# Patient Record
Sex: MALE | Race: WHITE
[De-identification: names, ages, dates, MRNs, and addresses within clinical notes are randomized per-mention and may not be internally consistent; named-entity substitution may affect disease eponyms.]

---

## 2019-01-25 ENCOUNTER — HOSPITAL ENCOUNTER (OUTPATIENT)
Age: 79
End: 2019-01-25
Payer: MEDICARE

## 2019-01-25 VITALS — BODY MASS INDEX: 34 KG/M2

## 2019-01-25 DIAGNOSIS — G47.30: Primary | ICD-10-CM

## 2019-01-25 PROCEDURE — 95811 POLYSOM 6/>YRS CPAP 4/> PARM: CPT

## 2019-03-06 ENCOUNTER — HOSPITAL ENCOUNTER (OUTPATIENT)
Age: 79
End: 2019-03-06
Payer: MEDICARE

## 2019-03-06 VITALS — BODY MASS INDEX: 34 KG/M2

## 2019-03-06 DIAGNOSIS — R06.00: Primary | ICD-10-CM

## 2019-03-06 PROCEDURE — 94726 PLETHYSMOGRAPHY LUNG VOLUMES: CPT

## 2019-03-06 PROCEDURE — 94060 EVALUATION OF WHEEZING: CPT

## 2019-03-06 PROCEDURE — 94729 DIFFUSING CAPACITY: CPT

## 2019-03-11 ENCOUNTER — HOSPITAL ENCOUNTER (OUTPATIENT)
Age: 79
End: 2019-03-11
Payer: MEDICARE

## 2019-03-11 VITALS — OXYGEN SATURATION: 92 % | HEART RATE: 80 BPM

## 2019-03-11 VITALS — BODY MASS INDEX: 34 KG/M2

## 2019-03-11 DIAGNOSIS — R06.00: ICD-10-CM

## 2019-03-11 DIAGNOSIS — N20.0: Primary | ICD-10-CM

## 2019-03-11 DIAGNOSIS — R10.9: ICD-10-CM

## 2019-03-11 PROCEDURE — 94618 PULMONARY STRESS TESTING: CPT

## 2019-03-11 PROCEDURE — 74018 RADEX ABDOMEN 1 VIEW: CPT

## 2019-03-11 PROCEDURE — 82360 CALCULUS ASSAY QUANT: CPT

## 2019-03-18 LAB
CA OXALATE, DIHYDRATE: (no result)
CA OXALATE, MONOHYDRATE: 5 %
CHOLEST CRY STONE QL IR: (no result)
CHOLESTEROL: (no result)
COD CRY STONE QL IR: (no result)
COM CRY STONE QL IR: 5 %
COMMENT: (no result)
URATE CRY STONE QL IR: (no result)
URIC ACID: (no result)

## 2019-04-24 ENCOUNTER — HOSPITAL ENCOUNTER (OUTPATIENT)
Dept: HOSPITAL 100 - PAVLAB | Age: 79
Discharge: HOME | End: 2019-04-24
Payer: MEDICARE

## 2019-04-24 VITALS — BODY MASS INDEX: 33.7 KG/M2

## 2019-04-24 DIAGNOSIS — Z98.890: ICD-10-CM

## 2019-04-24 DIAGNOSIS — R53.83: ICD-10-CM

## 2019-04-24 DIAGNOSIS — R06.00: Primary | ICD-10-CM

## 2019-04-24 LAB
ANION GAP: 3 (ref 5–15)
BNP,B-TYPE NATRIURETIC PEPTIDE: 53.4 PG/ML (ref 0–100)
BUN SERPL-MCNC: 15 MG/DL (ref 7–18)
BUN/CREAT RATIO: 13.5 RATIO (ref 10–20)
CALCIUM SERPL-MCNC: 9.3 MG/DL (ref 8.5–10.1)
CARBON DIOXIDE: 25 MMOL/L (ref 21–32)
CHLORIDE: 112 MMOL/L (ref 98–107)
DEPRECATED RDW RBC: 45.1 FL (ref 35.1–43.9)
DIFFERENTIAL INDICATED: (no result)
ERYTHROCYTE [DISTWIDTH] IN BLOOD: 13.4 % (ref 11.6–14.6)
EST GLOM FILT RATE - AFR AMER: 83 ML/MIN (ref 60–?)
FERRITIN SERPL-MCNC: 54 NG/ML (ref 26–388)
GLUCOSE: 140 MG/DL (ref 74–106)
HCT VFR BLD AUTO: 42.5 % (ref 40–54)
HEMOGLOBIN: 14.1 G/DL (ref 13–16.5)
HGB BLD-MCNC: 14.1 G/DL (ref 13–16.5)
IMMATURE GRANULOCYTES COUNT: 0.01 X10^3/UL (ref 0–0)
IRON BINDING CAPACITY,TOTAL: 281 UG/DL (ref 250–450)
IRON SATN MFR SERPL: 39.5 % (ref 15–55)
IRON SPEC-MCNT: 111 UG/DL (ref 65–175)
MCV RBC: 92.4 FL (ref 80–94)
MEAN CORP HGB CONC: 33.2 G/GL (ref 32–36)
MEAN PLATELET VOL.: 9.6 FL (ref 6.2–12)
PLATELET # BLD: 188 K/MM3 (ref 150–450)
PLATELET COUNT: 188 K/MM3 (ref 150–450)
POSITIVE COUNT: NO
POSITIVE DIFFERENTIAL: NO
POSITIVE MORPHOLOGY: NO
POTASSIUM: 4 MMOL/L (ref 3.5–5.1)
RBC # BLD AUTO: 4.6 M/MM3 (ref 4.6–6.2)
RBC DISTRIBUTION WIDTH CV: 13.4 % (ref 11.6–14.6)
RBC DISTRIBUTION WIDTH SD: 45.1 FL (ref 35.1–43.9)
WBC # BLD AUTO: 5.9 K/MM3 (ref 4.4–11)
WHITE BLOOD COUNT: 5.9 K/MM3 (ref 4.4–11)

## 2019-04-24 PROCEDURE — 85025 COMPLETE CBC W/AUTO DIFF WBC: CPT

## 2019-04-24 PROCEDURE — 36415 COLL VENOUS BLD VENIPUNCTURE: CPT

## 2019-04-24 PROCEDURE — 82728 ASSAY OF FERRITIN: CPT

## 2019-04-24 PROCEDURE — 80048 BASIC METABOLIC PNL TOTAL CA: CPT

## 2019-04-24 PROCEDURE — 83550 IRON BINDING TEST: CPT

## 2019-04-24 PROCEDURE — 83540 ASSAY OF IRON: CPT

## 2019-04-24 PROCEDURE — 83880 ASSAY OF NATRIURETIC PEPTIDE: CPT

## 2019-07-01 ENCOUNTER — HOSPITAL ENCOUNTER (OUTPATIENT)
Dept: HOSPITAL 100 - CVS | Age: 79
Discharge: HOME | End: 2019-07-01
Payer: MEDICARE

## 2019-07-01 VITALS — BODY MASS INDEX: 34.1 KG/M2

## 2019-07-01 DIAGNOSIS — R06.00: Primary | ICD-10-CM

## 2019-07-01 PROCEDURE — 93306 TTE W/DOPPLER COMPLETE: CPT

## 2019-07-27 ENCOUNTER — HOSPITAL ENCOUNTER (EMERGENCY)
Age: 79
Discharge: HOME | End: 2019-07-27
Payer: MEDICARE

## 2019-07-27 VITALS — HEART RATE: 67 BPM | RESPIRATION RATE: 24 BRPM | OXYGEN SATURATION: 95 %

## 2019-07-27 VITALS
DIASTOLIC BLOOD PRESSURE: 77 MMHG | RESPIRATION RATE: 16 BRPM | OXYGEN SATURATION: 95 % | TEMPERATURE: 98.42 F | SYSTOLIC BLOOD PRESSURE: 140 MMHG | HEART RATE: 74 BPM

## 2019-07-27 VITALS
SYSTOLIC BLOOD PRESSURE: 131 MMHG | DIASTOLIC BLOOD PRESSURE: 68 MMHG | OXYGEN SATURATION: 96 % | RESPIRATION RATE: 16 BRPM | HEART RATE: 67 BPM

## 2019-07-27 VITALS — RESPIRATION RATE: 16 BRPM | HEART RATE: 65 BPM | OXYGEN SATURATION: 97 %

## 2019-07-27 VITALS — BODY MASS INDEX: 35.2 KG/M2 | BODY MASS INDEX: 34.9 KG/M2

## 2019-07-27 DIAGNOSIS — Z79.51: ICD-10-CM

## 2019-07-27 DIAGNOSIS — Z79.899: ICD-10-CM

## 2019-07-27 DIAGNOSIS — J44.9: ICD-10-CM

## 2019-07-27 DIAGNOSIS — R53.83: ICD-10-CM

## 2019-07-27 DIAGNOSIS — Z79.02: ICD-10-CM

## 2019-07-27 DIAGNOSIS — H81.09: Primary | ICD-10-CM

## 2019-07-27 DIAGNOSIS — G47.33: ICD-10-CM

## 2019-07-27 LAB
ABSOLUTE NUCLEATED RBC COUNT: 0 10^3/UL (ref 0–5)
ANION GAP: 5 (ref 5–15)
BUN SERPL-MCNC: 9 MG/DL (ref 7–18)
BUN/CREAT RATIO: 9.4 RATIO (ref 10–20)
CALCIUM SERPL-MCNC: 9.5 MG/DL (ref 8.5–10.1)
CARBON DIOXIDE: 26 MMOL/L (ref 21–32)
CHLORIDE: 106 MMOL/L (ref 98–107)
DEPRECATED RDW RBC: 43.2 FL (ref 35.1–43.9)
ERYTHROCYTE [DISTWIDTH] IN BLOOD: 12.5 % (ref 11.6–14.6)
EST GLOM FILT RATE - AFR AMER: 98 ML/MIN (ref 60–?)
ESTIMATED CREATININE CLEARANCE: 77.02 ML/MIN
GLUCOSE: 114 MG/DL (ref 74–106)
HCT VFR BLD AUTO: 43.8 % (ref 40–54)
HEMOGLOBIN: 14.6 G/DL (ref 13–16.5)
HGB BLD-MCNC: 14.6 G/DL (ref 13–16.5)
IMMATURE GRANULOCYTES COUNT: 0.01 X10^3/UL (ref 0–0)
KETONE-DIPSTICK: 15 MG/DL
LEUKOCYTE ESTERASE UR QL STRIP: 25 /UL
MCV RBC: 94 FL (ref 80–94)
MEAN CORP HGB CONC: 33.3 G/DL (ref 32–36)
MEAN PLATELET VOL.: 9.9 FL (ref 6.2–12)
MUCOUS THREADS URNS QL MICRO: (no result) /HPF
NRBC FLAGGED BY ANALYZER: 0 % (ref 0–5)
PLATELET # BLD: 210 K/MM3 (ref 150–450)
PLATELET COUNT: 210 K/MM3 (ref 150–450)
POTASSIUM: 4 MMOL/L (ref 3.5–5.1)
RBC # BLD AUTO: 4.66 M/MM3 (ref 4.6–6.2)
RBC DISTRIBUTION WIDTH CV: 12.5 % (ref 11.6–14.6)
RBC DISTRIBUTION WIDTH SD: 43.2 FL (ref 35.1–43.9)
RBC UR QL: (no result) /HPF (ref 0–5)
SP GR UR: 1.01 (ref 1–1.03)
SQUAMOUS URNS QL MICRO: (no result) /HPF (ref 0–5)
URINE PRESERVATIVE: (no result)
WBC # BLD AUTO: 7.4 K/MM3 (ref 4.4–11)
WHITE BLOOD COUNT: 7.4 K/MM3 (ref 4.4–11)

## 2019-07-27 PROCEDURE — 80048 BASIC METABOLIC PNL TOTAL CA: CPT

## 2019-07-27 PROCEDURE — A4216 STERILE WATER/SALINE, 10 ML: HCPCS

## 2019-07-27 PROCEDURE — 94640 AIRWAY INHALATION TREATMENT: CPT

## 2019-07-27 PROCEDURE — 85025 COMPLETE CBC W/AUTO DIFF WBC: CPT

## 2019-07-27 PROCEDURE — 71046 X-RAY EXAM CHEST 2 VIEWS: CPT

## 2019-07-27 PROCEDURE — 99285 EMERGENCY DEPT VISIT HI MDM: CPT

## 2019-07-27 PROCEDURE — 81001 URINALYSIS AUTO W/SCOPE: CPT

## 2019-07-27 PROCEDURE — 93005 ELECTROCARDIOGRAM TRACING: CPT

## 2019-07-27 PROCEDURE — 84484 ASSAY OF TROPONIN QUANT: CPT

## 2019-08-29 ENCOUNTER — HOSPITAL ENCOUNTER (OUTPATIENT)
Dept: HOSPITAL 100 - CT | Age: 79
Discharge: HOME | End: 2019-08-29
Payer: MEDICARE

## 2019-08-29 VITALS — BODY MASS INDEX: 35.2 KG/M2

## 2019-08-29 DIAGNOSIS — R06.09: Primary | ICD-10-CM

## 2019-08-29 PROCEDURE — 71275 CT ANGIOGRAPHY CHEST: CPT

## 2019-09-05 ENCOUNTER — HOSPITAL ENCOUNTER (OUTPATIENT)
Dept: HOSPITAL 100 - PSN | Age: 79
Discharge: HOME | End: 2019-09-05
Payer: MEDICARE

## 2019-09-05 VITALS — HEART RATE: 105 BPM | OXYGEN SATURATION: 91 %

## 2019-09-05 VITALS — BODY MASS INDEX: 35.2 KG/M2

## 2019-09-05 DIAGNOSIS — R06.00: Primary | ICD-10-CM

## 2019-09-05 PROCEDURE — 94618 PULMONARY STRESS TESTING: CPT

## 2020-01-02 ENCOUNTER — HOSPITAL ENCOUNTER (OUTPATIENT)
Age: 80
End: 2020-01-02
Payer: MEDICARE

## 2020-01-02 VITALS — BODY MASS INDEX: 36.1 KG/M2

## 2020-01-02 DIAGNOSIS — R82.998: Primary | ICD-10-CM

## 2020-01-02 PROCEDURE — 87086 URINE CULTURE/COLONY COUNT: CPT

## 2020-01-03 ENCOUNTER — HOSPITAL ENCOUNTER (OUTPATIENT)
Age: 80
End: 2020-01-03
Payer: MEDICARE

## 2020-01-03 VITALS — BODY MASS INDEX: 36.1 KG/M2

## 2020-01-03 DIAGNOSIS — N20.1: ICD-10-CM

## 2020-01-03 DIAGNOSIS — N40.1: Primary | ICD-10-CM

## 2020-01-03 DIAGNOSIS — R31.0: ICD-10-CM

## 2020-01-03 PROCEDURE — 88305 TISSUE EXAM BY PATHOLOGIST: CPT

## 2021-04-02 ENCOUNTER — HOSPITAL ENCOUNTER (OUTPATIENT)
Age: 81
End: 2021-04-02
Payer: MEDICARE

## 2021-04-02 VITALS — BODY MASS INDEX: 29.3 KG/M2

## 2021-04-02 DIAGNOSIS — R10.9: Primary | ICD-10-CM

## 2021-04-02 LAB
ALANINE AMINOTRANSFER ALT/SGPT: 32 U/L (ref 16–61)
ALBUMIN SERPL-MCNC: 3.5 G/DL (ref 3.2–5)
ALKALINE PHOSPHATASE: 110 U/L (ref 45–117)
AMYLASE SERPL-CCNC: 64 U/L (ref 25–115)
ANION GAP: 2 (ref 5–15)
AST(SGOT): 26 U/L (ref 15–37)
BUN SERPL-MCNC: 22 MG/DL (ref 7–18)
BUN/CREAT RATIO: 19.8 RATIO (ref 10–20)
CALCIUM SERPL-MCNC: 9.4 MG/DL (ref 8.5–10.1)
CARBON DIOXIDE: 27 MMOL/L (ref 21–32)
CHLORIDE: 107 MMOL/L (ref 98–107)
CRP SERPL-MCNC: < 2.9 MG/L (ref 0–3)
DEPRECATED RDW RBC: 44.1 FL (ref 35.1–43.9)
ERYTHROCYTE [DISTWIDTH] IN BLOOD: 12.7 % (ref 11.6–14.6)
EST GLOM FILT RATE - AFR AMER: 82 ML/MIN (ref 60–?)
GLOBULIN: 3.5 G/DL (ref 2.2–4.2)
GLUCOSE: 102 MG/DL (ref 74–106)
HCT VFR BLD AUTO: 41.7 % (ref 40–54)
HEMOGLOBIN: 13.8 G/DL (ref 13–16.5)
HGB BLD-MCNC: 13.8 G/DL (ref 13–16.5)
IMMATURE GRANULOCYTES COUNT: 0.01 X10^3/UL (ref 0–0)
LIPASE: 79 U/L (ref 73–393)
MCV RBC: 94.3 FL (ref 80–94)
MEAN CORP HGB CONC: 33.1 G/DL (ref 32–36)
MEAN PLATELET VOL.: 9.6 FL (ref 6.2–12)
NRBC FLAGGED BY ANALYZER: 0 % (ref 0–5)
PLATELET # BLD: 209 K/MM3 (ref 150–450)
PLATELET COUNT: 209 K/MM3 (ref 150–450)
POTASSIUM: 4.6 MMOL/L (ref 3.5–5.1)
RBC # BLD AUTO: 4.42 M/MM3 (ref 4.6–6.2)
RBC DISTRIBUTION WIDTH CV: 12.7 % (ref 11.6–14.6)
RBC DISTRIBUTION WIDTH SD: 44.1 FL (ref 35.1–43.9)
WBC # BLD AUTO: 5.5 K/MM3 (ref 4.4–11)
WHITE BLOOD COUNT: 5.5 K/MM3 (ref 4.4–11)
XTRA TUBE EP LAB: (no result)

## 2021-04-02 PROCEDURE — 82150 ASSAY OF AMYLASE: CPT

## 2021-04-02 PROCEDURE — 80053 COMPREHEN METABOLIC PANEL: CPT

## 2021-04-02 PROCEDURE — 86140 C-REACTIVE PROTEIN: CPT

## 2021-04-02 PROCEDURE — 36415 COLL VENOUS BLD VENIPUNCTURE: CPT

## 2021-04-02 PROCEDURE — 74177 CT ABD & PELVIS W/CONTRAST: CPT

## 2021-04-02 PROCEDURE — 85652 RBC SED RATE AUTOMATED: CPT

## 2021-04-02 PROCEDURE — 83690 ASSAY OF LIPASE: CPT

## 2021-04-02 PROCEDURE — 85025 COMPLETE CBC W/AUTO DIFF WBC: CPT

## 2021-04-12 ENCOUNTER — HOSPITAL ENCOUNTER (OUTPATIENT)
Dept: HOSPITAL 100 - EN | Age: 81
Discharge: HOME | End: 2021-04-12
Payer: MEDICARE

## 2021-04-12 VITALS
SYSTOLIC BLOOD PRESSURE: 123 MMHG | OXYGEN SATURATION: 96 % | DIASTOLIC BLOOD PRESSURE: 64 MMHG | TEMPERATURE: 97 F | RESPIRATION RATE: 16 BRPM | HEART RATE: 58 BPM

## 2021-04-12 VITALS
SYSTOLIC BLOOD PRESSURE: 123 MMHG | HEART RATE: 57 BPM | DIASTOLIC BLOOD PRESSURE: 62 MMHG | TEMPERATURE: 96.8 F | OXYGEN SATURATION: 98 % | RESPIRATION RATE: 16 BRPM

## 2021-04-12 VITALS
DIASTOLIC BLOOD PRESSURE: 64 MMHG | HEART RATE: 72 BPM | OXYGEN SATURATION: 96 % | SYSTOLIC BLOOD PRESSURE: 123 MMHG | TEMPERATURE: 97.3 F | RESPIRATION RATE: 16 BRPM

## 2021-04-12 VITALS
SYSTOLIC BLOOD PRESSURE: 113 MMHG | DIASTOLIC BLOOD PRESSURE: 64 MMHG | OXYGEN SATURATION: 98 % | HEART RATE: 58 BPM | RESPIRATION RATE: 16 BRPM

## 2021-04-12 VITALS — BODY MASS INDEX: 30 KG/M2 | BODY MASS INDEX: 29.3 KG/M2

## 2021-04-12 VITALS — DIASTOLIC BLOOD PRESSURE: 64 MMHG | SYSTOLIC BLOOD PRESSURE: 123 MMHG

## 2021-04-12 VITALS
OXYGEN SATURATION: 98 % | DIASTOLIC BLOOD PRESSURE: 64 MMHG | SYSTOLIC BLOOD PRESSURE: 114 MMHG | RESPIRATION RATE: 16 BRPM | HEART RATE: 55 BPM

## 2021-04-12 DIAGNOSIS — I10: ICD-10-CM

## 2021-04-12 DIAGNOSIS — K44.9: ICD-10-CM

## 2021-04-12 DIAGNOSIS — Z86.73: ICD-10-CM

## 2021-04-12 DIAGNOSIS — K63.5: ICD-10-CM

## 2021-04-12 DIAGNOSIS — G25.81: ICD-10-CM

## 2021-04-12 DIAGNOSIS — K21.9: ICD-10-CM

## 2021-04-12 DIAGNOSIS — Z86.718: ICD-10-CM

## 2021-04-12 DIAGNOSIS — K64.1: ICD-10-CM

## 2021-04-12 DIAGNOSIS — F32.9: ICD-10-CM

## 2021-04-12 DIAGNOSIS — Z79.899: ICD-10-CM

## 2021-04-12 DIAGNOSIS — Z79.02: ICD-10-CM

## 2021-04-12 DIAGNOSIS — K57.30: ICD-10-CM

## 2021-04-12 DIAGNOSIS — E78.5: ICD-10-CM

## 2021-04-12 DIAGNOSIS — Z79.51: ICD-10-CM

## 2021-04-12 DIAGNOSIS — J44.9: ICD-10-CM

## 2021-04-12 DIAGNOSIS — K29.50: Primary | ICD-10-CM

## 2021-04-12 PROCEDURE — 45380 COLONOSCOPY AND BIOPSY: CPT

## 2021-04-12 PROCEDURE — 88341 IMHCHEM/IMCYTCHM EA ADD ANTB: CPT

## 2021-04-12 PROCEDURE — 88305 TISSUE EXAM BY PATHOLOGIST: CPT

## 2021-04-12 PROCEDURE — 0DJD8ZZ INSPECTION OF LOWER INTESTINAL TRACT, VIA NATURAL OR ARTIFICIAL OPENING ENDOSCOPIC: ICD-10-PCS | Performed by: SURGERY

## 2021-04-12 PROCEDURE — A4216 STERILE WATER/SALINE, 10 ML: HCPCS

## 2021-04-12 PROCEDURE — 43239 EGD BIOPSY SINGLE/MULTIPLE: CPT

## 2021-04-12 PROCEDURE — 88342 IMHCHEM/IMCYTCHM 1ST ANTB: CPT

## 2021-04-12 PROCEDURE — 88313 SPECIAL STAINS GROUP 2: CPT

## 2021-04-24 ENCOUNTER — HOSPITAL ENCOUNTER (OUTPATIENT)
Age: 81
End: 2021-04-24
Payer: MEDICARE

## 2021-04-24 VITALS — BODY MASS INDEX: 30 KG/M2

## 2021-04-24 DIAGNOSIS — R19.7: Primary | ICD-10-CM

## 2021-04-24 PROCEDURE — 87493 C DIFF AMPLIFIED PROBE: CPT

## 2021-04-24 PROCEDURE — 87506 IADNA-DNA/RNA PROBE TQ 6-11: CPT

## 2021-04-24 PROCEDURE — 82705 FATS/LIPIDS FECES QUAL: CPT

## 2022-01-21 ENCOUNTER — HOSPITAL ENCOUNTER (OUTPATIENT)
Dept: HOSPITAL 100 - RAD | Age: 82
Discharge: ANOTHER HOSPITAL | End: 2022-01-21
Payer: MEDICARE

## 2022-01-21 DIAGNOSIS — Z93.3: Primary | ICD-10-CM

## 2022-01-21 PROCEDURE — 74280 X-RAY XM COLON 2CNTRST STD: CPT

## 2022-04-19 ENCOUNTER — HOSPITAL ENCOUNTER (OUTPATIENT)
Age: 82
Discharge: HOME | End: 2022-04-19
Payer: MEDICARE

## 2022-04-19 DIAGNOSIS — Z12.5: Primary | ICD-10-CM

## 2022-04-19 LAB — PSA,TOTAL - ANNUAL SCREEN: 5.48 NG/ML (ref 0–4)

## 2022-04-19 PROCEDURE — G0103 PSA SCREENING: HCPCS

## 2022-04-19 PROCEDURE — 84153 ASSAY OF PSA TOTAL: CPT

## 2022-04-19 PROCEDURE — 36415 COLL VENOUS BLD VENIPUNCTURE: CPT

## 2022-07-14 LAB
ANION GAP: 4 (ref 5–15)
BUN SERPL-MCNC: 17 MG/DL (ref 7–18)
BUN/CREAT RATIO: 17.1 RATIO (ref 10–20)
CALCIUM SERPL-MCNC: 9.5 MG/DL (ref 8.5–10.1)
CARBON DIOXIDE: 28 MMOL/L (ref 21–32)
CHLORIDE: 107 MMOL/L (ref 98–107)
DEPRECATED RDW RBC: 44 FL (ref 35.1–43.9)
ERYTHROCYTE [DISTWIDTH] IN BLOOD: 13 % (ref 11.6–14.6)
EST GLOM FILT RATE - AFR AMER: 93 ML/MIN (ref 60–?)
GLUCOSE: 116 MG/DL (ref 74–106)
HCT VFR BLD AUTO: 41.2 % (ref 40–54)
HEMOGLOBIN: 13.8 G/DL (ref 13–16.5)
HGB BLD-MCNC: 13.8 G/DL (ref 13–16.5)
MAGNESIUM: 2.4 MG/DL (ref 1.6–2.6)
MCV RBC: 92.2 FL (ref 80–94)
MEAN CORP HGB CONC: 33.5 G/DL (ref 32–36)
MEAN PLATELET VOL.: 9.7 FL (ref 6.2–12)
PLATELET # BLD: 217 K/MM3 (ref 150–450)
PLATELET COUNT: 217 K/MM3 (ref 150–450)
POTASSIUM: 4.1 MMOL/L (ref 3.5–5.1)
RBC # BLD AUTO: 4.47 M/MM3 (ref 4.6–6.2)
RBC DISTRIBUTION WIDTH CV: 13 % (ref 11.6–14.6)
RBC DISTRIBUTION WIDTH SD: 44 FL (ref 35.1–43.9)
WBC # BLD AUTO: 7.5 K/MM3 (ref 4.4–11)
WHITE BLOOD COUNT: 7.5 K/MM3 (ref 4.4–11)

## 2022-07-18 ENCOUNTER — HOSPITAL ENCOUNTER (INPATIENT)
Dept: HOSPITAL 100 - ACINP | Age: 82
LOS: 5 days | Discharge: HOME HEALTH | DRG: 331 | End: 2022-07-23
Payer: MEDICARE

## 2022-07-18 VITALS
OXYGEN SATURATION: 95 % | SYSTOLIC BLOOD PRESSURE: 118 MMHG | DIASTOLIC BLOOD PRESSURE: 64 MMHG | TEMPERATURE: 97.6 F | HEART RATE: 64 BPM | RESPIRATION RATE: 16 BRPM

## 2022-07-18 VITALS
TEMPERATURE: 97.7 F | SYSTOLIC BLOOD PRESSURE: 110 MMHG | RESPIRATION RATE: 16 BRPM | OXYGEN SATURATION: 100 % | HEART RATE: 58 BPM | DIASTOLIC BLOOD PRESSURE: 75 MMHG

## 2022-07-18 VITALS
OXYGEN SATURATION: 100 % | DIASTOLIC BLOOD PRESSURE: 75 MMHG | RESPIRATION RATE: 16 BRPM | SYSTOLIC BLOOD PRESSURE: 118 MMHG | HEART RATE: 57 BPM

## 2022-07-18 VITALS
HEART RATE: 76 BPM | DIASTOLIC BLOOD PRESSURE: 75 MMHG | OXYGEN SATURATION: 96 % | RESPIRATION RATE: 16 BRPM | TEMPERATURE: 97.1 F | SYSTOLIC BLOOD PRESSURE: 137 MMHG

## 2022-07-18 VITALS
DIASTOLIC BLOOD PRESSURE: 75 MMHG | RESPIRATION RATE: 16 BRPM | HEART RATE: 58 BPM | SYSTOLIC BLOOD PRESSURE: 122 MMHG | OXYGEN SATURATION: 100 %

## 2022-07-18 VITALS — HEART RATE: 75 BPM | RESPIRATION RATE: 20 BRPM

## 2022-07-18 VITALS
HEART RATE: 71 BPM | RESPIRATION RATE: 18 BRPM | DIASTOLIC BLOOD PRESSURE: 85 MMHG | SYSTOLIC BLOOD PRESSURE: 127 MMHG | TEMPERATURE: 98.3 F | OXYGEN SATURATION: 96 %

## 2022-07-18 VITALS
SYSTOLIC BLOOD PRESSURE: 127 MMHG | OXYGEN SATURATION: 95 % | RESPIRATION RATE: 18 BRPM | HEART RATE: 70 BPM | TEMPERATURE: 97.7 F | DIASTOLIC BLOOD PRESSURE: 63 MMHG

## 2022-07-18 VITALS
RESPIRATION RATE: 16 BRPM | HEART RATE: 60 BPM | DIASTOLIC BLOOD PRESSURE: 75 MMHG | SYSTOLIC BLOOD PRESSURE: 121 MMHG | OXYGEN SATURATION: 95 %

## 2022-07-18 VITALS — DIASTOLIC BLOOD PRESSURE: 62 MMHG | RESPIRATION RATE: 16 BRPM | SYSTOLIC BLOOD PRESSURE: 119 MMHG | HEART RATE: 58 BPM

## 2022-07-18 VITALS
SYSTOLIC BLOOD PRESSURE: 114 MMHG | RESPIRATION RATE: 18 BRPM | OXYGEN SATURATION: 99 % | TEMPERATURE: 98.6 F | HEART RATE: 64 BPM | DIASTOLIC BLOOD PRESSURE: 52 MMHG

## 2022-07-18 VITALS — OXYGEN SATURATION: 96 %

## 2022-07-18 VITALS — BODY MASS INDEX: 30.8 KG/M2

## 2022-07-18 DIAGNOSIS — R00.0: ICD-10-CM

## 2022-07-18 DIAGNOSIS — G47.33: ICD-10-CM

## 2022-07-18 DIAGNOSIS — Z43.3: Primary | ICD-10-CM

## 2022-07-18 DIAGNOSIS — R09.02: ICD-10-CM

## 2022-07-18 DIAGNOSIS — Z86.718: ICD-10-CM

## 2022-07-18 DIAGNOSIS — K66.0: ICD-10-CM

## 2022-07-18 DIAGNOSIS — G25.81: ICD-10-CM

## 2022-07-18 DIAGNOSIS — I25.10: ICD-10-CM

## 2022-07-18 DIAGNOSIS — Z86.73: ICD-10-CM

## 2022-07-18 DIAGNOSIS — K43.5: ICD-10-CM

## 2022-07-18 DIAGNOSIS — Z79.02: ICD-10-CM

## 2022-07-18 DIAGNOSIS — F32.A: ICD-10-CM

## 2022-07-18 DIAGNOSIS — E87.6: ICD-10-CM

## 2022-07-18 DIAGNOSIS — J44.9: ICD-10-CM

## 2022-07-18 DIAGNOSIS — I95.1: ICD-10-CM

## 2022-07-18 DIAGNOSIS — I10: ICD-10-CM

## 2022-07-18 DIAGNOSIS — I45.10: ICD-10-CM

## 2022-07-18 DIAGNOSIS — R53.1: ICD-10-CM

## 2022-07-18 DIAGNOSIS — N40.0: ICD-10-CM

## 2022-07-18 DIAGNOSIS — E66.9: ICD-10-CM

## 2022-07-18 DIAGNOSIS — K21.9: ICD-10-CM

## 2022-07-18 DIAGNOSIS — E78.5: ICD-10-CM

## 2022-07-18 DIAGNOSIS — Z79.899: ICD-10-CM

## 2022-07-18 DIAGNOSIS — R53.83: ICD-10-CM

## 2022-07-18 PROCEDURE — 80048 BASIC METABOLIC PNL TOTAL CA: CPT

## 2022-07-18 PROCEDURE — 74019 RADEX ABDOMEN 2 VIEWS: CPT

## 2022-07-18 PROCEDURE — 85025 COMPLETE CBC W/AUTO DIFF WBC: CPT

## 2022-07-18 PROCEDURE — 71045 X-RAY EXAM CHEST 1 VIEW: CPT

## 2022-07-18 PROCEDURE — 84132 ASSAY OF SERUM POTASSIUM: CPT

## 2022-07-18 PROCEDURE — 97530 THERAPEUTIC ACTIVITIES: CPT

## 2022-07-18 PROCEDURE — 0DQE4ZZ REPAIR LARGE INTESTINE, PERCUTANEOUS ENDOSCOPIC APPROACH: ICD-10-PCS | Performed by: SURGERY

## 2022-07-18 PROCEDURE — 81001 URINALYSIS AUTO W/SCOPE: CPT

## 2022-07-18 PROCEDURE — 36415 COLL VENOUS BLD VENIPUNCTURE: CPT

## 2022-07-18 PROCEDURE — C8929 TTE W OR WO FOL WCON,DOPPLER: HCPCS

## 2022-07-18 PROCEDURE — 87088 URINE BACTERIA CULTURE: CPT

## 2022-07-18 PROCEDURE — 93306 TTE W/DOPPLER COMPLETE: CPT

## 2022-07-18 PROCEDURE — 94762 N-INVAS EAR/PLS OXIMTRY CONT: CPT

## 2022-07-18 PROCEDURE — 82962 GLUCOSE BLOOD TEST: CPT

## 2022-07-18 PROCEDURE — 97162 PT EVAL MOD COMPLEX 30 MIN: CPT

## 2022-07-18 PROCEDURE — 87086 URINE CULTURE/COLONY COUNT: CPT

## 2022-07-18 PROCEDURE — 84484 ASSAY OF TROPONIN QUANT: CPT

## 2022-07-18 PROCEDURE — 71275 CT ANGIOGRAPHY CHEST: CPT

## 2022-07-18 PROCEDURE — A4216 STERILE WATER/SALINE, 10 ML: HCPCS

## 2022-07-18 PROCEDURE — 88304 TISSUE EXAM BY PATHOLOGIST: CPT

## 2022-07-18 PROCEDURE — 97535 SELF CARE MNGMENT TRAINING: CPT

## 2022-07-18 PROCEDURE — 99251: CPT

## 2022-07-18 PROCEDURE — 93005 ELECTROCARDIOGRAM TRACING: CPT

## 2022-07-18 PROCEDURE — 84443 ASSAY THYROID STIM HORMONE: CPT

## 2022-07-18 PROCEDURE — 85027 COMPLETE CBC AUTOMATED: CPT

## 2022-07-18 PROCEDURE — G0463 HOSPITAL OUTPT CLINIC VISIT: HCPCS

## 2022-07-18 PROCEDURE — 97166 OT EVAL MOD COMPLEX 45 MIN: CPT

## 2022-07-18 PROCEDURE — 83735 ASSAY OF MAGNESIUM: CPT

## 2022-07-18 PROCEDURE — 94640 AIRWAY INHALATION TREATMENT: CPT

## 2022-07-18 PROCEDURE — 88307 TISSUE EXAM BY PATHOLOGIST: CPT

## 2022-07-18 PROCEDURE — 84100 ASSAY OF PHOSPHORUS: CPT

## 2022-07-18 RX ADMIN — Medication 30 GM: at 10:00

## 2022-07-18 RX ADMIN — SODIUM CHLORIDE 10 ML: 9 INJECTION, SOLUTION INTRAMUSCULAR; INTRAVENOUS; SUBCUTANEOUS at 10:00

## 2022-07-18 RX ADMIN — BUPIVACAINE 20 ML: 13.3 INJECTION, SUSPENSION, LIPOSOMAL INFILTRATION at 10:00

## 2022-07-18 RX ADMIN — CEFOTETAN DISODIUM 200 GM: 2 INJECTION, POWDER, FOR SOLUTION INTRAMUSCULAR; INTRAVENOUS at 09:47

## 2022-07-18 RX ADMIN — ALBUTEROL SULFATE 2.5 MG: 2.5 SOLUTION RESPIRATORY (INHALATION) at 19:03

## 2022-07-18 RX ADMIN — BUDESONIDE 0.5 MG: 0.5 SUSPENSION RESPIRATORY (INHALATION) at 19:04

## 2022-07-19 VITALS
HEART RATE: 73 BPM | SYSTOLIC BLOOD PRESSURE: 129 MMHG | RESPIRATION RATE: 18 BRPM | OXYGEN SATURATION: 94 % | DIASTOLIC BLOOD PRESSURE: 69 MMHG | TEMPERATURE: 98.78 F

## 2022-07-19 VITALS — RESPIRATION RATE: 18 BRPM | HEART RATE: 82 BPM

## 2022-07-19 VITALS — OXYGEN SATURATION: 95 %

## 2022-07-19 VITALS
SYSTOLIC BLOOD PRESSURE: 115 MMHG | RESPIRATION RATE: 16 BRPM | HEART RATE: 63 BPM | TEMPERATURE: 98.42 F | OXYGEN SATURATION: 98 % | DIASTOLIC BLOOD PRESSURE: 58 MMHG

## 2022-07-19 VITALS
TEMPERATURE: 98.24 F | DIASTOLIC BLOOD PRESSURE: 71 MMHG | RESPIRATION RATE: 16 BRPM | HEART RATE: 93 BPM | OXYGEN SATURATION: 92 % | SYSTOLIC BLOOD PRESSURE: 113 MMHG

## 2022-07-19 VITALS — HEART RATE: 71 BPM | OXYGEN SATURATION: 97 % | DIASTOLIC BLOOD PRESSURE: 55 MMHG | SYSTOLIC BLOOD PRESSURE: 121 MMHG

## 2022-07-19 VITALS
HEART RATE: 64 BPM | SYSTOLIC BLOOD PRESSURE: 106 MMHG | RESPIRATION RATE: 16 BRPM | DIASTOLIC BLOOD PRESSURE: 57 MMHG | OXYGEN SATURATION: 96 % | TEMPERATURE: 97.8 F

## 2022-07-19 VITALS — RESPIRATION RATE: 20 BRPM | HEART RATE: 91 BPM

## 2022-07-19 VITALS
RESPIRATION RATE: 18 BRPM | OXYGEN SATURATION: 95 % | TEMPERATURE: 98.1 F | SYSTOLIC BLOOD PRESSURE: 129 MMHG | HEART RATE: 71 BPM | DIASTOLIC BLOOD PRESSURE: 50 MMHG

## 2022-07-19 VITALS — RESPIRATION RATE: 22 BRPM | HEART RATE: 78 BPM

## 2022-07-19 VITALS — SYSTOLIC BLOOD PRESSURE: 129 MMHG | DIASTOLIC BLOOD PRESSURE: 69 MMHG | HEART RATE: 68 BPM

## 2022-07-19 LAB
ANION GAP: 5 (ref 5–15)
BUN SERPL-MCNC: 12 MG/DL (ref 7–18)
BUN/CREAT RATIO: 12.4 RATIO (ref 10–20)
CALCIUM SERPL-MCNC: 8.9 MG/DL (ref 8.5–10.1)
CARBON DIOXIDE: 27 MMOL/L (ref 21–32)
CHLORIDE: 105 MMOL/L (ref 98–107)
DEPRECATED RDW RBC: 44.9 FL (ref 35.1–43.9)
DEPRECATED RDW RBC: 45.3 FL (ref 35.1–43.9)
ERYTHROCYTE [DISTWIDTH] IN BLOOD: 12.9 % (ref 11.6–14.6)
ERYTHROCYTE [DISTWIDTH] IN BLOOD: 13 % (ref 11.6–14.6)
EST GLOM FILT RATE - AFR AMER: 96 ML/MIN (ref 60–?)
ESTIMATED CREATININE CLEARANCE: 72.59 ML/MIN
GLUCOSE: 162 MG/DL (ref 74–106)
HCT VFR BLD AUTO: 38.5 % (ref 40–54)
HCT VFR BLD AUTO: 41.4 % (ref 40–54)
HEMOGLOBIN: 12.8 G/DL (ref 13–16.5)
HEMOGLOBIN: 13.3 G/DL (ref 13–16.5)
HGB BLD-MCNC: 12.8 G/DL (ref 13–16.5)
HGB BLD-MCNC: 13.3 G/DL (ref 13–16.5)
MCV RBC: 94.1 FL (ref 80–94)
MCV RBC: 95 FL (ref 80–94)
MEAN CORP HGB CONC: 32.1 G/DL (ref 32–36)
MEAN CORP HGB CONC: 33.2 G/DL (ref 32–36)
MEAN PLATELET VOL.: 10.1 FL (ref 6.2–12)
MEAN PLATELET VOL.: 9.8 FL (ref 6.2–12)
PLATELET # BLD: 190 K/MM3 (ref 150–450)
PLATELET # BLD: 193 K/MM3 (ref 150–450)
PLATELET COUNT: 190 K/MM3 (ref 150–450)
PLATELET COUNT: 193 K/MM3 (ref 150–450)
POTASSIUM: 3.6 MMOL/L (ref 3.5–5.1)
RBC # BLD AUTO: 4.09 M/MM3 (ref 4.6–6.2)
RBC # BLD AUTO: 4.36 M/MM3 (ref 4.6–6.2)
RBC DISTRIBUTION WIDTH CV: 12.9 % (ref 11.6–14.6)
RBC DISTRIBUTION WIDTH CV: 13 % (ref 11.6–14.6)
RBC DISTRIBUTION WIDTH SD: 44.9 FL (ref 35.1–43.9)
RBC DISTRIBUTION WIDTH SD: 45.3 FL (ref 35.1–43.9)
WBC # BLD AUTO: 11.8 K/MM3 (ref 4.4–11)
WBC # BLD AUTO: 12 K/MM3 (ref 4.4–11)
WHITE BLOOD COUNT: 11.8 K/MM3 (ref 4.4–11)
WHITE BLOOD COUNT: 12 K/MM3 (ref 4.4–11)

## 2022-07-19 RX ADMIN — ALBUTEROL SULFATE 2.5 MG: 2.5 SOLUTION RESPIRATORY (INHALATION) at 07:20

## 2022-07-19 RX ADMIN — ALBUTEROL SULFATE 2.5 MG: 2.5 SOLUTION RESPIRATORY (INHALATION) at 13:39

## 2022-07-19 RX ADMIN — BUDESONIDE 0.5 MG: 0.5 SUSPENSION RESPIRATORY (INHALATION) at 19:00

## 2022-07-19 RX ADMIN — BUDESONIDE 0.5 MG: 0.5 SUSPENSION RESPIRATORY (INHALATION) at 07:20

## 2022-07-19 RX ADMIN — FLUTICASONE PROPIONATE 2 SPRAY: 50 SPRAY, METERED NASAL at 09:38

## 2022-07-19 RX ADMIN — ALBUTEROL SULFATE 2.5 MG: 2.5 SOLUTION RESPIRATORY (INHALATION) at 19:00

## 2022-07-19 RX ADMIN — Medication 128 MG: at 09:39

## 2022-07-20 VITALS
SYSTOLIC BLOOD PRESSURE: 125 MMHG | RESPIRATION RATE: 16 BRPM | HEART RATE: 79 BPM | TEMPERATURE: 97.9 F | OXYGEN SATURATION: 94 % | DIASTOLIC BLOOD PRESSURE: 61 MMHG

## 2022-07-20 VITALS
OXYGEN SATURATION: 95 % | DIASTOLIC BLOOD PRESSURE: 74 MMHG | TEMPERATURE: 98.3 F | HEART RATE: 67 BPM | RESPIRATION RATE: 18 BRPM | SYSTOLIC BLOOD PRESSURE: 126 MMHG

## 2022-07-20 VITALS
HEART RATE: 82 BPM | RESPIRATION RATE: 16 BRPM | TEMPERATURE: 98.78 F | DIASTOLIC BLOOD PRESSURE: 68 MMHG | OXYGEN SATURATION: 97 % | SYSTOLIC BLOOD PRESSURE: 118 MMHG

## 2022-07-20 VITALS
HEART RATE: 86 BPM | OXYGEN SATURATION: 94 % | SYSTOLIC BLOOD PRESSURE: 110 MMHG | RESPIRATION RATE: 16 BRPM | TEMPERATURE: 97.88 F | DIASTOLIC BLOOD PRESSURE: 54 MMHG

## 2022-07-20 VITALS — HEART RATE: 79 BPM | RESPIRATION RATE: 18 BRPM

## 2022-07-20 VITALS
OXYGEN SATURATION: 94 % | SYSTOLIC BLOOD PRESSURE: 142 MMHG | TEMPERATURE: 97.88 F | HEART RATE: 97 BPM | DIASTOLIC BLOOD PRESSURE: 74 MMHG | RESPIRATION RATE: 16 BRPM

## 2022-07-20 VITALS — HEART RATE: 71 BPM | RESPIRATION RATE: 18 BRPM

## 2022-07-20 VITALS — RESPIRATION RATE: 18 BRPM | OXYGEN SATURATION: 92 %

## 2022-07-20 VITALS — HEART RATE: 86 BPM | RESPIRATION RATE: 18 BRPM

## 2022-07-20 LAB
ANION GAP: 7 (ref 5–15)
BUN SERPL-MCNC: 9 MG/DL (ref 7–18)
BUN/CREAT RATIO: 8.8 RATIO (ref 10–20)
CALCIUM SERPL-MCNC: 9.4 MG/DL (ref 8.5–10.1)
CARBON DIOXIDE: 26 MMOL/L (ref 21–32)
CHLORIDE: 105 MMOL/L (ref 98–107)
DEPRECATED RDW RBC: 44.5 FL (ref 35.1–43.9)
ERYTHROCYTE [DISTWIDTH] IN BLOOD: 13.1 % (ref 11.6–14.6)
EST GLOM FILT RATE - AFR AMER: 90 ML/MIN (ref 60–?)
ESTIMATED CREATININE CLEARANCE: 69.04 ML/MIN
GLUCOSE: 145 MG/DL (ref 74–106)
HCT VFR BLD AUTO: 39.7 % (ref 40–54)
HEMOGLOBIN: 13.1 G/DL (ref 13–16.5)
HGB BLD-MCNC: 13.1 G/DL (ref 13–16.5)
IMMATURE GRANULOCYTES COUNT: 0.05 X10^3/UL (ref 0–0)
MCV RBC: 93 FL (ref 80–94)
MEAN CORP HGB CONC: 33 G/DL (ref 32–36)
MEAN PLATELET VOL.: 9.8 FL (ref 6.2–12)
NRBC FLAGGED BY ANALYZER: 0 % (ref 0–5)
PLATELET # BLD: 212 K/MM3 (ref 150–450)
PLATELET COUNT: 212 K/MM3 (ref 150–450)
POTASSIUM: 3.3 MMOL/L (ref 3.5–5.1)
RBC # BLD AUTO: 4.27 M/MM3 (ref 4.6–6.2)
RBC DISTRIBUTION WIDTH CV: 13.1 % (ref 11.6–14.6)
RBC DISTRIBUTION WIDTH SD: 44.5 FL (ref 35.1–43.9)
WBC # BLD AUTO: 11.3 K/MM3 (ref 4.4–11)
WHITE BLOOD COUNT: 11.3 K/MM3 (ref 4.4–11)

## 2022-07-20 RX ADMIN — ALBUTEROL SULFATE 2.5 MG: 2.5 SOLUTION RESPIRATORY (INHALATION) at 13:18

## 2022-07-20 RX ADMIN — BUDESONIDE 0.5 MG: 0.5 SUSPENSION RESPIRATORY (INHALATION) at 19:38

## 2022-07-20 RX ADMIN — ALBUTEROL SULFATE 2.5 MG: 2.5 SOLUTION RESPIRATORY (INHALATION) at 07:09

## 2022-07-20 RX ADMIN — Medication 128 MG: at 07:56

## 2022-07-20 RX ADMIN — FLUTICASONE PROPIONATE 2 SPRAY: 50 SPRAY, METERED NASAL at 07:56

## 2022-07-20 RX ADMIN — BUDESONIDE 0.5 MG: 0.5 SUSPENSION RESPIRATORY (INHALATION) at 07:09

## 2022-07-20 RX ADMIN — ALBUTEROL SULFATE 2.5 MG: 2.5 SOLUTION RESPIRATORY (INHALATION) at 19:38

## 2022-07-20 RX ADMIN — POTASSIUM CHLORIDE 20 MEQ: 1500 TABLET, EXTENDED RELEASE ORAL at 17:10

## 2022-07-21 VITALS
SYSTOLIC BLOOD PRESSURE: 140 MMHG | TEMPERATURE: 98.96 F | DIASTOLIC BLOOD PRESSURE: 83 MMHG | RESPIRATION RATE: 18 BRPM | OXYGEN SATURATION: 94 % | HEART RATE: 120 BPM

## 2022-07-21 VITALS
RESPIRATION RATE: 18 BRPM | OXYGEN SATURATION: 93 % | DIASTOLIC BLOOD PRESSURE: 83 MMHG | TEMPERATURE: 99.5 F | HEART RATE: 121 BPM | SYSTOLIC BLOOD PRESSURE: 142 MMHG

## 2022-07-21 VITALS
RESPIRATION RATE: 16 BRPM | OXYGEN SATURATION: 96 % | HEART RATE: 87 BPM | DIASTOLIC BLOOD PRESSURE: 67 MMHG | TEMPERATURE: 97.88 F | SYSTOLIC BLOOD PRESSURE: 131 MMHG

## 2022-07-21 VITALS
RESPIRATION RATE: 18 BRPM | HEART RATE: 103 BPM | SYSTOLIC BLOOD PRESSURE: 121 MMHG | TEMPERATURE: 98.42 F | OXYGEN SATURATION: 89 % | DIASTOLIC BLOOD PRESSURE: 76 MMHG

## 2022-07-21 VITALS
RESPIRATION RATE: 16 BRPM | HEART RATE: 84 BPM | OXYGEN SATURATION: 96 % | DIASTOLIC BLOOD PRESSURE: 73 MMHG | TEMPERATURE: 98.78 F | SYSTOLIC BLOOD PRESSURE: 126 MMHG

## 2022-07-21 VITALS — OXYGEN SATURATION: 96 %

## 2022-07-21 VITALS — DIASTOLIC BLOOD PRESSURE: 84 MMHG | HEART RATE: 108 BPM | SYSTOLIC BLOOD PRESSURE: 129 MMHG

## 2022-07-21 VITALS — HEART RATE: 116 BPM | RESPIRATION RATE: 22 BRPM

## 2022-07-21 VITALS — RESPIRATION RATE: 20 BRPM | HEART RATE: 80 BPM

## 2022-07-21 VITALS — HEART RATE: 100 BPM

## 2022-07-21 LAB
ANION GAP: 7 (ref 5–15)
BUN SERPL-MCNC: 9 MG/DL (ref 7–18)
BUN/CREAT RATIO: 9.6 RATIO (ref 10–20)
CALCIUM SERPL-MCNC: 9.3 MG/DL (ref 8.5–10.1)
CARBON DIOXIDE: 25 MMOL/L (ref 21–32)
CHLORIDE: 105 MMOL/L (ref 98–107)
DEPRECATED RDW RBC: 44.5 FL (ref 35.1–43.9)
ERYTHROCYTE [DISTWIDTH] IN BLOOD: 13.1 % (ref 11.6–14.6)
EST GLOM FILT RATE - AFR AMER: 99 ML/MIN (ref 60–?)
ESTIMATED CREATININE CLEARANCE: 74.91 ML/MIN
GLUCOSE: 158 MG/DL (ref 74–106)
HCT VFR BLD AUTO: 36.9 % (ref 40–54)
HEMOGLOBIN: 12.5 G/DL (ref 13–16.5)
HGB BLD-MCNC: 12.5 G/DL (ref 13–16.5)
IMMATURE GRANULOCYTES COUNT: 0.06 X10^3/UL (ref 0–0)
LEUKOCYTE ESTERASE UR QL STRIP: 100 /UL
MAGNESIUM: 2.1 MG/DL (ref 1.6–2.6)
MCV RBC: 92 FL (ref 80–94)
MEAN CORP HGB CONC: 33.9 G/DL (ref 32–36)
MEAN PLATELET VOL.: 9.8 FL (ref 6.2–12)
MUCOUS THREADS URNS QL MICRO: (no result) /HPF
NRBC FLAGGED BY ANALYZER: 0 % (ref 0–5)
PLATELET # BLD: 206 K/MM3 (ref 150–450)
PLATELET COUNT: 206 K/MM3 (ref 150–450)
POTASSIUM: 3.1 MMOL/L (ref 3.5–5.1)
PROT UR QL STRIP.AUTO: 15 MG/DL
RBC # BLD AUTO: 4.01 M/MM3 (ref 4.6–6.2)
RBC DISTRIBUTION WIDTH CV: 13.1 % (ref 11.6–14.6)
RBC DISTRIBUTION WIDTH SD: 44.5 FL (ref 35.1–43.9)
RBC UR QL: (no result) /HPF (ref 0–5)
RBC UR QL: 10 /UL
SP GR UR: 1.02 (ref 1–1.03)
SQUAMOUS URNS QL MICRO: (no result) /HPF (ref 0–5)
TRI-PHOS CRY URNS QL MICRO: (no result) /HPF
TRIPLE PHOSPHATE CRYSTALS UR: (no result) /HPF
TROPONIN-I HS: 21 PG/ML (ref 3–78)
TROPONIN-I HS: 21 PG/ML (ref 3–78)
TROPONIN-I HS: 22 PG/ML (ref 3–78)
URINE PRESERVATIVE: (no result)
WBC # BLD AUTO: 9.5 K/MM3 (ref 4.4–11)
WHITE BLOOD COUNT: 9.5 K/MM3 (ref 4.4–11)
YEAST-URINE: (no result) /HPF

## 2022-07-21 RX ADMIN — BUDESONIDE 0.5 MG: 0.5 SUSPENSION RESPIRATORY (INHALATION) at 19:27

## 2022-07-21 RX ADMIN — ALBUTEROL SULFATE 2.5 MG: 2.5 SOLUTION RESPIRATORY (INHALATION) at 19:27

## 2022-07-21 RX ADMIN — POTASSIUM CHLORIDE 20 MEQ: 1500 TABLET, EXTENDED RELEASE ORAL at 07:59

## 2022-07-21 RX ADMIN — ALBUTEROL SULFATE 2.5 MG: 2.5 SOLUTION RESPIRATORY (INHALATION) at 12:28

## 2022-07-21 RX ADMIN — POTASSIUM CHLORIDE 40 MEQ: 1500 TABLET, EXTENDED RELEASE ORAL at 17:53

## 2022-07-21 RX ADMIN — FLUTICASONE PROPIONATE 2 SPRAY: 50 SPRAY, METERED NASAL at 10:00

## 2022-07-21 RX ADMIN — Medication 128 MG: at 10:00

## 2022-07-21 RX ADMIN — BUDESONIDE 0.5 MG: 0.5 SUSPENSION RESPIRATORY (INHALATION) at 12:28

## 2022-07-21 RX ADMIN — POTASSIUM CHLORIDE 20 MEQ: 1500 TABLET, EXTENDED RELEASE ORAL at 11:35

## 2022-07-22 VITALS
OXYGEN SATURATION: 93 % | DIASTOLIC BLOOD PRESSURE: 65 MMHG | HEART RATE: 89 BPM | TEMPERATURE: 98.42 F | RESPIRATION RATE: 18 BRPM | SYSTOLIC BLOOD PRESSURE: 136 MMHG

## 2022-07-22 VITALS — HEART RATE: 109 BPM | SYSTOLIC BLOOD PRESSURE: 112 MMHG | DIASTOLIC BLOOD PRESSURE: 77 MMHG

## 2022-07-22 VITALS
SYSTOLIC BLOOD PRESSURE: 105 MMHG | DIASTOLIC BLOOD PRESSURE: 68 MMHG | HEART RATE: 87 BPM | RESPIRATION RATE: 18 BRPM | TEMPERATURE: 98.3 F | OXYGEN SATURATION: 98 %

## 2022-07-22 VITALS — HEART RATE: 111 BPM

## 2022-07-22 VITALS — HEART RATE: 96 BPM

## 2022-07-22 VITALS — RESPIRATION RATE: 18 BRPM | OXYGEN SATURATION: 93 % | HEART RATE: 87 BPM

## 2022-07-22 VITALS
TEMPERATURE: 98.96 F | DIASTOLIC BLOOD PRESSURE: 78 MMHG | RESPIRATION RATE: 20 BRPM | HEART RATE: 125 BPM | SYSTOLIC BLOOD PRESSURE: 134 MMHG | OXYGEN SATURATION: 92 %

## 2022-07-22 VITALS — RESPIRATION RATE: 16 BRPM | HEART RATE: 109 BPM | OXYGEN SATURATION: 91 %

## 2022-07-22 VITALS
RESPIRATION RATE: 18 BRPM | TEMPERATURE: 99.5 F | OXYGEN SATURATION: 92 % | SYSTOLIC BLOOD PRESSURE: 138 MMHG | HEART RATE: 114 BPM | DIASTOLIC BLOOD PRESSURE: 78 MMHG

## 2022-07-22 VITALS
DIASTOLIC BLOOD PRESSURE: 65 MMHG | TEMPERATURE: 98.1 F | SYSTOLIC BLOOD PRESSURE: 129 MMHG | RESPIRATION RATE: 18 BRPM | OXYGEN SATURATION: 97 % | HEART RATE: 85 BPM

## 2022-07-22 VITALS — HEART RATE: 98 BPM

## 2022-07-22 VITALS — HEART RATE: 90 BPM

## 2022-07-22 VITALS — HEART RATE: 92 BPM

## 2022-07-22 VITALS — RESPIRATION RATE: 24 BRPM | HEART RATE: 95 BPM

## 2022-07-22 LAB
ANION GAP: 6 (ref 5–15)
BUN SERPL-MCNC: 16 MG/DL (ref 7–18)
BUN/CREAT RATIO: 17.8 RATIO (ref 10–20)
CALCIUM SERPL-MCNC: 9.1 MG/DL (ref 8.5–10.1)
CARBON DIOXIDE: 24 MMOL/L (ref 21–32)
CHLORIDE: 107 MMOL/L (ref 98–107)
DEPRECATED RDW RBC: 44.9 FL (ref 35.1–43.9)
DIFFERENTIAL COMMENT: (no result)
DIFFERENTIAL INDICATED: (no result)
ERYTHROCYTE [DISTWIDTH] IN BLOOD: 13.2 % (ref 11.6–14.6)
EST GLOM FILT RATE - AFR AMER: 105 ML/MIN (ref 60–?)
ESTIMATED CREATININE CLEARANCE: 78.24 ML/MIN
GLUCOSE: 173 MG/DL (ref 74–106)
HCT VFR BLD AUTO: 38 % (ref 40–54)
HEMOGLOBIN: 12.7 G/DL (ref 13–16.5)
HGB BLD-MCNC: 12.7 G/DL (ref 13–16.5)
IMMATURE GRANULOCYTES COUNT: 0.07 X10^3/UL (ref 0–0)
MANUAL DIF COMMENT BLD-IMP: (no result)
MCV RBC: 91.3 FL (ref 80–94)
MEAN CORP HGB CONC: 33.4 G/DL (ref 32–36)
MEAN PLATELET VOL.: 9.6 FL (ref 6.2–12)
NRBC FLAGGED BY ANALYZER: 0 % (ref 0–5)
PLATELET # BLD: 215 K/MM3 (ref 150–450)
PLATELET COUNT: 215 K/MM3 (ref 150–450)
POSITIVE DIFFERENTIAL: YES
POTASSIUM: 3.2 MMOL/L (ref 3.5–5.1)
RBC # BLD AUTO: 4.16 M/MM3 (ref 4.6–6.2)
RBC DISTRIBUTION WIDTH CV: 13.2 % (ref 11.6–14.6)
RBC DISTRIBUTION WIDTH SD: 44.9 FL (ref 35.1–43.9)
SCAN SMEAR PER REVIEW CRITERIA: (no result)
WBC # BLD AUTO: 12.3 K/MM3 (ref 4.4–11)
WHITE BLOOD COUNT: 12.3 K/MM3 (ref 4.4–11)

## 2022-07-22 RX ADMIN — FLUTICASONE PROPIONATE 2 SPRAY: 50 SPRAY, METERED NASAL at 09:22

## 2022-07-22 RX ADMIN — SODIUM CHLORIDE, PRESERVATIVE FREE 0 ML: 5 INJECTION INTRAVENOUS at 22:59

## 2022-07-22 RX ADMIN — BUDESONIDE 0.5 MG: 0.5 SUSPENSION RESPIRATORY (INHALATION) at 07:17

## 2022-07-22 RX ADMIN — Medication 128 MG: at 09:22

## 2022-07-22 RX ADMIN — SODIUM CHLORIDE, POTASSIUM CHLORIDE, SODIUM LACTATE AND CALCIUM CHLORIDE 999 ML: 600; 310; 30; 20 INJECTION, SOLUTION INTRAVENOUS at 03:54

## 2022-07-22 RX ADMIN — ALBUTEROL SULFATE 2.5 MG: 2.5 SOLUTION RESPIRATORY (INHALATION) at 07:17

## 2022-07-22 RX ADMIN — POTASSIUM CHLORIDE 40 MEQ: 1500 TABLET, EXTENDED RELEASE ORAL at 09:22

## 2022-07-22 RX ADMIN — BUDESONIDE 0.5 MG: 0.5 SUSPENSION RESPIRATORY (INHALATION) at 19:48

## 2022-07-22 RX ADMIN — POTASSIUM CHLORIDE 40 MEQ: 1500 TABLET, EXTENDED RELEASE ORAL at 17:39

## 2022-07-22 RX ADMIN — ALBUTEROL SULFATE 2.5 MG: 2.5 SOLUTION RESPIRATORY (INHALATION) at 13:13

## 2022-07-22 RX ADMIN — ALBUTEROL SULFATE 2.5 MG: 2.5 SOLUTION RESPIRATORY (INHALATION) at 19:47

## 2022-07-23 VITALS
SYSTOLIC BLOOD PRESSURE: 159 MMHG | RESPIRATION RATE: 20 BRPM | DIASTOLIC BLOOD PRESSURE: 74 MMHG | HEART RATE: 94 BPM | OXYGEN SATURATION: 94 % | TEMPERATURE: 98.42 F

## 2022-07-23 VITALS
OXYGEN SATURATION: 94 % | HEART RATE: 106 BPM | SYSTOLIC BLOOD PRESSURE: 113 MMHG | TEMPERATURE: 98.8 F | DIASTOLIC BLOOD PRESSURE: 67 MMHG | RESPIRATION RATE: 16 BRPM

## 2022-07-23 VITALS — RESPIRATION RATE: 18 BRPM | OXYGEN SATURATION: 90 % | HEART RATE: 104 BPM

## 2022-07-23 VITALS — HEART RATE: 104 BPM

## 2022-07-23 VITALS — HEART RATE: 90 BPM

## 2022-07-23 VITALS — OXYGEN SATURATION: 89 %

## 2022-07-23 VITALS — HEART RATE: 99 BPM

## 2022-07-23 VITALS — HEART RATE: 100 BPM | RESPIRATION RATE: 18 BRPM | OXYGEN SATURATION: 89 %

## 2022-07-23 VITALS
RESPIRATION RATE: 17 BRPM | HEART RATE: 94 BPM | OXYGEN SATURATION: 96 % | SYSTOLIC BLOOD PRESSURE: 141 MMHG | DIASTOLIC BLOOD PRESSURE: 74 MMHG | TEMPERATURE: 99.1 F

## 2022-07-23 VITALS — RESPIRATION RATE: 20 BRPM | HEART RATE: 77 BPM

## 2022-07-23 VITALS — OXYGEN SATURATION: 88 %

## 2022-07-23 LAB
DEPRECATED RDW RBC: 44.9 FL (ref 35.1–43.9)
ERYTHROCYTE [DISTWIDTH] IN BLOOD: 13.2 % (ref 11.6–14.6)
HCT VFR BLD AUTO: 34.8 % (ref 40–54)
HEMOGLOBIN: 11.9 G/DL (ref 13–16.5)
HGB BLD-MCNC: 11.9 G/DL (ref 13–16.5)
IMMATURE GRANULOCYTES COUNT: 0.08 X10^3/UL (ref 0–0)
MCV RBC: 91.8 FL (ref 80–94)
MEAN CORP HGB CONC: 34.2 G/DL (ref 32–36)
MEAN PLATELET VOL.: 10.2 FL (ref 6.2–12)
NRBC FLAGGED BY ANALYZER: 0 % (ref 0–5)
PLATELET # BLD: 209 K/MM3 (ref 150–450)
PLATELET COUNT: 209 K/MM3 (ref 150–450)
POTASSIUM: 3.2 MMOL/L (ref 3.5–5.1)
RBC # BLD AUTO: 3.79 M/MM3 (ref 4.6–6.2)
RBC DISTRIBUTION WIDTH CV: 13.2 % (ref 11.6–14.6)
RBC DISTRIBUTION WIDTH SD: 44.9 FL (ref 35.1–43.9)
WBC # BLD AUTO: 10.8 K/MM3 (ref 4.4–11)
WHITE BLOOD COUNT: 10.8 K/MM3 (ref 4.4–11)

## 2022-07-23 RX ADMIN — ALBUTEROL SULFATE 2.5 MG: 2.5 SOLUTION RESPIRATORY (INHALATION) at 13:39

## 2022-07-23 RX ADMIN — FLUTICASONE PROPIONATE 2 SPRAY: 50 SPRAY, METERED NASAL at 08:09

## 2022-07-23 RX ADMIN — POTASSIUM CHLORIDE 40 MEQ: 1500 TABLET, EXTENDED RELEASE ORAL at 08:06

## 2022-07-23 RX ADMIN — ALBUTEROL SULFATE 2.5 MG: 2.5 SOLUTION RESPIRATORY (INHALATION) at 07:36

## 2022-07-23 RX ADMIN — POTASSIUM CHLORIDE 40 MEQ: 1500 TABLET, EXTENDED RELEASE ORAL at 17:33

## 2022-07-23 RX ADMIN — SODIUM CHLORIDE, PRESERVATIVE FREE 0 ML: 5 INJECTION INTRAVENOUS at 07:01

## 2022-07-23 RX ADMIN — Medication 128 MG: at 08:08

## 2022-07-23 RX ADMIN — ALBUTEROL SULFATE 2.5 MG: 2.5 SOLUTION RESPIRATORY (INHALATION) at 19:53

## 2022-07-23 RX ADMIN — SODIUM CHLORIDE, PRESERVATIVE FREE 0 ML: 5 INJECTION INTRAVENOUS at 10:43

## 2022-07-23 RX ADMIN — BUDESONIDE 0.5 MG: 0.5 SUSPENSION RESPIRATORY (INHALATION) at 07:36

## 2022-07-23 RX ADMIN — BUDESONIDE 0.5 MG: 0.5 SUSPENSION RESPIRATORY (INHALATION) at 19:53

## 2022-07-28 ENCOUNTER — HOSPITAL ENCOUNTER (OUTPATIENT)
Dept: HOSPITAL 100 - LABSPEC | Age: 82
Discharge: HOME | End: 2022-07-28
Payer: MEDICARE

## 2022-07-28 DIAGNOSIS — R82.90: Primary | ICD-10-CM

## 2022-07-28 PROCEDURE — 87086 URINE CULTURE/COLONY COUNT: CPT

## 2022-07-28 PROCEDURE — 87088 URINE BACTERIA CULTURE: CPT

## 2022-09-06 ENCOUNTER — HOSPITAL ENCOUNTER (OUTPATIENT)
Dept: HOSPITAL 100 - LABSPEC | Age: 82
Discharge: HOME | End: 2022-09-06
Payer: MEDICARE

## 2022-09-06 DIAGNOSIS — R31.9: Primary | ICD-10-CM

## 2022-09-06 PROCEDURE — 87086 URINE CULTURE/COLONY COUNT: CPT

## 2022-09-06 PROCEDURE — 87088 URINE BACTERIA CULTURE: CPT

## 2022-09-27 ENCOUNTER — HOSPITAL ENCOUNTER (EMERGENCY)
Dept: HOSPITAL 100 - ED | Age: 82
Discharge: LEFT BEFORE DISCHARGE | End: 2022-09-27
Payer: MEDICARE

## 2022-09-27 VITALS
TEMPERATURE: 97.9 F | RESPIRATION RATE: 15 BRPM | BODY MASS INDEX: 29.9 KG/M2 | SYSTOLIC BLOOD PRESSURE: 158 MMHG | OXYGEN SATURATION: 97 % | HEART RATE: 86 BPM | DIASTOLIC BLOOD PRESSURE: 81 MMHG

## 2022-09-27 DIAGNOSIS — I10: Primary | ICD-10-CM

## 2023-01-05 LAB
ANION GAP: 7 (ref 5–15)
BUN SERPL-MCNC: 28 MG/DL (ref 7–18)
BUN/CREAT RATIO: 27.2 RATIO (ref 10–20)
CALCIUM SERPL-MCNC: 9.4 MG/DL (ref 8.5–10.1)
CARBON DIOXIDE: 26 MMOL/L (ref 21–32)
CHLORIDE: 106 MMOL/L (ref 98–107)
DEPRECATED RDW RBC: 46.4 FL (ref 35.1–43.9)
ERYTHROCYTE [DISTWIDTH] IN BLOOD: 14.2 % (ref 11.6–14.6)
EST GLOM FILT RATE - AFR AMER: 89 ML/MIN (ref 60–?)
GLUCOSE: 112 MG/DL (ref 74–106)
HCT VFR BLD AUTO: 38.4 % (ref 40–54)
HEMOGLOBIN: 12.9 G/DL (ref 13–16.5)
HGB BLD-MCNC: 12.9 G/DL (ref 13–16.5)
MCV RBC: 89.5 FL (ref 80–94)
MEAN CORP HGB CONC: 33.6 G/DL (ref 32–36)
MEAN PLATELET VOL.: 9.9 FL (ref 6.2–12)
PLATELET # BLD: 215 K/MM3 (ref 150–450)
PLATELET COUNT: 215 K/MM3 (ref 150–450)
POTASSIUM: 4.2 MMOL/L (ref 3.5–5.1)
RBC # BLD AUTO: 4.29 M/MM3 (ref 4.6–6.2)
RBC DISTRIBUTION WIDTH CV: 14.2 % (ref 11.6–14.6)
RBC DISTRIBUTION WIDTH SD: 46.4 FL (ref 35.1–43.9)
WBC # BLD AUTO: 8.2 K/MM3 (ref 4.4–11)
WHITE BLOOD COUNT: 8.2 K/MM3 (ref 4.4–11)

## 2023-01-09 ENCOUNTER — HOSPITAL ENCOUNTER (INPATIENT)
Dept: HOSPITAL 100 - SDC | Age: 83
LOS: 2 days | Discharge: HOME | DRG: 337 | End: 2023-01-11
Payer: MEDICARE

## 2023-01-09 VITALS
TEMPERATURE: 98.24 F | SYSTOLIC BLOOD PRESSURE: 111 MMHG | DIASTOLIC BLOOD PRESSURE: 63 MMHG | HEART RATE: 77 BPM | OXYGEN SATURATION: 95 % | RESPIRATION RATE: 18 BRPM

## 2023-01-09 VITALS
SYSTOLIC BLOOD PRESSURE: 107 MMHG | RESPIRATION RATE: 18 BRPM | DIASTOLIC BLOOD PRESSURE: 58 MMHG | TEMPERATURE: 97.7 F | OXYGEN SATURATION: 96 % | HEART RATE: 82 BPM

## 2023-01-09 VITALS
SYSTOLIC BLOOD PRESSURE: 107 MMHG | HEART RATE: 78 BPM | DIASTOLIC BLOOD PRESSURE: 61 MMHG | OXYGEN SATURATION: 96 % | TEMPERATURE: 97.88 F | RESPIRATION RATE: 18 BRPM

## 2023-01-09 VITALS
OXYGEN SATURATION: 92 % | SYSTOLIC BLOOD PRESSURE: 113 MMHG | RESPIRATION RATE: 16 BRPM | DIASTOLIC BLOOD PRESSURE: 63 MMHG | HEART RATE: 67 BPM

## 2023-01-09 VITALS
HEART RATE: 78 BPM | DIASTOLIC BLOOD PRESSURE: 61 MMHG | TEMPERATURE: 97.8 F | SYSTOLIC BLOOD PRESSURE: 107 MMHG | RESPIRATION RATE: 18 BRPM | OXYGEN SATURATION: 96 %

## 2023-01-09 VITALS
OXYGEN SATURATION: 96 % | TEMPERATURE: 97.88 F | RESPIRATION RATE: 18 BRPM | HEART RATE: 72 BPM | DIASTOLIC BLOOD PRESSURE: 60 MMHG | SYSTOLIC BLOOD PRESSURE: 112 MMHG

## 2023-01-09 VITALS
RESPIRATION RATE: 18 BRPM | DIASTOLIC BLOOD PRESSURE: 64 MMHG | SYSTOLIC BLOOD PRESSURE: 113 MMHG | OXYGEN SATURATION: 93 % | HEART RATE: 70 BPM

## 2023-01-09 VITALS
HEART RATE: 68 BPM | OXYGEN SATURATION: 93 % | SYSTOLIC BLOOD PRESSURE: 117 MMHG | DIASTOLIC BLOOD PRESSURE: 64 MMHG | RESPIRATION RATE: 18 BRPM

## 2023-01-09 VITALS
DIASTOLIC BLOOD PRESSURE: 75 MMHG | HEART RATE: 64 BPM | OXYGEN SATURATION: 96 % | TEMPERATURE: 97.6 F | RESPIRATION RATE: 16 BRPM | SYSTOLIC BLOOD PRESSURE: 120 MMHG

## 2023-01-09 VITALS
OXYGEN SATURATION: 97 % | HEART RATE: 79 BPM | TEMPERATURE: 98.06 F | DIASTOLIC BLOOD PRESSURE: 75 MMHG | RESPIRATION RATE: 18 BRPM | SYSTOLIC BLOOD PRESSURE: 113 MMHG

## 2023-01-09 VITALS
HEART RATE: 72 BPM | RESPIRATION RATE: 18 BRPM | DIASTOLIC BLOOD PRESSURE: 75 MMHG | OXYGEN SATURATION: 93 % | SYSTOLIC BLOOD PRESSURE: 120 MMHG

## 2023-01-09 VITALS
SYSTOLIC BLOOD PRESSURE: 115 MMHG | OXYGEN SATURATION: 93 % | DIASTOLIC BLOOD PRESSURE: 75 MMHG | RESPIRATION RATE: 16 BRPM | HEART RATE: 68 BPM

## 2023-01-09 VITALS
OXYGEN SATURATION: 93 % | SYSTOLIC BLOOD PRESSURE: 115 MMHG | RESPIRATION RATE: 16 BRPM | HEART RATE: 68 BPM | DIASTOLIC BLOOD PRESSURE: 75 MMHG

## 2023-01-09 VITALS
SYSTOLIC BLOOD PRESSURE: 113 MMHG | DIASTOLIC BLOOD PRESSURE: 75 MMHG | OXYGEN SATURATION: 92 % | RESPIRATION RATE: 18 BRPM | TEMPERATURE: 97.52 F | HEART RATE: 72 BPM

## 2023-01-09 VITALS — OXYGEN SATURATION: 95 % | HEART RATE: 88 BPM | RESPIRATION RATE: 18 BRPM

## 2023-01-09 VITALS — BODY MASS INDEX: 31.6 KG/M2

## 2023-01-09 DIAGNOSIS — K66.0: ICD-10-CM

## 2023-01-09 DIAGNOSIS — Z79.02: ICD-10-CM

## 2023-01-09 DIAGNOSIS — K43.2: Primary | ICD-10-CM

## 2023-01-09 DIAGNOSIS — I10: ICD-10-CM

## 2023-01-09 DIAGNOSIS — Z86.73: ICD-10-CM

## 2023-01-09 DIAGNOSIS — E66.9: ICD-10-CM

## 2023-01-09 DIAGNOSIS — Z86.711: ICD-10-CM

## 2023-01-09 DIAGNOSIS — Z79.899: ICD-10-CM

## 2023-01-09 DIAGNOSIS — K43.5: ICD-10-CM

## 2023-01-09 PROCEDURE — 97162 PT EVAL MOD COMPLEX 30 MIN: CPT

## 2023-01-09 PROCEDURE — 80048 BASIC METABOLIC PNL TOTAL CA: CPT

## 2023-01-09 PROCEDURE — 99252 IP/OBS CONSLTJ NEW/EST SF 35: CPT

## 2023-01-09 PROCEDURE — 0WQF4ZZ REPAIR ABDOMINAL WALL, PERCUTANEOUS ENDOSCOPIC APPROACH: ICD-10-PCS | Performed by: SURGERY

## 2023-01-09 PROCEDURE — 85027 COMPLETE CBC AUTOMATED: CPT

## 2023-01-09 PROCEDURE — 36415 COLL VENOUS BLD VENIPUNCTURE: CPT

## 2023-01-09 PROCEDURE — 94640 AIRWAY INHALATION TREATMENT: CPT

## 2023-01-09 PROCEDURE — G0463 HOSPITAL OUTPT CLINIC VISIT: HCPCS

## 2023-01-09 PROCEDURE — 85025 COMPLETE CBC W/AUTO DIFF WBC: CPT

## 2023-01-09 PROCEDURE — 97530 THERAPEUTIC ACTIVITIES: CPT

## 2023-01-09 PROCEDURE — 93005 ELECTROCARDIOGRAM TRACING: CPT

## 2023-01-09 PROCEDURE — C1781 MESH (IMPLANTABLE): HCPCS

## 2023-01-09 RX ADMIN — BUDESONIDE 0.5 MG: 0.5 SUSPENSION RESPIRATORY (INHALATION) at 19:07

## 2023-01-09 RX ADMIN — SODIUM CHLORIDE 10 ML: 9 INJECTION, SOLUTION INTRAMUSCULAR; INTRAVENOUS; SUBCUTANEOUS at 10:08

## 2023-01-09 RX ADMIN — CEFAZOLIN 150 GM: 10 INJECTION, POWDER, FOR SOLUTION INTRAVENOUS at 07:40

## 2023-01-09 RX ADMIN — Medication 1 PACKET: at 21:02

## 2023-01-09 RX ADMIN — Medication 128 MG: at 15:02

## 2023-01-09 RX ADMIN — ALBUTEROL SULFATE 2.5 MG: 2.5 SOLUTION RESPIRATORY (INHALATION) at 19:07

## 2023-01-09 RX ADMIN — BUPIVACAINE 20 ML: 13.3 INJECTION, SUSPENSION, LIPOSOMAL INFILTRATION at 10:08

## 2023-01-10 VITALS — HEART RATE: 82 BPM | RESPIRATION RATE: 18 BRPM

## 2023-01-10 VITALS
TEMPERATURE: 98 F | DIASTOLIC BLOOD PRESSURE: 71 MMHG | SYSTOLIC BLOOD PRESSURE: 126 MMHG | OXYGEN SATURATION: 93 % | RESPIRATION RATE: 18 BRPM | HEART RATE: 74 BPM

## 2023-01-10 VITALS
HEART RATE: 84 BPM | TEMPERATURE: 98.06 F | RESPIRATION RATE: 18 BRPM | SYSTOLIC BLOOD PRESSURE: 122 MMHG | OXYGEN SATURATION: 92 % | DIASTOLIC BLOOD PRESSURE: 70 MMHG

## 2023-01-10 VITALS
HEART RATE: 87 BPM | RESPIRATION RATE: 18 BRPM | SYSTOLIC BLOOD PRESSURE: 130 MMHG | TEMPERATURE: 98.06 F | DIASTOLIC BLOOD PRESSURE: 71 MMHG | OXYGEN SATURATION: 93 %

## 2023-01-10 VITALS
SYSTOLIC BLOOD PRESSURE: 115 MMHG | OXYGEN SATURATION: 96 % | HEART RATE: 67 BPM | TEMPERATURE: 98.42 F | RESPIRATION RATE: 16 BRPM | DIASTOLIC BLOOD PRESSURE: 63 MMHG

## 2023-01-10 VITALS — HEART RATE: 75 BPM | RESPIRATION RATE: 14 BRPM

## 2023-01-10 VITALS
DIASTOLIC BLOOD PRESSURE: 63 MMHG | RESPIRATION RATE: 18 BRPM | SYSTOLIC BLOOD PRESSURE: 121 MMHG | HEART RATE: 88 BPM | TEMPERATURE: 98.42 F | OXYGEN SATURATION: 94 %

## 2023-01-10 VITALS — OXYGEN SATURATION: 93 %

## 2023-01-10 LAB
ANION GAP: 8 (ref 5–15)
BUN SERPL-MCNC: 17 MG/DL (ref 7–18)
BUN/CREAT RATIO: 17.3 RATIO (ref 10–20)
CALCIUM SERPL-MCNC: 8.9 MG/DL (ref 8.5–10.1)
CARBON DIOXIDE: 26 MMOL/L (ref 21–32)
CHLORIDE: 104 MMOL/L (ref 98–107)
DEPRECATED RDW RBC: 47.1 FL (ref 35.1–43.9)
ERYTHROCYTE [DISTWIDTH] IN BLOOD: 14.5 % (ref 11.6–14.6)
EST GLOM FILT RATE - AFR AMER: 94 ML/MIN (ref 60–?)
ESTIMATED CREATININE CLEARANCE: 70.66 ML/MIN
GLUCOSE: 129 MG/DL (ref 74–106)
HCT VFR BLD AUTO: 37.2 % (ref 40–54)
HEMOGLOBIN: 12.2 G/DL (ref 13–16.5)
HGB BLD-MCNC: 12.2 G/DL (ref 13–16.5)
IMMATURE GRANULOCYTES COUNT: 0.03 X10^3/UL (ref 0–0)
MCV RBC: 89.4 FL (ref 80–94)
MEAN CORP HGB CONC: 32.8 G/DL (ref 32–36)
MEAN PLATELET VOL.: 10 FL (ref 6.2–12)
NRBC FLAGGED BY ANALYZER: 0 % (ref 0–5)
PLATELET # BLD: 203 K/MM3 (ref 150–450)
PLATELET COUNT: 203 K/MM3 (ref 150–450)
POTASSIUM: 4.1 MMOL/L (ref 3.5–5.1)
RBC # BLD AUTO: 4.16 M/MM3 (ref 4.6–6.2)
RBC DISTRIBUTION WIDTH CV: 14.5 % (ref 11.6–14.6)
RBC DISTRIBUTION WIDTH SD: 47.1 FL (ref 35.1–43.9)
WBC # BLD AUTO: 9.1 K/MM3 (ref 4.4–11)
WHITE BLOOD COUNT: 9.1 K/MM3 (ref 4.4–11)

## 2023-01-10 RX ADMIN — ALBUTEROL SULFATE 2.5 MG: 2.5 SOLUTION RESPIRATORY (INHALATION) at 12:49

## 2023-01-10 RX ADMIN — Medication 1 PACKET: at 10:12

## 2023-01-10 RX ADMIN — BUDESONIDE 0.5 MG: 0.5 SUSPENSION RESPIRATORY (INHALATION) at 12:49

## 2023-01-10 RX ADMIN — Medication 128 MG: at 10:15

## 2023-01-10 RX ADMIN — ALBUTEROL SULFATE 2.5 MG: 2.5 SOLUTION RESPIRATORY (INHALATION) at 20:30

## 2023-01-10 RX ADMIN — BUDESONIDE 0.5 MG: 0.5 SUSPENSION RESPIRATORY (INHALATION) at 20:30

## 2023-01-10 RX ADMIN — HYDROCODONE BITARTRATE AND ACETAMINOPHEN 0 TABLET: 5; 325 TABLET ORAL at 13:30

## 2023-01-11 VITALS
OXYGEN SATURATION: 94 % | HEART RATE: 80 BPM | SYSTOLIC BLOOD PRESSURE: 117 MMHG | TEMPERATURE: 98.42 F | DIASTOLIC BLOOD PRESSURE: 71 MMHG | RESPIRATION RATE: 17 BRPM

## 2023-01-11 VITALS
HEART RATE: 79 BPM | SYSTOLIC BLOOD PRESSURE: 109 MMHG | TEMPERATURE: 98.24 F | DIASTOLIC BLOOD PRESSURE: 65 MMHG | RESPIRATION RATE: 16 BRPM | OXYGEN SATURATION: 94 %

## 2023-01-11 VITALS — RESPIRATION RATE: 18 BRPM | OXYGEN SATURATION: 92 % | HEART RATE: 83 BPM

## 2023-01-11 VITALS — OXYGEN SATURATION: 96 %

## 2023-01-11 RX ADMIN — Medication 128 MG: at 08:26

## 2023-01-11 RX ADMIN — ALBUTEROL SULFATE 2.5 MG: 2.5 SOLUTION RESPIRATORY (INHALATION) at 07:23

## 2023-01-11 RX ADMIN — BUDESONIDE 0.5 MG: 0.5 SUSPENSION RESPIRATORY (INHALATION) at 07:23

## 2023-03-03 PROBLEM — I63.9 STROKE (MULTI): Status: ACTIVE | Noted: 2023-03-03

## 2023-03-03 PROBLEM — J44.9 CHRONIC OBSTRUCTIVE PULMONARY DISEASE (MULTI): Status: ACTIVE | Noted: 2023-03-03

## 2023-03-03 PROBLEM — I63.9 CEREBROVASCULAR ACCIDENT (CVA) (MULTI): Status: ACTIVE | Noted: 2023-03-03

## 2023-03-03 PROBLEM — G45.9 TIA (TRANSIENT ISCHEMIC ATTACK): Status: ACTIVE | Noted: 2023-03-03

## 2023-03-03 PROBLEM — J98.09 BRONCHOMALACIA: Status: ACTIVE | Noted: 2023-03-03

## 2023-03-03 PROBLEM — M41.9 LUMBAR SCOLIOSIS: Status: ACTIVE | Noted: 2023-03-03

## 2023-03-03 PROBLEM — H93.13 TINNITUS OF BOTH EARS: Status: ACTIVE | Noted: 2023-03-03

## 2023-03-03 PROBLEM — R42 VERTIGO: Status: ACTIVE | Noted: 2023-03-03

## 2023-03-03 PROBLEM — K92.1 BLACK STOOL: Status: ACTIVE | Noted: 2023-03-03

## 2023-03-03 PROBLEM — R42 DIZZINESS: Status: ACTIVE | Noted: 2023-03-03

## 2023-03-03 PROBLEM — I34.1 MITRAL VALVE PROLAPSE: Status: ACTIVE | Noted: 2023-03-03

## 2023-03-03 PROBLEM — M16.12 ARTHRITIS OF LEFT HIP: Status: ACTIVE | Noted: 2023-03-03

## 2023-03-03 PROBLEM — H90.3 ASYMMETRIC SNHL (SENSORINEURAL HEARING LOSS): Status: ACTIVE | Noted: 2023-03-03

## 2023-03-03 PROBLEM — D64.9 ANEMIA: Status: ACTIVE | Noted: 2023-03-03

## 2023-03-03 PROBLEM — G47.33 OBSTRUCTIVE SLEEP APNEA, ADULT: Status: ACTIVE | Noted: 2023-03-03

## 2023-03-03 PROBLEM — E11.9 DIABETES MELLITUS (MULTI): Status: ACTIVE | Noted: 2023-03-03

## 2023-03-03 PROBLEM — H81.09 MENIERE'S DISEASE: Status: ACTIVE | Noted: 2023-03-03

## 2023-03-03 PROBLEM — J98.6 ELEVATED DIAPHRAGM: Status: ACTIVE | Noted: 2023-03-03

## 2023-03-03 PROBLEM — M51.26 DEGENERATION OF LUMBAR INTERVERTEBRAL DISC WITH ACUTE HERNIATION: Status: ACTIVE | Noted: 2023-03-03

## 2023-03-03 PROBLEM — N40.0 ENLARGED PROSTATE: Status: ACTIVE | Noted: 2023-03-03

## 2023-03-03 PROBLEM — I10 MILD HTN: Status: ACTIVE | Noted: 2023-03-03

## 2023-03-03 PROBLEM — M51.36 DEGENERATION OF LUMBAR INTERVERTEBRAL DISC WITH ACUTE HERNIATION: Status: ACTIVE | Noted: 2023-03-03

## 2023-03-03 PROBLEM — M51.369 DEGENERATION OF LUMBAR INTERVERTEBRAL DISC WITH ACUTE HERNIATION: Status: ACTIVE | Noted: 2023-03-03

## 2023-03-03 PROBLEM — M25.552 LEFT HIP PAIN: Status: ACTIVE | Noted: 2023-03-03

## 2023-03-03 PROBLEM — I63.89: Status: ACTIVE | Noted: 2023-03-03

## 2023-03-03 PROBLEM — E78.5 HYPERLIPIDEMIA: Status: ACTIVE | Noted: 2023-03-03

## 2023-03-03 PROBLEM — R19.7 DIARRHEA: Status: ACTIVE | Noted: 2023-03-03

## 2023-03-03 PROBLEM — R53.83 FATIGUE: Status: ACTIVE | Noted: 2023-03-03

## 2023-03-03 PROBLEM — J45.909 ASTHMA (HHS-HCC): Status: ACTIVE | Noted: 2023-03-03

## 2023-03-03 RX ORDER — GLUCOSAMINE HCL 500 MG
1 TABLET ORAL DAILY
COMMUNITY
End: 2024-03-15 | Stop reason: HOSPADM

## 2023-03-03 RX ORDER — PSYLLIUM SEED (WITH DEXTROSE)
POWDER (GRAM) ORAL NIGHTLY
COMMUNITY

## 2023-03-03 RX ORDER — MONTELUKAST SODIUM 10 MG/1
1 TABLET ORAL NIGHTLY
COMMUNITY
End: 2023-08-01

## 2023-03-03 RX ORDER — LOSARTAN POTASSIUM 25 MG/1
1 TABLET ORAL DAILY
COMMUNITY
End: 2023-08-22 | Stop reason: DRUGHIGH

## 2023-03-03 RX ORDER — ASCORBIC ACID 500 MG
1 TABLET ORAL DAILY
COMMUNITY
End: 2024-03-15 | Stop reason: HOSPADM

## 2023-03-03 RX ORDER — CHOLECALCIFEROL (VITAMIN D3) 125 MCG
125 CAPSULE ORAL DAILY
COMMUNITY

## 2023-03-03 RX ORDER — LANOLIN ALCOHOL/MO/W.PET/CERES
1 CREAM (GRAM) TOPICAL DAILY
COMMUNITY

## 2023-03-03 RX ORDER — IBUPROFEN 200 MG
CAPSULE ORAL
COMMUNITY
Start: 2019-12-04 | End: 2023-08-01

## 2023-03-03 RX ORDER — ALBUTEROL SULFATE 90 UG/1
2 AEROSOL, METERED RESPIRATORY (INHALATION) EVERY 4 HOURS PRN
COMMUNITY
End: 2023-04-12 | Stop reason: SDUPTHER

## 2023-03-03 RX ORDER — FINASTERIDE 5 MG/1
1 TABLET, FILM COATED ORAL DAILY
COMMUNITY
End: 2023-08-01

## 2023-03-03 RX ORDER — FLUTICASONE PROPIONATE 50 MCG
2 SPRAY, SUSPENSION (ML) NASAL DAILY
COMMUNITY
End: 2024-01-25 | Stop reason: SDUPTHER

## 2023-03-03 RX ORDER — TAMSULOSIN HYDROCHLORIDE 0.4 MG/1
1 CAPSULE ORAL DAILY
COMMUNITY
Start: 2022-08-15 | End: 2024-04-22 | Stop reason: ALTCHOICE

## 2023-03-03 RX ORDER — OMEPRAZOLE 40 MG/1
1 CAPSULE, DELAYED RELEASE ORAL DAILY
COMMUNITY
Start: 2019-02-05 | End: 2023-12-26 | Stop reason: SDUPTHER

## 2023-03-03 RX ORDER — MULTIVITAMIN/IRON/FOLIC ACID 18MG-0.4MG
1 TABLET ORAL DAILY
COMMUNITY
End: 2024-03-15 | Stop reason: HOSPADM

## 2023-03-03 RX ORDER — BUDESONIDE AND FORMOTEROL FUMARATE DIHYDRATE 160; 4.5 UG/1; UG/1
2 AEROSOL RESPIRATORY (INHALATION) 2 TIMES DAILY
COMMUNITY
End: 2023-05-08 | Stop reason: SDUPTHER

## 2023-03-03 RX ORDER — SERTRALINE HYDROCHLORIDE 100 MG/1
1 TABLET, FILM COATED ORAL DAILY
COMMUNITY
Start: 2021-12-03 | End: 2023-03-09 | Stop reason: SDUPTHER

## 2023-03-03 RX ORDER — CLOPIDOGREL BISULFATE 75 MG/1
1 TABLET ORAL DAILY
COMMUNITY
End: 2023-03-09 | Stop reason: SDUPTHER

## 2023-03-03 RX ORDER — SIMVASTATIN 20 MG/1
40 TABLET, FILM COATED ORAL DAILY
COMMUNITY
End: 2023-09-25 | Stop reason: DRUGHIGH

## 2023-03-03 RX ORDER — MULTIVITAMIN
1 TABLET ORAL DAILY
COMMUNITY
End: 2023-04-12 | Stop reason: ALTCHOICE

## 2023-03-09 ENCOUNTER — OFFICE VISIT (OUTPATIENT)
Dept: PRIMARY CARE | Facility: CLINIC | Age: 83
End: 2023-03-09
Payer: MEDICARE

## 2023-03-09 VITALS
BODY MASS INDEX: 32.08 KG/M2 | WEIGHT: 258 LBS | HEART RATE: 58 BPM | HEIGHT: 75 IN | OXYGEN SATURATION: 95 % | SYSTOLIC BLOOD PRESSURE: 110 MMHG | DIASTOLIC BLOOD PRESSURE: 62 MMHG

## 2023-03-09 DIAGNOSIS — H81.09 MENIERE'S DISEASE, UNSPECIFIED LATERALITY: ICD-10-CM

## 2023-03-09 DIAGNOSIS — I63.40 CEREBROVASCULAR ACCIDENT (CVA) DUE TO EMBOLISM OF CEREBRAL ARTERY (MULTI): Primary | Chronic | ICD-10-CM

## 2023-03-09 DIAGNOSIS — I10 MILD HTN: ICD-10-CM

## 2023-03-09 DIAGNOSIS — R42 DIZZINESS: ICD-10-CM

## 2023-03-09 DIAGNOSIS — R42 VERTIGO: ICD-10-CM

## 2023-03-09 DIAGNOSIS — R53.82 CHRONIC FATIGUE: Chronic | ICD-10-CM

## 2023-03-09 PROCEDURE — 99214 OFFICE O/P EST MOD 30 MIN: CPT | Performed by: FAMILY MEDICINE

## 2023-03-09 PROCEDURE — 1160F RVW MEDS BY RX/DR IN RCRD: CPT | Performed by: FAMILY MEDICINE

## 2023-03-09 PROCEDURE — 1036F TOBACCO NON-USER: CPT | Performed by: FAMILY MEDICINE

## 2023-03-09 PROCEDURE — 3074F SYST BP LT 130 MM HG: CPT | Performed by: FAMILY MEDICINE

## 2023-03-09 PROCEDURE — 3078F DIAST BP <80 MM HG: CPT | Performed by: FAMILY MEDICINE

## 2023-03-09 PROCEDURE — 1159F MED LIST DOCD IN RCRD: CPT | Performed by: FAMILY MEDICINE

## 2023-03-09 PROCEDURE — 1157F ADVNC CARE PLAN IN RCRD: CPT | Performed by: FAMILY MEDICINE

## 2023-03-09 RX ORDER — CLOPIDOGREL BISULFATE 75 MG/1
75 TABLET ORAL DAILY
Qty: 90 TABLET | Refills: 3 | Status: SHIPPED | OUTPATIENT
Start: 2023-03-09 | End: 2023-12-22 | Stop reason: ALTCHOICE

## 2023-03-09 RX ORDER — SERTRALINE HYDROCHLORIDE 50 MG/1
50 TABLET, FILM COATED ORAL DAILY
Qty: 30 TABLET | Refills: 3 | Status: SHIPPED | OUTPATIENT
Start: 2023-03-09 | End: 2023-04-12 | Stop reason: SDUPTHER

## 2023-03-09 ASSESSMENT — ENCOUNTER SYMPTOMS
ARTHRALGIAS: 0
DIARRHEA: 0
WHEEZING: 0
NUMBNESS: 0
FATIGUE: 1
DIZZINESS: 1
HEADACHES: 0
VOMITING: 0
ABDOMINAL DISTENTION: 0
DIFFICULTY URINATING: 0
RHINORRHEA: 0
OCCASIONAL FEELINGS OF UNSTEADINESS: 1
APPETITE CHANGE: 0
DEPRESSION: 1
ADENOPATHY: 0
SHORTNESS OF BREATH: 1
TROUBLE SWALLOWING: 0
CONSTIPATION: 0
ACTIVITY CHANGE: 0
COUGH: 0
PALPITATIONS: 0
LIGHT-HEADEDNESS: 1
NAUSEA: 0
UNEXPECTED WEIGHT CHANGE: 0
LOSS OF SENSATION IN FEET: 1
ABDOMINAL PAIN: 0

## 2023-03-09 NOTE — PROGRESS NOTES
"Subjective   Patient ID: Arvin Diallo is a 82 y.o. male who presents for cont. fatigue, dizziness, tinnitis, feels  lousy.    HPI   Did hold Flomax, had issues with nocturia, no difference with dizziness  Chronic fatigue, + dizziness and balance issues, no falls  Dizziness and fatigue has gotten some worse, especially over the past 2 weeks  Had seen neurologist, to see in August  No nausea or diarrhea  Chronic SOB, sees pulmonary in [place], no albuterol use        Review of Systems   Constitutional:  Positive for fatigue. Negative for activity change, appetite change and unexpected weight change.   HENT:  Positive for hearing loss and tinnitus. Negative for ear pain, nosebleeds, rhinorrhea, sneezing and trouble swallowing.    Respiratory:  Positive for shortness of breath. Negative for cough and wheezing.    Cardiovascular:  Negative for chest pain, palpitations and leg swelling.   Gastrointestinal:  Negative for abdominal distention, abdominal pain, constipation, diarrhea, nausea and vomiting.   Genitourinary:  Negative for difficulty urinating.   Musculoskeletal:  Negative for arthralgias.   Skin:  Negative for rash.   Neurological:  Positive for dizziness and light-headedness. Negative for numbness and headaches.   Hematological:  Negative for adenopathy.   Psychiatric/Behavioral:  Negative for behavioral problems.    All other systems reviewed and are negative.      Objective   /62   Pulse 58   Ht 1.905 m (6' 3\")   Wt 117 kg (258 lb)   SpO2 95%   BMI 32.25 kg/m²     Physical Exam  Vitals and nursing note reviewed.   Constitutional:       General: He is not in acute distress.     Appearance: Normal appearance. He is not toxic-appearing.   HENT:      Head: Normocephalic and atraumatic.      Right Ear: Tympanic membrane, ear canal and external ear normal.      Left Ear: Tympanic membrane, ear canal and external ear normal.      Nose: Nose normal.      Mouth/Throat:      Mouth: Mucous membranes are " dry.      Pharynx: Oropharynx is clear.   Eyes:      Extraocular Movements: Extraocular movements intact.      Conjunctiva/sclera: Conjunctivae normal.      Pupils: Pupils are equal, round, and reactive to light.   Cardiovascular:      Rate and Rhythm: Normal rate and regular rhythm.   Pulmonary:      Effort: Pulmonary effort is normal.      Breath sounds: Normal breath sounds.   Abdominal:      General: Abdomen is flat. Bowel sounds are normal.      Palpations: Abdomen is soft.   Musculoskeletal:      Cervical back: Normal range of motion and neck supple.   Skin:     General: Skin is warm and dry.      Capillary Refill: Capillary refill takes less than 2 seconds.   Neurological:      General: No focal deficit present.      Mental Status: He is alert and oriented to person, place, and time. Mental status is at baseline.   Psychiatric:         Mood and Affect: Mood normal.         Behavior: Behavior normal.     Patient seen today for complaints of feeling lousy.  Patient states over the past 1 to 2 weeks feeling more fatigued, dizzy and lightheaded, blames the sertraline for this.  Try reducing sertraline to 50 mg a day, get blood testing today recheck again in the next month, next appointment with neurology is not until August, he has seen neurology and ENT physicians in the past for this chronic    Assessment/Plan   Problem List Items Addressed This Visit          Nervous    Cerebrovascular accident (CVA) (CMS/[facility]) - Primary       Circulatory    Mild HTN       Other    Dizziness    Fatigue    Meniere's disease    Vertigo

## 2023-03-15 ENCOUNTER — OFFICE VISIT (OUTPATIENT)
Dept: PRIMARY CARE | Facility: CLINIC | Age: 83
End: 2023-03-15
Payer: MEDICARE

## 2023-03-15 VITALS
OXYGEN SATURATION: 94 % | BODY MASS INDEX: 31.81 KG/M2 | WEIGHT: 255.8 LBS | SYSTOLIC BLOOD PRESSURE: 130 MMHG | HEIGHT: 75 IN | HEART RATE: 75 BPM | DIASTOLIC BLOOD PRESSURE: 70 MMHG

## 2023-03-15 DIAGNOSIS — I63.40 CEREBROVASCULAR ACCIDENT (CVA) DUE TO EMBOLISM OF CEREBRAL ARTERY (MULTI): ICD-10-CM

## 2023-03-15 DIAGNOSIS — I10 MILD HTN: ICD-10-CM

## 2023-03-15 DIAGNOSIS — Z01.818 PREOP EXAMINATION: Primary | ICD-10-CM

## 2023-03-15 DIAGNOSIS — J45.20 MILD INTERMITTENT ASTHMA WITHOUT COMPLICATION (HHS-HCC): ICD-10-CM

## 2023-03-15 DIAGNOSIS — E11.9 TYPE 2 DIABETES MELLITUS WITHOUT COMPLICATION, WITHOUT LONG-TERM CURRENT USE OF INSULIN (MULTI): ICD-10-CM

## 2023-03-15 PROCEDURE — 1159F MED LIST DOCD IN RCRD: CPT | Performed by: FAMILY MEDICINE

## 2023-03-15 PROCEDURE — 1160F RVW MEDS BY RX/DR IN RCRD: CPT | Performed by: FAMILY MEDICINE

## 2023-03-15 PROCEDURE — 3075F SYST BP GE 130 - 139MM HG: CPT | Performed by: FAMILY MEDICINE

## 2023-03-15 PROCEDURE — 99213 OFFICE O/P EST LOW 20 MIN: CPT | Performed by: FAMILY MEDICINE

## 2023-03-15 PROCEDURE — 1157F ADVNC CARE PLAN IN RCRD: CPT | Performed by: FAMILY MEDICINE

## 2023-03-15 PROCEDURE — 1036F TOBACCO NON-USER: CPT | Performed by: FAMILY MEDICINE

## 2023-03-15 PROCEDURE — 3078F DIAST BP <80 MM HG: CPT | Performed by: FAMILY MEDICINE

## 2023-03-15 ASSESSMENT — ENCOUNTER SYMPTOMS
LIGHT-HEADEDNESS: 1
ADENOPATHY: 0
NAUSEA: 0
TROUBLE SWALLOWING: 0
UNEXPECTED WEIGHT CHANGE: 0
DIARRHEA: 0
ARTHRALGIAS: 0
APPETITE CHANGE: 0
DIZZINESS: 1
COUGH: 0
ABDOMINAL PAIN: 0
NUMBNESS: 0
ABDOMINAL DISTENTION: 0
CONSTIPATION: 0
WHEEZING: 0
HEADACHES: 1
PALPITATIONS: 0
SHORTNESS OF BREATH: 0
VOMITING: 0
ACTIVITY CHANGE: 0
DIFFICULTY URINATING: 0
RHINORRHEA: 0
FATIGUE: 0

## 2023-03-15 NOTE — PROGRESS NOTES
"Subjective   Patient ID: Arvin Diallo is a 82 y.o. male who presents for Pre-op Exam (Cateract removal).    HPI   To have cataract surgery  No headache, chest pain, shortness of breath, dizziness, lightheadedness, or edema  No albuterol need, sees pulmonary medicine in [place]  Reducing sertraline without much help  Able to exercise 4 mets  No tobacco use    Review of Systems   Constitutional:  Negative for activity change, appetite change, fatigue and unexpected weight change.   HENT:  Negative for ear pain, nosebleeds, rhinorrhea, sneezing and trouble swallowing.    Respiratory:  Negative for cough, shortness of breath and wheezing.    Cardiovascular:  Negative for chest pain, palpitations and leg swelling.   Gastrointestinal:  Negative for abdominal distention, abdominal pain, constipation, diarrhea, nausea and vomiting.   Genitourinary:  Negative for difficulty urinating.   Musculoskeletal:  Negative for arthralgias.   Skin:  Negative for rash.   Neurological:  Positive for dizziness, light-headedness and headaches. Negative for numbness.   Hematological:  Negative for adenopathy.   Psychiatric/Behavioral:  Negative for behavioral problems.    All other systems reviewed and are negative.    Objective   /70   Pulse 75   Ht 1.905 m (6' 3\")   Wt 116 kg (255 lb 12.8 oz)   SpO2 94%   BMI 31.97 kg/m²     Physical Exam  Vitals and nursing note reviewed.   Constitutional:       Appearance: Normal appearance.   Cardiovascular:      Rate and Rhythm: Normal rate and regular rhythm.      Pulses: Normal pulses.      Heart sounds: Normal heart sounds.   Pulmonary:      Effort: Pulmonary effort is normal.      Breath sounds: Normal breath sounds.   Neurological:      Mental Status: He is alert.   Psychiatric:         Mood and Affect: Mood normal.         Behavior: Behavior normal.         Assessment/Plan   Problem List Items Addressed This Visit          Nervous    Cerebrovascular accident (CVA) (CMS/[facility])       " Respiratory    Asthma       Circulatory    Mild HTN       Endocrine/Metabolic    Diabetes mellitus (CMS/HCC)     Other Visit Diagnoses       Preop examination    -  Primary    Medically OK for cataract surgery

## 2023-03-31 ENCOUNTER — OFFICE VISIT (OUTPATIENT)
Dept: PRIMARY CARE | Facility: CLINIC | Age: 83
End: 2023-03-31
Payer: MEDICARE

## 2023-03-31 VITALS
HEIGHT: 75 IN | WEIGHT: 256.3 LBS | DIASTOLIC BLOOD PRESSURE: 76 MMHG | SYSTOLIC BLOOD PRESSURE: 140 MMHG | HEART RATE: 88 BPM | OXYGEN SATURATION: 91 % | BODY MASS INDEX: 31.87 KG/M2

## 2023-03-31 DIAGNOSIS — H93.13 TINNITUS OF BOTH EARS: ICD-10-CM

## 2023-03-31 DIAGNOSIS — L72.9 CYST OF SKIN: ICD-10-CM

## 2023-03-31 DIAGNOSIS — R42 DIZZINESS: Primary | ICD-10-CM

## 2023-03-31 PROCEDURE — 3077F SYST BP >= 140 MM HG: CPT | Performed by: NURSE PRACTITIONER

## 2023-03-31 PROCEDURE — 1159F MED LIST DOCD IN RCRD: CPT | Performed by: NURSE PRACTITIONER

## 2023-03-31 PROCEDURE — 3078F DIAST BP <80 MM HG: CPT | Performed by: NURSE PRACTITIONER

## 2023-03-31 PROCEDURE — 1160F RVW MEDS BY RX/DR IN RCRD: CPT | Performed by: NURSE PRACTITIONER

## 2023-03-31 PROCEDURE — 99214 OFFICE O/P EST MOD 30 MIN: CPT | Performed by: NURSE PRACTITIONER

## 2023-03-31 PROCEDURE — 1157F ADVNC CARE PLAN IN RCRD: CPT | Performed by: NURSE PRACTITIONER

## 2023-03-31 PROCEDURE — 1036F TOBACCO NON-USER: CPT | Performed by: NURSE PRACTITIONER

## 2023-03-31 RX ORDER — MECLIZINE HCL 12.5 MG 12.5 MG/1
12.5 TABLET ORAL 3 TIMES DAILY PRN
Qty: 30 TABLET | Refills: 11 | Status: SHIPPED | OUTPATIENT
Start: 2023-03-31 | End: 2023-05-15 | Stop reason: ALTCHOICE

## 2023-03-31 ASSESSMENT — ENCOUNTER SYMPTOMS
FATIGUE: 1
LIGHT-HEADEDNESS: 1
HEADACHES: 0
SPEECH DIFFICULTY: 0
PALPITATIONS: 0
DIZZINESS: 1

## 2023-03-31 NOTE — PATIENT INSTRUCTIONS
Referral to physical therapy for vestibular rehabilitation  Meclizine 12.5 mg 3 times per day as needed for dizziness   encouraged to move slowly any position changes should be done in a slow and gradual manner to avoid aggravating dizziness  Referral to general surgery for left hand cyst removal

## 2023-03-31 NOTE — PROGRESS NOTES
"Subjective   Patient ID: Arvin Diallo is a 82 y.o. male who presents for Dizziness (Worse x 1 week , ringing in ears ) and Hand Pain (Lump on hand x 1 mo).    Arvin comes to the office, with his daughter, to discuss ongoing chronic dizziness.  Has seen several neurologists in the past and follows actively with an ENT.  States dizziness and ringing in his ears has been worse over the course of the last week.  Describes it as feeling fuzzy in his head. He has been gradually reducing his Zoloft as concerned that maybe contributing to his dizziness. Lukas. Hearing aids. + noise exposure in younger yrs w/o ear protection. Trialed discontinuing Flomax for a short period of time w/o no difference in dizziness.  Dizziness associated with turning his head bending over and movement of his head.  There are no syncopal episodes/headache/drop attacks associated with the dizziness.  Cyst top of left hand; bumps it on things and bothers him         Review of Systems   Constitutional:  Positive for fatigue.   Cardiovascular:  Negative for chest pain and palpitations.   Skin:         Cyst top of left hand   Neurological:  Positive for dizziness and light-headedness. Negative for syncope, speech difficulty and headaches.       Objective   /76   Pulse 88   Ht 1.905 m (6' 3\")   Wt 116 kg (256 lb 4.8 oz)   SpO2 91%   BMI 32.04 kg/m²     Physical Exam  Vitals and nursing note reviewed.   Constitutional:       Appearance: Normal appearance.   HENT:      Head: Normocephalic.      Right Ear: Tympanic membrane normal.      Left Ear: Tympanic membrane normal.      Mouth/Throat:      Pharynx: No pharyngeal swelling, oropharyngeal exudate or posterior oropharyngeal erythema.   Cardiovascular:      Rate and Rhythm: Normal rate and regular rhythm.      Heart sounds: Normal heart sounds.   Pulmonary:      Effort: Pulmonary effort is normal.      Breath sounds: Normal breath sounds.   Musculoskeletal:      Cervical back: Normal range " of motion.   Skin:     General: Skin is warm and dry.   Neurological:      General: No focal deficit present.      Mental Status: He is alert and oriented to person, place, and time.      Cranial Nerves: No cranial nerve deficit.      Gait: Gait normal.   Psychiatric:         Mood and Affect: Mood normal.         Thought Content: Thought content normal.         Assessment/Plan   Problem List Items Addressed This Visit          Other    Dizziness - Primary    Relevant Medications    meclizine (Antivert) 12.5 mg tablet    Other Relevant Orders    Referral to Physical Therapy    Tinnitus of both ears     Other Visit Diagnoses       Cyst of skin        Referral to general surgery    Relevant Orders    Referral to General Surgery

## 2023-04-06 ENCOUNTER — HOSPITAL ENCOUNTER (OUTPATIENT)
Dept: DATA CONVERSION | Facility: HOSPITAL | Age: 83
End: 2023-04-06
Attending: OPHTHALMOLOGY | Admitting: OPHTHALMOLOGY
Payer: MEDICARE

## 2023-04-06 DIAGNOSIS — H25.812 COMBINED FORMS OF AGE-RELATED CATARACT, LEFT EYE: ICD-10-CM

## 2023-04-06 DIAGNOSIS — J44.9 CHRONIC OBSTRUCTIVE PULMONARY DISEASE, UNSPECIFIED (MULTI): ICD-10-CM

## 2023-04-06 DIAGNOSIS — Z79.02 LONG TERM (CURRENT) USE OF ANTITHROMBOTICS/ANTIPLATELETS: ICD-10-CM

## 2023-04-06 DIAGNOSIS — Z86.711 PERSONAL HISTORY OF PULMONARY EMBOLISM: ICD-10-CM

## 2023-04-06 DIAGNOSIS — G47.33 OBSTRUCTIVE SLEEP APNEA (ADULT) (PEDIATRIC): ICD-10-CM

## 2023-04-06 DIAGNOSIS — H52.222 REGULAR ASTIGMATISM, LEFT EYE: ICD-10-CM

## 2023-04-06 DIAGNOSIS — E11.36 TYPE 2 DIABETES MELLITUS WITH DIABETIC CATARACT (MULTI): ICD-10-CM

## 2023-04-06 DIAGNOSIS — Z86.73 PERSONAL HISTORY OF TRANSIENT ISCHEMIC ATTACK (TIA), AND CEREBRAL INFARCTION WITHOUT RESIDUAL DEFICITS: ICD-10-CM

## 2023-04-06 DIAGNOSIS — H81.09 MENIERE'S DISEASE, UNSPECIFIED EAR: ICD-10-CM

## 2023-04-06 DIAGNOSIS — N40.0 BENIGN PROSTATIC HYPERPLASIA WITHOUT LOWER URINARY TRACT SYMPTOMS: ICD-10-CM

## 2023-04-06 DIAGNOSIS — I10 ESSENTIAL (PRIMARY) HYPERTENSION: ICD-10-CM

## 2023-04-12 ENCOUNTER — TELEPHONE (OUTPATIENT)
Dept: PRIMARY CARE | Facility: CLINIC | Age: 83
End: 2023-04-12

## 2023-04-12 ENCOUNTER — OFFICE VISIT (OUTPATIENT)
Dept: PRIMARY CARE | Facility: CLINIC | Age: 83
End: 2023-04-12
Payer: MEDICARE

## 2023-04-12 VITALS
HEART RATE: 77 BPM | DIASTOLIC BLOOD PRESSURE: 60 MMHG | HEIGHT: 75 IN | BODY MASS INDEX: 32.19 KG/M2 | WEIGHT: 258.9 LBS | SYSTOLIC BLOOD PRESSURE: 110 MMHG | OXYGEN SATURATION: 96 %

## 2023-04-12 DIAGNOSIS — R42 VERTIGO: ICD-10-CM

## 2023-04-12 DIAGNOSIS — I63.40 CEREBROVASCULAR ACCIDENT (CVA) DUE TO EMBOLISM OF CEREBRAL ARTERY (MULTI): Chronic | ICD-10-CM

## 2023-04-12 DIAGNOSIS — R53.82 CHRONIC FATIGUE: Chronic | ICD-10-CM

## 2023-04-12 DIAGNOSIS — I10 MILD HTN: ICD-10-CM

## 2023-04-12 DIAGNOSIS — R42 DIZZINESS: ICD-10-CM

## 2023-04-12 DIAGNOSIS — J45.20 MILD INTERMITTENT ASTHMA WITHOUT COMPLICATION (HHS-HCC): Primary | ICD-10-CM

## 2023-04-12 DIAGNOSIS — I63.40 CEREBROVASCULAR ACCIDENT (CVA) DUE TO EMBOLISM OF CEREBRAL ARTERY (MULTI): ICD-10-CM

## 2023-04-12 DIAGNOSIS — J41.0 SIMPLE CHRONIC BRONCHITIS (MULTI): ICD-10-CM

## 2023-04-12 DIAGNOSIS — E11.9 TYPE 2 DIABETES MELLITUS WITHOUT COMPLICATION, WITHOUT LONG-TERM CURRENT USE OF INSULIN (MULTI): ICD-10-CM

## 2023-04-12 DIAGNOSIS — H81.09 MENIERE'S DISEASE, UNSPECIFIED LATERALITY: ICD-10-CM

## 2023-04-12 DIAGNOSIS — I26.99 ACUTE PULMONARY EMBOLISM WITHOUT ACUTE COR PULMONALE, UNSPECIFIED PULMONARY EMBOLISM TYPE (MULTI): ICD-10-CM

## 2023-04-12 PROBLEM — I63.9 STROKE (MULTI): Status: RESOLVED | Noted: 2023-03-03 | Resolved: 2023-04-12

## 2023-04-12 PROBLEM — J45.909 ASTHMA (HHS-HCC): Status: RESOLVED | Noted: 2023-03-03 | Resolved: 2023-04-12

## 2023-04-12 PROCEDURE — 99214 OFFICE O/P EST MOD 30 MIN: CPT | Performed by: FAMILY MEDICINE

## 2023-04-12 PROCEDURE — 1160F RVW MEDS BY RX/DR IN RCRD: CPT | Performed by: FAMILY MEDICINE

## 2023-04-12 PROCEDURE — 3078F DIAST BP <80 MM HG: CPT | Performed by: FAMILY MEDICINE

## 2023-04-12 PROCEDURE — 3074F SYST BP LT 130 MM HG: CPT | Performed by: FAMILY MEDICINE

## 2023-04-12 PROCEDURE — 1036F TOBACCO NON-USER: CPT | Performed by: FAMILY MEDICINE

## 2023-04-12 PROCEDURE — 1159F MED LIST DOCD IN RCRD: CPT | Performed by: FAMILY MEDICINE

## 2023-04-12 PROCEDURE — 1157F ADVNC CARE PLAN IN RCRD: CPT | Performed by: FAMILY MEDICINE

## 2023-04-12 RX ORDER — ALBUTEROL SULFATE 90 UG/1
2 AEROSOL, METERED RESPIRATORY (INHALATION) EVERY 4 HOURS PRN
Qty: 18 G | Refills: 11 | Status: SHIPPED | OUTPATIENT
Start: 2023-04-12 | End: 2024-02-01 | Stop reason: SDUPTHER

## 2023-04-12 RX ORDER — SERTRALINE HYDROCHLORIDE 100 MG/1
100 TABLET, FILM COATED ORAL DAILY
Qty: 30 TABLET | Refills: 3 | Status: SHIPPED | OUTPATIENT
Start: 2023-04-12 | End: 2023-07-14 | Stop reason: SDUPTHER

## 2023-04-12 ASSESSMENT — ENCOUNTER SYMPTOMS
VOMITING: 0
CHEST TIGHTNESS: 0
PALPITATIONS: 0
HEADACHES: 0
DIARRHEA: 0
COUGH: 0
FATIGUE: 1
TROUBLE SWALLOWING: 0
ABDOMINAL PAIN: 0
LIGHT-HEADEDNESS: 1
RHINORRHEA: 0
ACTIVITY CHANGE: 0
APPETITE CHANGE: 0
DIZZINESS: 1
CONSTIPATION: 0
NAUSEA: 0
SHORTNESS OF BREATH: 0

## 2023-04-12 NOTE — TELEPHONE ENCOUNTER
REFERRAL TO [facility] NEUROLOGY WITH INSURANCE, DEMOGRAPHICS, AND RECORDS FAXED [phone] AND : [phone] WITH REQUEST TO SCHEDULE.  NO IMAGING SENT - DR. MICHAEL STATED THAT IMAGING IS IN [facility] DATA BASE SO THEY SHOULD BE ABLE TO VIEW THE SAME.

## 2023-04-12 NOTE — ASSESSMENT & PLAN NOTE
This is a chronic long-term issue, patient continues to complain, increase sertraline back to 100 mg, continue with physical therapy but supposed to start in the next week, get another opinion from a different neurologist.  Follow-up with ENT physician.

## 2023-04-12 NOTE — ASSESSMENT & PLAN NOTE
Patient with remote history of pulmonary embolism following surgery.  Is now off of anticoagulation.

## 2023-04-12 NOTE — ASSESSMENT & PLAN NOTE
Patient with persistent dizziness, questionable history in the past of posterior stroke, MRI scans done in 2021 were negative.  Patient with persistent complaints of dizziness, has seen different neurologist and ear nose and throat physicians, will get a second opinion from a different neurologist, increase sertraline back to 100 mg a day since symptoms are worse with titrating this down to 50 mg.  Patient encouraged to follow through with physical therapy that was ordered

## 2023-04-12 NOTE — PROGRESS NOTES
"Subjective   Patient ID: Marco A Diallo is a 82 y.o. male who presents for 1 MO LABS.    HPI   hAD CATARACT SURGERY, went well  Feeling some worse in the past month since reduced sertraline, to start PT next week  + fatigued and woozy, had seen neurology and ENT  Gets BARRON (sees Pulmonary in [place]), using CPAP therapy  No headaches    Review of Systems   Constitutional:  Positive for fatigue. Negative for activity change and appetite change.   HENT:  Negative for congestion, ear pain, rhinorrhea and trouble swallowing.    Respiratory:  Negative for cough, chest tightness and shortness of breath.    Cardiovascular:  Negative for chest pain, palpitations and leg swelling.   Gastrointestinal:  Negative for abdominal pain, constipation, diarrhea, nausea and vomiting.   Neurological:  Positive for dizziness and light-headedness. Negative for headaches.       Objective   /60   Pulse 77   Ht 1.905 m (6' 3\")   Wt 117 kg (258 lb 14.4 oz)   SpO2 96%   BMI 32.36 kg/m²     Physical Exam  Vitals and nursing note reviewed.   Constitutional:       Appearance: Normal appearance.   Cardiovascular:      Rate and Rhythm: Normal rate and regular rhythm.      Pulses: Normal pulses.      Heart sounds: Normal heart sounds.   Pulmonary:      Effort: Pulmonary effort is normal.      Breath sounds: Normal breath sounds.   Neurological:      General: No focal deficit present.      Mental Status: He is alert and oriented to person, place, and time. Mental status is at baseline.      Comments: Mildly abnormal Romberg sign, cranial nerves II through XII grossly intact.   Psychiatric:         Mood and Affect: Mood normal.         Behavior: Behavior normal.         Assessment/Plan   Problem List Items Addressed This Visit          Nervous    Cerebrovascular accident (CVA) (CMS/[facility])     Patient with persistent dizziness, questionable history in the past of posterior stroke, MRI scans done in 2021 were negative.  Patient with persistent " complaints of dizziness, has seen different neurologist and ear nose and throat physicians, will get a second opinion from a different neurologist, increase sertraline back to 100 mg a day since symptoms are worse with titrating this down to 50 mg.  Patient encouraged to follow through with physical therapy that was ordered         Relevant Medications    sertraline (Zoloft) 100 mg tablet    Other Relevant Orders    Referral to Neurology    RESOLVED: Stroke (CMS/[facility])    Relevant Medications    sertraline (Zoloft) 100 mg tablet    Other Relevant Orders    Referral to Neurology       Respiratory    Chronic obstructive pulmonary disease (CMS/[facility])     Use albuterol as needed, seems to be stable follows with pulmonary medicine in [place].         RESOLVED: Asthma - Primary    Relevant Medications    albuterol 90 mcg/actuation inhaler       Circulatory    Mild HTN     Blood pressures under good control, recent blood testing was normal.         Relevant Orders    Referral to Neurology    Acute pulmonary embolism without acute cor pulmonale, unspecified pulmonary embolism type (CMS/[facility])     Patient with remote history of pulmonary embolism following surgery.  Is now off of anticoagulation.            Endocrine/Metabolic    Diabetes mellitus (CMS/[facility])     Recent A1c testing below 7, renal functions normal            Other    Dizziness     This is a chronic long-term issue, patient continues to complain, increase sertraline back to 100 mg, continue with physical therapy but supposed to start in the next week, get another opinion from a different neurologist.  Follow-up with ENT physician.         Relevant Medications    sertraline (Zoloft) 100 mg tablet    Other Relevant Orders    Referral to Neurology    Fatigue     Recent blood testing was reviewed and was normal.         Relevant Medications    sertraline (Zoloft) 100 mg tablet    Other Relevant Orders    Referral to Neurology    Meniere's disease    Relevant Medications     sertraline (Zoloft) 100 mg tablet    Other Relevant Orders    Referral to Neurology    Vertigo    Relevant Medications    sertraline (Zoloft) 100 mg tablet    Other Relevant Orders    Referral to Neurology

## 2023-05-03 ENCOUNTER — TELEPHONE (OUTPATIENT)
Dept: PRIMARY CARE | Facility: CLINIC | Age: 83
End: 2023-05-03
Payer: MEDICARE

## 2023-05-03 NOTE — TELEPHONE ENCOUNTER
Natalia from Dr. Ford's office called regarding Arvin's referral, he will need referred to someone else because dr ford does not do hands or face and his cyst is on his hand.

## 2023-05-04 NOTE — TELEPHONE ENCOUNTER
Patients daughter stopped by and said he is going to make his own appt with a surgeon he has previously seen.  He is going to schedule with Dr Javed.

## 2023-05-07 ENCOUNTER — HOSPITAL ENCOUNTER (EMERGENCY)
Age: 83
Discharge: HOME | End: 2023-05-07
Payer: MEDICARE

## 2023-05-07 VITALS — HEART RATE: 74 BPM | DIASTOLIC BLOOD PRESSURE: 75 MMHG | SYSTOLIC BLOOD PRESSURE: 136 MMHG

## 2023-05-07 VITALS
OXYGEN SATURATION: 93 % | SYSTOLIC BLOOD PRESSURE: 144 MMHG | DIASTOLIC BLOOD PRESSURE: 87 MMHG | HEART RATE: 61 BPM | RESPIRATION RATE: 17 BRPM

## 2023-05-07 VITALS
TEMPERATURE: 96.8 F | RESPIRATION RATE: 18 BRPM | OXYGEN SATURATION: 94 % | HEART RATE: 88 BPM | SYSTOLIC BLOOD PRESSURE: 148 MMHG | DIASTOLIC BLOOD PRESSURE: 79 MMHG

## 2023-05-07 VITALS
SYSTOLIC BLOOD PRESSURE: 144 MMHG | OXYGEN SATURATION: 94 % | HEART RATE: 73 BPM | RESPIRATION RATE: 15 BRPM | DIASTOLIC BLOOD PRESSURE: 77 MMHG

## 2023-05-07 DIAGNOSIS — E66.9: ICD-10-CM

## 2023-05-07 DIAGNOSIS — I10: ICD-10-CM

## 2023-05-07 DIAGNOSIS — J44.9: ICD-10-CM

## 2023-05-07 DIAGNOSIS — E11.9: ICD-10-CM

## 2023-05-07 DIAGNOSIS — E78.5: ICD-10-CM

## 2023-05-07 DIAGNOSIS — R53.83: Primary | ICD-10-CM

## 2023-05-07 LAB
ALANINE AMINOTRANSFER ALT/SGPT: 25 U/L (ref 16–61)
ALBUMIN SERPL-MCNC: 3.4 G/DL (ref 3.2–5)
ALKALINE PHOSPHATASE: 101 U/L (ref 45–117)
ANION GAP: 7 (ref 5–15)
APTT PPP: 28.6 SECONDS (ref 24.1–36.2)
AST(SGOT): 16 U/L (ref 15–37)
BUN SERPL-MCNC: 21 MG/DL (ref 7–18)
BUN/CREAT RATIO: 19.4 RATIO (ref 10–20)
CALCIUM SERPL-MCNC: 9.8 MG/DL (ref 8.5–10.1)
CARBON DIOXIDE: 25 MMOL/L (ref 21–32)
CHLORIDE: 107 MMOL/L (ref 98–107)
DEPRECATED RDW RBC: 44.9 FL (ref 35.1–43.9)
ERYTHROCYTE [DISTWIDTH] IN BLOOD: 13.8 % (ref 11.6–14.6)
EST GLOM FILT RATE - AFR AMER: 84 ML/MIN (ref 60–?)
ESTIMATED CREATININE CLEARANCE: 64.11 ML/MIN
GLOBULIN: 3.4 G/DL (ref 2.2–4.2)
GLUCOSE: 134 MG/DL (ref 74–106)
HCT VFR BLD AUTO: 40.9 % (ref 40–54)
HEMOGLOBIN: 13.7 G/DL (ref 13–16.5)
HGB BLD-MCNC: 13.7 G/DL (ref 13–16.5)
IMMATURE GRANULOCYTES COUNT: 0.02 X10^3/UL (ref 0–0)
LEUKOCYTE ESTERASE UR QL STRIP: 100 /UL
MCV RBC: 89.5 FL (ref 80–94)
MEAN CORP HGB CONC: 33.5 G/DL (ref 32–36)
MEAN PLATELET VOL.: 9.6 FL (ref 6.2–12)
MUCOUS THREADS URNS QL MICRO: (no result) /HPF
NRBC FLAGGED BY ANALYZER: 0 % (ref 0–5)
PLATELET # BLD: 192 K/MM3 (ref 150–450)
PLATELET COUNT: 192 K/MM3 (ref 150–450)
POTASSIUM: 4.2 MMOL/L (ref 3.5–5.1)
PROTHROMBIN TIME (PROTIME)PT.: 14 SECONDS (ref 11.7–14.9)
RBC # BLD AUTO: 4.57 M/MM3 (ref 4.6–6.2)
RBC DISTRIBUTION WIDTH CV: 13.8 % (ref 11.6–14.6)
RBC DISTRIBUTION WIDTH SD: 44.9 FL (ref 35.1–43.9)
RBC UR QL: (no result) /HPF (ref 0–5)
SP GR UR: 1.02 (ref 1–1.03)
SQUAMOUS URNS QL MICRO: (no result) /HPF (ref 0–5)
TROPONIN-I HS: 12 PG/ML (ref 3–78)
URINE PRESERVATIVE: (no result)
WBC # BLD AUTO: 6.5 K/MM3 (ref 4.4–11)
WHITE BLOOD COUNT: 6.5 K/MM3 (ref 4.4–11)

## 2023-05-07 PROCEDURE — 87428 SARSCOV & INF VIR A&B AG IA: CPT

## 2023-05-07 PROCEDURE — 70450 CT HEAD/BRAIN W/O DYE: CPT

## 2023-05-07 PROCEDURE — 71046 X-RAY EXAM CHEST 2 VIEWS: CPT

## 2023-05-07 PROCEDURE — 85025 COMPLETE CBC W/AUTO DIFF WBC: CPT

## 2023-05-07 PROCEDURE — 99284 EMERGENCY DEPT VISIT MOD MDM: CPT

## 2023-05-07 PROCEDURE — 93005 ELECTROCARDIOGRAM TRACING: CPT

## 2023-05-07 PROCEDURE — 85730 THROMBOPLASTIN TIME PARTIAL: CPT

## 2023-05-07 PROCEDURE — 80053 COMPREHEN METABOLIC PANEL: CPT

## 2023-05-07 PROCEDURE — 81001 URINALYSIS AUTO W/SCOPE: CPT

## 2023-05-07 PROCEDURE — 84484 ASSAY OF TROPONIN QUANT: CPT

## 2023-05-07 PROCEDURE — 96360 HYDRATION IV INFUSION INIT: CPT

## 2023-05-07 PROCEDURE — 96361 HYDRATE IV INFUSION ADD-ON: CPT

## 2023-05-07 PROCEDURE — 85610 PROTHROMBIN TIME: CPT

## 2023-05-08 ENCOUNTER — TELEPHONE (OUTPATIENT)
Dept: PRIMARY CARE | Facility: CLINIC | Age: 83
End: 2023-05-08
Payer: MEDICARE

## 2023-05-08 DIAGNOSIS — L72.9 CYST OF SKIN: ICD-10-CM

## 2023-05-08 DIAGNOSIS — J98.09 BRONCHOMALACIA: Primary | ICD-10-CM

## 2023-05-08 DIAGNOSIS — N41.1 CHRONIC PROSTATITIS: Primary | ICD-10-CM

## 2023-05-08 RX ORDER — CEPHALEXIN 500 MG/1
500 CAPSULE ORAL 2 TIMES DAILY
Qty: 180 CAPSULE | Refills: 3 | Status: SHIPPED | OUTPATIENT
Start: 2023-05-08 | End: 2023-05-08 | Stop reason: SDUPTHER

## 2023-05-08 RX ORDER — CEPHALEXIN 500 MG/1
500 CAPSULE ORAL 2 TIMES DAILY
Qty: 180 CAPSULE | Refills: 3 | Status: SHIPPED | OUTPATIENT
Start: 2023-05-08

## 2023-05-08 RX ORDER — CEPHALEXIN 500 MG/1
500 CAPSULE ORAL 2 TIMES DAILY
COMMUNITY
Start: 2020-01-20 | End: 2023-05-08 | Stop reason: SDUPTHER

## 2023-05-08 RX ORDER — BUDESONIDE AND FORMOTEROL FUMARATE DIHYDRATE 160; 4.5 UG/1; UG/1
2 AEROSOL RESPIRATORY (INHALATION) 2 TIMES DAILY
Qty: 3 EACH | Refills: 3 | Status: SHIPPED | OUTPATIENT
Start: 2023-05-08 | End: 2024-03-22 | Stop reason: SDUPTHER

## 2023-05-08 NOTE — TELEPHONE ENCOUNTER
REFERRAL TO DR. TAINA VILLAREAL WITH INSURANCE, DEMOGRAPHICS, AND RECORDS FAXED WITH REQUEST TO SCHEDULE PATIENT TO FAX: [phone] WITH REQUEST TO SCHEDULE PATIENT.     PATIENT IS ALREADY SCHEDULED FOR 5/17/23.

## 2023-05-08 NOTE — TELEPHONE ENCOUNTER
PATIENT ALSO ASKING  FOR A REFILL   ON KEFLEX  500MG 1 every day  #90 3 DARLENE TREADWELL SAYS HE TAKES EVERYDAY ?  HEMANTH/CONY

## 2023-05-08 NOTE — TELEPHONE ENCOUNTER
Dr dang would not see patient for his hand.   Needs referred to dr cabrera.       in [place].   Raised bum left hand     already juan for 5-17-23   fax  [phone]  I put ref in

## 2023-05-10 ENCOUNTER — HOSPITAL ENCOUNTER (OUTPATIENT)
Dept: HOSPITAL 100 - ED | Age: 83
Setting detail: OBSERVATION
LOS: 2 days | Discharge: HOME | End: 2023-05-12
Payer: MEDICARE

## 2023-05-10 VITALS
OXYGEN SATURATION: 93 % | SYSTOLIC BLOOD PRESSURE: 118 MMHG | DIASTOLIC BLOOD PRESSURE: 73 MMHG | HEART RATE: 75 BPM | RESPIRATION RATE: 16 BRPM

## 2023-05-10 VITALS — RESPIRATION RATE: 18 BRPM | OXYGEN SATURATION: 92 % | HEART RATE: 66 BPM

## 2023-05-10 VITALS
HEART RATE: 65 BPM | TEMPERATURE: 98.42 F | SYSTOLIC BLOOD PRESSURE: 133 MMHG | RESPIRATION RATE: 18 BRPM | OXYGEN SATURATION: 93 % | DIASTOLIC BLOOD PRESSURE: 73 MMHG

## 2023-05-10 VITALS — OXYGEN SATURATION: 91 % | HEART RATE: 74 BPM | RESPIRATION RATE: 16 BRPM

## 2023-05-10 VITALS
TEMPERATURE: 97.52 F | HEART RATE: 79 BPM | OXYGEN SATURATION: 96 % | DIASTOLIC BLOOD PRESSURE: 69 MMHG | RESPIRATION RATE: 20 BRPM | SYSTOLIC BLOOD PRESSURE: 129 MMHG

## 2023-05-10 VITALS
SYSTOLIC BLOOD PRESSURE: 124 MMHG | OXYGEN SATURATION: 92 % | HEART RATE: 65 BPM | TEMPERATURE: 98.24 F | DIASTOLIC BLOOD PRESSURE: 59 MMHG | RESPIRATION RATE: 16 BRPM

## 2023-05-10 VITALS
OXYGEN SATURATION: 93 % | HEART RATE: 74 BPM | RESPIRATION RATE: 16 BRPM | SYSTOLIC BLOOD PRESSURE: 119 MMHG | DIASTOLIC BLOOD PRESSURE: 74 MMHG

## 2023-05-10 VITALS
RESPIRATION RATE: 18 BRPM | TEMPERATURE: 97.6 F | DIASTOLIC BLOOD PRESSURE: 69 MMHG | SYSTOLIC BLOOD PRESSURE: 121 MMHG | HEART RATE: 58 BPM | OXYGEN SATURATION: 95 %

## 2023-05-10 VITALS
HEART RATE: 72 BPM | SYSTOLIC BLOOD PRESSURE: 117 MMHG | OXYGEN SATURATION: 94 % | RESPIRATION RATE: 18 BRPM | DIASTOLIC BLOOD PRESSURE: 75 MMHG

## 2023-05-10 VITALS — OXYGEN SATURATION: 92 %

## 2023-05-10 VITALS — RESPIRATION RATE: 19 BRPM | OXYGEN SATURATION: 92 %

## 2023-05-10 DIAGNOSIS — N13.8: ICD-10-CM

## 2023-05-10 DIAGNOSIS — F41.9: ICD-10-CM

## 2023-05-10 DIAGNOSIS — E11.9: ICD-10-CM

## 2023-05-10 DIAGNOSIS — R07.89: Primary | ICD-10-CM

## 2023-05-10 DIAGNOSIS — Z79.899: ICD-10-CM

## 2023-05-10 DIAGNOSIS — I10: ICD-10-CM

## 2023-05-10 DIAGNOSIS — H93.19: ICD-10-CM

## 2023-05-10 DIAGNOSIS — Z79.02: ICD-10-CM

## 2023-05-10 DIAGNOSIS — J44.9: ICD-10-CM

## 2023-05-10 DIAGNOSIS — Z86.73: ICD-10-CM

## 2023-05-10 DIAGNOSIS — R06.02: ICD-10-CM

## 2023-05-10 DIAGNOSIS — K21.9: ICD-10-CM

## 2023-05-10 DIAGNOSIS — G47.33: ICD-10-CM

## 2023-05-10 DIAGNOSIS — E78.5: ICD-10-CM

## 2023-05-10 DIAGNOSIS — Z79.01: ICD-10-CM

## 2023-05-10 DIAGNOSIS — Z79.51: ICD-10-CM

## 2023-05-10 DIAGNOSIS — N40.1: ICD-10-CM

## 2023-05-10 DIAGNOSIS — F32.A: ICD-10-CM

## 2023-05-10 LAB
ANION GAP: 7 (ref 5–15)
BNP,B-TYPE NATRIURETIC PEPTIDE: 50.3 PG/ML (ref 0–100)
BUN SERPL-MCNC: 19 MG/DL (ref 7–18)
BUN/CREAT RATIO: 17.4 RATIO (ref 10–20)
CALCIUM SERPL-MCNC: 10.1 MG/DL (ref 8.5–10.1)
CARBON DIOXIDE: 27 MMOL/L (ref 21–32)
CHLORIDE: 107 MMOL/L (ref 98–107)
D-DIMER QUANTITATIVE (DVT/PE): 0.81 FEU/UG/M (ref 0.27–0.49)
DEPRECATED RDW RBC: 46 FL (ref 35.1–43.9)
ERYTHROCYTE [DISTWIDTH] IN BLOOD: 13.6 % (ref 11.6–14.6)
EST GLOM FILT RATE - AFR AMER: 83 ML/MIN (ref 60–?)
ESTIMATED CREATININE CLEARANCE: 63.53 ML/MIN
GLUCOSE: 101 MG/DL (ref 74–106)
HCT VFR BLD AUTO: 42.7 % (ref 40–54)
HEMOGLOBIN: 13.7 G/DL (ref 13–16.5)
HGB BLD-MCNC: 13.7 G/DL (ref 13–16.5)
IMMATURE GRANULOCYTES COUNT: 0.02 X10^3/UL (ref 0–0)
MCV RBC: 92 FL (ref 80–94)
MEAN CORP HGB CONC: 32.1 G/DL (ref 32–36)
MEAN PLATELET VOL.: 9.7 FL (ref 6.2–12)
NRBC FLAGGED BY ANALYZER: 0 % (ref 0–5)
PLATELET # BLD: 201 K/MM3 (ref 150–450)
PLATELET COUNT: 201 K/MM3 (ref 150–450)
POTASSIUM: 4.3 MMOL/L (ref 3.5–5.1)
RBC # BLD AUTO: 4.64 M/MM3 (ref 4.6–6.2)
RBC DISTRIBUTION WIDTH CV: 13.6 % (ref 11.6–14.6)
RBC DISTRIBUTION WIDTH SD: 46 FL (ref 35.1–43.9)
TROPONIN-I HS: 10 PG/ML (ref 3–78)
TROPONIN-I HS: 12 PG/ML (ref 3–78)
TROPONIN-I HS: 13 PG/ML (ref 3–78)
WBC # BLD AUTO: 6.6 K/MM3 (ref 4.4–11)
WHITE BLOOD COUNT: 6.6 K/MM3 (ref 4.4–11)

## 2023-05-10 PROCEDURE — A9575 INJ GADOTERATE MEGLUMI 0.1ML: HCPCS

## 2023-05-10 PROCEDURE — 84100 ASSAY OF PHOSPHORUS: CPT

## 2023-05-10 PROCEDURE — G0463 HOSPITAL OUTPT CLINIC VISIT: HCPCS

## 2023-05-10 PROCEDURE — 83735 ASSAY OF MAGNESIUM: CPT

## 2023-05-10 PROCEDURE — 85025 COMPLETE CBC W/AUTO DIFF WBC: CPT

## 2023-05-10 PROCEDURE — 96372 THER/PROPH/DIAG INJ SC/IM: CPT

## 2023-05-10 PROCEDURE — 85379 FIBRIN DEGRADATION QUANT: CPT

## 2023-05-10 PROCEDURE — 97162 PT EVAL MOD COMPLEX 30 MIN: CPT

## 2023-05-10 PROCEDURE — A9500 TC99M SESTAMIBI: HCPCS

## 2023-05-10 PROCEDURE — 99284 EMERGENCY DEPT VISIT MOD MDM: CPT

## 2023-05-10 PROCEDURE — A4216 STERILE WATER/SALINE, 10 ML: HCPCS

## 2023-05-10 PROCEDURE — 97165 OT EVAL LOW COMPLEX 30 MIN: CPT

## 2023-05-10 PROCEDURE — 82803 BLOOD GASES ANY COMBINATION: CPT

## 2023-05-10 PROCEDURE — 78452 HT MUSCLE IMAGE SPECT MULT: CPT

## 2023-05-10 PROCEDURE — G0378 HOSPITAL OBSERVATION PER HR: HCPCS

## 2023-05-10 PROCEDURE — 87428 SARSCOV & INF VIR A&B AG IA: CPT

## 2023-05-10 PROCEDURE — 84484 ASSAY OF TROPONIN QUANT: CPT

## 2023-05-10 PROCEDURE — 99252 IP/OBS CONSLTJ NEW/EST SF 35: CPT

## 2023-05-10 PROCEDURE — 93005 ELECTROCARDIOGRAM TRACING: CPT

## 2023-05-10 PROCEDURE — 82607 VITAMIN B-12: CPT

## 2023-05-10 PROCEDURE — 71046 X-RAY EXAM CHEST 2 VIEWS: CPT

## 2023-05-10 PROCEDURE — 99221 1ST HOSP IP/OBS SF/LOW 40: CPT

## 2023-05-10 PROCEDURE — 36600 WITHDRAWAL OF ARTERIAL BLOOD: CPT

## 2023-05-10 PROCEDURE — 80053 COMPREHEN METABOLIC PANEL: CPT

## 2023-05-10 PROCEDURE — 93017 CV STRESS TEST TRACING ONLY: CPT

## 2023-05-10 PROCEDURE — 94640 AIRWAY INHALATION TREATMENT: CPT

## 2023-05-10 PROCEDURE — 36415 COLL VENOUS BLD VENIPUNCTURE: CPT

## 2023-05-10 PROCEDURE — 70553 MRI BRAIN STEM W/O & W/DYE: CPT

## 2023-05-10 PROCEDURE — 80048 BASIC METABOLIC PNL TOTAL CA: CPT

## 2023-05-10 PROCEDURE — 83880 ASSAY OF NATRIURETIC PEPTIDE: CPT

## 2023-05-10 PROCEDURE — 71275 CT ANGIOGRAPHY CHEST: CPT

## 2023-05-10 PROCEDURE — 84443 ASSAY THYROID STIM HORMONE: CPT

## 2023-05-10 RX ADMIN — PREDNISOLONE ACETATE 1 DRP: 10 SUSPENSION/ DROPS OPHTHALMIC at 22:34

## 2023-05-10 RX ADMIN — ALBUTEROL SULFATE 2.5 MG: 2.5 SOLUTION RESPIRATORY (INHALATION) at 19:05

## 2023-05-10 RX ADMIN — KETOROLAC TROMETHAMINE 1 ML: 4 SOLUTION/ DROPS OPHTHALMIC at 22:35

## 2023-05-10 RX ADMIN — BUDESONIDE 0.5 MG: 0.5 SUSPENSION RESPIRATORY (INHALATION) at 19:05

## 2023-05-11 VITALS
SYSTOLIC BLOOD PRESSURE: 112 MMHG | TEMPERATURE: 98 F | RESPIRATION RATE: 16 BRPM | DIASTOLIC BLOOD PRESSURE: 68 MMHG | HEART RATE: 66 BPM | OXYGEN SATURATION: 96 %

## 2023-05-11 VITALS
DIASTOLIC BLOOD PRESSURE: 56 MMHG | RESPIRATION RATE: 18 BRPM | OXYGEN SATURATION: 95 % | SYSTOLIC BLOOD PRESSURE: 109 MMHG | HEART RATE: 65 BPM | TEMPERATURE: 98.7 F

## 2023-05-11 VITALS — OXYGEN SATURATION: 95 %

## 2023-05-11 VITALS
SYSTOLIC BLOOD PRESSURE: 124 MMHG | TEMPERATURE: 98.06 F | OXYGEN SATURATION: 95 % | RESPIRATION RATE: 16 BRPM | HEART RATE: 63 BPM | DIASTOLIC BLOOD PRESSURE: 71 MMHG

## 2023-05-11 VITALS
SYSTOLIC BLOOD PRESSURE: 110 MMHG | RESPIRATION RATE: 16 BRPM | HEART RATE: 70 BPM | TEMPERATURE: 98.1 F | DIASTOLIC BLOOD PRESSURE: 63 MMHG | OXYGEN SATURATION: 94 %

## 2023-05-11 VITALS
RESPIRATION RATE: 16 BRPM | SYSTOLIC BLOOD PRESSURE: 131 MMHG | OXYGEN SATURATION: 95 % | HEART RATE: 76 BPM | DIASTOLIC BLOOD PRESSURE: 62 MMHG | TEMPERATURE: 97.52 F

## 2023-05-11 VITALS — RESPIRATION RATE: 20 BRPM | HEART RATE: 78 BPM

## 2023-05-11 VITALS — OXYGEN SATURATION: 91 %

## 2023-05-11 VITALS — HEART RATE: 61 BPM | RESPIRATION RATE: 18 BRPM

## 2023-05-11 VITALS — OXYGEN SATURATION: 90 %

## 2023-05-11 VITALS — OXYGEN SATURATION: 96 %

## 2023-05-11 LAB
ANION GAP: 3 (ref 5–15)
ANION GAP: 8 (ref 5–15)
BASE EXCESS BLDV CALC-SCNC: 1 MMOL/L
BNP,B-TYPE NATRIURETIC PEPTIDE: 44.7 PG/ML (ref 0–100)
BUN SERPL-MCNC: 20 MG/DL (ref 7–18)
BUN SERPL-MCNC: 21 MG/DL (ref 7–18)
BUN/CREAT RATIO: 18 RATIO (ref 10–20)
BUN/CREAT RATIO: 22 RATIO (ref 10–20)
CALCIUM SERPL-MCNC: 9.1 MG/DL (ref 8.5–10.1)
CALCIUM SERPL-MCNC: 9.5 MG/DL (ref 8.5–10.1)
CARBON DIOXIDE: 25 MMOL/L (ref 21–32)
CARBON DIOXIDE: 26 MMOL/L (ref 21–32)
CHLORIDE: 105 MMOL/L (ref 98–107)
CHLORIDE: 109 MMOL/L (ref 98–107)
DEPRECATED RDW RBC: 46.4 FL (ref 35.1–43.9)
ERYTHROCYTE [DISTWIDTH] IN BLOOD: 13.8 % (ref 11.6–14.6)
EST GLOM FILT RATE - AFR AMER: 82 ML/MIN (ref 60–?)
EST GLOM FILT RATE - AFR AMER: 97 ML/MIN (ref 60–?)
ESTIMATED CREATININE CLEARANCE: 62.38 ML/MIN
ESTIMATED CREATININE CLEARANCE: 72.89 ML/MIN
FI02: 21
GLUCOSE: 108 MG/DL (ref 74–106)
GLUCOSE: 114 MG/DL (ref 74–106)
HCT VFR BLD AUTO: 38.8 % (ref 40–54)
HEMOGLOBIN: 12.6 G/DL (ref 13–16.5)
HGB BLD-MCNC: 12.6 G/DL (ref 13–16.5)
IMMATURE GRANULOCYTES COUNT: 0.03 X10^3/UL (ref 0–0)
MCV RBC: 91.3 FL (ref 80–94)
MEAN CORP HGB CONC: 32.5 G/DL (ref 32–36)
MEAN PLATELET VOL.: 10.1 FL (ref 6.2–12)
NRBC FLAGGED BY ANALYZER: 0 % (ref 0–5)
PLATELET # BLD: 197 K/MM3 (ref 150–450)
PLATELET COUNT: 197 K/MM3 (ref 150–450)
PO2 BLDA: 66 MMHG (ref 75–100)
POTASSIUM: 3.9 MMOL/L (ref 3.5–5.1)
POTASSIUM: 4.2 MMOL/L (ref 3.5–5.1)
RBC # BLD AUTO: 4.25 M/MM3 (ref 4.6–6.2)
RBC DISTRIBUTION WIDTH CV: 13.8 % (ref 11.6–14.6)
RBC DISTRIBUTION WIDTH SD: 46.4 FL (ref 35.1–43.9)
SO2: 94 % (ref 95–99)
VITAMIN B12: 599 PG/ML (ref 211–911)
WBC # BLD AUTO: 6.4 K/MM3 (ref 4.4–11)
WHITE BLOOD COUNT: 6.4 K/MM3 (ref 4.4–11)

## 2023-05-11 RX ADMIN — PREDNISOLONE ACETATE 1 DRP: 10 SUSPENSION/ DROPS OPHTHALMIC at 16:24

## 2023-05-11 RX ADMIN — ALBUTEROL SULFATE 2.5 MG: 2.5 SOLUTION RESPIRATORY (INHALATION) at 21:14

## 2023-05-11 RX ADMIN — ALBUTEROL SULFATE 2.5 MG: 2.5 SOLUTION RESPIRATORY (INHALATION) at 08:18

## 2023-05-11 RX ADMIN — PREDNISOLONE ACETATE 1 DRP: 10 SUSPENSION/ DROPS OPHTHALMIC at 06:36

## 2023-05-11 RX ADMIN — BUDESONIDE 0.5 MG: 0.5 SUSPENSION RESPIRATORY (INHALATION) at 21:14

## 2023-05-11 RX ADMIN — KETOROLAC TROMETHAMINE 1 ML: 4 SOLUTION/ DROPS OPHTHALMIC at 16:25

## 2023-05-11 RX ADMIN — KETOROLAC TROMETHAMINE 1 ML: 4 SOLUTION/ DROPS OPHTHALMIC at 06:37

## 2023-05-11 RX ADMIN — KETOROLAC TROMETHAMINE 1 ML: 4 SOLUTION/ DROPS OPHTHALMIC at 21:54

## 2023-05-11 RX ADMIN — ALBUTEROL SULFATE 2.5 MG: 2.5 SOLUTION RESPIRATORY (INHALATION) at 13:50

## 2023-05-11 RX ADMIN — BUDESONIDE 0.5 MG: 0.5 SUSPENSION RESPIRATORY (INHALATION) at 08:18

## 2023-05-11 RX ADMIN — PREDNISOLONE ACETATE 1 DRP: 10 SUSPENSION/ DROPS OPHTHALMIC at 21:54

## 2023-05-12 VITALS
SYSTOLIC BLOOD PRESSURE: 135 MMHG | DIASTOLIC BLOOD PRESSURE: 65 MMHG | OXYGEN SATURATION: 96 % | TEMPERATURE: 97.88 F | RESPIRATION RATE: 16 BRPM | HEART RATE: 68 BPM

## 2023-05-12 VITALS — RESPIRATION RATE: 16 BRPM | HEART RATE: 80 BPM

## 2023-05-12 VITALS — OXYGEN SATURATION: 97 %

## 2023-05-12 VITALS — HEART RATE: 67 BPM | DIASTOLIC BLOOD PRESSURE: 67 MMHG | SYSTOLIC BLOOD PRESSURE: 128 MMHG

## 2023-05-12 VITALS
HEART RATE: 82 BPM | RESPIRATION RATE: 18 BRPM | DIASTOLIC BLOOD PRESSURE: 66 MMHG | SYSTOLIC BLOOD PRESSURE: 117 MMHG | OXYGEN SATURATION: 94 % | TEMPERATURE: 98.42 F

## 2023-05-12 VITALS
RESPIRATION RATE: 16 BRPM | SYSTOLIC BLOOD PRESSURE: 126 MMHG | DIASTOLIC BLOOD PRESSURE: 72 MMHG | TEMPERATURE: 98.5 F | HEART RATE: 87 BPM | OXYGEN SATURATION: 93 %

## 2023-05-12 VITALS — OXYGEN SATURATION: 97 % | RESPIRATION RATE: 18 BRPM | HEART RATE: 78 BPM

## 2023-05-12 VITALS — OXYGEN SATURATION: 91 %

## 2023-05-12 LAB
ALANINE AMINOTRANSFER ALT/SGPT: 23 U/L (ref 16–61)
ALBUMIN SERPL-MCNC: 3.2 G/DL (ref 3.2–5)
ALKALINE PHOSPHATASE: 98 U/L (ref 45–117)
ANION GAP: 5 (ref 5–15)
AST(SGOT): 18 U/L (ref 15–37)
BUN SERPL-MCNC: 19 MG/DL (ref 7–18)
BUN/CREAT RATIO: 15.4 RATIO (ref 10–20)
CALCIUM SERPL-MCNC: 9.5 MG/DL (ref 8.5–10.1)
CARBON DIOXIDE: 26 MMOL/L (ref 21–32)
CHLORIDE: 107 MMOL/L (ref 98–107)
DEPRECATED RDW RBC: 46.9 FL (ref 35.1–43.9)
ERYTHROCYTE [DISTWIDTH] IN BLOOD: 13.8 % (ref 11.6–14.6)
EST GLOM FILT RATE - AFR AMER: 73 ML/MIN (ref 60–?)
ESTIMATED CREATININE CLEARANCE: 56.3 ML/MIN
GLOBULIN: 3.2 G/DL (ref 2.2–4.2)
GLUCOSE: 126 MG/DL (ref 74–106)
HCT VFR BLD AUTO: 39.6 % (ref 40–54)
HEMOGLOBIN: 12.7 G/DL (ref 13–16.5)
HGB BLD-MCNC: 12.7 G/DL (ref 13–16.5)
IMMATURE GRANULOCYTES COUNT: 0.02 X10^3/UL (ref 0–0)
MAGNESIUM: 2.5 MG/DL (ref 1.6–2.6)
MCV RBC: 92.3 FL (ref 80–94)
MEAN CORP HGB CONC: 32.1 G/DL (ref 32–36)
MEAN PLATELET VOL.: 9.6 FL (ref 6.2–12)
NRBC FLAGGED BY ANALYZER: 0 % (ref 0–5)
PLATELET # BLD: 196 K/MM3 (ref 150–450)
PLATELET COUNT: 196 K/MM3 (ref 150–450)
POTASSIUM: 4.2 MMOL/L (ref 3.5–5.1)
RBC # BLD AUTO: 4.29 M/MM3 (ref 4.6–6.2)
RBC DISTRIBUTION WIDTH CV: 13.8 % (ref 11.6–14.6)
RBC DISTRIBUTION WIDTH SD: 46.9 FL (ref 35.1–43.9)
WBC # BLD AUTO: 6.2 K/MM3 (ref 4.4–11)
WHITE BLOOD COUNT: 6.2 K/MM3 (ref 4.4–11)

## 2023-05-12 RX ADMIN — ALBUTEROL SULFATE 2.5 MG: 2.5 SOLUTION RESPIRATORY (INHALATION) at 07:28

## 2023-05-12 RX ADMIN — BUDESONIDE 0.5 MG: 0.5 SUSPENSION RESPIRATORY (INHALATION) at 07:28

## 2023-05-12 RX ADMIN — PREDNISOLONE ACETATE 1 DRP: 10 SUSPENSION/ DROPS OPHTHALMIC at 05:42

## 2023-05-12 RX ADMIN — KETOROLAC TROMETHAMINE 1 ML: 4 SOLUTION/ DROPS OPHTHALMIC at 05:42

## 2023-05-12 RX ADMIN — ALBUTEROL SULFATE 2.5 MG: 2.5 SOLUTION RESPIRATORY (INHALATION) at 13:26

## 2023-05-15 ENCOUNTER — TELEPHONE (OUTPATIENT)
Dept: PRIMARY CARE | Facility: CLINIC | Age: 83
End: 2023-05-15

## 2023-05-15 ENCOUNTER — OFFICE VISIT (OUTPATIENT)
Dept: PRIMARY CARE | Facility: CLINIC | Age: 83
End: 2023-05-15
Payer: MEDICARE

## 2023-05-15 VITALS
SYSTOLIC BLOOD PRESSURE: 110 MMHG | HEART RATE: 62 BPM | OXYGEN SATURATION: 96 % | DIASTOLIC BLOOD PRESSURE: 60 MMHG | WEIGHT: 261.2 LBS | HEIGHT: 75 IN | BODY MASS INDEX: 32.48 KG/M2

## 2023-05-15 DIAGNOSIS — R26.89 IMBALANCE DUE TO OLD STROKE: ICD-10-CM

## 2023-05-15 DIAGNOSIS — I69.398 IMBALANCE DUE TO OLD STROKE: ICD-10-CM

## 2023-05-15 DIAGNOSIS — M79.661 PAIN OF RIGHT LOWER LEG: Primary | ICD-10-CM

## 2023-05-15 PROCEDURE — 1160F RVW MEDS BY RX/DR IN RCRD: CPT | Performed by: NURSE PRACTITIONER

## 2023-05-15 PROCEDURE — 99214 OFFICE O/P EST MOD 30 MIN: CPT | Performed by: NURSE PRACTITIONER

## 2023-05-15 PROCEDURE — 3078F DIAST BP <80 MM HG: CPT | Performed by: NURSE PRACTITIONER

## 2023-05-15 PROCEDURE — 3074F SYST BP LT 130 MM HG: CPT | Performed by: NURSE PRACTITIONER

## 2023-05-15 PROCEDURE — 1036F TOBACCO NON-USER: CPT | Performed by: NURSE PRACTITIONER

## 2023-05-15 PROCEDURE — 1159F MED LIST DOCD IN RCRD: CPT | Performed by: NURSE PRACTITIONER

## 2023-05-15 PROCEDURE — 1157F ADVNC CARE PLAN IN RCRD: CPT | Performed by: NURSE PRACTITIONER

## 2023-05-15 RX ORDER — ASPIRIN 81 MG/1
81 TABLET ORAL DAILY
COMMUNITY
End: 2023-12-22 | Stop reason: WASHOUT

## 2023-05-15 ASSESSMENT — ENCOUNTER SYMPTOMS
FATIGUE: 1
WEAKNESS: 1
PALPITATIONS: 0
DIZZINESS: 1
UNEXPECTED WEIGHT CHANGE: 0
SHORTNESS OF BREATH: 0
APPETITE CHANGE: 0
LIGHT-HEADEDNESS: 1

## 2023-05-15 NOTE — PROGRESS NOTES
"Subjective   Patient ID: Marco A Diallo is a 82 y.o. male who presents for HOSP F/U LT HEADED, DIZZINESS,NOT FEELING WELL.    Marco A comes to the office today, with his daughter as a follow-up from the ER. Was experiencing dizziness w/ presyncopal episode which resulted in a fall prior to being evaluated in the ER. Was seen @ [facility] on 5/10. He felt his fall was a result of his R foot being numb (chronic) which caused him not to be able to lift the foot completely off the floor therefore catching his foot. c/o feeling fatigued. MRI brain showed mild chronic ischemic chgs in R cerebellar hemisphere are new finding from previous MRI. Has Neuro appt in Sept with Dr. Ramirez. ASA was added to medication regimen. On Saturday R lower leg felt red & painful along R outer lateral leg. Saw Podiatrist saw this AM.  Is scheduled for elective surgery to have hand cyst removed Thursday as a result he has been holding his Plavix & hasn't started the ASA yet. c/o More unsteady & imbalance. Is receiving PT for vestibular rehab.   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  Estimated ef 64%.  Labs-wbc 6.2, h/h: 12.7-39.6, plt 196, na 136, k 4.2, bun 19, creat 1.23, gfr 56, glu 126, ca 9.5, po4 2.7, mg 2.5, ast 18, alt 23, alk phos 98, tsh 1.70, bnp 44.7, b12 599, d-dimer 0.81, trop hs 13,  pt 14.0 inr 1.1, aptt 28.6           Review of Systems   Constitutional:  Positive for fatigue. Negative for appetite change and unexpected weight change.   HENT:  Positive for hearing loss and tinnitus.         B/l hearing aids   Respiratory:  Negative for shortness of breath.    Cardiovascular:  Positive for leg swelling. Negative for chest pain and palpitations.   Neurological:  Positive for dizziness, weakness and light-headedness.       Objective   /60   Pulse 62   Ht 1.905 m (6' 3\")   Wt 118 kg (261 lb 3.2 oz)   SpO2 96%   BMI 32.65 kg/m² "     Physical Exam  Vitals and nursing note reviewed.   Constitutional:       Appearance: Normal appearance.   HENT:      Head: Normocephalic.   Cardiovascular:      Rate and Rhythm: Normal rate and regular rhythm.      Heart sounds: Normal heart sounds.   Pulmonary:      Effort: Pulmonary effort is normal.      Breath sounds: Normal breath sounds.   Musculoskeletal:      Cervical back: Normal range of motion.   Skin:     General: Skin is warm and dry.   Neurological:      General: No focal deficit present.      Mental Status: He is alert and oriented to person, place, and time.      Cranial Nerves: Cranial nerves 2-12 are intact.   Psychiatric:         Mood and Affect: Mood normal.         Thought Content: Thought content normal.         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 Pain of right lower leg  Vascular US lower extremity venous duplex right      2. 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

## 2023-05-15 NOTE — TELEPHONE ENCOUNTER
US LOWER EXTREMITY VENOUS DUPLEX RIGHT ORDER FORWARDED TO [facility] REQUEST TO SCHEDULE PATIENT TODAY AT 2:30 AT [facility].

## 2023-05-15 NOTE — TELEPHONE ENCOUNTER
PT ORDER GIVEN TO PATIENT TO TAKE TO  REHAB.  HE IS AN EXISTING PATIENT AND REQUEST THEY TOGGLE THIS THERAPY WITH HIS EXISTING THERAPY.

## 2023-05-15 NOTE — PATIENT INSTRUCTIONS
Continue with physical therapy for gait strengthening and balance  Stop meclizine/Antivert  Check Doppler ultrasound right lower extremity  Resume aspirin and Plavix  Place on hold surgery for the right hand cyst at this time  Recommend to start using walker on a more consistent basis to help with gait balance and prevent falls  Keep appointment with neurologist in September

## 2023-05-18 ENCOUNTER — TELEPHONE (OUTPATIENT)
Dept: PRIMARY CARE | Facility: CLINIC | Age: 83
End: 2023-05-18
Payer: MEDICARE

## 2023-05-18 NOTE — TELEPHONE ENCOUNTER
DAUGHTER DONNELL CALLING PATIENT IN [facility] LAST WEEK .   DIZZINESS,FATIGUE,SOB.  PULSE OX DOWN INTO 80'S.  HAD  MRI OF BRAIN SHOWED ISCHEMIC CHANGES IN  CERELLAR  OF BRAIN SINCE 2021.   DONNELL CANNOT GET HIM INTO DR TAN UNTIL SEPT.   SHE WANTS HIM TOO BE SEEN  AT [facility].   SHE WILL BRING FORM IN FOR US TO FILL OUT

## 2023-06-02 ENCOUNTER — TELEPHONE (OUTPATIENT)
Dept: PRIMARY CARE | Facility: CLINIC | Age: 83
End: 2023-06-02
Payer: MEDICARE

## 2023-06-02 NOTE — TELEPHONE ENCOUNTER
PATIENT IS GOING TO HAVE CATARACT PERFORMED BY DR CONKLIN, DO YOU NEED TO SEE HIM TO CLEAR HIM FOR SURGERY?

## 2023-06-07 ENCOUNTER — OFFICE VISIT (OUTPATIENT)
Dept: PRIMARY CARE | Facility: CLINIC | Age: 83
End: 2023-06-07
Payer: MEDICARE

## 2023-06-07 ENCOUNTER — TELEPHONE (OUTPATIENT)
Dept: PRIMARY CARE | Facility: CLINIC | Age: 83
End: 2023-06-07

## 2023-06-07 VITALS
SYSTOLIC BLOOD PRESSURE: 122 MMHG | OXYGEN SATURATION: 92 % | WEIGHT: 261 LBS | DIASTOLIC BLOOD PRESSURE: 62 MMHG | BODY MASS INDEX: 32.45 KG/M2 | HEIGHT: 75 IN | HEART RATE: 72 BPM

## 2023-06-07 DIAGNOSIS — I10 MILD HTN: Primary | ICD-10-CM

## 2023-06-07 DIAGNOSIS — R42 VERTIGO: ICD-10-CM

## 2023-06-07 DIAGNOSIS — Z01.818 PREOP EXAMINATION: ICD-10-CM

## 2023-06-07 DIAGNOSIS — E11.9 TYPE 2 DIABETES MELLITUS WITHOUT COMPLICATION, WITHOUT LONG-TERM CURRENT USE OF INSULIN (MULTI): ICD-10-CM

## 2023-06-07 DIAGNOSIS — I63.40 CEREBROVASCULAR ACCIDENT (CVA) DUE TO EMBOLISM OF CEREBRAL ARTERY (MULTI): ICD-10-CM

## 2023-06-07 DIAGNOSIS — G47.33 OBSTRUCTIVE SLEEP APNEA, ADULT: ICD-10-CM

## 2023-06-07 PROBLEM — G45.9 TIA (TRANSIENT ISCHEMIC ATTACK): Status: RESOLVED | Noted: 2023-03-03 | Resolved: 2023-06-07

## 2023-06-07 PROBLEM — I26.99 ACUTE PULMONARY EMBOLISM WITHOUT ACUTE COR PULMONALE, UNSPECIFIED PULMONARY EMBOLISM TYPE (MULTI): Chronic | Status: ACTIVE | Noted: 2023-04-12

## 2023-06-07 PROCEDURE — 1157F ADVNC CARE PLAN IN RCRD: CPT | Performed by: FAMILY MEDICINE

## 2023-06-07 PROCEDURE — 1160F RVW MEDS BY RX/DR IN RCRD: CPT | Performed by: FAMILY MEDICINE

## 2023-06-07 PROCEDURE — 3078F DIAST BP <80 MM HG: CPT | Performed by: FAMILY MEDICINE

## 2023-06-07 PROCEDURE — 1159F MED LIST DOCD IN RCRD: CPT | Performed by: FAMILY MEDICINE

## 2023-06-07 PROCEDURE — 1036F TOBACCO NON-USER: CPT | Performed by: FAMILY MEDICINE

## 2023-06-07 PROCEDURE — 99214 OFFICE O/P EST MOD 30 MIN: CPT | Performed by: FAMILY MEDICINE

## 2023-06-07 PROCEDURE — 3074F SYST BP LT 130 MM HG: CPT | Performed by: FAMILY MEDICINE

## 2023-06-07 RX ORDER — PREDNISONE 10 MG/1
TABLET ORAL
Qty: 30 TABLET | Refills: 0 | Status: SHIPPED | OUTPATIENT
Start: 2023-06-07 | End: 2023-06-19

## 2023-06-07 ASSESSMENT — ENCOUNTER SYMPTOMS
ACTIVITY CHANGE: 0
NERVOUS/ANXIOUS: 0
DIZZINESS: 1
ARTHRALGIAS: 0
CONSTIPATION: 0
FATIGUE: 1
COUGH: 0
RHINORRHEA: 0
NUMBNESS: 0
DIFFICULTY URINATING: 0
VOMITING: 0
CHILLS: 0
ADENOPATHY: 0
ABDOMINAL DISTENTION: 0
WHEEZING: 0
FEVER: 0
DIARRHEA: 0
DYSPHORIC MOOD: 0
NAUSEA: 0
SHORTNESS OF BREATH: 1
TROUBLE SWALLOWING: 0
PALPITATIONS: 0
UNEXPECTED WEIGHT CHANGE: 0
LIGHT-HEADEDNESS: 1
SLEEP DISTURBANCE: 0
HEADACHES: 1
ABDOMINAL PAIN: 0
APPETITE CHANGE: 0

## 2023-06-07 NOTE — PROGRESS NOTES
"Subjective   Patient ID: Marco A Diallo is a 82 y.o. male who presents for Pre-op Exam.    HPI   Was in hospital in [place] early May, for dizziness and wooziness, no change in medicines  Has a dick monitor on right now, has an appointment with Cardiology in the next month  Trying to be active, using CPAP, naps through the day  To have cataract surgery right eye, no time set up  Some SOB, some worse, no CP, chronic dizziness, able to exert 4 mets    Review of Systems   Constitutional:  Positive for fatigue. Negative for activity change, appetite change, chills, fever and unexpected weight change.   HENT:  Negative for ear pain, nosebleeds, rhinorrhea, sneezing and trouble swallowing.    Respiratory:  Positive for shortness of breath. Negative for cough and wheezing.    Cardiovascular:  Negative for chest pain, palpitations and leg swelling.   Gastrointestinal:  Negative for abdominal distention, abdominal pain, constipation, diarrhea, nausea and vomiting.   Genitourinary:  Negative for difficulty urinating.   Musculoskeletal:  Negative for arthralgias.   Skin:  Negative for rash.   Neurological:  Positive for dizziness, light-headedness and headaches. Negative for numbness.   Hematological:  Negative for adenopathy.   Psychiatric/Behavioral:  Negative for behavioral problems, dysphoric mood and sleep disturbance. The patient is not nervous/anxious.    All other systems reviewed and are negative.      Objective   /62   Pulse 72   Ht 1.905 m (6' 3\")   Wt 118 kg (261 lb)   SpO2 92%   BMI 32.62 kg/m²     Physical Exam  Vitals and nursing note reviewed.   Constitutional:       Appearance: Normal appearance.   HENT:      Head: Normocephalic and atraumatic.      Right Ear: Tympanic membrane, ear canal and external ear normal.      Left Ear: Tympanic membrane, ear canal and external ear normal.      Nose: Nose normal.      Mouth/Throat:      Mouth: Mucous membranes are moist.      Pharynx: Oropharynx is clear. " "  Cardiovascular:      Rate and Rhythm: Normal rate and regular rhythm.      Pulses: Normal pulses.      Heart sounds: Normal heart sounds.   Pulmonary:      Effort: Pulmonary effort is normal.      Breath sounds: Normal breath sounds.   Neurological:      General: No focal deficit present.      Mental Status: He is alert and oriented to person, place, and time. Mental status is at baseline.   Psychiatric:         Mood and Affect: Mood normal.         Behavior: Behavior normal.         Assessment/Plan   Problem List Items Addressed This Visit          Nervous    Cerebrovascular accident (CVA) (CMS/[facility])     Patient hospitalized in May at [facility] due to complaints of \"wooziness and pressure in his head\".  MRI scan done then question a lacunar and cerebellar infarct that are probably old, patient had follow-up with neurologist at [facility] the end of May who did not feel as though there were anything neurologically that they could do to help the patient with his symptoms.  Patient was encouraged to continue with aggressive risk factor modifications, is currently seeing cardiology for his complaints has a Holter monitor in place, echocardiogram done at the hospital was normal, patient has seen multiple neurologist, ear nose and throat physicians in the past.  We will give a trial of prednisone per his request to see if this helps.  Follow-up on this problem in 1 month.         Obstructive sleep apnea, adult       Circulatory    Mild HTN - Primary     Blood pressure under good control, renal function in the hospital in [place] was normal, continue with current medications.         Relevant Orders    Follow Up In Primary Care       Endocrine/Metabolic    Diabetes mellitus (CMS/[facility])     Last A1c in the past few months was below 7, tolerating diet, no change with therapy.         Relevant Orders    Follow Up In Primary Care       Other    Vertigo     This has been a longstanding persistent complaint, " patient claims that this has been getting worse in the past month, give trial of prednisone per his request, has seen ear nose and throat physician before in [place] and in [place].  Patient also did not respond to vestibular retraining and physical therapy.         Relevant Medications    predniSONE (Deltasone) 10 mg tablet    Other Relevant Orders    Follow Up In Primary Care     Other Visit Diagnoses       Preop examination        Believe the patient is an acceptable risk for low risk cataract surgery despite the fact that he was in the hospital about a month ago.

## 2023-06-07 NOTE — ASSESSMENT & PLAN NOTE
This has been a longstanding persistent complaint, patient claims that this has been getting worse in the past month, give trial of prednisone per his request, has seen ear nose and throat physician before in [place] and in [place].  Patient also did not respond to vestibular retraining and physical therapy.

## 2023-06-07 NOTE — ASSESSMENT & PLAN NOTE
"Patient hospitalized in May at [facility] due to complaints of \"wooziness and pressure in his head\".  MRI scan done then question a lacunar and cerebellar infarct that are probably old, patient had follow-up with neurologist at [facility] the end of May who did not feel as though there were anything neurologically that they could do to help the patient with his symptoms.  Patient was encouraged to continue with aggressive risk factor modifications, is currently seeing cardiology for his complaints has a Holter monitor in place, echocardiogram done at the hospital was normal, patient has seen multiple neurologist, ear nose and throat physicians in the past.  We will give a trial of prednisone per his request to see if this helps.  Follow-up on this problem in 1 month.  "

## 2023-06-07 NOTE — ASSESSMENT & PLAN NOTE
Blood pressure under good control, renal function in the hospital in [place] was normal, continue with current medications.

## 2023-07-06 ENCOUNTER — HOSPITAL ENCOUNTER (OUTPATIENT)
Dept: DATA CONVERSION | Facility: HOSPITAL | Age: 83
End: 2023-07-06
Attending: OPHTHALMOLOGY | Admitting: OPHTHALMOLOGY
Payer: MEDICARE

## 2023-07-06 DIAGNOSIS — J44.9 CHRONIC OBSTRUCTIVE PULMONARY DISEASE, UNSPECIFIED (MULTI): ICD-10-CM

## 2023-07-06 DIAGNOSIS — E11.36 TYPE 2 DIABETES MELLITUS WITH DIABETIC CATARACT (MULTI): ICD-10-CM

## 2023-07-06 DIAGNOSIS — Z79.02 LONG TERM (CURRENT) USE OF ANTITHROMBOTICS/ANTIPLATELETS: ICD-10-CM

## 2023-07-06 DIAGNOSIS — I10 ESSENTIAL (PRIMARY) HYPERTENSION: ICD-10-CM

## 2023-07-06 DIAGNOSIS — H25.811 COMBINED FORMS OF AGE-RELATED CATARACT, RIGHT EYE: ICD-10-CM

## 2023-07-06 DIAGNOSIS — Z86.73 PERSONAL HISTORY OF TRANSIENT ISCHEMIC ATTACK (TIA), AND CEREBRAL INFARCTION WITHOUT RESIDUAL DEFICITS: ICD-10-CM

## 2023-07-11 ENCOUNTER — HOSPITAL ENCOUNTER (OUTPATIENT)
Age: 83
Discharge: HOME | End: 2023-07-11
Payer: MEDICARE

## 2023-07-11 DIAGNOSIS — R55: ICD-10-CM

## 2023-07-11 DIAGNOSIS — I63.9: ICD-10-CM

## 2023-07-11 DIAGNOSIS — R07.9: ICD-10-CM

## 2023-07-11 DIAGNOSIS — R53.1: ICD-10-CM

## 2023-07-11 DIAGNOSIS — R53.83: ICD-10-CM

## 2023-07-11 DIAGNOSIS — E78.5: ICD-10-CM

## 2023-07-11 DIAGNOSIS — R26.81: ICD-10-CM

## 2023-07-11 DIAGNOSIS — G47.33: Primary | ICD-10-CM

## 2023-07-11 DIAGNOSIS — E11.9: ICD-10-CM

## 2023-07-11 LAB
CPK TOTAL, CREATINE KINASE: 40 U/L (ref 39–308)
GLUCOSE: 131 MG/DL (ref 74–106)

## 2023-07-11 PROCEDURE — 36415 COLL VENOUS BLD VENIPUNCTURE: CPT

## 2023-07-11 PROCEDURE — 93306 TTE W/DOPPLER COMPLETE: CPT

## 2023-07-11 PROCEDURE — 83036 HEMOGLOBIN GLYCOSYLATED A1C: CPT

## 2023-07-11 PROCEDURE — A4216 STERILE WATER/SALINE, 10 ML: HCPCS

## 2023-07-11 PROCEDURE — 93880 EXTRACRANIAL BILAT STUDY: CPT

## 2023-07-11 PROCEDURE — C8929 TTE W OR WO FOL WCON,DOPPLER: HCPCS

## 2023-07-11 PROCEDURE — 82550 ASSAY OF CK (CPK): CPT

## 2023-07-11 PROCEDURE — 82947 ASSAY GLUCOSE BLOOD QUANT: CPT

## 2023-07-12 ENCOUNTER — HOSPITAL ENCOUNTER (EMERGENCY)
Age: 83
Discharge: HOME | End: 2023-07-12
Payer: MEDICARE

## 2023-07-12 VITALS
DIASTOLIC BLOOD PRESSURE: 68 MMHG | RESPIRATION RATE: 16 BRPM | TEMPERATURE: 97.2 F | SYSTOLIC BLOOD PRESSURE: 132 MMHG | OXYGEN SATURATION: 99 % | HEART RATE: 79 BPM

## 2023-07-12 VITALS
RESPIRATION RATE: 19 BRPM | OXYGEN SATURATION: 94 % | SYSTOLIC BLOOD PRESSURE: 146 MMHG | HEART RATE: 81 BPM | DIASTOLIC BLOOD PRESSURE: 74 MMHG

## 2023-07-12 VITALS
RESPIRATION RATE: 16 BRPM | HEART RATE: 67 BPM | SYSTOLIC BLOOD PRESSURE: 139 MMHG | OXYGEN SATURATION: 95 % | DIASTOLIC BLOOD PRESSURE: 82 MMHG

## 2023-07-12 VITALS — BODY MASS INDEX: 32.5 KG/M2

## 2023-07-12 DIAGNOSIS — E78.5: ICD-10-CM

## 2023-07-12 DIAGNOSIS — Z86.73: ICD-10-CM

## 2023-07-12 DIAGNOSIS — I10: ICD-10-CM

## 2023-07-12 DIAGNOSIS — R53.83: ICD-10-CM

## 2023-07-12 DIAGNOSIS — R42: Primary | ICD-10-CM

## 2023-07-12 DIAGNOSIS — J44.9: ICD-10-CM

## 2023-07-12 DIAGNOSIS — R07.9: ICD-10-CM

## 2023-07-12 DIAGNOSIS — E11.9: ICD-10-CM

## 2023-07-12 LAB
ALANINE AMINOTRANSFER ALT/SGPT: 24 U/L (ref 16–61)
ALBUMIN SERPL-MCNC: 3.3 G/DL (ref 3.2–5)
ALKALINE PHOSPHATASE: 95 U/L (ref 45–117)
ANION GAP: 3 (ref 5–15)
AST(SGOT): 11 U/L (ref 15–37)
BILIRUB DIRECT SERPL-MCNC: 0.3 MG/DL (ref 0–0.3)
BUN SERPL-MCNC: 24 MG/DL (ref 7–18)
BUN/CREAT RATIO: 22.9 RATIO (ref 10–20)
CALCIUM SERPL-MCNC: 9.4 MG/DL (ref 8.5–10.1)
CARBON DIOXIDE: 28 MMOL/L (ref 21–32)
CHLORIDE: 107 MMOL/L (ref 98–107)
DEPRECATED RDW RBC: 45.1 FL (ref 35.1–43.9)
ERYTHROCYTE [DISTWIDTH] IN BLOOD: 13.2 % (ref 11.6–14.6)
EST GLOM FILT RATE - AFR AMER: 87 ML/MIN (ref 60–?)
ESTIMATED CREATININE CLEARANCE: 65.95 ML/MIN
GLOBULIN: 3.3 G/DL (ref 2.2–4.2)
GLUCOSE: 105 MG/DL (ref 74–106)
HCT VFR BLD AUTO: 40.6 % (ref 40–54)
HEMOGLOBIN: 13.8 G/DL (ref 13–16.5)
HGB BLD-MCNC: 13.8 G/DL (ref 13–16.5)
IMMATURE GRANULOCYTES COUNT: 0.03 X10^3/UL (ref 0–0)
LEUKOCYTE ESTERASE UR QL STRIP: 100 /UL
LIPASE: 28 U/L (ref 13–75)
MCV RBC: 91.2 FL (ref 80–94)
MEAN CORP HGB CONC: 34 G/DL (ref 32–36)
MEAN PLATELET VOL.: 9.4 FL (ref 6.2–12)
MUCOUS THREADS URNS QL MICRO: (no result) /HPF
NRBC FLAGGED BY ANALYZER: 0 % (ref 0–5)
PLATELET # BLD: 193 K/MM3 (ref 150–450)
PLATELET COUNT: 193 K/MM3 (ref 150–450)
POTASSIUM: 4.2 MMOL/L (ref 3.5–5.1)
RBC # BLD AUTO: 4.45 M/MM3 (ref 4.6–6.2)
RBC DISTRIBUTION WIDTH CV: 13.2 % (ref 11.6–14.6)
RBC DISTRIBUTION WIDTH SD: 45.1 FL (ref 35.1–43.9)
RBC UR QL: (no result) /HPF (ref 0–5)
SP GR UR: 1.01 (ref 1–1.03)
SQUAMOUS URNS QL MICRO: (no result) /HPF (ref 0–5)
TROPONIN-I HS: 11 PG/ML (ref 3–78)
URINE PRESERVATIVE: (no result)
WBC # BLD AUTO: 6.6 K/MM3 (ref 4.4–11)
WHITE BLOOD COUNT: 6.6 K/MM3 (ref 4.4–11)
YEAST-URINE: (no result) /HPF

## 2023-07-12 PROCEDURE — 80048 BASIC METABOLIC PNL TOTAL CA: CPT

## 2023-07-12 PROCEDURE — 83690 ASSAY OF LIPASE: CPT

## 2023-07-12 PROCEDURE — 93005 ELECTROCARDIOGRAM TRACING: CPT

## 2023-07-12 PROCEDURE — 99284 EMERGENCY DEPT VISIT MOD MDM: CPT

## 2023-07-12 PROCEDURE — 96361 HYDRATE IV INFUSION ADD-ON: CPT

## 2023-07-12 PROCEDURE — 96360 HYDRATION IV INFUSION INIT: CPT

## 2023-07-12 PROCEDURE — 84443 ASSAY THYROID STIM HORMONE: CPT

## 2023-07-12 PROCEDURE — 80076 HEPATIC FUNCTION PANEL: CPT

## 2023-07-12 PROCEDURE — 84484 ASSAY OF TROPONIN QUANT: CPT

## 2023-07-12 PROCEDURE — 71045 X-RAY EXAM CHEST 1 VIEW: CPT

## 2023-07-12 PROCEDURE — 81001 URINALYSIS AUTO W/SCOPE: CPT

## 2023-07-12 PROCEDURE — 85025 COMPLETE CBC W/AUTO DIFF WBC: CPT

## 2023-07-12 PROCEDURE — 70496 CT ANGIOGRAPHY HEAD: CPT

## 2023-07-14 ENCOUNTER — TELEPHONE (OUTPATIENT)
Dept: PRIMARY CARE | Facility: CLINIC | Age: 83
End: 2023-07-14

## 2023-07-14 ENCOUNTER — OFFICE VISIT (OUTPATIENT)
Dept: PRIMARY CARE | Facility: CLINIC | Age: 83
End: 2023-07-14
Payer: MEDICARE

## 2023-07-14 VITALS
DIASTOLIC BLOOD PRESSURE: 68 MMHG | SYSTOLIC BLOOD PRESSURE: 110 MMHG | BODY MASS INDEX: 32.91 KG/M2 | OXYGEN SATURATION: 94 % | WEIGHT: 264.7 LBS | HEIGHT: 75 IN | HEART RATE: 82 BPM

## 2023-07-14 DIAGNOSIS — I10 MILD HTN: ICD-10-CM

## 2023-07-14 DIAGNOSIS — F41.1 GAD (GENERALIZED ANXIETY DISORDER): Primary | ICD-10-CM

## 2023-07-14 DIAGNOSIS — H81.09 MENIERE'S DISEASE, UNSPECIFIED LATERALITY: ICD-10-CM

## 2023-07-14 DIAGNOSIS — I63.40 CEREBROVASCULAR ACCIDENT (CVA) DUE TO EMBOLISM OF CEREBRAL ARTERY (MULTI): Chronic | ICD-10-CM

## 2023-07-14 DIAGNOSIS — R42 DIZZINESS: ICD-10-CM

## 2023-07-14 DIAGNOSIS — R42 VERTIGO: ICD-10-CM

## 2023-07-14 DIAGNOSIS — R53.82 CHRONIC FATIGUE: Chronic | ICD-10-CM

## 2023-07-14 PROBLEM — I26.99 ACUTE PULMONARY EMBOLISM WITHOUT ACUTE COR PULMONALE, UNSPECIFIED PULMONARY EMBOLISM TYPE (MULTI): Status: RESOLVED | Noted: 2023-04-12 | Resolved: 2023-07-14

## 2023-07-14 PROCEDURE — 3074F SYST BP LT 130 MM HG: CPT | Performed by: FAMILY MEDICINE

## 2023-07-14 PROCEDURE — 1036F TOBACCO NON-USER: CPT | Performed by: FAMILY MEDICINE

## 2023-07-14 PROCEDURE — 1157F ADVNC CARE PLAN IN RCRD: CPT | Performed by: FAMILY MEDICINE

## 2023-07-14 PROCEDURE — 1159F MED LIST DOCD IN RCRD: CPT | Performed by: FAMILY MEDICINE

## 2023-07-14 PROCEDURE — 99214 OFFICE O/P EST MOD 30 MIN: CPT | Performed by: FAMILY MEDICINE

## 2023-07-14 PROCEDURE — 1160F RVW MEDS BY RX/DR IN RCRD: CPT | Performed by: FAMILY MEDICINE

## 2023-07-14 PROCEDURE — 3078F DIAST BP <80 MM HG: CPT | Performed by: FAMILY MEDICINE

## 2023-07-14 RX ORDER — VENLAFAXINE HYDROCHLORIDE 37.5 MG/1
37.5 CAPSULE, EXTENDED RELEASE ORAL DAILY
Qty: 30 CAPSULE | Refills: 1 | Status: SHIPPED | OUTPATIENT
Start: 2023-07-14 | End: 2023-07-21 | Stop reason: SDUPTHER

## 2023-07-14 RX ORDER — LORAZEPAM 0.5 MG/1
0.5 TABLET ORAL 3 TIMES DAILY PRN
Qty: 21 TABLET | Refills: 0 | Status: SHIPPED | OUTPATIENT
Start: 2023-07-14 | End: 2023-07-31 | Stop reason: SDUPTHER

## 2023-07-14 RX ORDER — SERTRALINE HYDROCHLORIDE 50 MG/1
50 TABLET, FILM COATED ORAL DAILY
Qty: 30 TABLET | Refills: 1 | Status: SHIPPED | OUTPATIENT
Start: 2023-07-14 | End: 2023-07-21 | Stop reason: ALTCHOICE

## 2023-07-14 ASSESSMENT — ENCOUNTER SYMPTOMS
CONSTIPATION: 0
PALPITATIONS: 0
UNEXPECTED WEIGHT CHANGE: 0
ABDOMINAL DISTENTION: 0
SLEEP DISTURBANCE: 0
TROUBLE SWALLOWING: 0
DIZZINESS: 0
FATIGUE: 1
APPETITE CHANGE: 0
ABDOMINAL PAIN: 0
SHORTNESS OF BREATH: 0
VOMITING: 0
ACTIVITY CHANGE: 0
NUMBNESS: 0
DIFFICULTY URINATING: 0
ADENOPATHY: 0
NERVOUS/ANXIOUS: 1
RHINORRHEA: 0
DIARRHEA: 0
COUGH: 0
HEADACHES: 1
LIGHT-HEADEDNESS: 1
WHEEZING: 0
NAUSEA: 0
CHILLS: 0
FEVER: 0
ARTHRALGIAS: 0

## 2023-07-14 NOTE — ASSESSMENT & PLAN NOTE
Try reducing sertraline to 50 mg a day, have tried to reduce this in the past with increasing symptoms of dizziness, blames his dizziness on the medication, however I believe it is related to his cerebrovascular disease.  We will try starting venlafaxine 37-1/2 mg once a day, patient was given a small prescription of lorazepam to use only as needed if he feels extremely worse before he goes to the emergency room to see if it helps.  State prescribing database was reviewed today prior to prescribing, patient and daughter were advised about office policy regarding chronic controlled medicines.  We will plan to recheck on this issue again next week.

## 2023-07-14 NOTE — PROGRESS NOTES
"Subjective   Patient ID: Marco A Diallo is a 82 y.o. male who presents for ER F/U YARIEL DIZZINESS.    HPI   Was in ER at [facility] 7/12 for dizziness (chronic issue) and had a negative evaluation.  Has appointment with neurology at [facility] in September, had seen neurology at [facility] and  previously.   Having increased dizziness, fatigue and feels like might pass out at times  Had seen cardiology in [facility], had 30 day event monitor done (was OK)  Family concerned about anxiety    Review of Systems   Constitutional:  Positive for fatigue. Negative for activity change, appetite change, chills, fever and unexpected weight change.   HENT:  Positive for tinnitus. Negative for ear pain, nosebleeds, rhinorrhea, sneezing and trouble swallowing.    Respiratory:  Negative for cough, shortness of breath and wheezing.    Cardiovascular:  Negative for chest pain, palpitations and leg swelling.   Gastrointestinal:  Negative for abdominal distention, abdominal pain, constipation, diarrhea, nausea and vomiting.   Genitourinary:  Negative for difficulty urinating.   Musculoskeletal:  Negative for arthralgias.   Skin:  Negative for rash.   Neurological:  Positive for light-headedness and headaches. Negative for dizziness and numbness.   Hematological:  Negative for adenopathy.   Psychiatric/Behavioral:  Negative for behavioral problems and sleep disturbance. The patient is nervous/anxious.    All other systems reviewed and are negative.      Objective   /68   Pulse 82   Ht 1.905 m (6' 3\")   Wt 120 kg (264 lb 11.2 oz)   SpO2 94%   BMI 33.09 kg/m²     Physical Exam  Vitals and nursing note reviewed.   Constitutional:       Appearance: Normal appearance.   HENT:      Head: Normocephalic and atraumatic.      Right Ear: Tympanic membrane, ear canal and external ear normal.      Left Ear: Tympanic membrane, ear canal and external ear normal.      Nose: Nose normal.      Mouth/Throat:      Mouth: Mucous membranes are moist.      Pharynx: " "Oropharynx is clear.   Cardiovascular:      Rate and Rhythm: Normal rate and regular rhythm.      Pulses: Normal pulses.      Heart sounds: Normal heart sounds.   Pulmonary:      Effort: Pulmonary effort is normal.      Breath sounds: Normal breath sounds.   Musculoskeletal:      Cervical back: Normal range of motion and neck supple.   Neurological:      General: No focal deficit present.      Mental Status: He is alert and oriented to person, place, and time. Mental status is at baseline.   Psychiatric:         Mood and Affect: Mood normal.         Behavior: Behavior normal.         Assessment/Plan   Problem List Items Addressed This Visit       Cerebrovascular accident (CVA) (CMS/[facility])    Relevant Medications    sertraline (Zoloft) 50 mg tablet    Dizziness     Patient's dizziness, \"head wooziness\", feeling as though he might pass out is a chronic issue.  Scanning in the past has shown evidence of a stroke involving the right cerebellum in the left frontal lobe of the brain.  Patient has seen multiple neurologist at [facility], [facility], [facility], and has another consultation with neurology at [facility] in September.  Patient is also seen multiple ear nose and throat physicians, is also seen cardiology at [facility] who has done an echocardiogram, carotid ultrasound and 30-day event monitor which were nonrevealing.  Believe that most of the patient's symptoms may be attributed to his prior cerebrovascular disease, he has been through physical therapy in the past with no help, we will try adjusting medications for anxiety to see if this helps.         Relevant Medications    sertraline (Zoloft) 50 mg tablet    venlafaxine XR (Effexor-XR) 37.5 mg 24 hr capsule    LORazepam (Ativan) 0.5 mg tablet    Fatigue    Relevant Medications    sertraline (Zoloft) 50 mg tablet    Meniere's disease    Relevant Medications    sertraline (Zoloft) 50 mg tablet    venlafaxine " XR (Effexor-XR) 37.5 mg 24 hr capsule    Mild HTN    Vertigo    Relevant Medications    sertraline (Zoloft) 50 mg tablet    RAVI (generalized anxiety disorder) - Primary     Try reducing sertraline to 50 mg a day, have tried to reduce this in the past with increasing symptoms of dizziness, blames his dizziness on the medication, however I believe it is related to his cerebrovascular disease.  We will try starting venlafaxine 37-1/2 mg once a day, patient was given a small prescription of lorazepam to use only as needed if he feels extremely worse before he goes to the emergency room to see if it helps.  State prescribing database was reviewed today prior to prescribing, patient and daughter were advised about office policy regarding chronic controlled medicines.  We will plan to recheck on this issue again next week.         Relevant Medications    sertraline (Zoloft) 50 mg tablet    venlafaxine XR (Effexor-XR) 37.5 mg 24 hr capsule    LORazepam (Ativan) 0.5 mg tablet

## 2023-07-14 NOTE — TELEPHONE ENCOUNTER
DONNELL DAUGHTER CALLED, WOULD LIKE REFERRAL TO NEUROLOGY  DR YANI MAGDALENO.   DONNELL THINKS SOME OF THIS IS ANXIETY.   OK FORT REFERRAL  ?

## 2023-07-14 NOTE — ASSESSMENT & PLAN NOTE
"Patient's dizziness, \"head wooziness\", feeling as though he might pass out is a chronic issue.  Scanning in the past has shown evidence of a stroke involving the right cerebellum in the left frontal lobe of the brain.  Patient has seen multiple neurologist at [facility], [facility], [facility], and has another consultation with neurology at [facility] in September.  Patient is also seen multiple ear nose and throat physicians, is also seen cardiology at [facility] who has done an echocardiogram, carotid ultrasound and 30-day event monitor which were nonrevealing.  Believe that most of the patient's symptoms may be attributed to his prior cerebrovascular disease, he has been through physical therapy in the past with no help, we will try adjusting medications for anxiety to see if this helps.  "

## 2023-07-17 DIAGNOSIS — E13.69 OTHER SPECIFIED DIABETES MELLITUS WITH OTHER SPECIFIED COMPLICATION, UNSPECIFIED WHETHER LONG TERM INSULIN USE (MULTI): ICD-10-CM

## 2023-07-17 DIAGNOSIS — N40.0 ENLARGED PROSTATE: ICD-10-CM

## 2023-07-17 DIAGNOSIS — R42 DIZZINESS: ICD-10-CM

## 2023-07-17 DIAGNOSIS — F41.1 GAD (GENERALIZED ANXIETY DISORDER): ICD-10-CM

## 2023-07-17 DIAGNOSIS — J45.909 ASTHMA, UNSPECIFIED ASTHMA SEVERITY, UNSPECIFIED WHETHER COMPLICATED, UNSPECIFIED WHETHER PERSISTENT (HHS-HCC): ICD-10-CM

## 2023-07-21 ENCOUNTER — OFFICE VISIT (OUTPATIENT)
Dept: PRIMARY CARE | Facility: CLINIC | Age: 83
End: 2023-07-21
Payer: MEDICARE

## 2023-07-21 VITALS
WEIGHT: 265.1 LBS | HEIGHT: 75 IN | SYSTOLIC BLOOD PRESSURE: 130 MMHG | DIASTOLIC BLOOD PRESSURE: 62 MMHG | OXYGEN SATURATION: 96 % | BODY MASS INDEX: 32.96 KG/M2 | HEART RATE: 80 BPM

## 2023-07-21 DIAGNOSIS — I63.40 CEREBROVASCULAR ACCIDENT (CVA) DUE TO EMBOLISM OF CEREBRAL ARTERY (MULTI): ICD-10-CM

## 2023-07-21 DIAGNOSIS — R42 VERTIGO: ICD-10-CM

## 2023-07-21 DIAGNOSIS — I10 MILD HTN: ICD-10-CM

## 2023-07-21 DIAGNOSIS — R19.7 DIARRHEA OF PRESUMED INFECTIOUS ORIGIN: Primary | ICD-10-CM

## 2023-07-21 DIAGNOSIS — R42 DIZZINESS: ICD-10-CM

## 2023-07-21 DIAGNOSIS — E11.9 TYPE 2 DIABETES MELLITUS WITHOUT COMPLICATION, WITHOUT LONG-TERM CURRENT USE OF INSULIN (MULTI): ICD-10-CM

## 2023-07-21 DIAGNOSIS — F41.1 GAD (GENERALIZED ANXIETY DISORDER): ICD-10-CM

## 2023-07-21 DIAGNOSIS — H81.09 MENIERE'S DISEASE, UNSPECIFIED LATERALITY: ICD-10-CM

## 2023-07-21 PROCEDURE — 1159F MED LIST DOCD IN RCRD: CPT | Performed by: FAMILY MEDICINE

## 2023-07-21 PROCEDURE — 3078F DIAST BP <80 MM HG: CPT | Performed by: FAMILY MEDICINE

## 2023-07-21 PROCEDURE — 3075F SYST BP GE 130 - 139MM HG: CPT | Performed by: FAMILY MEDICINE

## 2023-07-21 PROCEDURE — 1160F RVW MEDS BY RX/DR IN RCRD: CPT | Performed by: FAMILY MEDICINE

## 2023-07-21 PROCEDURE — 1157F ADVNC CARE PLAN IN RCRD: CPT | Performed by: FAMILY MEDICINE

## 2023-07-21 PROCEDURE — 99213 OFFICE O/P EST LOW 20 MIN: CPT | Performed by: FAMILY MEDICINE

## 2023-07-21 PROCEDURE — 1036F TOBACCO NON-USER: CPT | Performed by: FAMILY MEDICINE

## 2023-07-21 RX ORDER — VENLAFAXINE HYDROCHLORIDE 75 MG/1
75 CAPSULE, EXTENDED RELEASE ORAL DAILY
Qty: 30 CAPSULE | Refills: 11 | Status: SHIPPED | OUTPATIENT
Start: 2023-07-21 | End: 2023-08-22 | Stop reason: ALTCHOICE

## 2023-07-21 ASSESSMENT — ENCOUNTER SYMPTOMS
LIGHT-HEADEDNESS: 1
DIZZINESS: 1
APPETITE CHANGE: 0
CONSTIPATION: 0
NAUSEA: 0
VOMITING: 0
CHEST TIGHTNESS: 0
FATIGUE: 1
ABDOMINAL PAIN: 0
PALPITATIONS: 0
SLEEP DISTURBANCE: 0
HEADACHES: 1
SHORTNESS OF BREATH: 0
COUGH: 0
ACTIVITY CHANGE: 0
DIARRHEA: 0
NERVOUS/ANXIOUS: 1

## 2023-07-21 NOTE — ASSESSMENT & PLAN NOTE
"MRI scan done in the past couple months at [facility] demonstrates evidence of old stroke involving the left frontal lobe and right cerebellum of the brain.  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  Continue with medication for treatment including statin and blood pressure medication, diabetic control.  Patient complains of persistent trouble with \"wooziness\" fatigue and dizziness.  Patient seen multiple neurologist and ENT physicians in the past, believe that most of his symptoms are probably related to his prior strokes, requesting another opinion from a different cardiologist, we will try to facilitate this for the patient per family's request.  "

## 2023-07-21 NOTE — TELEPHONE ENCOUNTER
REFERRAL TO [facility] NEUROLOGY/DR. BALDOMERO BEAVER II WITH INSURANCE, DEMOGRAPHICS, AND RECORDS FAXED [phone] AND PH: [phone] WITH REQUEST TO SCHEDULE PATIENT.

## 2023-07-21 NOTE — PROGRESS NOTES
"Subjective   Patient ID: Marco A Diallo is a 82 y.o. male who presents for 1 MO F/U.    HPI   Took 4 lorazepam in the past week  No difference in \"wooziness or fullness feelings\"  Chronic fatigue, BARRON  Using CPAP at night  Feels like might pass out at times      Review of Systems   Constitutional:  Positive for fatigue. Negative for activity change and appetite change.   Respiratory:  Negative for cough, chest tightness and shortness of breath.    Cardiovascular:  Negative for chest pain, palpitations and leg swelling.   Gastrointestinal:  Negative for abdominal pain, constipation, diarrhea, nausea and vomiting.   Neurological:  Positive for dizziness, light-headedness and headaches.   Psychiatric/Behavioral:  Negative for behavioral problems and sleep disturbance. The patient is nervous/anxious.        Objective   /62   Pulse 80   Ht 1.905 m (6' 3\")   Wt 120 kg (265 lb 1.6 oz)   SpO2 96%   BMI 33.14 kg/m²     Physical Exam  Vitals and nursing note reviewed.   Constitutional:       Appearance: Normal appearance.   HENT:      Head: Normocephalic and atraumatic.      Right Ear: Tympanic membrane, ear canal and external ear normal.      Left Ear: Tympanic membrane, ear canal and external ear normal.      Nose: Nose normal.      Mouth/Throat:      Mouth: Mucous membranes are moist.      Pharynx: Oropharynx is clear.   Cardiovascular:      Rate and Rhythm: Normal rate and regular rhythm.      Pulses: Normal pulses.      Heart sounds: Normal heart sounds.   Pulmonary:      Effort: Pulmonary effort is normal.      Breath sounds: Normal breath sounds.   Musculoskeletal:      Cervical back: Normal range of motion and neck supple.   Neurological:      Mental Status: He is alert.   Psychiatric:         Mood and Affect: Mood normal.         Behavior: Behavior normal.         Assessment/Plan   Problem List Items Addressed This Visit       Cerebrovascular accident (CVA) (CMS/[facility])     MRI scan done in the past couple " "months at [facility] demonstrates evidence of old stroke involving the left frontal lobe and right cerebellum of the brain.  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  Continue with medication for treatment including statin and blood pressure medication, diabetic control.  Patient complains of persistent trouble with \"wooziness\" fatigue and dizziness.  Patient seen multiple neurologist and ENT physicians in the past, believe that most of his symptoms are probably related to his prior strokes, requesting another opinion from a different cardiologist, we will try to facilitate this for the patient per family's request.         Diabetes mellitus (CMS/[facility])    Diarrhea - Primary    Relevant Medications    Bacillus coagulans-inulin 1 billion-250 cell-mg capsule    Dizziness    Relevant Medications    venlafaxine XR (Effexor-XR) 75 mg 24 hr capsule    Meniere's disease    Relevant Medications    venlafaxine XR (Effexor-XR) 75 mg 24 hr capsule    Mild HTN     Blood pressure and renal function stable, no change.         Vertigo    Relevant Orders    Referral to Neurology    RAVI (generalized anxiety disorder)     Discontinue sertraline, increase venlafaxine to 75 mg a day.  Use lorazepam only as needed, advised again about controlled medicine policy in the office.         Relevant Medications    venlafaxine XR (Effexor-XR) 75 mg 24 hr capsule    Other Relevant Orders    Referral to Neurology          "

## 2023-07-21 NOTE — ASSESSMENT & PLAN NOTE
Discontinue sertraline, increase venlafaxine to 75 mg a day.  Use lorazepam only as needed, advised again about controlled medicine policy in the office.

## 2023-08-01 RX ORDER — MONTELUKAST SODIUM 10 MG/1
10 TABLET ORAL NIGHTLY
Qty: 90 TABLET | Refills: 3 | Status: SHIPPED | OUTPATIENT
Start: 2023-08-01 | End: 2024-03-22 | Stop reason: SDUPTHER

## 2023-08-01 RX ORDER — LORAZEPAM 0.5 MG/1
0.5 TABLET ORAL 3 TIMES DAILY PRN
Qty: 21 TABLET | Refills: 0 | Status: SHIPPED | OUTPATIENT
Start: 2023-08-01 | End: 2023-08-22 | Stop reason: ALTCHOICE

## 2023-08-01 RX ORDER — FINASTERIDE 5 MG/1
5 TABLET, FILM COATED ORAL DAILY
Qty: 90 TABLET | Refills: 3 | Status: SHIPPED | OUTPATIENT
Start: 2023-08-01 | End: 2024-03-22 | Stop reason: SDUPTHER

## 2023-08-01 RX ORDER — CALCIUM CITRATE/VITAMIN D3 200MG-6.25
120 TABLET ORAL 4 TIMES DAILY
Qty: 120 STRIP | Refills: 11 | Status: SHIPPED | OUTPATIENT
Start: 2023-08-01

## 2023-08-21 ENCOUNTER — HOSPITAL ENCOUNTER (OUTPATIENT)
Dept: HOSPITAL 100 - PSN | Age: 83
Discharge: HOME | End: 2023-08-21
Payer: MEDICARE

## 2023-08-21 DIAGNOSIS — J98.09: Primary | ICD-10-CM

## 2023-08-21 PROCEDURE — 94726 PLETHYSMOGRAPHY LUNG VOLUMES: CPT

## 2023-08-21 PROCEDURE — 94729 DIFFUSING CAPACITY: CPT

## 2023-08-21 PROCEDURE — 94060 EVALUATION OF WHEEZING: CPT

## 2023-08-22 ENCOUNTER — HOSPITAL ENCOUNTER (OUTPATIENT)
Dept: HOSPITAL 100 - PSN | Age: 83
Discharge: HOME | End: 2023-08-22
Payer: MEDICARE

## 2023-08-22 ENCOUNTER — OFFICE VISIT (OUTPATIENT)
Dept: PRIMARY CARE | Facility: CLINIC | Age: 83
End: 2023-08-22
Payer: MEDICARE

## 2023-08-22 ENCOUNTER — TELEPHONE (OUTPATIENT)
Dept: PRIMARY CARE | Facility: CLINIC | Age: 83
End: 2023-08-22

## 2023-08-22 VITALS — OXYGEN SATURATION: 91 % | HEART RATE: 94 BPM

## 2023-08-22 VITALS
DIASTOLIC BLOOD PRESSURE: 70 MMHG | HEIGHT: 75 IN | WEIGHT: 269.5 LBS | BODY MASS INDEX: 33.51 KG/M2 | SYSTOLIC BLOOD PRESSURE: 120 MMHG | HEART RATE: 90 BPM | OXYGEN SATURATION: 94 %

## 2023-08-22 DIAGNOSIS — I10 MILD HTN: Primary | ICD-10-CM

## 2023-08-22 DIAGNOSIS — J41.0 SIMPLE CHRONIC BRONCHITIS (MULTI): ICD-10-CM

## 2023-08-22 DIAGNOSIS — F41.1 GAD (GENERALIZED ANXIETY DISORDER): ICD-10-CM

## 2023-08-22 DIAGNOSIS — J98.09: Primary | ICD-10-CM

## 2023-08-22 DIAGNOSIS — R42 DIZZINESS: ICD-10-CM

## 2023-08-22 PROCEDURE — 3078F DIAST BP <80 MM HG: CPT | Performed by: FAMILY MEDICINE

## 2023-08-22 PROCEDURE — 1157F ADVNC CARE PLAN IN RCRD: CPT | Performed by: FAMILY MEDICINE

## 2023-08-22 PROCEDURE — 99214 OFFICE O/P EST MOD 30 MIN: CPT | Performed by: FAMILY MEDICINE

## 2023-08-22 PROCEDURE — 3074F SYST BP LT 130 MM HG: CPT | Performed by: FAMILY MEDICINE

## 2023-08-22 PROCEDURE — 1159F MED LIST DOCD IN RCRD: CPT | Performed by: FAMILY MEDICINE

## 2023-08-22 PROCEDURE — 1036F TOBACCO NON-USER: CPT | Performed by: FAMILY MEDICINE

## 2023-08-22 PROCEDURE — 94618 PULMONARY STRESS TESTING: CPT

## 2023-08-22 PROCEDURE — 1160F RVW MEDS BY RX/DR IN RCRD: CPT | Performed by: FAMILY MEDICINE

## 2023-08-22 RX ORDER — METOPROLOL TARTRATE 25 MG/1
TABLET, FILM COATED ORAL
Qty: 30 TABLET | Refills: 11 | Status: SHIPPED | OUTPATIENT
Start: 2023-08-22 | End: 2023-12-14

## 2023-08-22 RX ORDER — LOSARTAN POTASSIUM 50 MG/1
50 TABLET ORAL DAILY
Qty: 30 TABLET | Refills: 11 | Status: SHIPPED | OUTPATIENT
Start: 2023-08-22 | End: 2023-08-22 | Stop reason: SDUPTHER

## 2023-08-22 RX ORDER — LOSARTAN POTASSIUM 50 MG/1
50 TABLET ORAL DAILY
Qty: 90 TABLET | Refills: 3 | Status: SHIPPED | OUTPATIENT
Start: 2023-08-22 | End: 2024-03-22 | Stop reason: SDUPTHER

## 2023-08-22 RX ORDER — VENLAFAXINE HYDROCHLORIDE 37.5 MG/1
37.5 CAPSULE, EXTENDED RELEASE ORAL DAILY
Qty: 7 CAPSULE | Refills: 0 | Status: SHIPPED | OUTPATIENT
Start: 2023-08-22 | End: 2023-08-22 | Stop reason: SDUPTHER

## 2023-08-22 RX ORDER — VENLAFAXINE HYDROCHLORIDE 37.5 MG/1
37.5 CAPSULE, EXTENDED RELEASE ORAL DAILY
Qty: 7 CAPSULE | Refills: 0 | Status: SHIPPED | OUTPATIENT
Start: 2023-08-22 | End: 2023-09-06 | Stop reason: ALTCHOICE

## 2023-08-22 RX ORDER — METOPROLOL TARTRATE 25 MG/1
TABLET, FILM COATED ORAL
Qty: 30 TABLET | Refills: 11 | Status: SHIPPED | OUTPATIENT
Start: 2023-08-22 | End: 2023-08-22 | Stop reason: SDUPTHER

## 2023-08-22 ASSESSMENT — ENCOUNTER SYMPTOMS
WHEEZING: 0
DIFFICULTY URINATING: 0
UNEXPECTED WEIGHT CHANGE: 0
ARTHRALGIAS: 0
NUMBNESS: 0
DECREASED CONCENTRATION: 0
SHORTNESS OF BREATH: 1
DIZZINESS: 1
LIGHT-HEADEDNESS: 1
NAUSEA: 0
WEAKNESS: 1
HEADACHES: 1
CONSTIPATION: 0
FATIGUE: 1
ABDOMINAL PAIN: 0
TREMORS: 0
NERVOUS/ANXIOUS: 1
ABDOMINAL DISTENTION: 0
VOMITING: 0
RHINORRHEA: 0
ACTIVITY CHANGE: 0
APPETITE CHANGE: 0
COUGH: 0
HALLUCINATIONS: 0
PALPITATIONS: 0
TROUBLE SWALLOWING: 0
SLEEP DISTURBANCE: 0
DIARRHEA: 0
ADENOPATHY: 0

## 2023-08-22 NOTE — ASSESSMENT & PLAN NOTE
Effexor does not seem to be helping with mood, patient complaining of some hot flashes which I think may also be related to the medication, will reduce dose to 37 and half milligrams a day for a week then discontinue.  If anxiety increases consider adding Lexapro at low-dose, patient did request refill of lorazepam today, was advised that this is to only use as needed and was advised about office policy regarding controlled medicines.  We will try going without lorazepam for the time being.

## 2023-08-22 NOTE — ASSESSMENT & PLAN NOTE
Is supposed to be using oxygen as needed at home and uses at night with CPAP bleed in, patient follows with pulmonary medicine at [facility], is post to have pulmonary function testing today and walking oxygen test to see if he needs oxygen more than just as needed.  Continue with current medications and follow-up with pulmonologist.

## 2023-08-22 NOTE — TELEPHONE ENCOUNTER
REFERRAL TO DR HOLGUIN IS OUT .   HE IS OUT OF THE OFFICE   OCT -  JAN 2024.   SO DONNELL WANTED YOU TO KNOW THEY CARE KEEPING APPT WITH DR PATEL 9-15-23.

## 2023-08-22 NOTE — ASSESSMENT & PLAN NOTE
This is a longstanding complaint, recent blood testing done in the past couple months was all stable.  Patient is supposed to have consultation arranged to see neurologist of his choice to [facility], not sure if this has been done yet.  Daughter will check on this.

## 2023-08-22 NOTE — PROGRESS NOTES
"Subjective   Patient ID: Marco A Diallo is a 82 y.o. male who presents for 1 MO F/U.    HPI   Was in ER at the end of July for dizziness  Seen Cardiology in [place], had Echo that showed PFO, to see cardiology in [place]  Complains of headaches in the past month, sweating more  Uses O2 at home, uses as needed and at night, sees pulmonary medicine in [place]  Uses 3L/min at night with CPAP, to have PFT and walking test today with pulmonary medicine  Uses lorazepam at least once a day    Review of Systems   Constitutional:  Positive for fatigue. Negative for activity change, appetite change and unexpected weight change.   HENT:  Negative for ear pain, nosebleeds, rhinorrhea, sneezing and trouble swallowing.    Respiratory:  Positive for shortness of breath. Negative for cough and wheezing.    Cardiovascular:  Negative for chest pain, palpitations and leg swelling.   Gastrointestinal:  Negative for abdominal distention, abdominal pain, constipation, diarrhea, nausea and vomiting.   Genitourinary:  Negative for difficulty urinating.   Musculoskeletal:  Negative for arthralgias.   Skin:  Negative for rash.   Neurological:  Positive for dizziness, weakness, light-headedness and headaches. Negative for tremors and numbness.   Hematological:  Negative for adenopathy.   Psychiatric/Behavioral:  Negative for behavioral problems, decreased concentration, hallucinations and sleep disturbance. The patient is nervous/anxious.    All other systems reviewed and are negative.      Objective   /70   Pulse 90   Ht 1.905 m (6' 3\")   Wt 122 kg (269 lb 8 oz)   SpO2 94%   BMI 33.69 kg/m²     Physical Exam  Vitals and nursing note reviewed.   Constitutional:       Appearance: Normal appearance.   HENT:      Head: Normocephalic and atraumatic.      Right Ear: Tympanic membrane, ear canal and external ear normal.      Left Ear: Tympanic membrane, ear canal and external ear normal.      Nose: Nose normal.      Mouth/Throat:      Mouth: " Mucous membranes are moist.      Pharynx: Oropharynx is clear.   Cardiovascular:      Rate and Rhythm: Normal rate and regular rhythm.      Pulses: Normal pulses.      Heart sounds: Normal heart sounds.   Pulmonary:      Effort: Pulmonary effort is normal.      Breath sounds: Normal breath sounds.   Musculoskeletal:      Cervical back: Normal range of motion and neck supple.   Neurological:      Mental Status: He is alert.   Psychiatric:         Mood and Affect: Mood normal.         Behavior: Behavior normal.         Assessment/Plan   Problem List Items Addressed This Visit       Chronic obstructive pulmonary disease (CMS/HCC)     Is supposed to be using oxygen as needed at home and uses at night with CPAP bleed in, patient follows with pulmonary medicine at [facility], is post to have pulmonary function testing today and walking oxygen test to see if he needs oxygen more than just as needed.  Continue with current medications and follow-up with pulmonologist.         Dizziness     This is a longstanding complaint, recent blood testing done in the past couple months was all stable.  Patient is supposed to have consultation arranged to see neurologist of his choice to [facility], not sure if this has been done yet.  Daughter will check on this.         Mild HTN - Primary     Blood pressure under good control, noted heart rate elevated, patient is complaining of some headaches and pounding in his head, cardiology recently increase losartan to 50 mg a day, blood pressure is under good control today in the office, has electrolyte profile and renal function planned at [facility] later today, try adding low-dose metoprolol 12-1/2 mg twice daily encouraged to follow-up with cardiology.         Relevant Medications    losartan (Cozaar) 50 mg tablet    metoprolol tartrate (Lopressor) 25 mg tablet    Other Relevant Orders    Follow Up In Primary Care - Established    RAVI  (generalized anxiety disorder)     Effexor does not seem to be helping with mood, patient complaining of some hot flashes which I think may also be related to the medication, will reduce dose to 37 and half milligrams a day for a week then discontinue.  If anxiety increases consider adding Lexapro at low-dose, patient did request refill of lorazepam today, was advised that this is to only use as needed and was advised about office policy regarding controlled medicines.  We will try going without lorazepam for the time being.         Relevant Medications    venlafaxine XR (Effexor-XR) 37.5 mg 24 hr capsule    Other Relevant Orders    Follow Up In Primary Care - Established

## 2023-08-22 NOTE — ASSESSMENT & PLAN NOTE
Blood pressure under good control, noted heart rate elevated, patient is complaining of some headaches and pounding in his head, cardiology recently increase losartan to 50 mg a day, blood pressure is under good control today in the office, has electrolyte profile and renal function planned at [facility] later today, try adding low-dose metoprolol 12-1/2 mg twice daily encouraged to follow-up with cardiology.

## 2023-09-06 ENCOUNTER — OFFICE VISIT (OUTPATIENT)
Dept: PRIMARY CARE | Facility: CLINIC | Age: 83
End: 2023-09-06
Payer: MEDICARE

## 2023-09-06 VITALS
HEART RATE: 74 BPM | HEIGHT: 75 IN | DIASTOLIC BLOOD PRESSURE: 62 MMHG | OXYGEN SATURATION: 94 % | BODY MASS INDEX: 33.34 KG/M2 | SYSTOLIC BLOOD PRESSURE: 110 MMHG | WEIGHT: 268.1 LBS

## 2023-09-06 VITALS — BODY MASS INDEX: 31.77 KG/M2 | WEIGHT: 255.51 LBS | HEIGHT: 75 IN

## 2023-09-06 DIAGNOSIS — G89.29 CHRONIC NONINTRACTABLE HEADACHE, UNSPECIFIED HEADACHE TYPE: ICD-10-CM

## 2023-09-06 DIAGNOSIS — R51.9 CHRONIC NONINTRACTABLE HEADACHE, UNSPECIFIED HEADACHE TYPE: ICD-10-CM

## 2023-09-06 DIAGNOSIS — I63.40 CEREBROVASCULAR ACCIDENT (CVA) DUE TO EMBOLISM OF CEREBRAL ARTERY (MULTI): Primary | ICD-10-CM

## 2023-09-06 DIAGNOSIS — R42 DIZZINESS: ICD-10-CM

## 2023-09-06 PROCEDURE — 3078F DIAST BP <80 MM HG: CPT | Performed by: FAMILY MEDICINE

## 2023-09-06 PROCEDURE — 99213 OFFICE O/P EST LOW 20 MIN: CPT | Performed by: FAMILY MEDICINE

## 2023-09-06 PROCEDURE — 1159F MED LIST DOCD IN RCRD: CPT | Performed by: FAMILY MEDICINE

## 2023-09-06 PROCEDURE — 1157F ADVNC CARE PLAN IN RCRD: CPT | Performed by: FAMILY MEDICINE

## 2023-09-06 PROCEDURE — 1036F TOBACCO NON-USER: CPT | Performed by: FAMILY MEDICINE

## 2023-09-06 PROCEDURE — 1160F RVW MEDS BY RX/DR IN RCRD: CPT | Performed by: FAMILY MEDICINE

## 2023-09-06 PROCEDURE — 3074F SYST BP LT 130 MM HG: CPT | Performed by: FAMILY MEDICINE

## 2023-09-06 ASSESSMENT — ENCOUNTER SYMPTOMS
ARTHRALGIAS: 0
TROUBLE SWALLOWING: 0
DYSPHORIC MOOD: 0
WHEEZING: 0
ABDOMINAL DISTENTION: 0
NAUSEA: 0
HEADACHES: 1
UNEXPECTED WEIGHT CHANGE: 0
TREMORS: 0
LIGHT-HEADEDNESS: 1
VOMITING: 0
COUGH: 0
DIFFICULTY URINATING: 0
SHORTNESS OF BREATH: 0
DIARRHEA: 0
CONSTIPATION: 0
NERVOUS/ANXIOUS: 1
PALPITATIONS: 0
ACTIVITY CHANGE: 0
APPETITE CHANGE: 0
ADENOPATHY: 0
ABDOMINAL PAIN: 0
SLEEP DISTURBANCE: 0
FATIGUE: 1
NUMBNESS: 0
RHINORRHEA: 0
DIZZINESS: 1

## 2023-09-06 NOTE — ASSESSMENT & PLAN NOTE
"Patient with chronic issues with \"head fullness\" dizziness lightheadedness.  MRI scan in the past is shown evidence of microvascular changes and atherosclerotic changes through the brain, does show involvement of the posterior circulation with stroke.  Has an appointment to see neurologist later this month.  Patient in the emergency room, headache was relieved with Toradol and antiemetics, concerned about possible migraine headaches, patient advised that I believe that his headaches and dizziness have more to do with his circulation issues related to his head.  Patient encouraged to follow-up with neurology, it is contraindicated to use triptan medicines given his age and findings on his MRI scan, we will try to set up home health care for physical therapy and a nurse assessment, patient is reluctant to drive due to his dizziness.  He also has an appointment to see ear nose and throat physician at [facility] later this week.  "

## 2023-09-06 NOTE — PROGRESS NOTES
"Subjective   Patient ID: Marco A Diallo is a 82 y.o. male who presents for Hospital Follow-up (9/2-9/3 Dizziness ).    HPI   Was in ER 9/1 for dizziness, ER treated with Toradol and anti-emetics with resolution of headache  Has an appointment with neurology on 9/15  Has been having more headaches  Chronic daily headaches and dizziness  To see ENT later this week  Still having hot flashes and sweating   Using CPAP with O2 at night  Not driving normally, unsure about balance, was not able to attend vestibular therapy      Review of Systems   Constitutional:  Positive for fatigue. Negative for activity change, appetite change and unexpected weight change.   HENT:  Negative for ear pain, nosebleeds, rhinorrhea, sneezing and trouble swallowing.    Respiratory:  Negative for cough, shortness of breath and wheezing.    Cardiovascular:  Negative for chest pain, palpitations and leg swelling.   Gastrointestinal:  Negative for abdominal distention, abdominal pain, constipation, diarrhea, nausea and vomiting.   Genitourinary:  Negative for difficulty urinating.   Musculoskeletal:  Negative for arthralgias.   Skin:  Negative for rash.   Neurological:  Positive for dizziness, light-headedness and headaches. Negative for tremors and numbness.   Hematological:  Negative for adenopathy.   Psychiatric/Behavioral:  Negative for behavioral problems, dysphoric mood and sleep disturbance. The patient is nervous/anxious.    All other systems reviewed and are negative.      Objective   /62   Pulse 74   Ht 1.905 m (6' 3\")   Wt 122 kg (268 lb 1.6 oz)   SpO2 94%   BMI 33.51 kg/m²     Physical Exam  Vitals and nursing note reviewed.   Constitutional:       Appearance: Normal appearance.   HENT:      Head: Normocephalic and atraumatic.      Right Ear: Tympanic membrane, ear canal and external ear normal.      Left Ear: Tympanic membrane, ear canal and external ear normal.      Nose: Nose normal.      Mouth/Throat:      Mouth: Mucous " "membranes are moist.      Pharynx: Oropharynx is clear.   Cardiovascular:      Rate and Rhythm: Normal rate and regular rhythm.      Pulses: Normal pulses.      Heart sounds: Normal heart sounds.   Pulmonary:      Effort: Pulmonary effort is normal.      Breath sounds: Normal breath sounds.   Musculoskeletal:      Cervical back: Normal range of motion and neck supple.   Neurological:      Mental Status: He is alert.   Psychiatric:         Mood and Affect: Mood normal.         Behavior: Behavior normal.         Assessment/Plan   Problem List Items Addressed This Visit       Cerebrovascular accident (CVA) (CMS/[facility]) - Primary     Patient with chronic issues with \"head fullness\" dizziness lightheadedness.  MRI scan in the past is shown evidence of microvascular changes and atherosclerotic changes through the brain, does show involvement of the posterior circulation with stroke.  Has an appointment to see neurologist later this month.  Patient in the emergency room, headache was relieved with Toradol and antiemetics, concerned about possible migraine headaches, patient advised that I believe that his headaches and dizziness have more to do with his circulation issues related to his head.  Patient encouraged to follow-up with neurology, it is contraindicated to use triptan medicines given his age and findings on his MRI scan, we will try to set up home health care for physical therapy and a nurse assessment, patient is reluctant to drive due to his dizziness.  He also has an appointment to see ear nose and throat physician at [facility] later this week.         Dizziness     Other Visit Diagnoses       Chronic nonintractable headache, unspecified headache type                   "

## 2023-09-06 NOTE — PATIENT INSTRUCTIONS
We are going to set-up some home health for therapy to help with balance.  Be sure to keep appointment to see neurology that is scheduled

## 2023-09-11 ENCOUNTER — TELEPHONE (OUTPATIENT)
Dept: PRIMARY CARE | Facility: CLINIC | Age: 83
End: 2023-09-11
Payer: MEDICARE

## 2023-09-14 NOTE — H&P
History & Physical Reviewed:   I have reviewed the History and Physical dated:  31-Mar-2023   History and Physical reviewed and relevant findings noted. Patient examined to review pertinent physical  findings.: No significant changes   Home Medications Reviewed: no changes noted   Allergies Reviewed: no changes noted       ERAS (Enhanced Recovery After Surgery):  ·  ERAS Patient: no     Consent:   COVID-19 Consent:  ·  COVID-19 Risk Consent Surgeon has reviewed key risks related to the risk of lamine COVID-19 and if they contract COVID-19 what the risks are.       Electronic Signatures:  Sami Carter)  (Signed 06-Apr-2023 12:30)   Authored: History & Physical Reviewed, ERAS, Consent,  Note Completion      Last Updated: 06-Apr-2023 12:30 by Sami Carter)

## 2023-09-25 ENCOUNTER — OFFICE VISIT (OUTPATIENT)
Dept: PRIMARY CARE | Facility: CLINIC | Age: 83
End: 2023-09-25
Payer: MEDICARE

## 2023-09-25 VITALS
DIASTOLIC BLOOD PRESSURE: 64 MMHG | WEIGHT: 274.7 LBS | SYSTOLIC BLOOD PRESSURE: 132 MMHG | BODY MASS INDEX: 34.15 KG/M2 | HEART RATE: 95 BPM | HEIGHT: 75 IN | OXYGEN SATURATION: 96 %

## 2023-09-25 DIAGNOSIS — F41.1 GAD (GENERALIZED ANXIETY DISORDER): ICD-10-CM

## 2023-09-25 DIAGNOSIS — E11.9 TYPE 2 DIABETES MELLITUS WITHOUT COMPLICATION, WITHOUT LONG-TERM CURRENT USE OF INSULIN (MULTI): Primary | ICD-10-CM

## 2023-09-25 DIAGNOSIS — R53.82 CHRONIC FATIGUE: ICD-10-CM

## 2023-09-25 DIAGNOSIS — I10 MILD HTN: ICD-10-CM

## 2023-09-25 DIAGNOSIS — I63.40 CEREBROVASCULAR ACCIDENT (CVA) DUE TO EMBOLISM OF CEREBRAL ARTERY (MULTI): ICD-10-CM

## 2023-09-25 PROBLEM — R19.7 DIARRHEA: Status: RESOLVED | Noted: 2023-03-03 | Resolved: 2023-09-25

## 2023-09-25 PROBLEM — K92.1 BLACK STOOL: Status: RESOLVED | Noted: 2023-03-03 | Resolved: 2023-09-25

## 2023-09-25 PROBLEM — D64.9 ANEMIA: Status: RESOLVED | Noted: 2023-03-03 | Resolved: 2023-09-25

## 2023-09-25 PROBLEM — M25.552 LEFT HIP PAIN: Status: RESOLVED | Noted: 2023-03-03 | Resolved: 2023-09-25

## 2023-09-25 PROCEDURE — 3075F SYST BP GE 130 - 139MM HG: CPT | Performed by: FAMILY MEDICINE

## 2023-09-25 PROCEDURE — 1159F MED LIST DOCD IN RCRD: CPT | Performed by: FAMILY MEDICINE

## 2023-09-25 PROCEDURE — 99213 OFFICE O/P EST LOW 20 MIN: CPT | Performed by: FAMILY MEDICINE

## 2023-09-25 PROCEDURE — 3078F DIAST BP <80 MM HG: CPT | Performed by: FAMILY MEDICINE

## 2023-09-25 PROCEDURE — 1036F TOBACCO NON-USER: CPT | Performed by: FAMILY MEDICINE

## 2023-09-25 PROCEDURE — 1160F RVW MEDS BY RX/DR IN RCRD: CPT | Performed by: FAMILY MEDICINE

## 2023-09-25 RX ORDER — SIMVASTATIN 40 MG/1
40 TABLET, FILM COATED ORAL DAILY
Qty: 90 TABLET | Refills: 3 | Status: SHIPPED | OUTPATIENT
Start: 2023-09-25 | End: 2024-09-24

## 2023-09-25 ASSESSMENT — ENCOUNTER SYMPTOMS
CONSTIPATION: 0
DIARRHEA: 0
NAUSEA: 0
PALPITATIONS: 0
UNEXPECTED WEIGHT CHANGE: 0
ABDOMINAL DISTENTION: 0
VOMITING: 0
SLEEP DISTURBANCE: 0
WHEEZING: 0
DIZZINESS: 0
SHORTNESS OF BREATH: 0
DIFFICULTY URINATING: 0
HEADACHES: 1
NERVOUS/ANXIOUS: 0
DYSPHORIC MOOD: 0
RHINORRHEA: 0
LIGHT-HEADEDNESS: 0
ADENOPATHY: 0
FATIGUE: 1
APPETITE CHANGE: 0
NUMBNESS: 0
ARTHRALGIAS: 0
COUGH: 0
ABDOMINAL PAIN: 0
TROUBLE SWALLOWING: 0
ACTIVITY CHANGE: 0

## 2023-09-25 NOTE — ASSESSMENT & PLAN NOTE
We will check blood test in 3 months for A1c last A1c testing was below 7, continue with current treatment.

## 2023-09-25 NOTE — PROGRESS NOTES
"Subjective   Patient ID: Marco A Diallo is a 82 y.o. male who presents for Follow-up (1 MO FU).    HPI   No headache, chest pain, shortness of breath, dizziness, lightheadedness, or edema  Seen neurology on 9/15, ordered additional testing, stopped gabapentin, mirapex and meclizine  To have a tilt table and increased simvastatin, repeating 30 day event monitor, to check orthostatic blood pressures, and has follow-up in 3-4 months  Some better, pressure in head still  PT at home helping, not napping through the day anymore, more positive about things (was not able to do prior)    Review of Systems   Constitutional:  Positive for fatigue. Negative for activity change, appetite change and unexpected weight change.   HENT:  Negative for ear pain, nosebleeds, rhinorrhea, sneezing and trouble swallowing.    Respiratory:  Negative for cough, shortness of breath and wheezing.    Cardiovascular:  Negative for chest pain, palpitations and leg swelling.   Gastrointestinal:  Negative for abdominal distention, abdominal pain, constipation, diarrhea, nausea and vomiting.   Genitourinary:  Negative for difficulty urinating.   Musculoskeletal:  Negative for arthralgias.   Skin:  Negative for rash.   Neurological:  Positive for headaches. Negative for dizziness, light-headedness and numbness.   Hematological:  Negative for adenopathy.   Psychiatric/Behavioral:  Negative for behavioral problems, dysphoric mood and sleep disturbance. The patient is not nervous/anxious.    All other systems reviewed and are negative.      Objective   /64   Pulse 95   Ht 1.905 m (6' 3\")   Wt 125 kg (274 lb 11.2 oz)   SpO2 96%   BMI 34.34 kg/m²     Physical Exam  Vitals and nursing note reviewed.   Constitutional:       Appearance: Normal appearance.   HENT:      Head: Normocephalic and atraumatic.      Right Ear: Tympanic membrane, ear canal and external ear normal.      Left Ear: Tympanic membrane, ear canal and external ear normal.      Nose: " Nose normal.      Mouth/Throat:      Mouth: Mucous membranes are moist.      Pharynx: Oropharynx is clear.   Cardiovascular:      Rate and Rhythm: Normal rate and regular rhythm.      Pulses: Normal pulses.      Heart sounds: Normal heart sounds.   Pulmonary:      Effort: Pulmonary effort is normal.      Breath sounds: Normal breath sounds.   Musculoskeletal:      Cervical back: Normal range of motion and neck supple.   Neurological:      Mental Status: He is alert.   Psychiatric:         Mood and Affect: Mood normal.         Behavior: Behavior normal.         Assessment/Plan   Problem List Items Addressed This Visit             ICD-10-CM    Cerebrovascular accident (CVA) (CMS/[facility]) I63.9     Patient had seen neurologist, is anticipating EMG testing and tilt table testing, currently doing physical therapy at home, is feeling somewhat better, neurologist had increased simvastatin.  There was some confusion about medications that the patient was taking when he saw the neurologist, hopefully these are cleared up by now.         Relevant Orders    Follow Up In Primary Care - Established    Diabetes mellitus (CMS/[facility]) - Primary E11.9     We will check blood test in 3 months for A1c last A1c testing was below 7, continue with current treatment.         Relevant Orders    Follow Up In Primary Care - Established    Comprehensive Metabolic Panel    Hemoglobin A1C    Lipid Panel    Fatigue R53.83     Seems to be doing okay currently.         Relevant Orders    Follow Up In Primary Care - Established    Mild HTN I10     Blood pressure under good control recent blood testing shows normal renal function.  No change.         Relevant Orders    Follow Up In Primary Care - Established    Comprehensive Metabolic Panel    RAVI (generalized anxiety disorder) F41.1     Seems to be stable currently.  Family is very supportive and attentive.         Relevant Orders    Follow Up In Primary Care - Established

## 2023-09-25 NOTE — ASSESSMENT & PLAN NOTE
Patient had seen neurologist, is anticipating EMG testing and tilt table testing, currently doing physical therapy at home, is feeling somewhat better, neurologist had increased simvastatin.  There was some confusion about medications that the patient was taking when he saw the neurologist, hopefully these are cleared up by now.

## 2023-09-26 ENCOUNTER — APPOINTMENT (OUTPATIENT)
Dept: PRIMARY CARE | Facility: CLINIC | Age: 83
End: 2023-09-26
Payer: MEDICARE

## 2023-09-30 NOTE — H&P
History & Physical Reviewed:   I have reviewed the History and Physical dated:  26-Jun-2023   History and Physical reviewed and relevant findings noted. Patient examined to review pertinent physical  findings.: No significant changes   Home Medications Reviewed: no changes noted   Allergies Reviewed: no changes noted       ERAS (Enhanced Recovery After Surgery):  ·  ERAS Patient: no     Consent:   COVID-19 Consent:  ·  COVID-19 Risk Consent Surgeon has reviewed key risks related to the risk of lamine COVID-19 and if they contract COVID-19 what the risks are.       Electronic Signatures:  Sami Carter)  (Signed 06-Jul-2023 08:14)   Authored: History & Physical Reviewed, ERAS, Consent,  Note Completion      Last Updated: 06-Jul-2023 08:14 by Sami Carter)

## 2023-10-02 ENCOUNTER — APPOINTMENT (OUTPATIENT)
Dept: PRIMARY CARE | Facility: CLINIC | Age: 83
End: 2023-10-02
Payer: MEDICARE

## 2023-10-02 NOTE — OP NOTE
Post Operative Note:     Post-Procedure Diagnosis: 1.  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  Cataract Extraction with Intraocular  Lens Implant Right Eye   Surgeon: Sami Carter MD   Resident/Fellow/Other Assistant: None   Estimated Blood Loss (mL): none   Specimen: no   Findings: 1.  Combined Form Age Related Cataract  Right Eye     Operative Report Dictated:  Dictation: not applicable - note contains Operative  Report   Operative Report:      The patient was correctly identified and the patient's operative eye was marked with a marking pen and verified with the patient in the pre-operative area.   The operative eye was dilated in the preoperative area.  The patient was then taken to the operating  room where timeout was performed before starting the procedure. Combined anesthesia  with intravenous sedation and topical tetracaine eyedrops were instilled into the right eye. The operative eye  was prepped and draped in the standard sterile ophthalmic  fashion in  preparation for ophthalmic surgery.  A Azucena wire speculum was then inserted between the eyelids of the right eye and the operating microscope was placed over the right eye.  A paracentesis incision was made approximately 30° away from  the planned surgical incision site with the help of MVR blade.  1% lidocaine MPF with Phenylephrine 1.5% PF was injected into the anterior chamber through the paracentesis incision. A near limbal clear corneal incision was fashioned in the temporal quadrant  just outside the vascular arcade and Viscoat was injected into anterior chamber to firm the eye. A bent needle cystotome was used and Utrata forceps were utilized to create a continuous curvilinear capsulorrhexis.  BSS was injected beneath the anterior  capsule to hydrodissect the nucleus from adjacent cortex and capsule.  The residual cortex was then aspirated with irrigation/aspiration handpiece. The posterior capsule was then  polished with the help of soft irrigation-aspiration tip.  Provisc viscoelastic  was then injected into the eye to reform the anterior chamber and to open the capsular bag.  The intraocular lens implant was taken from its sterile wrapping, inspected under the surgical microscope and found to be in good condition. The intraocular lens  implant 20.5D was injected into the capsule bag. The Provisc was then aspirated from the anterior chamber and from behind the intraocular lens implant.  The anterior chamber was inflated with the help of BSS to moderate tension.  And the edges of the  surgical incision were then hydrated with the help of BSS.  Vigamox was then injected into the anterior chamber and into the capsule bag through the paracentesis incision. The surgical wound was then inspected and found to be watertight.  The wire speculum  and drapes were then removed.  Pred Forte eyedrops, Acular eyedrops and Betadine 5% sterile ophthalmic solution were instilled in the conjunctival sac.     The patient tolerated the procedure well and was taken to recovery room in stable condition.    Attestation:   Note Completion:  Attending Attestation I performed the procedure without a resident         Electronic Signatures:  Sami Carter)  (Signed 06-Jul-2023 10:54)   Authored: Post Operative Note, Note Completion      Last Updated: 06-Jul-2023 10:54 by Sami Carter)

## 2023-10-02 NOTE — OP NOTE
Post Operative Note:     Post-Procedure Diagnosis: 1.  Combined Form Age Related  Cataract Left Eye  2.  Regular Astigmatism Left Eye   Procedure: 1.  Cataract Extraction with Intraocular  Lens Implant Left Eye   Surgeon: Sami Carter MD   Resident/Fellow/Other Assistant: None   Estimated Blood Loss (mL): none   Specimen: no   Findings: 1.  Combined Form Age Related Cataract  Left Eye  2.  Regular Astigmatism Left Eye     Operative Report Dictated:  Dictation: not applicable - note contains Operative  Report   Operative Report:      The patient was correctly identified in the preop area and the operative eye was marked with a marking pen. The operative eye was dilated in the preoperative area.  The patient was then taken to the operating room where timeout was performed before starting  the procedure. Combined anesthesia  with intravenous sedation and topical tetracaine eyedrops were given the left eye. The operative eye was prepped and draped in the standard sterile ophthalmic fashion in  preparation for ophthalmic surgery.  A Azucena  wire speculum was then inserted between the eyelids of the left eye and the operating microscope was placed over the left eye.  A paracentesis incision was made approximately 30° away from the planned surgical incision site with the help of MVR blade.   1% lidocaine MPF with Phenylephrine 1.5% PF was injected into the anterior chamber through the paracentesis incision. A near limbal clear corneal incision was fashioned in the temporal quadrant just outside the vascular arcade and Viscoat was injected  into anterior chamber to firm the eye. A bent needle cystotome was used and Utrata forceps were utilized to create a continuous curvilinear capsulorrhexis.  BSS was injected beneath the anterior capsule to hydrodissect the nucleus from adjacent cortex  and capsule.  The residual cortex were then aspirated with irrigation aspiration handpiece. The posterior capsule was then  polished with the help of soft irrigation-aspiration tip.  Provisc viscoelastic was then injected into the eye to reform the anterior  chamber and to open the capsular bag.  The intraocular lens implant was taken from its sterile wrapping, inspected under the surgical microscope and found to be in good condition. The intraocular lens implant 19.50D was injected into the capsule bag.  The Provisc was then aspirated from the anterior chamber and from behind the intraocular lens implant.  The anterior chamber was inflated with the help of BSS to moderate tension.  The edges of the surgical incision were then hydrated with the help of  BSS.  Vigamox was then injected into the anterior chamber and into the capsule bag through the paracentesis incision. The surgical wound was then inspected and found to be watertight.  The wire speculum and drapes were then removed.  Pred Forte eyedrops,  Acular eyedrops and Betadine 5% sterile ophthalmic solution were instilled in the conjunctival sac.     The patient tolerated the procedure well and was taken to recovery room in stable condition.    Attestation:   Note Completion:  Attending Attestation I performed the procedure without a resident         Electronic Signatures:  Sami Carter)  (Signed 06-Apr-2023 14:18)   Authored: Post Operative Note, Note Completion      Last Updated: 06-Apr-2023 14:18 by Sami Carter)

## 2023-12-06 DIAGNOSIS — E11.9 TYPE 2 DIABETES MELLITUS WITHOUT COMPLICATION, WITHOUT LONG-TERM CURRENT USE OF INSULIN (MULTI): ICD-10-CM

## 2023-12-06 RX ORDER — DEXTROSE 4 G
TABLET,CHEWABLE ORAL
Qty: 1 EACH | Refills: 0 | Status: SHIPPED | OUTPATIENT
Start: 2023-12-06

## 2023-12-07 ENCOUNTER — TELEPHONE (OUTPATIENT)
Dept: PRIMARY CARE | Facility: CLINIC | Age: 83
End: 2023-12-07
Payer: MEDICARE

## 2023-12-07 NOTE — TELEPHONE ENCOUNTER
Patient's daughter calls in today asking for answers as to why Bud was not put on a blood thinner in the past or kept on one after hospitalization for CVA? I have looked through the chart and I am not finding answers, could you reach out to Glory and talk to her regarding this question?

## 2023-12-12 DIAGNOSIS — I10 MILD HTN: ICD-10-CM

## 2023-12-14 RX ORDER — METOPROLOL TARTRATE 25 MG/1
TABLET, FILM COATED ORAL
Qty: 90 TABLET | Refills: 11 | Status: SHIPPED | OUTPATIENT
Start: 2023-12-14 | End: 2024-04-22 | Stop reason: ALTCHOICE

## 2023-12-14 NOTE — TELEPHONE ENCOUNTER
PATIENT SAW CARDIO ELEC.  IS GOING TO PUT LOOP RECORDER IN  NEXT MONTH.   POSITIVE TILY TABLE.   HE ADVISES DROPPING  LOSARTAN FROM 50 MG TO 25  MG.   OK ?   IS GOING TO STOP  PLAVIX AND WANTS TO START ELIQUIS.    CAN YOU SEND SCRIPT  INTO [facility]

## 2023-12-22 ENCOUNTER — OFFICE VISIT (OUTPATIENT)
Dept: PRIMARY CARE | Facility: CLINIC | Age: 83
End: 2023-12-22
Payer: MEDICARE

## 2023-12-22 VITALS
OXYGEN SATURATION: 91 % | HEIGHT: 75 IN | BODY MASS INDEX: 33.01 KG/M2 | HEART RATE: 73 BPM | DIASTOLIC BLOOD PRESSURE: 80 MMHG | WEIGHT: 265.5 LBS | SYSTOLIC BLOOD PRESSURE: 124 MMHG

## 2023-12-22 DIAGNOSIS — I63.40 CEREBROVASCULAR ACCIDENT (CVA) DUE TO EMBOLISM OF CEREBRAL ARTERY (MULTI): ICD-10-CM

## 2023-12-22 DIAGNOSIS — R53.82 CHRONIC FATIGUE: ICD-10-CM

## 2023-12-22 DIAGNOSIS — J41.0 SIMPLE CHRONIC BRONCHITIS (MULTI): ICD-10-CM

## 2023-12-22 DIAGNOSIS — Z86.718 HISTORY OF DVT (DEEP VEIN THROMBOSIS): ICD-10-CM

## 2023-12-22 DIAGNOSIS — I10 MILD HTN: ICD-10-CM

## 2023-12-22 DIAGNOSIS — F41.1 GAD (GENERALIZED ANXIETY DISORDER): ICD-10-CM

## 2023-12-22 DIAGNOSIS — G47.33 OBSTRUCTIVE SLEEP APNEA, ADULT: ICD-10-CM

## 2023-12-22 DIAGNOSIS — E11.9 TYPE 2 DIABETES MELLITUS WITHOUT COMPLICATION, WITHOUT LONG-TERM CURRENT USE OF INSULIN (MULTI): Primary | ICD-10-CM

## 2023-12-22 PROCEDURE — 1160F RVW MEDS BY RX/DR IN RCRD: CPT | Performed by: FAMILY MEDICINE

## 2023-12-22 PROCEDURE — 99214 OFFICE O/P EST MOD 30 MIN: CPT | Performed by: FAMILY MEDICINE

## 2023-12-22 PROCEDURE — 3074F SYST BP LT 130 MM HG: CPT | Performed by: FAMILY MEDICINE

## 2023-12-22 PROCEDURE — 1159F MED LIST DOCD IN RCRD: CPT | Performed by: FAMILY MEDICINE

## 2023-12-22 PROCEDURE — 3079F DIAST BP 80-89 MM HG: CPT | Performed by: FAMILY MEDICINE

## 2023-12-22 PROCEDURE — 1036F TOBACCO NON-USER: CPT | Performed by: FAMILY MEDICINE

## 2023-12-22 ASSESSMENT — ENCOUNTER SYMPTOMS
UNEXPECTED WEIGHT CHANGE: 0
DIZZINESS: 1
CONSTIPATION: 0
SLEEP DISTURBANCE: 0
COUGH: 0
ARTHRALGIAS: 0
APPETITE CHANGE: 0
NAUSEA: 0
PALPITATIONS: 0
HEADACHES: 0
LIGHT-HEADEDNESS: 1
RHINORRHEA: 0
ADENOPATHY: 0
TROUBLE SWALLOWING: 0
VOMITING: 0
SHORTNESS OF BREATH: 1
FATIGUE: 1
WHEEZING: 0
DIFFICULTY URINATING: 0
ABDOMINAL DISTENTION: 0
NERVOUS/ANXIOUS: 0
NUMBNESS: 1
ACTIVITY CHANGE: 0
DYSPHORIC MOOD: 0
ABDOMINAL PAIN: 0
DIARRHEA: 0

## 2023-12-22 NOTE — PROGRESS NOTES
Subjective   Patient ID: Marco A Diallo is a 83 y.o. male who presents for 3 MO LABS.    HPI   Seeing neurology and cardiology, found to have a PFO and failed a tilt table test  To have a loop recorder placed in January  Seen surgeon earlier this month for ventral hernia  To have EMG on hand, using CPAP at night, uses O2 bleed in  Some BARRON, had a fall in the past month, no palpitations, no CP  No nausea or GERD, chronic fatigue, fullness in head, no headaches  Dizziness stable    Review of Systems   Constitutional:  Positive for fatigue. Negative for activity change, appetite change and unexpected weight change.   HENT:  Negative for congestion, ear pain, nosebleeds, rhinorrhea, sneezing and trouble swallowing.    Respiratory:  Positive for shortness of breath. Negative for cough and wheezing.    Cardiovascular:  Negative for chest pain, palpitations and leg swelling.   Gastrointestinal:  Negative for abdominal distention, abdominal pain, constipation, diarrhea, nausea and vomiting.   Genitourinary:  Negative for difficulty urinating.   Musculoskeletal:  Negative for arthralgias.   Skin:  Negative for rash.   Neurological:  Positive for dizziness, light-headedness and numbness. Negative for headaches.   Hematological:  Negative for adenopathy.   Psychiatric/Behavioral:  Negative for behavioral problems, dysphoric mood and sleep disturbance. The patient is not nervous/anxious.    All other systems reviewed and are negative.      Current Outpatient Medications:     albuterol 90 mcg/actuation inhaler, Inhale 2 puffs every 4 hours if needed for shortness of breath., Disp: 18 g, Rfl: 11    ascorbic acid (Vitamin C) 500 mg tablet, Take 1 tablet (500 mg) by mouth once daily., Disp: , Rfl:     b complex 0.4 mg tablet, Take 1 tablet by mouth once daily., Disp: , Rfl:     Bacillus coagulans-inulin 1 billion-250 cell-mg capsule, Take 1 capsule by mouth once daily., Disp: 60 capsule, Rfl: 0    blood sugar diagnostic (True Metrix  Glucose Test Strip) strip, 120 strips 4 times a day., Disp: 120 strip, Rfl: 11    blood-glucose meter (True Metrix Glucose Meter) misc, TEST TWICE DAILY, Disp: 1 each, Rfl: 0    budesonide-formoteroL (Symbicort) 160-4.5 mcg/actuation inhaler, Inhale 2 puffs 2 times a day., Disp: 3 each, Rfl: 3    CALCIUM ORAL, Take 1 capsule by mouth in the morning and 1 capsule before bedtime., Disp: , Rfl:     cephalexin (Keflex) 500 mg capsule, Take 1 capsule (500 mg) by mouth 2 times a day., Disp: 180 capsule, Rfl: 3    cholecalciferol (Vitamin D-3) 25 MCG (1000 UT) tablet, Take 1 tablet (25 mcg) by mouth once daily., Disp: , Rfl:     finasteride (Proscar) 5 mg tablet, Take 1 tablet (5 mg) by mouth once daily., Disp: 90 tablet, Rfl: 3    fluticasone (Flonase) 50 mcg/actuation nasal spray, Administer 2 sprays into affected nostril(s) once daily., Disp: , Rfl:     GARLIC ORAL, Take 1 capsule by mouth once daily., Disp: , Rfl:     losartan (Cozaar) 50 mg tablet, Take 1 tablet (50 mg) by mouth once daily., Disp: 90 tablet, Rfl: 3    magnesium oxide (Mag-Ox) 400 mg (241.3 mg magnesium) tablet, Take 1 tablet (400 mg) by mouth once daily., Disp: , Rfl:     metoprolol tartrate (Lopressor) 25 mg tablet, take 1/2 tablet by mouth twice a day, Disp: 90 tablet, Rfl: 11    montelukast (Singulair) 10 mg tablet, Take 1 tablet (10 mg) by mouth once daily at bedtime., Disp: 90 tablet, Rfl: 3    multivitamin (MULTIPLE VITAMINS ORAL), Take 1 tablet by mouth once daily., Disp: , Rfl:     omeprazole (PriLOSEC) 40 mg DR capsule, Take 1 capsule (40 mg) by mouth once daily., Disp: , Rfl:     oxygen (O2) gas therapy, Inhale 3 L/min once daily at bedtime., Disp: , Rfl:     psyllium husk, with sugar, (Metamucil Fiber Thin) 2.5 gram wafer wafer, Take by mouth., Disp: , Rfl:     simvastatin (Zocor) 40 mg tablet, Take 1 tablet (40 mg) by mouth early in the morning.., Disp: 90 tablet, Rfl: 3    tamsulosin (Flomax) 0.4 mg 24 hr capsule, Take 1 capsule (0.4 mg)  "by mouth once daily., Disp: , Rfl:     ubidecarenone (coenzyme Q10) 100 mg tablet, Take 1 tablet by mouth once daily., Disp: , Rfl:     apixaban (Eliquis) 5 mg tablet, Take 1 tablet (5 mg) by mouth 2 times a day., Disp: 60 tablet, Rfl: 11      Objective   /80   Pulse 73   Ht 1.905 m (6' 3\")   Wt 120 kg (265 lb 8 oz)   SpO2 91%   BMI 33.19 kg/m²     Physical Exam  Vitals and nursing note reviewed.   Constitutional:       Appearance: Normal appearance.   HENT:      Head: Normocephalic and atraumatic.      Right Ear: Tympanic membrane, ear canal and external ear normal.      Left Ear: Tympanic membrane, ear canal and external ear normal.      Nose: Nose normal.      Mouth/Throat:      Mouth: Mucous membranes are moist.      Pharynx: Oropharynx is clear.   Cardiovascular:      Rate and Rhythm: Normal rate and regular rhythm.      Pulses: Normal pulses.      Heart sounds: Normal heart sounds.   Pulmonary:      Effort: Pulmonary effort is normal.      Breath sounds: Normal breath sounds.   Musculoskeletal:      Cervical back: Normal range of motion and neck supple.   Neurological:      Mental Status: He is alert.   Psychiatric:         Mood and Affect: Mood normal.         Behavior: Behavior normal.         Assessment/Plan   Problem List Items Addressed This Visit             ICD-10-CM    Cerebrovascular accident (CVA) (CMS/HCC) I63.9     Patient found to have a patent foramen ovale, also has a history of recurrent DVT and had a pulmonary embolism in the past.  Given his patent foramen ovale and findings of cerebral infarcts will discontinue Plavix and placed on Eliquis 5 mg twice daily.  Patient is currently undergoing evaluation with cardiology for a loop recorder to determine whether or not he may need a pacemaker, has also seen cardiology for evaluation of a patent foramen ovale.  Will contact patient's pulmonary medicine office to see if they would like to repeat a sleep study to make sure that his sleep " apnea is optimally treated.         Relevant Orders    Follow Up In Primary Care - Established    Chronic obstructive pulmonary disease (CMS/[facility]) J44.9     Follows with pulmonary medicine, continue with current treatment, will contact their office to see if they would like to do a another sleep test.         Diabetes mellitus (CMS/[facility]) - Primary E11.9     A1c testing below 7, no change with treatment for diabetes.         Relevant Orders    Follow Up In Primary Care - Established    Fatigue R53.83    Relevant Orders    Follow Up In Primary Care - Established    CBC and Auto Differential    Comprehensive Metabolic Panel    Mild HTN I10     Blood pressure seems to be stable, renal function also stable.  No change.         Relevant Orders    Follow Up In Primary Care - Established    Comprehensive Metabolic Panel    Obstructive sleep apnea, adult G47.33     Discussed case with his pulmonary medicine office in [place], his CPAP evaluation shows that his AHI is well below 5, he has a few leaks, seems to be well-controlled sleep apnea with his current CPAP settings.  Patient is also compliant with use of his CPAP.         RAVI (generalized anxiety disorder) F41.1     Seems to be stable currently not on medication         Relevant Orders    Follow Up In Primary Care - Established     Other Visit Diagnoses         Codes    History of DVT (deep vein thrombosis)     Z86.718    Relevant Medications    apixaban (Eliquis) 5 mg tablet    Other Relevant Orders    Follow Up In Primary Care - Established    CBC and Auto Differential    Comprehensive Metabolic Panel

## 2023-12-22 NOTE — ASSESSMENT & PLAN NOTE
Discussed case with his pulmonary medicine office in [place], his CPAP evaluation shows that his AHI is well below 5, he has a few leaks, seems to be well-controlled sleep apnea with his current CPAP settings.  Patient is also compliant with use of his CPAP.

## 2023-12-22 NOTE — ASSESSMENT & PLAN NOTE
Patient found to have a patent foramen ovale, also has a history of recurrent DVT and had a pulmonary embolism in the past.  Given his patent foramen ovale and findings of cerebral infarcts will discontinue Plavix and placed on Eliquis 5 mg twice daily.  Patient is currently undergoing evaluation with cardiology for a loop recorder to determine whether or not he may need a pacemaker, has also seen cardiology for evaluation of a patent foramen ovale.  Will contact patient's pulmonary medicine office to see if they would like to repeat a sleep study to make sure that his sleep apnea is optimally treated.

## 2023-12-26 ENCOUNTER — APPOINTMENT (OUTPATIENT)
Dept: PRIMARY CARE | Facility: CLINIC | Age: 83
End: 2023-12-26
Payer: MEDICARE

## 2023-12-26 DIAGNOSIS — K21.9 GASTROESOPHAGEAL REFLUX DISEASE, UNSPECIFIED WHETHER ESOPHAGITIS PRESENT: ICD-10-CM

## 2023-12-27 RX ORDER — OMEPRAZOLE 40 MG/1
40 CAPSULE, DELAYED RELEASE ORAL DAILY
Qty: 90 CAPSULE | Refills: 3 | Status: SHIPPED | OUTPATIENT
Start: 2023-12-27 | End: 2024-01-25 | Stop reason: SDUPTHER

## 2024-01-09 ENCOUNTER — HOSPITAL ENCOUNTER (EMERGENCY)
Facility: HOSPITAL | Age: 84
Discharge: HOME | End: 2024-01-09
Attending: EMERGENCY MEDICINE
Payer: MEDICARE

## 2024-01-09 ENCOUNTER — APPOINTMENT (OUTPATIENT)
Dept: RADIOLOGY | Facility: HOSPITAL | Age: 84
End: 2024-01-09
Payer: MEDICARE

## 2024-01-09 VITALS
TEMPERATURE: 98.3 F | DIASTOLIC BLOOD PRESSURE: 66 MMHG | HEIGHT: 75 IN | HEART RATE: 62 BPM | WEIGHT: 260 LBS | RESPIRATION RATE: 16 BRPM | SYSTOLIC BLOOD PRESSURE: 124 MMHG | OXYGEN SATURATION: 92 % | BODY MASS INDEX: 32.33 KG/M2

## 2024-01-09 DIAGNOSIS — R53.1 GENERALIZED WEAKNESS: Primary | ICD-10-CM

## 2024-01-09 LAB
AMORPH CRY #/AREA UR COMP ASSIST: ABNORMAL /HPF
ANION GAP SERPL CALC-SCNC: 9 MMOL/L (ref 10–20)
APPEARANCE UR: ABNORMAL
BACTERIA #/AREA URNS AUTO: ABNORMAL /HPF
BASOPHILS # BLD AUTO: 0.01 X10*3/UL (ref 0–0.1)
BASOPHILS NFR BLD AUTO: 0.1 %
BILIRUB UR STRIP.AUTO-MCNC: NEGATIVE MG/DL
BUN SERPL-MCNC: 17 MG/DL (ref 6–23)
CALCIUM SERPL-MCNC: 9.9 MG/DL (ref 8.6–10.3)
CARDIAC TROPONIN I PNL SERPL HS: 13 NG/L (ref 0–20)
CHLORIDE SERPL-SCNC: 106 MMOL/L (ref 98–107)
CO2 SERPL-SCNC: 25 MMOL/L (ref 21–32)
COLOR UR: ABNORMAL
CREAT SERPL-MCNC: 0.93 MG/DL (ref 0.5–1.3)
EGFRCR SERPLBLD CKD-EPI 2021: 82 ML/MIN/1.73M*2
EOSINOPHIL # BLD AUTO: 0.05 X10*3/UL (ref 0–0.4)
EOSINOPHIL NFR BLD AUTO: 0.7 %
ERYTHROCYTE [DISTWIDTH] IN BLOOD BY AUTOMATED COUNT: 12.2 % (ref 11.5–14.5)
FLUAV RNA RESP QL NAA+PROBE: NOT DETECTED
FLUBV RNA RESP QL NAA+PROBE: NOT DETECTED
GLUCOSE SERPL-MCNC: 135 MG/DL (ref 74–99)
GLUCOSE UR STRIP.AUTO-MCNC: NEGATIVE MG/DL
HCT VFR BLD AUTO: 41.7 % (ref 41–52)
HGB BLD-MCNC: 14.3 G/DL (ref 13.5–17.5)
HOLD SPECIMEN: NORMAL
IMM GRANULOCYTES # BLD AUTO: 0.02 X10*3/UL (ref 0–0.5)
IMM GRANULOCYTES NFR BLD AUTO: 0.3 % (ref 0–0.9)
KETONES UR STRIP.AUTO-MCNC: NEGATIVE MG/DL
LEUKOCYTE ESTERASE UR QL STRIP.AUTO: ABNORMAL
LYMPHOCYTES # BLD AUTO: 1.25 X10*3/UL (ref 0.8–3)
LYMPHOCYTES NFR BLD AUTO: 18.5 %
MCH RBC QN AUTO: 32.4 PG (ref 26–34)
MCHC RBC AUTO-ENTMCNC: 34.3 G/DL (ref 32–36)
MCV RBC AUTO: 95 FL (ref 80–100)
MONOCYTES # BLD AUTO: 0.6 X10*3/UL (ref 0.05–0.8)
MONOCYTES NFR BLD AUTO: 8.9 %
MUCOUS THREADS #/AREA URNS AUTO: ABNORMAL /LPF
NEUTROPHILS # BLD AUTO: 4.84 X10*3/UL (ref 1.6–5.5)
NEUTROPHILS NFR BLD AUTO: 71.5 %
NITRITE UR QL STRIP.AUTO: NEGATIVE
NRBC BLD-RTO: 0 /100 WBCS (ref 0–0)
PH UR STRIP.AUTO: 6 [PH]
PLATELET # BLD AUTO: 195 X10*3/UL (ref 150–450)
POTASSIUM SERPL-SCNC: 4.3 MMOL/L (ref 3.5–5.3)
PROT UR STRIP.AUTO-MCNC: ABNORMAL MG/DL
RBC # BLD AUTO: 4.41 X10*6/UL (ref 4.5–5.9)
RBC # UR STRIP.AUTO: NEGATIVE /UL
RBC #/AREA URNS AUTO: ABNORMAL /HPF
RSV RNA RESP QL NAA+PROBE: NOT DETECTED
SARS-COV-2 RNA RESP QL NAA+PROBE: NOT DETECTED
SODIUM SERPL-SCNC: 136 MMOL/L (ref 136–145)
SP GR UR STRIP.AUTO: 1.02
UROBILINOGEN UR STRIP.AUTO-MCNC: <2 MG/DL
WBC # BLD AUTO: 6.8 X10*3/UL (ref 4.4–11.3)
WBC #/AREA URNS AUTO: ABNORMAL /HPF

## 2024-01-09 PROCEDURE — 85025 COMPLETE CBC W/AUTO DIFF WBC: CPT | Performed by: EMERGENCY MEDICINE

## 2024-01-09 PROCEDURE — 84484 ASSAY OF TROPONIN QUANT: CPT | Performed by: EMERGENCY MEDICINE

## 2024-01-09 PROCEDURE — 94760 N-INVAS EAR/PLS OXIMETRY 1: CPT

## 2024-01-09 PROCEDURE — 36415 COLL VENOUS BLD VENIPUNCTURE: CPT | Performed by: EMERGENCY MEDICINE

## 2024-01-09 PROCEDURE — 71045 X-RAY EXAM CHEST 1 VIEW: CPT | Performed by: RADIOLOGY

## 2024-01-09 PROCEDURE — 80048 BASIC METABOLIC PNL TOTAL CA: CPT | Performed by: EMERGENCY MEDICINE

## 2024-01-09 PROCEDURE — 99283 EMERGENCY DEPT VISIT LOW MDM: CPT | Mod: 25

## 2024-01-09 PROCEDURE — 99285 EMERGENCY DEPT VISIT HI MDM: CPT | Performed by: EMERGENCY MEDICINE

## 2024-01-09 PROCEDURE — 71045 X-RAY EXAM CHEST 1 VIEW: CPT

## 2024-01-09 PROCEDURE — 81001 URINALYSIS AUTO W/SCOPE: CPT | Performed by: EMERGENCY MEDICINE

## 2024-01-09 PROCEDURE — 87086 URINE CULTURE/COLONY COUNT: CPT | Mod: SAMLAB | Performed by: EMERGENCY MEDICINE

## 2024-01-09 PROCEDURE — 93005 ELECTROCARDIOGRAM TRACING: CPT

## 2024-01-09 PROCEDURE — 9420000001 HC RT PATIENT EDUCATION 5 MIN

## 2024-01-09 PROCEDURE — 87637 SARSCOV2&INF A&B&RSV AMP PRB: CPT | Performed by: EMERGENCY MEDICINE

## 2024-01-09 RX ORDER — ACETAMINOPHEN 325 MG/1
650 TABLET ORAL EVERY 8 HOURS
COMMUNITY

## 2024-01-09 RX ORDER — CLOPIDOGREL BISULFATE 75 MG/1
75 TABLET ORAL DAILY
COMMUNITY
End: 2024-01-25 | Stop reason: WASHOUT

## 2024-01-09 ASSESSMENT — PAIN SCALES - GENERAL: PAINLEVEL_OUTOF10: 0 - NO PAIN

## 2024-01-09 ASSESSMENT — COLUMBIA-SUICIDE SEVERITY RATING SCALE - C-SSRS
2. HAVE YOU ACTUALLY HAD ANY THOUGHTS OF KILLING YOURSELF?: NO
6. HAVE YOU EVER DONE ANYTHING, STARTED TO DO ANYTHING, OR PREPARED TO DO ANYTHING TO END YOUR LIFE?: NO
1. IN THE PAST MONTH, HAVE YOU WISHED YOU WERE DEAD OR WISHED YOU COULD GO TO SLEEP AND NOT WAKE UP?: NO

## 2024-01-09 ASSESSMENT — PAIN - FUNCTIONAL ASSESSMENT: PAIN_FUNCTIONAL_ASSESSMENT: 0-10

## 2024-01-09 NOTE — ED PROVIDER NOTES
HPI   Chief Complaint   Patient presents with    Weakness, Gen     To ED per wheelchair with report of not feeling well and general malaise/weakness. He denies any CP. He reports dizziness, increased work of breathing, and feeling tired. He sees cardiology in [place] and is supposed to be having a loop recorder placed soon.        Patient presents to the emergency department secondary to generalized weakness.  Daughter is at bedside, who is a local EMT, and provides additional history.  This has been an ongoing issue for greater than 1 year for which the patient has had extensive outpatient testing with no definitive diagnosis.  No new component to his symptoms.  Was concerned about elevated blood pressure at home this morning.  Presents now to be further evaluated.                          No data recorded                Patient History   Past Medical History:   Diagnosis Date    Acute pulmonary embolism without acute cor pulmonale, unspecified pulmonary embolism type (CMS/HCC) 04/12/2023    Localized enlarged lymph nodes     Mediastinal adenopathy    Other nonspecific abnormal finding of lung field     Lung mass    Personal history of other diseases of the respiratory system     History of asthma    Personal history of other specified conditions     History of chronic cough    Personal history of pulmonary embolism     History of pulmonary embolism     Past Surgical History:   Procedure Laterality Date    CATARACT EXTRACTION Left 04/06/2023    CATARACT EXTRACTION Right 07/06/2023    HERNIA REPAIR  01/09/2023    MR HEAD ANGIO WO IV CONTRAST  08/12/2021    MR HEAD ANGIO WO IV CONTRAST 8/12/2021 [facility] CLINICAL LEGAC[facility]    OTHER SURGICAL HISTORY  12/04/2018    Cystoscopy    OTHER SURGICAL HISTORY  12/04/2018    Inferior vena cava filter placement    OTHER SURGICAL HISTORY  12/04/2018    Tonsillectomy    OTHER SURGICAL HISTORY  12/04/2018    Ostectomy of calcaneus for spur    OTHER SURGICAL HISTORY  12/04/2018    Hernia  repair    OTHER SURGICAL HISTORY  12/04/2018    Epidural steroid injection    OTHER SURGICAL HISTORY  12/04/2018    Transurethral resection of prostate    OTHER SURGICAL HISTORY  05/06/2022    Intra-articular corticosteroid injection    OTHER SURGICAL HISTORY  09/13/2021    Sigmoidectomy    OTHER SURGICAL HISTORY  04/20/2021    Colonoscopy    OTHER SURGICAL HISTORY  10/24/2019    Knee replacement     Family History   Problem Relation Name Age of Onset    Heart failure Mother      Parkinsonism Mother      Coronary artery disease Father      Heart failure Father      Breast cancer Daughter      Other (hysterectomy) Daughter      Diabetes Other grandfather      Social History     Tobacco Use    Smoking status: Never     Passive exposure: Past    Smokeless tobacco: Never   Vaping Use    Vaping Use: Never used   Substance Use Topics    Alcohol use: Never    Drug use: Defer       Physical Exam   ED Triage Vitals [01/09/24 0938]   Temp Heart Rate Resp BP   36.8 °C (98.3 °F) 78 16 137/69      SpO2 Temp src Heart Rate Source Patient Position   93 % -- -- --      BP Location FiO2 (%)     -- --       Physical Exam  Vitals and nursing note reviewed.   Constitutional:       General: He is not in acute distress.     Appearance: Normal appearance. He is normal weight. He is not ill-appearing, toxic-appearing or diaphoretic.   HENT:      Head: Normocephalic and atraumatic.      Nose: Nose normal. No rhinorrhea.      Mouth/Throat:      Mouth: Mucous membranes are moist.      Pharynx: Oropharynx is clear. No oropharyngeal exudate or posterior oropharyngeal erythema.   Neck:      Comments: Trachea is midline  Cardiovascular:      Rate and Rhythm: Normal rate and regular rhythm.      Heart sounds: No murmur heard.  Pulmonary:      Effort: Pulmonary effort is normal.      Breath sounds: Normal breath sounds. No wheezing.   Abdominal:      General: Abdomen is flat. Bowel sounds are normal. There is no distension.      Palpations: Abdomen  is soft.      Tenderness: There is no abdominal tenderness.   Musculoskeletal:         General: Normal range of motion.      Cervical back: Normal range of motion.   Skin:     General: Skin is warm and dry.      Findings: No rash.   Neurological:      General: No focal deficit present.      Mental Status: He is alert and oriented to person, place, and time. Mental status is at baseline.      Cranial Nerves: No cranial nerve deficit.      Sensory: No sensory deficit.      Motor: No weakness.      Coordination: Coordination normal.   Psychiatric:         Behavior: Behavior normal.         Thought Content: Thought content normal.         Judgment: Judgment normal.      Comments: Appears anxious and apprehensive         ED Course & MDM   Diagnoses as of 01/09/24 1251   Generalized weakness       Medical Decision Making  Twelve-lead EKG was interpreted by myself and this was noted to contribute directly to patient care.  Studies noted reveal normal sinus rhythm at 78 bpm, first-degree AV block, bifascicular block, evidence of LVH, no acute ischemic changes.    Patient's EKG was compared to a prior study from September 2023 and the study was noted to be unchanged.    Patient remained hemodynamically stable while here in the emergency room and his workup shows no acute findings of any kind.  Given the chronic and longstanding nature of the patient's symptoms I feel he is appropriate for discharge.  Reassurance was given.  Follow-up with his private physician as scheduled and return to the ER at any time if worse.        Procedure  Procedures     Tong Patton DO  01/09/24 1251

## 2024-01-10 ENCOUNTER — HOSPITAL ENCOUNTER (OUTPATIENT)
Dept: CARDIOLOGY | Facility: HOSPITAL | Age: 84
Discharge: HOME | End: 2024-01-10
Payer: MEDICARE

## 2024-01-10 LAB
ATRIAL RATE: 78 BPM
BACTERIA UR CULT: NORMAL
P AXIS: 48 DEGREES
P OFFSET: 140 MS
P ONSET: 80 MS
PR INTERVAL: 262 MS
Q ONSET: 211 MS
QRS COUNT: 13 BEATS
QRS DURATION: 154 MS
QT INTERVAL: 410 MS
QTC CALCULATION(BAZETT): 467 MS
QTC FREDERICIA: 447 MS
R AXIS: -66 DEGREES
T AXIS: 34 DEGREES
T OFFSET: 416 MS
VENTRICULAR RATE: 78 BPM

## 2024-01-10 PROCEDURE — 93005 ELECTROCARDIOGRAM TRACING: CPT

## 2024-01-17 ENCOUNTER — HOSPITAL ENCOUNTER (OUTPATIENT)
Dept: HOSPITAL 100 - CT | Age: 84
Discharge: HOME | End: 2024-01-17
Payer: MEDICARE

## 2024-01-17 DIAGNOSIS — K46.9: Primary | ICD-10-CM

## 2024-01-17 PROCEDURE — 74176 CT ABD & PELVIS W/O CONTRAST: CPT

## 2024-01-19 DIAGNOSIS — R42 VERTIGO: Primary | ICD-10-CM

## 2024-01-19 DIAGNOSIS — J31.0 CHRONIC RHINITIS: ICD-10-CM

## 2024-01-19 DIAGNOSIS — K21.9 GASTROESOPHAGEAL REFLUX DISEASE, UNSPECIFIED WHETHER ESOPHAGITIS PRESENT: ICD-10-CM

## 2024-01-22 LAB
ESTIMATED AVERAGE GLUCOSE FOR HBA1C EXTERNAL: 140 MG/DL
HEMOGLOBIN A1C/HEMOGLOBIN TOTAL IN BLOOD EXTERNAL: 6.5 %

## 2024-01-23 LAB
CHOLESTEROL (MG/DL) IN SER/PLAS EXTERNAL: 110 MG/DL
CHOLESTEROL IN HDL (MG/DL) IN SER/PLAS EXTERNAL: 46 MG/DL
CHOLESTEROL IN LDL (MG/DL) IN SERUM OR PLASMA BY CALCULATION EXTERNAL: 35 MG/DL
CHOLESTEROL/HDL RATIO EXTERNAL: 2.4
NON HDL CHOLESTEROL EXTERNAL: 64 MG/DL
TRIGLYCERIDE (MG/DL) IN SER/PLAS EXTERNAL: 144 MG/DL

## 2024-01-25 ENCOUNTER — OFFICE VISIT (OUTPATIENT)
Dept: PRIMARY CARE | Facility: CLINIC | Age: 84
End: 2024-01-25
Payer: MEDICARE

## 2024-01-25 VITALS
DIASTOLIC BLOOD PRESSURE: 70 MMHG | OXYGEN SATURATION: 95 % | SYSTOLIC BLOOD PRESSURE: 120 MMHG | BODY MASS INDEX: 32.71 KG/M2 | WEIGHT: 263.1 LBS | HEART RATE: 75 BPM | HEIGHT: 75 IN

## 2024-01-25 DIAGNOSIS — K21.9 GASTROESOPHAGEAL REFLUX DISEASE, UNSPECIFIED WHETHER ESOPHAGITIS PRESENT: ICD-10-CM

## 2024-01-25 DIAGNOSIS — R53.82 CHRONIC FATIGUE: ICD-10-CM

## 2024-01-25 DIAGNOSIS — I10 MILD HTN: Primary | ICD-10-CM

## 2024-01-25 DIAGNOSIS — G47.33 OBSTRUCTIVE SLEEP APNEA, ADULT: ICD-10-CM

## 2024-01-25 DIAGNOSIS — E11.9 TYPE 2 DIABETES MELLITUS WITHOUT COMPLICATION, WITHOUT LONG-TERM CURRENT USE OF INSULIN (MULTI): ICD-10-CM

## 2024-01-25 DIAGNOSIS — I63.40 CEREBROVASCULAR ACCIDENT (CVA) DUE TO EMBOLISM OF CEREBRAL ARTERY (MULTI): ICD-10-CM

## 2024-01-25 DIAGNOSIS — J31.0 CHRONIC RHINITIS: ICD-10-CM

## 2024-01-25 DIAGNOSIS — Z86.718 HISTORY OF DVT (DEEP VEIN THROMBOSIS): ICD-10-CM

## 2024-01-25 DIAGNOSIS — F41.1 GAD (GENERALIZED ANXIETY DISORDER): ICD-10-CM

## 2024-01-25 PROBLEM — I63.89: Status: RESOLVED | Noted: 2023-03-03 | Resolved: 2024-01-25

## 2024-01-25 PROBLEM — J44.9 CHRONIC OBSTRUCTIVE PULMONARY DISEASE (MULTI): Status: RESOLVED | Noted: 2023-03-03 | Resolved: 2024-01-25

## 2024-01-25 PROBLEM — Q21.12 PATENT FORAMEN OVALE (HHS-HCC): Status: ACTIVE | Noted: 2023-08-16

## 2024-01-25 PROCEDURE — 1159F MED LIST DOCD IN RCRD: CPT | Performed by: FAMILY MEDICINE

## 2024-01-25 PROCEDURE — 99214 OFFICE O/P EST MOD 30 MIN: CPT | Performed by: FAMILY MEDICINE

## 2024-01-25 PROCEDURE — 1036F TOBACCO NON-USER: CPT | Performed by: FAMILY MEDICINE

## 2024-01-25 PROCEDURE — 3078F DIAST BP <80 MM HG: CPT | Performed by: FAMILY MEDICINE

## 2024-01-25 PROCEDURE — 1160F RVW MEDS BY RX/DR IN RCRD: CPT | Performed by: FAMILY MEDICINE

## 2024-01-25 PROCEDURE — 1157F ADVNC CARE PLAN IN RCRD: CPT | Performed by: FAMILY MEDICINE

## 2024-01-25 PROCEDURE — 1126F AMNT PAIN NOTED NONE PRSNT: CPT | Performed by: FAMILY MEDICINE

## 2024-01-25 PROCEDURE — 3074F SYST BP LT 130 MM HG: CPT | Performed by: FAMILY MEDICINE

## 2024-01-25 RX ORDER — FLUOXETINE 10 MG/1
10 CAPSULE ORAL DAILY
Qty: 30 CAPSULE | Refills: 11 | Status: SHIPPED | OUTPATIENT
Start: 2024-01-25 | End: 2024-03-08 | Stop reason: SDUPTHER

## 2024-01-25 RX ORDER — TAMSULOSIN HYDROCHLORIDE 0.4 MG/1
0.4 CAPSULE ORAL DAILY
Qty: 90 CAPSULE | Refills: 3 | Status: CANCELLED | OUTPATIENT
Start: 2024-01-25 | End: 2025-01-24

## 2024-01-25 RX ORDER — OMEPRAZOLE 40 MG/1
40 CAPSULE, DELAYED RELEASE ORAL DAILY
Qty: 90 CAPSULE | Refills: 3 | Status: SHIPPED | OUTPATIENT
Start: 2024-01-25 | End: 2024-03-08 | Stop reason: WASHOUT

## 2024-01-25 RX ORDER — FLUTICASONE PROPIONATE 50 MCG
2 SPRAY, SUSPENSION (ML) NASAL DAILY
Qty: 16 G | Refills: 3 | Status: SHIPPED | OUTPATIENT
Start: 2024-01-25 | End: 2024-03-08 | Stop reason: WASHOUT

## 2024-01-25 ASSESSMENT — ENCOUNTER SYMPTOMS
FEVER: 0
DIZZINESS: 1
APPETITE CHANGE: 0
ABDOMINAL PAIN: 0
LIGHT-HEADEDNESS: 1
SLEEP DISTURBANCE: 0
SINUS PRESSURE: 1
NERVOUS/ANXIOUS: 1
UNEXPECTED WEIGHT CHANGE: 0
FATIGUE: 1
VOMITING: 0
DIFFICULTY URINATING: 0
ACTIVITY CHANGE: 0
NUMBNESS: 0
NAUSEA: 0
ADENOPATHY: 0
DIARRHEA: 0
TROUBLE SWALLOWING: 0
CONSTIPATION: 0
HEADACHES: 1
ARTHRALGIAS: 0
COUGH: 0
SHORTNESS OF BREATH: 1
PALPITATIONS: 0
RHINORRHEA: 0
CHILLS: 0
ABDOMINAL DISTENTION: 0
WHEEZING: 0
DYSPHORIC MOOD: 0

## 2024-01-25 NOTE — PROGRESS NOTES
"Subjective   Patient ID: Marco A Diallo is a 83 y.o. male who presents for 1 MO LABS.    HPI   Was in ER 1/9 due to weakness and concern about BP, EMG showed right CTS, had loop recorder placed 1/12, has follow-up with cardiology in the next month  Chronic headaches and \"woozy\" feelings  CPAP and DANI treated with pulmonary in [place]  Sees ortho, to see for CTS  No CP, some BARRON at times  No nausea, constipation or diarrhea  Anxious and nervous at times  Had a fall this past week    Review of Systems   Constitutional:  Positive for fatigue. Negative for activity change, appetite change, chills, fever and unexpected weight change.   HENT:  Positive for sinus pressure. Negative for congestion, ear pain, nosebleeds, postnasal drip, rhinorrhea, sneezing and trouble swallowing.    Respiratory:  Positive for shortness of breath. Negative for cough and wheezing.    Cardiovascular:  Negative for chest pain, palpitations and leg swelling.   Gastrointestinal:  Negative for abdominal distention, abdominal pain, constipation, diarrhea, nausea and vomiting.   Genitourinary:  Negative for difficulty urinating.   Musculoskeletal:  Negative for arthralgias.   Skin:  Negative for rash.   Neurological:  Positive for dizziness, light-headedness and headaches. Negative for numbness.   Hematological:  Negative for adenopathy.   Psychiatric/Behavioral:  Negative for behavioral problems, dysphoric mood and sleep disturbance. The patient is nervous/anxious.    All other systems reviewed and are negative.      Objective   /70   Pulse 75   Ht 1.905 m (6' 3\")   Wt 119 kg (263 lb 1.6 oz)   SpO2 95%   BMI 32.89 kg/m²     Physical Exam  Vitals and nursing note reviewed.   Constitutional:       Appearance: Normal appearance.   HENT:      Head: Normocephalic and atraumatic.      Right Ear: Tympanic membrane, ear canal and external ear normal.      Left Ear: Tympanic membrane, ear canal and external ear normal.      Nose: Nose normal.      " "Mouth/Throat:      Mouth: Mucous membranes are moist.      Pharynx: Oropharynx is clear.   Cardiovascular:      Rate and Rhythm: Normal rate and regular rhythm.      Pulses: Normal pulses.      Heart sounds: Normal heart sounds.   Pulmonary:      Effort: Pulmonary effort is normal.      Breath sounds: Normal breath sounds.   Musculoskeletal:      Cervical back: Normal range of motion and neck supple.   Skin:     General: Skin is warm and dry.   Neurological:      General: No focal deficit present.      Mental Status: He is alert and oriented to person, place, and time. Mental status is at baseline.   Psychiatric:         Mood and Affect: Mood normal.         Behavior: Behavior normal.         Assessment/Plan   Problem List Items Addressed This Visit             ICD-10-CM    Cerebrovascular accident (CVA) (CMS/[facility]) I63.9     Patient seen neurology, is following with cardiology for evaluation of arrhythmia and possible repair of patent foramen ovale, tolerating anticoagulation currently given history of stroke, no change with current treatment.  Patient's complaints of \"wooziness\" and pressure in his head are chronic and seem no different than usual.         Relevant Orders    Follow Up In Primary Care - Established    Diabetes mellitus (CMS/[facility]) E11.9     A1c testing in December was below 7, tolerating diet, no change with treatment.         Relevant Orders    Albumin , Urine Random    Comprehensive Metabolic Panel    Hemoglobin A1C    Thyroid Stimulating Hormone    Lipid Panel    Follow Up In Primary Care - Established    Fatigue R53.83    Relevant Orders    Follow Up In Primary Care - Established    Mild HTN - Primary I10     Blood pressure stable, kidney function stable, no change.         Relevant Orders    Albumin , Urine Random    Comprehensive Metabolic Panel    Follow Up In Primary Care - Established    Obstructive sleep apnea, adult G47.33     Patient follows with pulmonary medicine and sleep medicine with a " pulmonologist, did discuss with them at the last office visit about possible sleep study, they stated that his CPAP machine shows an AHI below 5 with minimal leak.  Feels as though his sleep apnea is adequately treated.         RAVI (generalized anxiety disorder) F41.1     Will try fluoxetine at 10 mg once a day.         Relevant Medications    FLUoxetine (PROzac) 10 mg capsule    Other Relevant Orders    Follow Up In Primary Care - Established    Gastroesophageal reflux disease K21.9    Relevant Medications    omeprazole (PriLOSEC) 40 mg DR capsule    Other Relevant Orders    Follow Up In Primary Care - Established    Chronic rhinitis J31.0    Relevant Medications    fluticasone (Flonase) 50 mcg/actuation nasal spray    Other Relevant Orders    Follow Up In Primary Care - Established    History of DVT (deep vein thrombosis) Z86.718    Relevant Medications    apixaban (Eliquis) 5 mg tablet    Other Relevant Orders    CBC and Auto Differential    Follow Up In Primary Care - Established     Patient with persistent complaints of pressure in his head and wooziness and fatigue, laboratory testing in the past is all been stable, is currently going through an evaluation with cardiology with a loop recorder to detect whether or not he might benefit from a pacemaker.  Is also seeing cardiology for patent foramen ovale which they have elected not to repair, but he is now tolerating Eliquis without difficulty.  Sleep medicine and pulmonary medicine are both following him for his obstructive sleep apnea, CPAP settings seem to be treating his sleep apnea appropriately, he is also seeing different neurologist, had multiple neurologic testing done and has a history of stroke, believe that his wooziness is probably related to this history of stroke, daughter is concerned that he may be displaying anxiety and ruminates about his symptoms, will try again to add a low-dose SSRI, at this time try fluoxetine.  He did not seem to see any  improvement with sertraline in the past.

## 2024-01-25 NOTE — ASSESSMENT & PLAN NOTE
Patient follows with pulmonary medicine and sleep medicine with a pulmonologist, did discuss with them at the last office visit about possible sleep study, they stated that his CPAP machine shows an AHI below 5 with minimal leak.  Feels as though his sleep apnea is adequately treated.

## 2024-01-25 NOTE — ASSESSMENT & PLAN NOTE
"Patient seen neurology, is following with cardiology for evaluation of arrhythmia and possible repair of patent foramen ovale, tolerating anticoagulation currently given history of stroke, no change with current treatment.  Patient's complaints of \"wooziness\" and pressure in his head are chronic and seem no different than usual.  "

## 2024-02-01 ENCOUNTER — OFFICE VISIT (OUTPATIENT)
Dept: PRIMARY CARE | Facility: CLINIC | Age: 84
End: 2024-02-01
Payer: MEDICARE

## 2024-02-01 VITALS
SYSTOLIC BLOOD PRESSURE: 118 MMHG | WEIGHT: 263 LBS | DIASTOLIC BLOOD PRESSURE: 68 MMHG | HEART RATE: 80 BPM | BODY MASS INDEX: 32.7 KG/M2 | HEIGHT: 75 IN | OXYGEN SATURATION: 96 %

## 2024-02-01 DIAGNOSIS — J02.9 SORE THROAT: ICD-10-CM

## 2024-02-01 DIAGNOSIS — R05.1 ACUTE COUGH: Primary | ICD-10-CM

## 2024-02-01 DIAGNOSIS — I73.9 PAD (PERIPHERAL ARTERY DISEASE) (CMS-HCC): ICD-10-CM

## 2024-02-01 DIAGNOSIS — J84.10 PULMONARY FIBROSIS, UNSPECIFIED (MULTI): ICD-10-CM

## 2024-02-01 DIAGNOSIS — J45.20 MILD INTERMITTENT ASTHMA WITHOUT COMPLICATION (HHS-HCC): ICD-10-CM

## 2024-02-01 PROCEDURE — 1159F MED LIST DOCD IN RCRD: CPT | Performed by: NURSE PRACTITIONER

## 2024-02-01 PROCEDURE — 1036F TOBACCO NON-USER: CPT | Performed by: NURSE PRACTITIONER

## 2024-02-01 PROCEDURE — 1160F RVW MEDS BY RX/DR IN RCRD: CPT | Performed by: NURSE PRACTITIONER

## 2024-02-01 PROCEDURE — 3074F SYST BP LT 130 MM HG: CPT | Performed by: NURSE PRACTITIONER

## 2024-02-01 PROCEDURE — 3078F DIAST BP <80 MM HG: CPT | Performed by: NURSE PRACTITIONER

## 2024-02-01 PROCEDURE — 87636 SARSCOV2 & INF A&B AMP PRB: CPT

## 2024-02-01 PROCEDURE — 1157F ADVNC CARE PLAN IN RCRD: CPT | Performed by: NURSE PRACTITIONER

## 2024-02-01 PROCEDURE — 99213 OFFICE O/P EST LOW 20 MIN: CPT | Performed by: NURSE PRACTITIONER

## 2024-02-01 PROCEDURE — 1126F AMNT PAIN NOTED NONE PRSNT: CPT | Performed by: NURSE PRACTITIONER

## 2024-02-01 RX ORDER — ALBUTEROL SULFATE 90 UG/1
2 AEROSOL, METERED RESPIRATORY (INHALATION) EVERY 4 HOURS PRN
Qty: 18 G | Refills: 11 | Status: SHIPPED | OUTPATIENT
Start: 2024-02-01 | End: 2025-01-31

## 2024-02-01 ASSESSMENT — ENCOUNTER SYMPTOMS
HEADACHES: 1
DIZZINESS: 1
SORE THROAT: 1

## 2024-02-01 ASSESSMENT — PATIENT HEALTH QUESTIONNAIRE - PHQ9
1. LITTLE INTEREST OR PLEASURE IN DOING THINGS: NOT AT ALL
2. FEELING DOWN, DEPRESSED OR HOPELESS: NOT AT ALL
SUM OF ALL RESPONSES TO PHQ9 QUESTIONS 1 AND 2: 0

## 2024-02-01 NOTE — PROGRESS NOTES
"Subjective   Patient ID: Marco A Diallo is a 83 y.o. male who presents for Sore Throat (Headache, congestion x yesterday ,  balance issue, home covid was negative , 96.6 temp today).    Marco A comes to the office, w/ family member, for complaints of head congestion, ST, balance issues and headache. Tues started w/ ST. No n/v/d. + fullness. No ear pain. No fever. No ill exposures. + raspy voice. OTC: cough gtts  Eating & drinking well  At home COVID testing negative  IO strep: negative  Tested for Influenza A/B/covid         Review of Systems   HENT:  Positive for congestion and sore throat.    Neurological:  Positive for dizziness and headaches.        + balance issues       Objective   /68   Pulse 80   Ht 1.905 m (6' 3\")   Wt 119 kg (263 lb)   SpO2 96%   BMI 32.87 kg/m²     Physical Exam  Vitals and nursing note reviewed.   Constitutional:       Appearance: Normal appearance.   HENT:      Head: Normocephalic.      Right Ear: Tympanic membrane normal.      Left Ear: Tympanic membrane normal.      Mouth/Throat:      Pharynx: Posterior oropharyngeal erythema present.   Cardiovascular:      Rate and Rhythm: Normal rate and regular rhythm.      Heart sounds: Normal heart sounds.   Pulmonary:      Effort: Pulmonary effort is normal.      Breath sounds: Normal breath sounds.   Skin:     General: Skin is warm and dry.   Neurological:      General: No focal deficit present.      Mental Status: He is alert and oriented to person, place, and time.      Cranial Nerves: Cranial nerves 2-12 are intact.   Psychiatric:         Mood and Affect: Mood normal.         Thought Content: Thought content normal.       Assessment/Plan   Problem List Items Addressed This Visit             ICD-10-CM    PAD (peripheral artery disease) (CMS/[facility]) I73.9    Pulmonary fibrosis, unspecified (CMS/[facility]) J84.10    Mild intermittent asthma without complication J45.20    Relevant Medications    albuterol 90 mcg/actuation inhaler    Sore throat " J02.9    Relevant Orders    Sars-CoV-2 and Influenza A/B PCR    Acute cough - Primary R05.1    Relevant Orders    Sars-CoV-2 and Influenza A/B PCR

## 2024-02-02 LAB
FLUAV RNA RESP QL NAA+PROBE: NOT DETECTED
FLUBV RNA RESP QL NAA+PROBE: NOT DETECTED
SARS-COV-2 RNA RESP QL NAA+PROBE: NOT DETECTED

## 2024-02-02 NOTE — TELEPHONE ENCOUNTER
----- Message from CARMENCITA Aaron sent at 2/2/2024  8:17 AM EST -----  Call & notify patient his flu A & B and COVID test are negative

## 2024-02-04 RX ORDER — OMEPRAZOLE 40 MG/1
40 CAPSULE, DELAYED RELEASE ORAL DAILY
Qty: 90 CAPSULE | Refills: 3 | Status: SHIPPED | OUTPATIENT
Start: 2024-02-04

## 2024-02-04 RX ORDER — FLUTICASONE PROPIONATE 50 MCG
2 SPRAY, SUSPENSION (ML) NASAL DAILY
Qty: 48 G | Refills: 3 | Status: SHIPPED | OUTPATIENT
Start: 2024-02-04

## 2024-02-08 ENCOUNTER — APPOINTMENT (OUTPATIENT)
Dept: RADIOLOGY | Facility: HOSPITAL | Age: 84
End: 2024-02-08
Payer: MEDICARE

## 2024-02-08 ENCOUNTER — HOSPITAL ENCOUNTER (OUTPATIENT)
Dept: CARDIOLOGY | Facility: HOSPITAL | Age: 84
Discharge: HOME | End: 2024-02-08
Payer: MEDICARE

## 2024-02-08 ENCOUNTER — HOSPITAL ENCOUNTER (EMERGENCY)
Facility: HOSPITAL | Age: 84
Discharge: HOME | End: 2024-02-08
Attending: EMERGENCY MEDICINE
Payer: MEDICARE

## 2024-02-08 VITALS
DIASTOLIC BLOOD PRESSURE: 76 MMHG | RESPIRATION RATE: 19 BRPM | HEIGHT: 75 IN | BODY MASS INDEX: 32.33 KG/M2 | OXYGEN SATURATION: 93 % | TEMPERATURE: 99.1 F | HEART RATE: 82 BPM | WEIGHT: 260 LBS | SYSTOLIC BLOOD PRESSURE: 126 MMHG

## 2024-02-08 DIAGNOSIS — J40 BRONCHITIS: Primary | ICD-10-CM

## 2024-02-08 DIAGNOSIS — N30.00 ACUTE CYSTITIS WITHOUT HEMATURIA: ICD-10-CM

## 2024-02-08 LAB
ALBUMIN SERPL BCP-MCNC: 3.9 G/DL (ref 3.4–5)
ALP SERPL-CCNC: 76 U/L (ref 33–136)
ALT SERPL W P-5'-P-CCNC: 15 U/L (ref 10–52)
ANION GAP SERPL CALC-SCNC: 9 MMOL/L (ref 10–20)
APPEARANCE UR: ABNORMAL
AST SERPL W P-5'-P-CCNC: 14 U/L (ref 9–39)
BACTERIA #/AREA URNS AUTO: ABNORMAL /HPF
BASOPHILS # BLD AUTO: 0.02 X10*3/UL (ref 0–0.1)
BASOPHILS NFR BLD AUTO: 0.3 %
BILIRUB SERPL-MCNC: 1.5 MG/DL (ref 0–1.2)
BILIRUB UR STRIP.AUTO-MCNC: NEGATIVE MG/DL
BNP SERPL-MCNC: 74 PG/ML (ref 0–99)
BUN SERPL-MCNC: 17 MG/DL (ref 6–23)
CALCIUM SERPL-MCNC: 9.9 MG/DL (ref 8.6–10.3)
CARDIAC TROPONIN I PNL SERPL HS: 14 NG/L (ref 0–20)
CARDIAC TROPONIN I PNL SERPL HS: 14 NG/L (ref 0–20)
CHLORIDE SERPL-SCNC: 105 MMOL/L (ref 98–107)
CO2 SERPL-SCNC: 25 MMOL/L (ref 21–32)
COLOR UR: ABNORMAL
CREAT SERPL-MCNC: 0.92 MG/DL (ref 0.5–1.3)
D DIMER PPP FEU-MCNC: 270 NG/ML FEU
EGFRCR SERPLBLD CKD-EPI 2021: 83 ML/MIN/1.73M*2
EOSINOPHIL # BLD AUTO: 0.06 X10*3/UL (ref 0–0.4)
EOSINOPHIL NFR BLD AUTO: 0.8 %
ERYTHROCYTE [DISTWIDTH] IN BLOOD BY AUTOMATED COUNT: 12.3 % (ref 11.5–14.5)
FLUAV RNA RESP QL NAA+PROBE: NOT DETECTED
FLUBV RNA RESP QL NAA+PROBE: NOT DETECTED
GLUCOSE SERPL-MCNC: 106 MG/DL (ref 74–99)
GLUCOSE UR STRIP.AUTO-MCNC: NEGATIVE MG/DL
HCT VFR BLD AUTO: 42.8 % (ref 41–52)
HGB BLD-MCNC: 14.5 G/DL (ref 13.5–17.5)
IMM GRANULOCYTES # BLD AUTO: 0.03 X10*3/UL (ref 0–0.5)
IMM GRANULOCYTES NFR BLD AUTO: 0.4 % (ref 0–0.9)
KETONES UR STRIP.AUTO-MCNC: NEGATIVE MG/DL
LACTATE SERPL-SCNC: 1.2 MMOL/L (ref 0.4–2)
LEUKOCYTE ESTERASE UR QL STRIP.AUTO: ABNORMAL
LYMPHOCYTES # BLD AUTO: 1.19 X10*3/UL (ref 0.8–3)
LYMPHOCYTES NFR BLD AUTO: 14.9 %
MCH RBC QN AUTO: 32 PG (ref 26–34)
MCHC RBC AUTO-ENTMCNC: 33.9 G/DL (ref 32–36)
MCV RBC AUTO: 95 FL (ref 80–100)
MONOCYTES # BLD AUTO: 0.57 X10*3/UL (ref 0.05–0.8)
MONOCYTES NFR BLD AUTO: 7.2 %
MUCOUS THREADS #/AREA URNS AUTO: ABNORMAL /LPF
NEUTROPHILS # BLD AUTO: 6.1 X10*3/UL (ref 1.6–5.5)
NEUTROPHILS NFR BLD AUTO: 76.4 %
NITRITE UR QL STRIP.AUTO: NEGATIVE
NRBC BLD-RTO: 0 /100 WBCS (ref 0–0)
PH UR STRIP.AUTO: 7 [PH]
PLATELET # BLD AUTO: 196 X10*3/UL (ref 150–450)
POTASSIUM SERPL-SCNC: 4.2 MMOL/L (ref 3.5–5.3)
PROT SERPL-MCNC: 6.4 G/DL (ref 6.4–8.2)
PROT UR STRIP.AUTO-MCNC: NEGATIVE MG/DL
RBC # BLD AUTO: 4.53 X10*6/UL (ref 4.5–5.9)
RBC # UR STRIP.AUTO: NEGATIVE /UL
RBC #/AREA URNS AUTO: ABNORMAL /HPF
RSV RNA RESP QL NAA+PROBE: NOT DETECTED
SARS-COV-2 RNA RESP QL NAA+PROBE: NOT DETECTED
SODIUM SERPL-SCNC: 135 MMOL/L (ref 136–145)
SP GR UR STRIP.AUTO: 1.02
UROBILINOGEN UR STRIP.AUTO-MCNC: <2 MG/DL
WBC # BLD AUTO: 8 X10*3/UL (ref 4.4–11.3)
WBC #/AREA URNS AUTO: ABNORMAL /HPF

## 2024-02-08 PROCEDURE — 99285 EMERGENCY DEPT VISIT HI MDM: CPT | Mod: 25 | Performed by: EMERGENCY MEDICINE

## 2024-02-08 PROCEDURE — 94640 AIRWAY INHALATION TREATMENT: CPT

## 2024-02-08 PROCEDURE — 94760 N-INVAS EAR/PLS OXIMETRY 1: CPT

## 2024-02-08 PROCEDURE — 71275 CT ANGIOGRAPHY CHEST: CPT | Performed by: RADIOLOGY

## 2024-02-08 PROCEDURE — 71275 CT ANGIOGRAPHY CHEST: CPT

## 2024-02-08 PROCEDURE — 84484 ASSAY OF TROPONIN QUANT: CPT | Performed by: EMERGENCY MEDICINE

## 2024-02-08 PROCEDURE — 2550000001 HC RX 255 CONTRASTS: Performed by: EMERGENCY MEDICINE

## 2024-02-08 PROCEDURE — 81001 URINALYSIS AUTO W/SCOPE: CPT | Performed by: EMERGENCY MEDICINE

## 2024-02-08 PROCEDURE — 84075 ASSAY ALKALINE PHOSPHATASE: CPT | Performed by: EMERGENCY MEDICINE

## 2024-02-08 PROCEDURE — 83880 ASSAY OF NATRIURETIC PEPTIDE: CPT | Performed by: EMERGENCY MEDICINE

## 2024-02-08 PROCEDURE — 94664 DEMO&/EVAL PT USE INHALER: CPT

## 2024-02-08 PROCEDURE — 36415 COLL VENOUS BLD VENIPUNCTURE: CPT | Performed by: EMERGENCY MEDICINE

## 2024-02-08 PROCEDURE — 87637 SARSCOV2&INF A&B&RSV AMP PRB: CPT | Performed by: EMERGENCY MEDICINE

## 2024-02-08 PROCEDURE — 87086 URINE CULTURE/COLONY COUNT: CPT | Mod: SAMLAB | Performed by: EMERGENCY MEDICINE

## 2024-02-08 PROCEDURE — 96360 HYDRATION IV INFUSION INIT: CPT | Mod: 59

## 2024-02-08 PROCEDURE — 70450 CT HEAD/BRAIN W/O DYE: CPT

## 2024-02-08 PROCEDURE — 2500000004 HC RX 250 GENERAL PHARMACY W/ HCPCS (ALT 636 FOR OP/ED): Performed by: EMERGENCY MEDICINE

## 2024-02-08 PROCEDURE — 96361 HYDRATE IV INFUSION ADD-ON: CPT

## 2024-02-08 PROCEDURE — 2500000005 HC RX 250 GENERAL PHARMACY W/O HCPCS: Performed by: EMERGENCY MEDICINE

## 2024-02-08 PROCEDURE — 93005 ELECTROCARDIOGRAM TRACING: CPT

## 2024-02-08 PROCEDURE — 85025 COMPLETE CBC W/AUTO DIFF WBC: CPT | Performed by: EMERGENCY MEDICINE

## 2024-02-08 PROCEDURE — 71045 X-RAY EXAM CHEST 1 VIEW: CPT

## 2024-02-08 PROCEDURE — 2500000002 HC RX 250 W HCPCS SELF ADMINISTERED DRUGS (ALT 637 FOR MEDICARE OP, ALT 636 FOR OP/ED)

## 2024-02-08 PROCEDURE — 70450 CT HEAD/BRAIN W/O DYE: CPT | Performed by: RADIOLOGY

## 2024-02-08 PROCEDURE — 85379 FIBRIN DEGRADATION QUANT: CPT | Performed by: EMERGENCY MEDICINE

## 2024-02-08 PROCEDURE — 71045 X-RAY EXAM CHEST 1 VIEW: CPT | Performed by: RADIOLOGY

## 2024-02-08 PROCEDURE — 83605 ASSAY OF LACTIC ACID: CPT | Performed by: EMERGENCY MEDICINE

## 2024-02-08 RX ORDER — SODIUM CHLORIDE 9 MG/ML
125 INJECTION, SOLUTION INTRAVENOUS CONTINUOUS
Status: DISCONTINUED | OUTPATIENT
Start: 2024-02-08 | End: 2024-02-08 | Stop reason: HOSPADM

## 2024-02-08 RX ORDER — IPRATROPIUM BROMIDE AND ALBUTEROL SULFATE 2.5; .5 MG/3ML; MG/3ML
3 SOLUTION RESPIRATORY (INHALATION) ONCE
Status: COMPLETED | OUTPATIENT
Start: 2024-02-08 | End: 2024-02-08

## 2024-02-08 RX ORDER — ALBUTEROL SULFATE 0.83 MG/ML
2.5 SOLUTION RESPIRATORY (INHALATION) ONCE
Status: COMPLETED | OUTPATIENT
Start: 2024-02-08 | End: 2024-02-08

## 2024-02-08 RX ORDER — NITROFURANTOIN 25; 75 MG/1; MG/1
100 CAPSULE ORAL 2 TIMES DAILY
Qty: 14 CAPSULE | Refills: 0 | Status: SHIPPED | OUTPATIENT
Start: 2024-02-08 | End: 2024-02-15

## 2024-02-08 RX ORDER — IPRATROPIUM BROMIDE AND ALBUTEROL SULFATE 2.5; .5 MG/3ML; MG/3ML
SOLUTION RESPIRATORY (INHALATION)
Status: COMPLETED
Start: 2024-02-08 | End: 2024-02-08

## 2024-02-08 RX ORDER — PREDNISONE 50 MG/1
50 TABLET ORAL DAILY
Qty: 5 TABLET | Refills: 0 | Status: SHIPPED | OUTPATIENT
Start: 2024-02-08 | End: 2024-02-13

## 2024-02-08 RX ORDER — ALBUTEROL SULFATE 0.83 MG/ML
SOLUTION RESPIRATORY (INHALATION)
Status: COMPLETED
Start: 2024-02-08 | End: 2024-02-08

## 2024-02-08 RX ADMIN — ALBUTEROL SULFATE 2.5 MG: 2.5 SOLUTION RESPIRATORY (INHALATION) at 13:20

## 2024-02-08 RX ADMIN — SODIUM CHLORIDE 125 ML/HR: 9 INJECTION, SOLUTION INTRAVENOUS at 12:08

## 2024-02-08 RX ADMIN — IPRATROPIUM BROMIDE AND ALBUTEROL SULFATE 3 ML: 2.5; .5 SOLUTION RESPIRATORY (INHALATION) at 13:19

## 2024-02-08 RX ADMIN — Medication 2 L/MIN: at 13:20

## 2024-02-08 RX ADMIN — ALBUTEROL SULFATE 2.5 MG: 0.83 SOLUTION RESPIRATORY (INHALATION) at 13:20

## 2024-02-08 RX ADMIN — IOHEXOL 68 ML: 350 INJECTION, SOLUTION INTRAVENOUS at 14:51

## 2024-02-08 ASSESSMENT — COLUMBIA-SUICIDE SEVERITY RATING SCALE - C-SSRS
1. IN THE PAST MONTH, HAVE YOU WISHED YOU WERE DEAD OR WISHED YOU COULD GO TO SLEEP AND NOT WAKE UP?: NO
2. HAVE YOU ACTUALLY HAD ANY THOUGHTS OF KILLING YOURSELF?: NO
6. HAVE YOU EVER DONE ANYTHING, STARTED TO DO ANYTHING, OR PREPARED TO DO ANYTHING TO END YOUR LIFE?: NO

## 2024-02-08 ASSESSMENT — PAIN SCALES - GENERAL: PAINLEVEL_OUTOF10: 0 - NO PAIN

## 2024-02-08 ASSESSMENT — PAIN - FUNCTIONAL ASSESSMENT: PAIN_FUNCTIONAL_ASSESSMENT: 0-10

## 2024-02-08 NOTE — ED PROVIDER NOTES
Generalized weakness, shortness of breath.  This 83-year-old white male presents to the ED with his daughter secondary to increasing shortness of breath and generalized weakness.  Patient started with upper respiratory symptoms last week and has progressed to gotten worse to the point where he is bedridden.  At the beginning of the month he was tested for COVID influenza and strep which was negative.  Patient states that shortness of breath has progressively gotten worse he denies any history of congestive heart failure and denies any swelling of his legs that is new or out of the ordinary.  Daughter states that the patient is not acting his normal self and he normally drives himself around and cannot because of the symptoms.  Patient states that nothing seems to make his symptoms better or worse.      History provided by:  Patient (Daughter)   used: No         Physical Exam  Vitals and nursing note reviewed.   Constitutional:       General: He is awake.      Appearance: Normal appearance. He is morbidly obese.   HENT:      Head: Normocephalic and atraumatic.      Jaw: There is normal jaw occlusion.      Right Ear: Hearing and external ear normal.      Left Ear: Hearing and external ear normal.      Nose: Congestion and rhinorrhea present.      Mouth/Throat:      Lips: Pink.      Mouth: Mucous membranes are moist.      Pharynx: Oropharynx is clear. No oropharyngeal exudate or posterior oropharyngeal erythema.   Eyes:      General: Lids are normal.         Right eye: No discharge.         Left eye: No discharge.      Extraocular Movements: Extraocular movements intact.      Conjunctiva/sclera: Conjunctivae normal.      Pupils: Pupils are equal, round, and reactive to light.   Cardiovascular:      Rate and Rhythm: Normal rate and regular rhythm.      Pulses: Normal pulses.      Heart sounds: Normal heart sounds. No murmur heard.     No friction rub. No gallop.   Pulmonary:      Effort: Pulmonary  effort is normal. No respiratory distress.      Breath sounds: No stridor. Examination of the right-lower field reveals decreased breath sounds. Examination of the left-lower field reveals decreased breath sounds. Decreased breath sounds present. No wheezing, rhonchi or rales.   Chest:      Chest wall: No tenderness.   Abdominal:      General: Abdomen is flat. Bowel sounds are normal. There is no distension.      Palpations: Abdomen is soft. There is no mass.      Tenderness: There is no abdominal tenderness. There is no guarding or rebound.      Hernia: No hernia is present.   Musculoskeletal:         General: No swelling, tenderness, deformity or signs of injury. Normal range of motion.      Cervical back: Full passive range of motion without pain, normal range of motion and neck supple.      Right lower le+ Edema present.      Left lower le+ Edema present.   Skin:     General: Skin is warm and dry.      Capillary Refill: Capillary refill takes less than 2 seconds.      Coloration: Skin is not jaundiced or pale.      Findings: No bruising, erythema, lesion or rash.   Neurological:      General: No focal deficit present.      Mental Status: He is alert and oriented to person, place, and time.      GCS: GCS eye subscore is 4. GCS verbal subscore is 5. GCS motor subscore is 6.      Cranial Nerves: Cranial nerves 2-12 are intact. No cranial nerve deficit.      Sensory: Sensation is intact. No sensory deficit.      Motor: Motor function is intact. No weakness.      Coordination: Coordination is intact. Coordination normal.      Deep Tendon Reflexes: Reflexes normal.   Psychiatric:         Attention and Perception: Attention and perception normal.         Mood and Affect: Mood and affect normal.         Speech: Speech normal.         Behavior: Behavior normal. Behavior is cooperative.         Thought Content: Thought content normal.         Cognition and Memory: Cognition and memory normal.         Judgment:  Judgment normal.          Labs Reviewed   COMPREHENSIVE METABOLIC PANEL - Abnormal       Result Value    Glucose 106 (*)     Sodium 135 (*)     Potassium 4.2      Chloride 105      Bicarbonate 25      Anion Gap 9 (*)     Urea Nitrogen 17      Creatinine 0.92      eGFR 83      Calcium 9.9      Albumin 3.9      Alkaline Phosphatase 76      Total Protein 6.4      AST 14      Bilirubin, Total 1.5 (*)     ALT 15     CBC WITH AUTO DIFFERENTIAL - Abnormal    WBC 8.0      nRBC 0.0      RBC 4.53      Hemoglobin 14.5      Hematocrit 42.8      MCV 95      MCH 32.0      MCHC 33.9      RDW 12.3      Platelets 196      Neutrophils % 76.4      Immature Granulocytes %, Automated 0.4      Lymphocytes % 14.9      Monocytes % 7.2      Eosinophils % 0.8      Basophils % 0.3      Neutrophils Absolute 6.10 (*)     Immature Granulocytes Absolute, Automated 0.03      Lymphocytes Absolute 1.19      Monocytes Absolute 0.57      Eosinophils Absolute 0.06      Basophils Absolute 0.02     URINALYSIS WITH REFLEX CULTURE AND MICROSCOPIC - Abnormal    Color, Urine Rosey (*)     Appearance, Urine Hazy (*)     Specific Gravity, Urine 1.016      pH, Urine 7.0      Protein, Urine NEGATIVE      Glucose, Urine NEGATIVE      Blood, Urine NEGATIVE      Ketones, Urine NEGATIVE      Bilirubin, Urine NEGATIVE      Urobilinogen, Urine <2.0      Nitrite, Urine NEGATIVE      Leukocyte Esterase, Urine MODERATE (2+) (*)    MICROSCOPIC ONLY, URINE - Abnormal    WBC, Urine 21-50 (*)     RBC, Urine 6-10 (*)     Bacteria, Urine 1+ (*)     Mucus, Urine 1+     URINE CULTURE - Normal    Urine Culture No significant growth     LACTATE - Normal    Lactate 1.2      Narrative:     Venipuncture immediately after or during the administration of Metamizole may lead to falsely low results. Testing should be performed immediately  prior to Metamizole dosing.   SARS-COV-2 PCR - Normal    Coronavirus 2019, PCR Not Detected      Narrative:     This assay has received FDA Emergency  Use Authorization (EUA) and is only authorized for the duration of time that circumstances exist to justify the authorization of the emergency use of in vitro diagnostic tests for the detection of SARS-CoV-2 virus and/or diagnosis of COVID-19 infection under section 564(b)(1) of the Act, 21 U.S.C. 360bbb-3(b)(1). This assay is an in vitro diagnostic nucleic acid amplification test for the qualitative detection of SARS-CoV-2 from nasopharyngeal specimens and has been validated for use at [facility]. Negative results do not preclude COVID-19 infections and should not be used as the sole basis for diagnosis, treatment, or other management decisions.     RSV PCR - Normal    RSV PCR Not Detected      Narrative:     This assay is an FDA-cleared, in vitro diagnostic nucleic acid amplification test for the detection of RSV from nasopharyngeal specimens, and has been validated for use at [facility]. Negative results do not preclude RSV infections, and should not be used as the sole basis for diagnosis, treatment, or other management decisions. If Influenza A/B and RSV PCR results are negative, testing for Parainfluenza virus, Adenovirus and Metapneumovirus is routinely performed for pediatric oncology and intensive care inpatients at [facility], and is available on other patients by placing an add-on request.       INFLUENZA A AND B PCR - Normal    Flu A Result Not Detected      Flu B Result Not Detected      Narrative:     This assay is an in vitro diagnostic multiplex nucleic acid amplification test for the detection and discrimination of Influenza A & B from nasopharyngeal specimens, and has been validated for use at [facility]. Negative results do not preclude Influenza A/B infections, and should not be used as the sole basis for diagnosis, treatment, or other management decisions. If Influenza A/B and RSV PCR results are negative, testing for Parainfluenza  virus, Adenovirus and Metapneumovirus is routinely performed for [facility] pediatric oncology and intensive care inpatients, and is available on other patients by placing an add-on request.   D-DIMER, VTE EXCLUSION - Normal    D-Dimer, Quantitative VTE Exclusion 270      Narrative:     The VTE Exclusion D-Dimer assay is reported in ng/mL Fibrinogen Equivalent Units (FEU).    Per 's instructions for use, a value of less than 500 ng/mL (FEU) may help to exclude DVT or PE in outpatients when the assay is used with a clinical pretest probability assessment.([facility] must utilize and document eCalc 'Wells Score Deep Vein Thrombosis Risk' for DVT exclusion only. Emergency Department should utilize  Guidelines for Emergency Department Use of the VTE Exclusion D-Dimer and Clinical Pretest probability assessment model for DVT or PE exclusion.)   SERIAL TROPONIN-INITIAL - Normal    Troponin I, High Sensitivity 14      Narrative:     Less than 99th percentile of normal range cutoff-  Female and children under 18 years old <14 ng/L; Male <21 ng/L: Negative  Repeat testing should be performed if clinically indicated.     Female and children under 18 years old 14-50 ng/L; Male 21-50 ng/L:  Consistent with possible cardiac damage and possible increased clinical   risk. Serial measurements may help to assess extent of myocardial damage.     >50 ng/L: Consistent with cardiac damage, increased clinical risk and  myocardial infarction. Serial measurements may help assess extent of   myocardial damage.      NOTE: Children less than 1 year old may have higher baseline troponin   levels and results should be interpreted in conjunction with the overall   clinical context.     NOTE: Troponin I testing is performed using a different   testing methodology at [facility] than at other   [facility]. Direct result comparisons should only   be made within the same method.   SERIAL TROPONIN, 1 HOUR - Normal    Troponin I,  High Sensitivity 14      Narrative:     Less than 99th percentile of normal range cutoff-  Female and children under 18 years old <14 ng/L; Male <21 ng/L: Negative  Repeat testing should be performed if clinically indicated.     Female and children under 18 years old 14-50 ng/L; Male 21-50 ng/L:  Consistent with possible cardiac damage and possible increased clinical   risk. Serial measurements may help to assess extent of myocardial damage.     >50 ng/L: Consistent with cardiac damage, increased clinical risk and  myocardial infarction. Serial measurements may help assess extent of   myocardial damage.      NOTE: Children less than 1 year old may have higher baseline troponin   levels and results should be interpreted in conjunction with the overall   clinical context.     NOTE: Troponin I testing is performed using a different   testing methodology at [facility] than at other   [facility]. Direct result comparisons should only   be made within the same method.   B-TYPE NATRIURETIC PEPTIDE - Normal    BNP 74      Narrative:        <100 pg/mL - Heart failure unlikely  100-299 pg/mL - Intermediate probability of acute heart                  failure exacerbation. Correlate with clinical                  context and patient history.    >=300 pg/mL - Heart Failure likely. Correlate with clinical                  context and patient history.    BNP testing is performed using different testing methodology at [facility] than at other [facility]. Direct result comparisons should only be made within the same method.      URINALYSIS WITH REFLEX CULTURE AND MICROSCOPIC    Narrative:     The following orders were created for panel order Urinalysis with Reflex Culture and Microscopic.  Procedure                               Abnormality         Status                     ---------                               -----------         ------                     Urinalysis with Reflex  C...[[id number]]  Abnormal            Final result               Extra Urine Gray Tube[[id number]]                            Final result                 Please view results for these tests on the individual orders.   TROPONIN SERIES- (INITIAL, 1 HR)    Narrative:     The following orders were created for panel order Troponin I Series, High Sensitivity (0, 1 HR).  Procedure                               Abnormality         Status                     ---------                               -----------         ------                     Troponin I, High Sensiti...[[id number]]  Normal              Final result               Troponin, High Sensitivi...[[id number]]  Normal              Final result                 Please view results for these tests on the individual orders.   EXTRA URINE GRAY TUBE    Extra Tube Hold for add-ons.          CT head wo IV contrast   Final Result   Mild age related degenerative change as described without acute   findings or significant change from the prior exam.        Signed by: Ronny Evans 2/8/2024 2:58 PM   Dictation workstation:   [id number]      CT angio chest for pulmonary embolism   Final Result   1.  No evidence of pulmonary embolus, aneurysm or dissection.   2.  Additional stable findings as described above.        Signed by: Ronny Evans 2/8/2024 3:09 PM   Dictation workstation:   [id number]      XR chest 1 view   Final Result   No evidence of acute intrathoracic abnormality.        Signed by: rAtur Warner 2/8/2024 12:34 PM   Dictation workstation:   [id number]           Procedures     Medical Decision Making  This patient was seen and evaluated in the ED due to complaints of shortness of breath and generalized weakness.  Patient admits to getting upper respiratory infection last week and has progressively gotten worse to the point where he was almost bedridden.  Patient was concerned to have possible congestive heart failure, pneumonia and/or viral illness.  Patient had a CMP  performed which revealed an elevated bilirubin of 1.5.  White cell count was normal 8 hemoglobin is normal at 14.5.  Urinalysis did reveal evidence of a possible urinary tract infection.  Lactic acid level was normal at 1.2.  Testing for COVID, influenza and RSV were all negative.  D-dimer was normal at 270 troponin was normal at 14.  Repeat troponin was normal at 14.  BNP was normal at 74.  CT scan of the brain revealed no acute abnormality with chronic changes due to age.  CT of the chest was performed reveals no evidence of pulm embolism, aneurysm or dissection no other acute findings noted.  Hand previously performed chest x-ray was negative.  EKG revealed no evidence of ischemia.  I then had a detailed discussion with the patient and the patient's family concerning lab and x-ray results and felt the patient most likely has bronchitis and acute urinary tract infection I did have a discussion with the family concerning the patient going home versus staying in the hospital and they felt comfortable having the patient go home he was subsidy discharged home with a prescription for Macrobid and prednisone patient currently does have a nebulizer machine at home.  Referred patient was referred back to his primary care doctor for follow-up and encouraged the patient to return to the ED if his symptoms worsen.    Amount and/or Complexity of Data Reviewed  ECG/medicine tests: independent interpretation performed.     Details: EKG was interpreted by myself at 11:36 AM.  EKG reveals sinus rhythm with a first-degree AV block block with left axis deviation and right bundle branch block pattern with nonspecific ST-T wave changes.  Heart rate 72 bpm.  The FL interval is long at 228 ms.  The QRS durations 156 ms.  The QTC is 444 ms Axis is -39 degrees.         Diagnoses as of 02/14/24 1100   Bronchitis   Acute cystitis without hematuria                    Jean-Pierre Souza, DO  02/14/24 1100

## 2024-02-09 LAB
BACTERIA UR CULT: NORMAL
HOLD SPECIMEN: NORMAL

## 2024-02-10 LAB
ATRIAL RATE: 72 BPM
P AXIS: 29 DEGREES
P OFFSET: 152 MS
P ONSET: 92 MS
PR INTERVAL: 228 MS
Q ONSET: 206 MS
QRS COUNT: 12 BEATS
QRS DURATION: 156 MS
QT INTERVAL: 406 MS
QTC CALCULATION(BAZETT): 444 MS
QTC FREDERICIA: 431 MS
R AXIS: -39 DEGREES
T AXIS: 54 DEGREES
T OFFSET: 409 MS
VENTRICULAR RATE: 72 BPM

## 2024-02-19 ENCOUNTER — HOSPITAL ENCOUNTER (OUTPATIENT)
Dept: HOSPITAL 100 - CVS | Age: 84
Discharge: HOME | End: 2024-02-19
Payer: MEDICARE

## 2024-02-19 DIAGNOSIS — R55: Primary | ICD-10-CM

## 2024-02-19 DIAGNOSIS — R53.1: ICD-10-CM

## 2024-02-19 DIAGNOSIS — I10: ICD-10-CM

## 2024-02-19 DIAGNOSIS — I63.9: ICD-10-CM

## 2024-02-19 PROCEDURE — 93880 EXTRACRANIAL BILAT STUDY: CPT

## 2024-03-08 ENCOUNTER — OFFICE VISIT (OUTPATIENT)
Dept: PRIMARY CARE | Facility: CLINIC | Age: 84
End: 2024-03-08
Payer: MEDICARE

## 2024-03-08 VITALS
SYSTOLIC BLOOD PRESSURE: 120 MMHG | WEIGHT: 259.6 LBS | DIASTOLIC BLOOD PRESSURE: 70 MMHG | HEART RATE: 83 BPM | HEIGHT: 75 IN | OXYGEN SATURATION: 94 % | BODY MASS INDEX: 32.28 KG/M2

## 2024-03-08 DIAGNOSIS — F41.1 GAD (GENERALIZED ANXIETY DISORDER): ICD-10-CM

## 2024-03-08 PROCEDURE — 1157F ADVNC CARE PLAN IN RCRD: CPT | Performed by: FAMILY MEDICINE

## 2024-03-08 PROCEDURE — 1036F TOBACCO NON-USER: CPT | Performed by: FAMILY MEDICINE

## 2024-03-08 PROCEDURE — 1126F AMNT PAIN NOTED NONE PRSNT: CPT | Performed by: FAMILY MEDICINE

## 2024-03-08 PROCEDURE — 99213 OFFICE O/P EST LOW 20 MIN: CPT | Performed by: FAMILY MEDICINE

## 2024-03-08 PROCEDURE — 1160F RVW MEDS BY RX/DR IN RCRD: CPT | Performed by: FAMILY MEDICINE

## 2024-03-08 PROCEDURE — 1159F MED LIST DOCD IN RCRD: CPT | Performed by: FAMILY MEDICINE

## 2024-03-08 PROCEDURE — 3074F SYST BP LT 130 MM HG: CPT | Performed by: FAMILY MEDICINE

## 2024-03-08 PROCEDURE — 3078F DIAST BP <80 MM HG: CPT | Performed by: FAMILY MEDICINE

## 2024-03-08 RX ORDER — FLUOXETINE HYDROCHLORIDE 20 MG/1
20 CAPSULE ORAL DAILY
Qty: 30 CAPSULE | Refills: 11 | Status: SHIPPED | OUTPATIENT
Start: 2024-03-08 | End: 2025-03-08

## 2024-03-08 ASSESSMENT — ENCOUNTER SYMPTOMS
DIZZINESS: 0
NERVOUS/ANXIOUS: 0
NAUSEA: 0
SHORTNESS OF BREATH: 1
SLEEP DISTURBANCE: 0
DIARRHEA: 0
COUGH: 0
CONSTIPATION: 0
APPETITE CHANGE: 0
FATIGUE: 1
ACTIVITY CHANGE: 0
LIGHT-HEADEDNESS: 0
DYSPHORIC MOOD: 1
CHEST TIGHTNESS: 0
VOMITING: 0
PALPITATIONS: 0
ABDOMINAL PAIN: 0
HEADACHES: 0

## 2024-03-08 NOTE — ASSESSMENT & PLAN NOTE
Increase fluoxetine to 20 mg a day, encouraged to be more active and to try to interact more socially with other people.  Recommended possible counseling as well.

## 2024-03-08 NOTE — PROGRESS NOTES
"Subjective   Patient ID: Marco A Diallo is a 83 y.o. male who presents for Discuss Dep Medication.    HPI   More fatigued and napping more through the day  Seen pulmonologist a week ago, some increase in SOB using albuterol, evaluation normal  To see cardiology in April, has a Loop recorder    Review of Systems   Constitutional:  Positive for fatigue. Negative for activity change and appetite change.   Respiratory:  Positive for shortness of breath. Negative for cough and chest tightness.    Cardiovascular:  Negative for chest pain, palpitations and leg swelling.   Gastrointestinal:  Negative for abdominal pain, constipation, diarrhea, nausea and vomiting.   Neurological:  Negative for dizziness, light-headedness and headaches.   Psychiatric/Behavioral:  Positive for dysphoric mood. Negative for sleep disturbance. The patient is not nervous/anxious.        Objective   /70   Pulse 83   Ht 1.905 m (6' 3\")   Wt 118 kg (259 lb 9.6 oz)   SpO2 94%   BMI 32.45 kg/m²     Physical Exam  Vitals and nursing note reviewed.   Constitutional:       Appearance: Normal appearance.   HENT:      Head: Normocephalic and atraumatic.      Right Ear: Tympanic membrane, ear canal and external ear normal.      Left Ear: Tympanic membrane, ear canal and external ear normal.      Nose: Nose normal.      Mouth/Throat:      Mouth: Mucous membranes are moist.      Pharynx: Oropharynx is clear.   Cardiovascular:      Rate and Rhythm: Normal rate and regular rhythm.      Pulses: Normal pulses.      Heart sounds: Normal heart sounds.   Pulmonary:      Effort: Pulmonary effort is normal.      Breath sounds: Normal breath sounds.   Musculoskeletal:      Cervical back: Normal range of motion and neck supple.   Neurological:      Mental Status: He is alert.   Psychiatric:         Mood and Affect: Mood normal.         Behavior: Behavior normal.         Assessment/Plan   Problem List Items Addressed This Visit             ICD-10-CM    RAVI " (generalized anxiety disorder) F41.1     Increase fluoxetine to 20 mg a day, encouraged to be more active and to try to interact more socially with other people.  Recommended possible counseling as well.         Relevant Medications    FLUoxetine (PROzac) 20 mg capsule

## 2024-03-11 ENCOUNTER — APPOINTMENT (OUTPATIENT)
Dept: CARDIOLOGY | Facility: HOSPITAL | Age: 84
DRG: 287 | End: 2024-03-11
Payer: MEDICARE

## 2024-03-11 ENCOUNTER — APPOINTMENT (OUTPATIENT)
Dept: RADIOLOGY | Facility: HOSPITAL | Age: 84
DRG: 287 | End: 2024-03-11
Payer: MEDICARE

## 2024-03-11 ENCOUNTER — TELEPHONE (OUTPATIENT)
Dept: PRIMARY CARE | Facility: CLINIC | Age: 84
End: 2024-03-11
Payer: MEDICARE

## 2024-03-11 ENCOUNTER — HOSPITAL ENCOUNTER (INPATIENT)
Facility: HOSPITAL | Age: 84
LOS: 4 days | Discharge: HOME | DRG: 287 | End: 2024-03-15
Attending: EMERGENCY MEDICINE | Admitting: PHYSICIAN ASSISTANT
Payer: MEDICARE

## 2024-03-11 DIAGNOSIS — R07.9 CHEST PAIN, UNSPECIFIED TYPE: ICD-10-CM

## 2024-03-11 DIAGNOSIS — R53.1 GENERALIZED WEAKNESS: ICD-10-CM

## 2024-03-11 DIAGNOSIS — R55 NEAR SYNCOPE: Primary | ICD-10-CM

## 2024-03-11 DIAGNOSIS — R07.89 OTHER CHEST PAIN: ICD-10-CM

## 2024-03-11 LAB
ANION GAP SERPL CALC-SCNC: 10 MMOL/L (ref 10–20)
APPEARANCE UR: CLEAR
BASOPHILS # BLD AUTO: 0.02 X10*3/UL (ref 0–0.1)
BASOPHILS NFR BLD AUTO: 0.3 %
BILIRUB UR STRIP.AUTO-MCNC: NEGATIVE MG/DL
BNP SERPL-MCNC: 52 PG/ML (ref 0–99)
BUN SERPL-MCNC: 20 MG/DL (ref 6–23)
CALCIUM SERPL-MCNC: 9.4 MG/DL (ref 8.6–10.3)
CARDIAC TROPONIN I PNL SERPL HS: 16 NG/L (ref 0–20)
CHLORIDE SERPL-SCNC: 105 MMOL/L (ref 98–107)
CO2 SERPL-SCNC: 24 MMOL/L (ref 21–32)
COLOR UR: YELLOW
CREAT SERPL-MCNC: 0.88 MG/DL (ref 0.5–1.3)
EGFRCR SERPLBLD CKD-EPI 2021: 86 ML/MIN/1.73M*2
EOSINOPHIL # BLD AUTO: 0.23 X10*3/UL (ref 0–0.4)
EOSINOPHIL NFR BLD AUTO: 4 %
ERYTHROCYTE [DISTWIDTH] IN BLOOD BY AUTOMATED COUNT: 12.2 % (ref 11.5–14.5)
FLUAV RNA RESP QL NAA+PROBE: NOT DETECTED
FLUBV RNA RESP QL NAA+PROBE: NOT DETECTED
GLUCOSE SERPL-MCNC: 142 MG/DL (ref 74–99)
GLUCOSE UR STRIP.AUTO-MCNC: NEGATIVE MG/DL
HCT VFR BLD AUTO: 38.1 % (ref 41–52)
HGB BLD-MCNC: 13 G/DL (ref 13.5–17.5)
HOLD SPECIMEN: NORMAL
HYALINE CASTS #/AREA URNS AUTO: ABNORMAL /LPF
IMM GRANULOCYTES # BLD AUTO: 0.02 X10*3/UL (ref 0–0.5)
IMM GRANULOCYTES NFR BLD AUTO: 0.3 % (ref 0–0.9)
KETONES UR STRIP.AUTO-MCNC: NEGATIVE MG/DL
LEUKOCYTE ESTERASE UR QL STRIP.AUTO: ABNORMAL
LYMPHOCYTES # BLD AUTO: 1.3 X10*3/UL (ref 0.8–3)
LYMPHOCYTES NFR BLD AUTO: 22.5 %
MCH RBC QN AUTO: 32 PG (ref 26–34)
MCHC RBC AUTO-ENTMCNC: 34.1 G/DL (ref 32–36)
MCV RBC AUTO: 94 FL (ref 80–100)
MONOCYTES # BLD AUTO: 0.57 X10*3/UL (ref 0.05–0.8)
MONOCYTES NFR BLD AUTO: 9.9 %
MUCOUS THREADS #/AREA URNS AUTO: ABNORMAL /LPF
NEUTROPHILS # BLD AUTO: 3.64 X10*3/UL (ref 1.6–5.5)
NEUTROPHILS NFR BLD AUTO: 63 %
NITRITE UR QL STRIP.AUTO: NEGATIVE
NRBC BLD-RTO: 0 /100 WBCS (ref 0–0)
PH UR STRIP.AUTO: 6 [PH]
PLATELET # BLD AUTO: 172 X10*3/UL (ref 150–450)
POTASSIUM SERPL-SCNC: 3.9 MMOL/L (ref 3.5–5.3)
PROT UR STRIP.AUTO-MCNC: NEGATIVE MG/DL
RBC # BLD AUTO: 4.06 X10*6/UL (ref 4.5–5.9)
RBC # UR STRIP.AUTO: NEGATIVE /UL
RBC #/AREA URNS AUTO: ABNORMAL /HPF
SARS-COV-2 RNA RESP QL NAA+PROBE: NOT DETECTED
SODIUM SERPL-SCNC: 135 MMOL/L (ref 136–145)
SP GR UR STRIP.AUTO: 1.01
UROBILINOGEN UR STRIP.AUTO-MCNC: <2 MG/DL
WBC # BLD AUTO: 5.8 X10*3/UL (ref 4.4–11.3)
WBC #/AREA URNS AUTO: ABNORMAL /HPF
YEAST BUDDING #/AREA UR COMP ASSIST: PRESENT /HPF

## 2024-03-11 PROCEDURE — 99222 1ST HOSP IP/OBS MODERATE 55: CPT | Performed by: STUDENT IN AN ORGANIZED HEALTH CARE EDUCATION/TRAINING PROGRAM

## 2024-03-11 PROCEDURE — 36415 COLL VENOUS BLD VENIPUNCTURE: CPT | Performed by: EMERGENCY MEDICINE

## 2024-03-11 PROCEDURE — 87086 URINE CULTURE/COLONY COUNT: CPT | Mod: SAMLAB | Performed by: EMERGENCY MEDICINE

## 2024-03-11 PROCEDURE — 80048 BASIC METABOLIC PNL TOTAL CA: CPT | Performed by: EMERGENCY MEDICINE

## 2024-03-11 PROCEDURE — 94761 N-INVAS EAR/PLS OXIMETRY MLT: CPT | Mod: MUE

## 2024-03-11 PROCEDURE — 1200000002 HC GENERAL ROOM WITH TELEMETRY DAILY

## 2024-03-11 PROCEDURE — 83880 ASSAY OF NATRIURETIC PEPTIDE: CPT | Performed by: EMERGENCY MEDICINE

## 2024-03-11 PROCEDURE — 2500000005 HC RX 250 GENERAL PHARMACY W/O HCPCS: Performed by: PHYSICIAN ASSISTANT

## 2024-03-11 PROCEDURE — 96360 HYDRATION IV INFUSION INIT: CPT

## 2024-03-11 PROCEDURE — 93005 ELECTROCARDIOGRAM TRACING: CPT

## 2024-03-11 PROCEDURE — 2500000002 HC RX 250 W HCPCS SELF ADMINISTERED DRUGS (ALT 637 FOR MEDICARE OP, ALT 636 FOR OP/ED): Mod: MUE | Performed by: PHYSICIAN ASSISTANT

## 2024-03-11 PROCEDURE — 94664 DEMO&/EVAL PT USE INHALER: CPT | Mod: MUE

## 2024-03-11 PROCEDURE — 99285 EMERGENCY DEPT VISIT HI MDM: CPT | Mod: 25

## 2024-03-11 PROCEDURE — 81001 URINALYSIS AUTO W/SCOPE: CPT | Performed by: EMERGENCY MEDICINE

## 2024-03-11 PROCEDURE — 71045 X-RAY EXAM CHEST 1 VIEW: CPT

## 2024-03-11 PROCEDURE — 2500000001 HC RX 250 WO HCPCS SELF ADMINISTERED DRUGS (ALT 637 FOR MEDICARE OP): Performed by: INTERNAL MEDICINE

## 2024-03-11 PROCEDURE — 2500000001 HC RX 250 WO HCPCS SELF ADMINISTERED DRUGS (ALT 637 FOR MEDICARE OP): Performed by: PHYSICIAN ASSISTANT

## 2024-03-11 PROCEDURE — 2500000004 HC RX 250 GENERAL PHARMACY W/ HCPCS (ALT 636 FOR OP/ED): Performed by: EMERGENCY MEDICINE

## 2024-03-11 PROCEDURE — 97112 NEUROMUSCULAR REEDUCATION: CPT | Mod: GP

## 2024-03-11 PROCEDURE — 71045 X-RAY EXAM CHEST 1 VIEW: CPT | Performed by: RADIOLOGY

## 2024-03-11 PROCEDURE — 94640 AIRWAY INHALATION TREATMENT: CPT | Mod: MUE

## 2024-03-11 PROCEDURE — 70450 CT HEAD/BRAIN W/O DYE: CPT

## 2024-03-11 PROCEDURE — 99222 1ST HOSP IP/OBS MODERATE 55: CPT | Performed by: PHYSICIAN ASSISTANT

## 2024-03-11 PROCEDURE — 85025 COMPLETE CBC W/AUTO DIFF WBC: CPT | Performed by: EMERGENCY MEDICINE

## 2024-03-11 PROCEDURE — 9420000001 HC RT PATIENT EDUCATION 5 MIN

## 2024-03-11 PROCEDURE — 84484 ASSAY OF TROPONIN QUANT: CPT | Performed by: EMERGENCY MEDICINE

## 2024-03-11 PROCEDURE — 70450 CT HEAD/BRAIN W/O DYE: CPT | Performed by: RADIOLOGY

## 2024-03-11 PROCEDURE — 2500000004 HC RX 250 GENERAL PHARMACY W/ HCPCS (ALT 636 FOR OP/ED): Performed by: PHYSICIAN ASSISTANT

## 2024-03-11 PROCEDURE — 97161 PT EVAL LOW COMPLEX 20 MIN: CPT | Mod: GP

## 2024-03-11 PROCEDURE — 87636 SARSCOV2 & INF A&B AMP PRB: CPT | Performed by: EMERGENCY MEDICINE

## 2024-03-11 PROCEDURE — 97165 OT EVAL LOW COMPLEX 30 MIN: CPT | Mod: GO

## 2024-03-11 RX ORDER — ACETAMINOPHEN 325 MG/1
650 TABLET ORAL ONCE
Status: COMPLETED | OUTPATIENT
Start: 2024-03-11 | End: 2024-03-11

## 2024-03-11 RX ORDER — BISMUTH SUBSALICYLATE 262 MG
1 TABLET,CHEWABLE ORAL DAILY
Status: DISCONTINUED | OUTPATIENT
Start: 2024-03-11 | End: 2024-03-11

## 2024-03-11 RX ORDER — ALBUTEROL SULFATE 0.83 MG/ML
2.5 SOLUTION RESPIRATORY (INHALATION) EVERY 6 HOURS PRN
Status: DISCONTINUED | OUTPATIENT
Start: 2024-03-11 | End: 2024-03-15 | Stop reason: HOSPADM

## 2024-03-11 RX ORDER — FLUOXETINE HYDROCHLORIDE 20 MG/1
20 CAPSULE ORAL DAILY
Status: DISCONTINUED | OUTPATIENT
Start: 2024-03-11 | End: 2024-03-15 | Stop reason: HOSPADM

## 2024-03-11 RX ORDER — MULTIVIT-MIN/IRON FUM/FOLIC AC 7.5 MG-4
1 TABLET ORAL DAILY
Status: DISCONTINUED | OUTPATIENT
Start: 2024-03-11 | End: 2024-03-15 | Stop reason: HOSPADM

## 2024-03-11 RX ORDER — SIMVASTATIN 20 MG/1
40 TABLET, FILM COATED ORAL DAILY
Status: DISCONTINUED | OUTPATIENT
Start: 2024-03-11 | End: 2024-03-15 | Stop reason: HOSPADM

## 2024-03-11 RX ORDER — ACETAMINOPHEN 325 MG/1
325 TABLET ORAL EVERY 6 HOURS PRN
Status: DISCONTINUED | OUTPATIENT
Start: 2024-03-11 | End: 2024-03-11

## 2024-03-11 RX ORDER — ACETAMINOPHEN 325 MG/1
650 TABLET ORAL EVERY 6 HOURS PRN
Status: DISCONTINUED | OUTPATIENT
Start: 2024-03-11 | End: 2024-03-15 | Stop reason: HOSPADM

## 2024-03-11 RX ORDER — ALBUTEROL SULFATE 90 UG/1
2 AEROSOL, METERED RESPIRATORY (INHALATION) EVERY 4 HOURS PRN
Status: DISCONTINUED | OUTPATIENT
Start: 2024-03-11 | End: 2024-03-11

## 2024-03-11 RX ORDER — FINASTERIDE 5 MG/1
5 TABLET, FILM COATED ORAL DAILY
Status: DISCONTINUED | OUTPATIENT
Start: 2024-03-11 | End: 2024-03-15 | Stop reason: HOSPADM

## 2024-03-11 RX ORDER — MONTELUKAST SODIUM 10 MG/1
10 TABLET ORAL NIGHTLY
Status: DISCONTINUED | OUTPATIENT
Start: 2024-03-11 | End: 2024-03-15 | Stop reason: HOSPADM

## 2024-03-11 RX ORDER — METOPROLOL TARTRATE 25 MG/1
12.5 TABLET, FILM COATED ORAL 2 TIMES DAILY
Status: DISCONTINUED | OUTPATIENT
Start: 2024-03-11 | End: 2024-03-15 | Stop reason: HOSPADM

## 2024-03-11 RX ORDER — LOSARTAN POTASSIUM 50 MG/1
50 TABLET ORAL DAILY
Status: DISCONTINUED | OUTPATIENT
Start: 2024-03-11 | End: 2024-03-15 | Stop reason: HOSPADM

## 2024-03-11 RX ORDER — BUDESONIDE 0.5 MG/2ML
0.5 INHALANT ORAL
Status: DISCONTINUED | OUTPATIENT
Start: 2024-03-11 | End: 2024-03-15 | Stop reason: HOSPADM

## 2024-03-11 RX ORDER — LANOLIN ALCOHOL/MO/W.PET/CERES
400 CREAM (GRAM) TOPICAL DAILY
Status: DISCONTINUED | OUTPATIENT
Start: 2024-03-11 | End: 2024-03-15 | Stop reason: HOSPADM

## 2024-03-11 RX ORDER — TAMSULOSIN HYDROCHLORIDE 0.4 MG/1
0.4 CAPSULE ORAL DAILY
Status: DISCONTINUED | OUTPATIENT
Start: 2024-03-11 | End: 2024-03-15 | Stop reason: HOSPADM

## 2024-03-11 RX ORDER — PANTOPRAZOLE SODIUM 20 MG/1
20 TABLET, DELAYED RELEASE ORAL
Status: DISCONTINUED | OUTPATIENT
Start: 2024-03-12 | End: 2024-03-15 | Stop reason: HOSPADM

## 2024-03-11 RX ADMIN — Medication 3 L/MIN: at 21:00

## 2024-03-11 RX ADMIN — MULTIPLE VITAMINS W/ MINERALS TAB 1 TABLET: TAB at 10:54

## 2024-03-11 RX ADMIN — SIMVASTATIN 40 MG: 20 TABLET, FILM COATED ORAL at 10:55

## 2024-03-11 RX ADMIN — SODIUM CHLORIDE 1000 ML: 9 INJECTION, SOLUTION INTRAVENOUS at 04:25

## 2024-03-11 RX ADMIN — ALBUTEROL SULFATE 2.5 MG: 2.5 SOLUTION RESPIRATORY (INHALATION) at 23:20

## 2024-03-11 RX ADMIN — METOPROLOL TARTRATE 12.5 MG: 25 TABLET, FILM COATED ORAL at 10:55

## 2024-03-11 RX ADMIN — APIXABAN 5 MG: 5 TABLET, FILM COATED ORAL at 20:25

## 2024-03-11 RX ADMIN — APIXABAN 5 MG: 5 TABLET, FILM COATED ORAL at 10:54

## 2024-03-11 RX ADMIN — POLYVINYL ALCOHOL, POVIDONE 1 DROP: 14; 6 SOLUTION/ DROPS OPHTHALMIC at 20:25

## 2024-03-11 RX ADMIN — MONTELUKAST 10 MG: 10 TABLET, FILM COATED ORAL at 20:25

## 2024-03-11 RX ADMIN — ALBUTEROL SULFATE 2.5 MG: 2.5 SOLUTION RESPIRATORY (INHALATION) at 14:25

## 2024-03-11 RX ADMIN — TAMSULOSIN HYDROCHLORIDE 0.4 MG: 0.4 CAPSULE ORAL at 10:55

## 2024-03-11 RX ADMIN — BUDESONIDE 0.5 MG: 0.5 INHALANT RESPIRATORY (INHALATION) at 18:37

## 2024-03-11 RX ADMIN — ACETAMINOPHEN 650 MG: 325 TABLET ORAL at 16:51

## 2024-03-11 RX ADMIN — FLUOXETINE 20 MG: 20 CAPSULE ORAL at 10:54

## 2024-03-11 RX ADMIN — FINASTERIDE 5 MG: 5 TABLET, FILM COATED ORAL at 10:55

## 2024-03-11 RX ADMIN — Medication 400 MG: at 10:54

## 2024-03-11 RX ADMIN — ALBUTEROL SULFATE 2.5 MG: 2.5 SOLUTION RESPIRATORY (INHALATION) at 18:29

## 2024-03-11 RX ADMIN — LOSARTAN POTASSIUM 50 MG: 50 TABLET, FILM COATED ORAL at 10:55

## 2024-03-11 RX ADMIN — ACETAMINOPHEN 650 MG: 325 TABLET ORAL at 07:31

## 2024-03-11 SDOH — SOCIAL STABILITY: SOCIAL INSECURITY: ABUSE: ADULT

## 2024-03-11 SDOH — SOCIAL STABILITY: SOCIAL INSECURITY: DO YOU FEEL ANYONE HAS EXPLOITED OR TAKEN ADVANTAGE OF YOU FINANCIALLY OR OF YOUR PERSONAL PROPERTY?: NO

## 2024-03-11 SDOH — SOCIAL STABILITY: SOCIAL INSECURITY: ARE THERE ANY APPARENT SIGNS OF INJURIES/BEHAVIORS THAT COULD BE RELATED TO ABUSE/NEGLECT?: NO

## 2024-03-11 SDOH — SOCIAL STABILITY: SOCIAL INSECURITY: HAS ANYONE EVER THREATENED TO HURT YOUR FAMILY OR YOUR PETS?: NO

## 2024-03-11 SDOH — SOCIAL STABILITY: SOCIAL INSECURITY: ARE YOU OR HAVE YOU BEEN THREATENED OR ABUSED PHYSICALLY, EMOTIONALLY, OR SEXUALLY BY ANYONE?: NO

## 2024-03-11 SDOH — SOCIAL STABILITY: SOCIAL INSECURITY: WERE YOU ABLE TO COMPLETE ALL THE BEHAVIORAL HEALTH SCREENINGS?: YES

## 2024-03-11 SDOH — SOCIAL STABILITY: SOCIAL INSECURITY: DOES ANYONE TRY TO KEEP YOU FROM HAVING/CONTACTING OTHER FRIENDS OR DOING THINGS OUTSIDE YOUR HOME?: NO

## 2024-03-11 SDOH — SOCIAL STABILITY: SOCIAL INSECURITY: DO YOU FEEL UNSAFE GOING BACK TO THE PLACE WHERE YOU ARE LIVING?: NO

## 2024-03-11 SDOH — SOCIAL STABILITY: SOCIAL INSECURITY: HAVE YOU HAD THOUGHTS OF HARMING ANYONE ELSE?: NO

## 2024-03-11 ASSESSMENT — ACTIVITIES OF DAILY LIVING (ADL)
DRESSING YOURSELF: INDEPENDENT
FEEDING YOURSELF: INDEPENDENT
LACK_OF_TRANSPORTATION: NO
ADEQUATE_TO_COMPLETE_ADL: YES
BATHING_ASSISTANCE: MODERATE
TOILETING: INDEPENDENT
GROOMING: INDEPENDENT
LACK_OF_TRANSPORTATION: NO
BATHING: INDEPENDENT
ASSISTIVE_DEVICE: EYEGLASSES;WALKER
PATIENT'S MEMORY ADEQUATE TO SAFELY COMPLETE DAILY ACTIVITIES?: YES
WALKS IN HOME: INDEPENDENT
HEARING - LEFT EAR: DIFFICULTY WITH NOISE
JUDGMENT_ADEQUATE_SAFELY_COMPLETE_DAILY_ACTIVITIES: YES
HEARING - RIGHT EAR: DIFFICULTY WITH NOISE

## 2024-03-11 ASSESSMENT — COGNITIVE AND FUNCTIONAL STATUS - GENERAL
DAILY ACTIVITIY SCORE: 19
MOVING FROM LYING ON BACK TO SITTING ON SIDE OF FLAT BED WITH BEDRAILS: A LITTLE
HELP NEEDED FOR BATHING: A LITTLE
STANDING UP FROM CHAIR USING ARMS: A LITTLE
STANDING UP FROM CHAIR USING ARMS: A LITTLE
TURNING FROM BACK TO SIDE WHILE IN FLAT BAD: A LITTLE
TOILETING: A LITTLE
DRESSING REGULAR LOWER BODY CLOTHING: A LITTLE
MOBILITY SCORE: 21
PERSONAL GROOMING: A LITTLE
MOVING TO AND FROM BED TO CHAIR: A LITTLE
CLIMB 3 TO 5 STEPS WITH RAILING: A LITTLE
DAILY ACTIVITIY SCORE: 24
CLIMB 3 TO 5 STEPS WITH RAILING: A LOT
DRESSING REGULAR UPPER BODY CLOTHING: A LITTLE
PATIENT BASELINE BEDBOUND: NO
WALKING IN HOSPITAL ROOM: A LITTLE
MOBILITY SCORE: 17
WALKING IN HOSPITAL ROOM: A LITTLE

## 2024-03-11 ASSESSMENT — PAIN - FUNCTIONAL ASSESSMENT
PAIN_FUNCTIONAL_ASSESSMENT: 0-10

## 2024-03-11 ASSESSMENT — ENCOUNTER SYMPTOMS
SHORTNESS OF BREATH: 0
ACTIVITY CHANGE: 1
DIZZINESS: 1
CHEST TIGHTNESS: 0
FATIGUE: 1
WEAKNESS: 1

## 2024-03-11 ASSESSMENT — PAIN SCALES - GENERAL
PAINLEVEL_OUTOF10: 3
PAINLEVEL_OUTOF10: 2
PAINLEVEL_OUTOF10: 0 - NO PAIN
PAINLEVEL_OUTOF10: 0 - NO PAIN
PAINLEVEL_OUTOF10: 10 - WORST POSSIBLE PAIN
PAINLEVEL_OUTOF10: 7
PAINLEVEL_OUTOF10: 0 - NO PAIN
PAINLEVEL_OUTOF10: 4

## 2024-03-11 ASSESSMENT — PAIN DESCRIPTION - LOCATION
LOCATION: HEAD
LOCATION: HEAD

## 2024-03-11 ASSESSMENT — PATIENT HEALTH QUESTIONNAIRE - PHQ9
SUM OF ALL RESPONSES TO PHQ9 QUESTIONS 1 & 2: 2
1. LITTLE INTEREST OR PLEASURE IN DOING THINGS: SEVERAL DAYS
2. FEELING DOWN, DEPRESSED OR HOPELESS: SEVERAL DAYS

## 2024-03-11 ASSESSMENT — PAIN DESCRIPTION - PAIN TYPE: TYPE: ACUTE PAIN

## 2024-03-11 ASSESSMENT — PAIN DESCRIPTION - DESCRIPTORS
DESCRIPTORS: PRESSURE
DESCRIPTORS: ACHING

## 2024-03-11 ASSESSMENT — LIFESTYLE VARIABLES
HOW OFTEN DO YOU HAVE A DRINK CONTAINING ALCOHOL: NEVER
SKIP TO QUESTIONS 9-10: 1
HOW MANY STANDARD DRINKS CONTAINING ALCOHOL DO YOU HAVE ON A TYPICAL DAY: PATIENT DOES NOT DRINK
HOW OFTEN DO YOU HAVE 6 OR MORE DRINKS ON ONE OCCASION: NEVER
AUDIT-C TOTAL SCORE: 0
AUDIT-C TOTAL SCORE: 0

## 2024-03-11 NOTE — PROGRESS NOTES
03/11/24 1050   Discharge Planning   Living Arrangements Alone   Support Systems Children   Assistance Needed None   Type of Residence Private residence   Number of Stairs to Enter Residence 3  (also has a ramp)   Number of Stairs Within Residence 0   Do you have animals or pets at home? Yes   Type of Animals or Pets 4 cats inside and 4 cats outside   Who is requesting discharge planning? Provider   Home or Post Acute Services None   Patient expects to be discharged to: Home   Does the patient need discharge transport arranged? No   Financial Resource Strain   How hard is it for you to pay for the very basics like food, housing, medical care, and heating? Not hard   Housing Stability   In the last 12 months, was there a time when you were not able to pay the mortgage or rent on time? N   In the last 12 months, how many places have you lived? 1   In the last 12 months, was there a time when you did not have a steady place to sleep or slept in a shelter (including now)? N   Transportation Needs   In the past 12 months, has lack of transportation kept you from medical appointments or from getting medications? no   In the past 12 months, has lack of transportation kept you from meetings, work, or from getting things needed for daily living? No   Patient Choice   Provider Choice list and CMS website (https://medicare.gov/care-compare#search) for post-acute Quality and Resource Measure Data were provided and reviewed with: Other (Comment)  (NA)     Met with pt at the bedside and verified address, phone number and emergency contact information. PCP is  Dr Beckwith last seen this March and pharmacy is [facility] for routine. Pt is independent, lives alone and feels safe. Pt uses a rollator/walker, has a loop recorder and uses a C-pap with 3L oxygen supplied by [employer]. Pt plans to return home no new needs. CT to follow. Theodora Frazier BSN/RN-TCC

## 2024-03-11 NOTE — CONSULTS
Inpatient consult to Cardiology  Consult performed by: James Costa MD  Consult ordered by: Mery Covington PA-C  Reason for consult: near syncope      History Of Present Illness:    Mr. Marco A Diallo is a 83 y.o. non-smoker male being consulted by the Cardiology team for near syncope. Patient with past medical history significant for hypertension, allergies, BPH, depression, hyperlipidemia, reactive airway disease, pulmonary fibrosis, peripheral arterial disease, asthma, osteoarthritis, vertigo, diverticulitis with perforation, has a loop recorder that was placed in January, CVA, prediabetes, GERD, history of DVT on DOAC, patent foramen ovale, obstructive sleep apnea with CPAP, anxiety. He presented to ED [facility] on 03/11/2024 complaining of 2 episodes of near syncope. He states that around 2am, upon getting up to urinate, he felt dizzy and almost passed out, but he did not lose consciousness. He denies chest pain, shortness of breath, palpitations, leg edema, lightheadedness, headaches, fever, chills, orthopnea or paroxysmal nocturnal dyspnea. Vital signs on presentation to the ER temperature 98.1, pulse ox 89%, pulse 64, blood pressure 140/76.  Oxygen then was started and is now 93%.  Blood work was insignificant with a normal white count, hemoglobin, hematocrit, normal troponin, negative UA, normal BNP, negative for flu AB and COVID, CT scan of the head showed nonspecific scattered white matter hypodensities favored to represent sequela of small vessel ischemia but no acute intracranial hemorrhage mass effect or midline shift.  EKG showed sinus rhythm with RBBB and LAFB, no signs of ACS.  Chest x-ray was negative for pneumonia.  Patient was admitted for clinical compensation.     Last Recorded Vitals:  Vitals:    03/11/24 1135 03/11/24 1144 03/11/24 1420 03/11/24 1426   BP:   117/63    BP Location:   Right arm    Patient Position:   Lying    Pulse:   52    Resp:   16    Temp:   36.7 °C (98 °F)   "  TempSrc:   Temporal    SpO2: 94% 94% 94% 94%   Weight:       Height:           Last Labs:  CBC - 3/11/2024:  4:40 AM  5.8 13.0 172    38.1      CMP - 3/11/2024:  4:40 AM  9.4 6.4 14 --- 1.5   _ 3.9 15 76      PTT - No results in last year.  _   _ _     Troponin I, High Sensitivity   Date/Time Value Ref Range Status   03/11/2024 04:40 AM 16 0 - 20 ng/L Final   02/08/2024 01:24 PM 14 0 - 20 ng/L Final   02/08/2024 11:58 AM 14 0 - 20 ng/L Final     BNP   Date/Time Value Ref Range Status   03/11/2024 04:40 AM 52 0 - 99 pg/mL Final   02/08/2024 11:58 AM 74 0 - 99 pg/mL Final     Hemoglobin A1C External   Date/Time Value Ref Range Status   12/18/2023 12:00 AM 6.5 % Final     Comment:     [facility]     Hemoglobin A1C   Date/Time Value Ref Range Status   12/15/2023 09:41 AM 6.5 (H) 4.0 - 5.6 % Final   04/10/2023 09:35 AM 6.2 (H) 4.0 - 5.6 % Final     LDL Calculated external   Date/Time Value Ref Range Status   12/15/2023 12:00 AM 35 mg/dL Final     VLDL   Date/Time Value Ref Range Status   08/12/2022 10:00 AM 24 0 - 40 mg/dL Final      Last I/O:  I/O last 3 completed shifts:  In: 1000 (8.8 mL/kg) [IV Piggyback:1000]  Out: - (0 mL/kg)   Weight: 113.4 kg     Past Cardiology Tests (Last 3 Years):  EKG:  ECG 12 lead 03/11/2024 (Preliminary)      ECG 12 Lead 02/08/2024      ECG 12 Lead 01/10/2024    Echo:  Transthoracic echo (TTE) complete     Ejection Fractions:  No results found for: \"EF\"  Cath:  No results found for this or any previous visit from the past 1095 days.    Stress Test:  No results found for this or any previous visit from the past 1095 days.    Cardiac Imaging:  XR CHEST ABDOMEN FOR OG NG PLACEMENT 08/18/2021      Past Medical History:  He has a past medical history of Acute pulmonary embolism without acute cor pulmonale, unspecified pulmonary embolism type (CMS/HCC) (04/12/2023), Localized enlarged lymph nodes, Other nonspecific abnormal finding of lung field, Personal history of other diseases of the " respiratory system, Personal history of other specified conditions, and Personal history of pulmonary embolism.    Past Surgical History:  He has a past surgical history that includes Other surgical history (12/04/2018); Other surgical history (12/04/2018); Other surgical history (12/04/2018); Other surgical history (12/04/2018); Other surgical history (12/04/2018); Other surgical history (12/04/2018); Other surgical history (12/04/2018); Other surgical history (05/06/2022); Other surgical history (09/13/2021); Other surgical history (04/20/2021); Other surgical history (10/24/2019); MR angio head wo IV contrast (08/12/2021); Hernia repair (01/09/2023); Cataract extraction (Left, 04/06/2023); and Cataract extraction (Right, 07/06/2023).      Social History:  He reports that he has never smoked. He has been exposed to tobacco smoke. He has never used smokeless tobacco. Drug use questions deferred to the physician. He reports that he does not drink alcohol.    Family History:  Family History   Problem Relation Name Age of Onset    Heart failure Mother      Parkinsonism Mother      Coronary artery disease Father      Heart failure Father      Breast cancer Daughter      Other (hysterectomy) Daughter      Diabetes Other grandfather         Allergies:  Patient has no known allergies.    Inpatient Medications:  Scheduled medications   Medication Dose Route Frequency    apixaban  5 mg oral BID    budesonide  0.5 mg nebulization BID    finasteride  5 mg oral Daily    FLUoxetine  20 mg oral Daily    losartan  50 mg oral Daily    magnesium oxide  400 mg oral Daily    [Held by provider] metoprolol tartrate  12.5 mg oral BID    montelukast  10 mg oral Nightly    multivitamin with minerals  1 tablet oral Daily    oxygen  3 L/min inhalation Nightly    [START ON 3/12/2024] pantoprazole  20 mg oral Daily before breakfast    [Held by provider] simvastatin  40 mg oral Daily    tamsulosin  0.4 mg oral Daily     PRN medications    Medication    acetaminophen    albuterol    lubricating eye drops    oxygen     Continuous Medications   Medication Dose Last Rate     Outpatient Medications:  Current Outpatient Medications   Medication Instructions    acetaminophen (TYLENOL) 325 mg, oral, Every 6 hours PRN    albuterol 90 mcg/actuation inhaler 2 puffs, inhalation, Every 4 hours PRN    apixaban (ELIQUIS) 5 mg, oral, 2 times daily    ascorbic acid (Vitamin C) 500 mg tablet 1 tablet, oral, Daily    b complex 0.4 mg tablet 1 tablet, oral, Daily    Bacillus coagulans-inulin 1 billion-250 cell-mg capsule 1 capsule, oral, Daily    blood sugar diagnostic (True Metrix Glucose Test Strip) strip 120 strips, miscellaneous, 4 times daily    blood-glucose meter (True Metrix Glucose Meter) misc TEST TWICE DAILY    budesonide-formoteroL (Symbicort) 160-4.5 mcg/actuation inhaler 2 puffs, inhalation, 2 times daily    CALCIUM ORAL 1 capsule, oral, 2 times daily    cephalexin (KEFLEX) 500 mg, oral, 2 times daily    cholecalciferol (Vitamin D-3) 25 MCG (1000 UT) tablet 1 tablet, oral, Daily    finasteride (PROSCAR) 5 mg, oral, Daily    FLUoxetine (PROZAC) 20 mg, oral, Daily    fluticasone (Flonase) 50 mcg/actuation nasal spray 2 sprays, Each Nostril, Daily    GARLIC ORAL 1 capsule, oral, Daily    losartan (COZAAR) 50 mg, oral, Daily    lubricating eye drops ophthalmic solution 1 drop, Both Eyes, As needed    magnesium oxide (Mag-Ox) 400 mg (241.3 mg magnesium) tablet 1 tablet, oral, Daily    metoprolol tartrate (Lopressor) 25 mg tablet take 1/2 tablet by mouth twice a day    montelukast (SINGULAIR) 10 mg, oral, Nightly    multivitamin (MULTIPLE VITAMINS ORAL) 1 tablet, oral, Daily    omeprazole (PRILOSEC) 40 mg, oral, Daily    oxygen (O2) gas therapy Inhale 3 L/min once daily at bedtime.    psyllium husk, with sugar, (Metamucil Fiber Thin) 2.5 gram wafer wafer oral    simvastatin (ZOCOR) 40 mg, oral, Daily    tamsulosin (Flomax) 0.4 mg 24 hr capsule 1 capsule, oral,  Daily    ubidecarenone (coenzyme Q10) 100 mg tablet 1 tablet, oral, Daily       Physical Exam:  General: alert, oriented and in no acute distress  HEENT: NC/AT; EOMI; PERRLA, external ear is normal  Neck: supple; trachea midline; no masses; no JVD  Chest: clear breath sounds bilaterally; no wheezing  Cardio: regular rhythm, S1S2 normal, no murmurs  Abdomen: Soft, non-tender, non-distension, no organomegaly  Extremities: no clubbing/cyanosis/edema  Neuro: Grossly intact     Psychiatric: Normal mood and affect      Assessment/Plan     Mr. Marco A Diallo is a 83 y.o. non-smoker male being consulted by the Cardiology team for near syncope. Patient with past medical history significant for hypertension, allergies, BPH, depression, hyperlipidemia, reactive airway disease, pulmonary fibrosis, peripheral arterial disease, asthma, osteoarthritis, vertigo, diverticulitis with perforation, has a loop recorder that was placed in January, CVA, prediabetes, GERD, history of DVT, patent foramen ovale, obstructive sleep apnea with CPAP, anxiety. He presented to ED [facility] on 03/11/2024 complaining of 2 episodes of near syncope. He states that around 2am, upon getting up to urinate, he felt dizzy and almost passed out, but he did not lose consciousness. He denies chest pain, shortness of breath, palpitations, leg edema, lightheadedness, headaches, fever, chills, orthopnea or paroxysmal nocturnal dyspnea. Vital signs on presentation to the ER temperature 98.1, pulse ox 89%, pulse 64, blood pressure 140/76.  Oxygen then was started and is now 93%.  Blood work was insignificant with a normal white count, hemoglobin, hematocrit, normal troponin, negative UA, normal BNP, negative for flu AB and COVID, CT scan of the head showed nonspecific scattered white matter hypodensities favored to represent sequela of small vessel ischemia but no acute intracranial hemorrhage mass effect or midline shift.  EKG showed sinus rhythm with RBBB and  LAFB, no signs of ACS.  Chest x-ray was negative for pneumonia.  Patient was admitted for clinical compensation.    Assessment    # Near syncope  - KG showed sinus rhythm with bifascicular block:  RBBB and LAFB, no signs of ACS.   - Patient with loop recorder from January 2023.  - Would suggest EP evaluation as outpatient with loop recorder reading - patient may need permanent pacemaker.  - Would suggest to keep Metoprolol tartrate 12.5mg BID.  - Follow up with EP team    # History of DVT  - Patient on DOAC - Eliquis  - Would suggest to rediscuss DOAC; if the risk for falls is high due to near syncopal episodes, with increased risk for bleeding, would suggest to stop DOAC. Will defer decision to Primary Care Team.    # Hypertension  - Controlled blood pressure.  - Keep current medications including Losartan 50mg daily.  - Patient counseled to keep a healthy lifestyle including regular exercise and low-sodium diet.  - Recommended home blood pressure monitoring.  - Goal of BP < 130/80mmHg.     # Hyperlipidemia  - Keep home medication with Sinvastatin 40mg daily.  - Counseled on healthy diet and regular exercise.     Thank you for allowing me to participate in the care of this patient. Please reach me out if you have any questions or if you need any clarifications regarding the patient's care.     Peripheral IV 03/11/24 20 G Right;Ventral Hand (Active)   Site Assessment Clean;Dry;Intact 03/11/24 0900   Number of days: 0       Code Status:  Full Code    James Costa MD

## 2024-03-11 NOTE — PROGRESS NOTES
"Physical Therapy    Physical Therapy Evaluation & Treatment    Patient Name: Arvin Diallo \"Newton\"  MRN: [id number]  Today's Date: 3/11/2024   Time Calculation  Start Time: 1141  Stop Time: 1219  Time Calculation (min): 38 min    Assessment/Plan   PT Assessment  PT Assessment Results: Impaired balance, Decreased mobility  Rehab Prognosis: Excellent  End of Session Communication: Bedside nurse  Assessment Comment: Pt presents with signs/symptoms consistent with (L) acute unilateral vestibulopathy. Pt also demonstrates weakness, decreased balance, and decreased mobility. Pt will benefit from skilled PT during hospital stay to address above deficits.  End of Session Patient Position: Up in chair, Alarm on  IP OR SWING BED PT PLAN  Inpatient or Swing Bed: Inpatient  PT Plan  Treatment/Interventions: Bed mobility, Transfer training, Gait training, Stair training, Balance training, Neuromuscular re-education, Strengthening, Endurance training, Range of motion, Therapeutic exercise, Therapeutic activity, Home exercise program  PT Plan: Skilled PT  PT Frequency: Daily  PT Discharge Recommendations: Low intensity level of continued care, Moderate intensity level of continued care (24 hour assist; low vs mod depending on hospital course.)  PT Recommended Transfer Status: Assist x1, Assistive device  PT - OK to Discharge: Yes (PT eval complete; ok to d/c once deemed medically appropriate. Recommend follow up with outpatient vestibular therapy.)    Current Problem:  Patient Active Problem List   Diagnosis    Arthritis of left hip    Asymmetric SNHL (sensorineural hearing loss)    Bronchomalacia    Cerebrovascular accident (CVA) (CMS/HCC)    Degeneration of lumbar intervertebral disc with acute herniation    Diabetes mellitus (CMS/HCC)    Dizziness    Elevated diaphragm    Enlarged prostate    Fatigue    Hyperlipidemia    Lumbar scoliosis    Meniere's disease    Mild HTN    Mitral valve prolapse    Obstructive sleep apnea, adult "    Tinnitus of both ears    Vertigo    RAVI (generalized anxiety disorder)    Gastroesophageal reflux disease    Chronic rhinitis    History of DVT (deep vein thrombosis)    Patent foramen ovale    PAD (peripheral artery disease) (CMS/HCC)    Pulmonary fibrosis, unspecified (CMS/HCC)    Mild intermittent asthma without complication    Sore throat    Acute cough    Near syncope       Subjective     General Visit Information:  General  Reason for Referral: impaired mobility  Referred By: Adria PT/OT  Past Medical History Relevant to Rehab: TIA, CPAP, BPH  Co-Treatment: OT  Prior to Session Communication: Bedside nurse  Patient Position Received: Bed, 3 rail up, Alarm on  General Comment: Pt to ED 3/11 with complaints of syncopal episode. 3/11 CTH (-) acute.    Home Living:  Home Living  Type of Home: House  Lives With: Alone  Home Adaptive Equipment:  (rollator)  Home Layout: One level  Home Access: Ramped entrance, Stairs to enter with rails  Entrance Stairs-Number of Steps: 2  Bathroom Shower/Tub: Walk-in shower  Bathroom Equipment: Grab bars in shower, Shower chair with back  Home Living Comments: Owns shower chair, does not use. Daughter lives 3 miles away.    Prior Level of Function:  Prior Function Per Pt/Caregiver Report  Level of [place]: Independent with ADLs and functional transfers, Independent with homemaking with ambulation  Homemaking Assistance: Independent  Ambulatory Assistance: Independent (with rollator)  Prior Function Comments: Pt states independent with all ADLs and IADLs. Uses rollator for longer distances, does not typically use in the house. No falls in last 3 months. Drives, however states his daughter can drive him if needed.    Precautions:  Precautions  Medical Precautions: Fall precautions    Vital Signs:     Objective     Pain:  Pain Assessment  Pain Assessment: 0-10  Pain Score: 0 - No pain    Cognition:  Cognition  Overall Cognitive Status: Within Functional Limits  Orientation  Level: Oriented X4    General Assessments:  General Observation  General Observation: Head thrust (+) L ear; [place] (-) BL   Activity Tolerance  Endurance: Endurance does not limit participation in activity  Sensation  Light Touch: No apparent deficits  Strength  Strength Comments: BLE MMT 4/5 overal     Coordination  Movements are Fluid and Coordinated: Yes  Postural Control  Postural Control: Within Functional Limits  Static Sitting Balance  Static Sitting-Comment/Number of Minutes: Good  Dynamic Sitting Balance  Dynamic Sitting-Comments: Good  Static Standing Balance  Static Standing-Comment/Number of Minutes: Fair  Dynamic Standing Balance  Dynamic Standing-Comments: Fair    Functional Assessments:     Bed Mobility  Bed Mobility: Yes  Bed Mobility 1  Bed Mobility 1: Supine to sitting, Sitting to supine  Level of Assistance 1: Minimum assistance  Transfers  Transfer: Yes  Transfer 1  Transfer From 1: Bed to  Transfer to 1: Chair with arms  Technique 1: Sit to stand, Stand to sit  Transfer Level of Assistance 1: Minimum assistance  Trials/Comments 1: Assist for balance/safety  Ambulation/Gait Training  Ambulation/Gait Training Performed: Yes  Ambulation/Gait Training 1  Surface 1: Level tile  Device 1: No device  Assistance 1: Minimum assistance  Comments/Distance (ft) 1: Pt ambulates ~4' bed >chair, min A for balance/safety. Reports dizziness.          Extremity/Trunk Assessments:        RLE   RLE : Within Functional Limits  LLE   LLE : Within Functional Limits    Treatments:        Balance/Neuromuscular Re-Education  Balance/Neuromuscular Re-Education Activity Performed: Yes  Balance/Neuromuscular Re-Education Activity 1: x1 gaze stabilization; pitch and yaw  Bed Mobility  Bed Mobility: Yes  Bed Mobility 1  Bed Mobility 1: Supine to sitting, Sitting to supine  Level of Assistance 1: Minimum assistance  Ambulation/Gait Training  Ambulation/Gait Training Performed: Yes  Ambulation/Gait Training 1  Surface 1:  Level tile  Device 1: No device  Assistance 1: Minimum assistance  Comments/Distance (ft) 1: Pt ambulates ~4' bed >chair, min A for balance/safety. Reports dizziness.  Transfers  Transfer: Yes  Transfer 1  Transfer From 1: Bed to  Transfer to 1: Chair with arms  Technique 1: Sit to stand, Stand to sit  Transfer Level of Assistance 1: Minimum assistance  Trials/Comments 1: Assist for balance/safety       Outcome Measures:  [facility] Basic Mobility  Turning from your back to your side while in a flat bed without using bedrails: A little  Moving from lying on your back to sitting on the side of a flat bed without using bedrails: A little  Moving to and from bed to chair (including a wheelchair): A little  Standing up from a chair using your arms (e.g. wheelchair or bedside chair): A little  To walk in hospital room: A little  Climbing 3-5 steps with railing: A lot  Basic Mobility - Total Score: 17                            Goals:  Encounter Problems       Encounter Problems (Active)       PT Problem       Pt will demonstrate sup > sit and sit > sup bed mobility mod I (Progressing)       Start:  03/11/24    Expected End:  03/25/24            Pt will demo sit > stand and stand > sit transfer with rollator mod I  (Progressing)       Start:  03/11/24    Expected End:  03/25/24            Pt will ambulate 150' with rollator and mod I, without LOB  (Progressing)       Start:  03/11/24    Expected End:  03/25/24            Pt will demonstrate understanding of vestibular HEP (Progressing)       Start:  03/11/24    Expected End:  03/25/24               Pain - Adult            Education Documentation  Home Exercise Program, taught by Sharita Willett PT at 3/11/2024  1:02 PM.  Learner: Patient  Readiness: Acceptance  Method: Explanation, Handout  Response: Verbalizes Understanding  Comment: Provided vestibular HEP and reviewed exercises with patient.    Mobility Training, taught by Sharita Willett PT at 3/11/2024  1:02  PM.  Learner: Patient  Readiness: Acceptance  Method: Explanation, Handout  Response: Verbalizes Understanding  Comment: Provided vestibular HEP and reviewed exercises with patient.    Education Comments  No comments found.

## 2024-03-11 NOTE — ED PROVIDER NOTES
Patient is an 83-year-old male presents for evaluation following an episode of near syncope.  He felt as though he is going to pass out x 2.  He got up to go to the bathroom twice tonight and felt severe lightheaded sensation.  Also reported a headache he describes as pressure.  Denies any fevers or chills.  No chest pain or radiating symptoms to extremity neck or jaw.  No palpitations.  Patient is wearing a loop monitor that was placed in January.         Review of Systems     Physical Exam  Vitals and nursing note reviewed.   Constitutional:       General: He is not in acute distress.     Appearance: He is well-developed.   HENT:      Head: Normocephalic and atraumatic.   Eyes:      Conjunctiva/sclera: Conjunctivae normal.   Cardiovascular:      Rate and Rhythm: Normal rate and regular rhythm.      Heart sounds: No murmur heard.  Pulmonary:      Effort: Pulmonary effort is normal. No respiratory distress.      Breath sounds: Normal breath sounds.   Abdominal:      Palpations: Abdomen is soft.      Tenderness: There is no abdominal tenderness.   Musculoskeletal:         General: No swelling.      Cervical back: Neck supple.   Skin:     General: Skin is warm and dry.      Capillary Refill: Capillary refill takes less than 2 seconds.   Neurological:      Mental Status: He is alert.   Psychiatric:         Mood and Affect: Mood normal.          Labs Reviewed   CBC WITH AUTO DIFFERENTIAL - Abnormal       Result Value    WBC 5.8      nRBC 0.0      RBC 4.06 (*)     Hemoglobin 13.0 (*)     Hematocrit 38.1 (*)     MCV 94      MCH 32.0      MCHC 34.1      RDW 12.2      Platelets 172      Neutrophils % 63.0      Immature Granulocytes %, Automated 0.3      Lymphocytes % 22.5      Monocytes % 9.9      Eosinophils % 4.0      Basophils % 0.3      Neutrophils Absolute 3.64      Immature Granulocytes Absolute, Automated 0.02      Lymphocytes Absolute 1.30      Monocytes Absolute 0.57      Eosinophils Absolute 0.23      Basophils  Absolute 0.02     BASIC METABOLIC PANEL - Abnormal    Glucose 142 (*)     Sodium 135 (*)     Potassium 3.9      Chloride 105      Bicarbonate 24      Anion Gap 10      Urea Nitrogen 20      Creatinine 0.88      eGFR 86      Calcium 9.4     URINALYSIS WITH REFLEX CULTURE AND MICROSCOPIC - Abnormal    Color, Urine Yellow      Appearance, Urine Clear      Specific Gravity, Urine 1.010      pH, Urine 6.0      Protein, Urine NEGATIVE      Glucose, Urine NEGATIVE      Blood, Urine NEGATIVE      Ketones, Urine NEGATIVE      Bilirubin, Urine NEGATIVE      Urobilinogen, Urine <2.0      Nitrite, Urine NEGATIVE      Leukocyte Esterase, Urine TRACE (*)    MICROSCOPIC ONLY, URINE - Abnormal    WBC, Urine 6-10 (*)     RBC, Urine 1-2      Budding Yeast, Urine PRESENT (*)     Mucus, Urine 1+      Hyaline Casts, Urine OCCASIONAL (*)    TROPONIN I, HIGH SENSITIVITY - Normal    Troponin I, High Sensitivity 16      Narrative:     Less than 99th percentile of normal range cutoff-  Female and children under 18 years old <14 ng/L; Male <21 ng/L: Negative  Repeat testing should be performed if clinically indicated.     Female and children under 18 years old 14-50 ng/L; Male 21-50 ng/L:  Consistent with possible cardiac damage and possible increased clinical   risk. Serial measurements may help to assess extent of myocardial damage.     >50 ng/L: Consistent with cardiac damage, increased clinical risk and  myocardial infarction. Serial measurements may help assess extent of   myocardial damage.      NOTE: Children less than 1 year old may have higher baseline troponin   levels and results should be interpreted in conjunction with the overall   clinical context.     NOTE: Troponin I testing is performed using a different   testing methodology at [facility] than at other   [facility]. Direct result comparisons should only   be made within the same method.   B-TYPE NATRIURETIC PEPTIDE - Normal    BNP 52      Narrative:         <100 pg/mL - Heart failure unlikely  100-299 pg/mL - Intermediate probability of acute heart                  failure exacerbation. Correlate with clinical                  context and patient history.    >=300 pg/mL - Heart Failure likely. Correlate with clinical                  context and patient history.    BNP testing is performed using different testing methodology at [facility] than at other [facility]. Direct result comparisons should only be made within the same method.      SARS-COV-2 AND INFLUENZA A/B PCR - Normal    Flu A Result Not Detected      Flu B Result Not Detected      Coronavirus 2019, PCR Not Detected      Narrative:     This assay has received FDA Emergency Use Authorization (EUA) and  is only authorized for the duration of time that circumstances exist to justify the authorization of the emergency use of in vitro diagnostic tests for the detection of SARS-CoV-2 virus and/or diagnosis of COVID-19 infection under section 564(b)(1) of the Act, 21 U.S.C. 360bbb-3(b)(1). Testing for SARS-CoV-2 is only recommended for patients who meet current clinical and/or epidemiological criteria as defined by federal, state, or local public health directives. This assay is an in vitro diagnostic nucleic acid amplification test for the qualitative detection of SARS-CoV-2, Influenza A, and Influenza B from nasopharyngeal specimens and has been validated for use at [facility]. Negative results do not preclude COVID-19 infections or Influenza A/B infections, and should not be used as the sole basis for diagnosis, treatment, or other management decisions. If Influenza A/B and RSV PCR results are negative, testing for Parainfluenza virus, Adenovirus and Metapneumovirus is routinely performed for [facility] pediatric oncology and intensive care inpatients, and is available on other patients by placing an add-on request.    URINE CULTURE   URINALYSIS WITH REFLEX CULTURE AND  "MICROSCOPIC    Narrative:     The following orders were created for panel order Urinalysis with Reflex Culture and Microscopic.  Procedure                               Abnormality         Status                     ---------                               -----------         ------                     Urinalysis with Reflex C...[[id number]]  Abnormal            Final result               Extra Urine Gray Tube[[id number]]                            In process                   Please view results for these tests on the individual orders.   EXTRA URINE GRAY TUBE        CT head wo IV contrast   Final Result   No acute intracranial hemorrhage, mass effect or midline shift.        Nonspecific scattered white matter hypodensities favored to represent   sequela of small vessel ischemia.             MACRO:   None.        Signed by: Evan Finkelstein 3/11/2024 5:08 AM   Dictation workstation:   [id number]      XR chest 1 view   Final Result   Bibasilar atelectasis.        MACRO:   None.        Signed by: Evan Finkelstein 3/11/2024 4:33 AM   Dictation workstation:   [id number]           Procedures     Medical Decision Making  83-year-old gentleman presents from home chief complaint of headache, near syncopal episode, and generalized weakness.  IV access was obtained and he is administered a liter of fluids.  EKG was negative for ST elevation or dysrhythmia.  No significant electrolyte abnormalities.  Urinalysis negative for urinary tract infection.  Chest x-ray negative for pneumonia.  Patient does not recall any palpitations however he is wearing a loop monitor.  States he \"just does not feel\".  Will admit to the hospitalist service.    Amount and/or Complexity of Data Reviewed  ECG/medicine tests: independent interpretation performed.     Details: Sinus rhythm rate of 64, bifascicular block, no ST elevation or depression, no ectopy.         Diagnoses as of 03/11/24 0647   Near syncope   Generalized weakness "                    Song Hernandez MD  03/11/24 0647

## 2024-03-11 NOTE — NURSING NOTE
Walking pulse ox done to see if pt needs O2. SpO2 at rest on room air was 94-96%. Patient walked for 6 minutes and his SpO2 on room air stayed between 94-95%.

## 2024-03-11 NOTE — H&P (VIEW-ONLY)
Inpatient consult to Cardiology  Consult performed by: James Costa MD  Consult ordered by: Mery Covington PA-C  Reason for consult: near syncope      History Of Present Illness:    Mr. Marco A Diallo is a 83 y.o. non-smoker male being consulted by the Cardiology team for near syncope. Patient with past medical history significant for hypertension, allergies, BPH, depression, hyperlipidemia, reactive airway disease, pulmonary fibrosis, peripheral arterial disease, asthma, osteoarthritis, vertigo, diverticulitis with perforation, has a loop recorder that was placed in January, CVA, prediabetes, GERD, history of DVT on DOAC, patent foramen ovale, obstructive sleep apnea with CPAP, anxiety. He presented to ED [facility] on 03/11/2024 complaining of 2 episodes of near syncope. He states that around 2am, upon getting up to urinate, he felt dizzy and almost passed out, but he did not lose consciousness. He denies chest pain, shortness of breath, palpitations, leg edema, lightheadedness, headaches, fever, chills, orthopnea or paroxysmal nocturnal dyspnea. Vital signs on presentation to the ER temperature 98.1, pulse ox 89%, pulse 64, blood pressure 140/76.  Oxygen then was started and is now 93%.  Blood work was insignificant with a normal white count, hemoglobin, hematocrit, normal troponin, negative UA, normal BNP, negative for flu AB and COVID, CT scan of the head showed nonspecific scattered white matter hypodensities favored to represent sequela of small vessel ischemia but no acute intracranial hemorrhage mass effect or midline shift.  EKG showed sinus rhythm with RBBB and LAFB, no signs of ACS.  Chest x-ray was negative for pneumonia.  Patient was admitted for clinical compensation.     Last Recorded Vitals:  Vitals:    03/11/24 1135 03/11/24 1144 03/11/24 1420 03/11/24 1426   BP:   117/63    BP Location:   Right arm    Patient Position:   Lying    Pulse:   52    Resp:   16    Temp:   36.7 °C (98 °F)   "  TempSrc:   Temporal    SpO2: 94% 94% 94% 94%   Weight:       Height:           Last Labs:  CBC - 3/11/2024:  4:40 AM  5.8 13.0 172    38.1      CMP - 3/11/2024:  4:40 AM  9.4 6.4 14 --- 1.5   _ 3.9 15 76      PTT - No results in last year.  _   _ _     Troponin I, High Sensitivity   Date/Time Value Ref Range Status   03/11/2024 04:40 AM 16 0 - 20 ng/L Final   02/08/2024 01:24 PM 14 0 - 20 ng/L Final   02/08/2024 11:58 AM 14 0 - 20 ng/L Final     BNP   Date/Time Value Ref Range Status   03/11/2024 04:40 AM 52 0 - 99 pg/mL Final   02/08/2024 11:58 AM 74 0 - 99 pg/mL Final     Hemoglobin A1C External   Date/Time Value Ref Range Status   12/18/2023 12:00 AM 6.5 % Final     Comment:     [facility]     Hemoglobin A1C   Date/Time Value Ref Range Status   12/15/2023 09:41 AM 6.5 (H) 4.0 - 5.6 % Final   04/10/2023 09:35 AM 6.2 (H) 4.0 - 5.6 % Final     LDL Calculated external   Date/Time Value Ref Range Status   12/15/2023 12:00 AM 35 mg/dL Final     VLDL   Date/Time Value Ref Range Status   08/12/2022 10:00 AM 24 0 - 40 mg/dL Final      Last I/O:  I/O last 3 completed shifts:  In: 1000 (8.8 mL/kg) [IV Piggyback:1000]  Out: - (0 mL/kg)   Weight: 113.4 kg     Past Cardiology Tests (Last 3 Years):  EKG:  ECG 12 lead 03/11/2024 (Preliminary)      ECG 12 Lead 02/08/2024      ECG 12 Lead 01/10/2024    Echo:  Transthoracic echo (TTE) complete     Ejection Fractions:  No results found for: \"EF\"  Cath:  No results found for this or any previous visit from the past 1095 days.    Stress Test:  No results found for this or any previous visit from the past 1095 days.    Cardiac Imaging:  XR CHEST ABDOMEN FOR OG NG PLACEMENT 08/18/2021      Past Medical History:  He has a past medical history of Acute pulmonary embolism without acute cor pulmonale, unspecified pulmonary embolism type (CMS/HCC) (04/12/2023), Localized enlarged lymph nodes, Other nonspecific abnormal finding of lung field, Personal history of other diseases of the " respiratory system, Personal history of other specified conditions, and Personal history of pulmonary embolism.    Past Surgical History:  He has a past surgical history that includes Other surgical history (12/04/2018); Other surgical history (12/04/2018); Other surgical history (12/04/2018); Other surgical history (12/04/2018); Other surgical history (12/04/2018); Other surgical history (12/04/2018); Other surgical history (12/04/2018); Other surgical history (05/06/2022); Other surgical history (09/13/2021); Other surgical history (04/20/2021); Other surgical history (10/24/2019); MR angio head wo IV contrast (08/12/2021); Hernia repair (01/09/2023); Cataract extraction (Left, 04/06/2023); and Cataract extraction (Right, 07/06/2023).      Social History:  He reports that he has never smoked. He has been exposed to tobacco smoke. He has never used smokeless tobacco. Drug use questions deferred to the physician. He reports that he does not drink alcohol.    Family History:  Family History   Problem Relation Name Age of Onset    Heart failure Mother      Parkinsonism Mother      Coronary artery disease Father      Heart failure Father      Breast cancer Daughter      Other (hysterectomy) Daughter      Diabetes Other grandfather         Allergies:  Patient has no known allergies.    Inpatient Medications:  Scheduled medications   Medication Dose Route Frequency    apixaban  5 mg oral BID    budesonide  0.5 mg nebulization BID    finasteride  5 mg oral Daily    FLUoxetine  20 mg oral Daily    losartan  50 mg oral Daily    magnesium oxide  400 mg oral Daily    [Held by provider] metoprolol tartrate  12.5 mg oral BID    montelukast  10 mg oral Nightly    multivitamin with minerals  1 tablet oral Daily    oxygen  3 L/min inhalation Nightly    [START ON 3/12/2024] pantoprazole  20 mg oral Daily before breakfast    [Held by provider] simvastatin  40 mg oral Daily    tamsulosin  0.4 mg oral Daily     PRN medications    Medication    acetaminophen    albuterol    lubricating eye drops    oxygen     Continuous Medications   Medication Dose Last Rate     Outpatient Medications:  Current Outpatient Medications   Medication Instructions    acetaminophen (TYLENOL) 325 mg, oral, Every 6 hours PRN    albuterol 90 mcg/actuation inhaler 2 puffs, inhalation, Every 4 hours PRN    apixaban (ELIQUIS) 5 mg, oral, 2 times daily    ascorbic acid (Vitamin C) 500 mg tablet 1 tablet, oral, Daily    b complex 0.4 mg tablet 1 tablet, oral, Daily    Bacillus coagulans-inulin 1 billion-250 cell-mg capsule 1 capsule, oral, Daily    blood sugar diagnostic (True Metrix Glucose Test Strip) strip 120 strips, miscellaneous, 4 times daily    blood-glucose meter (True Metrix Glucose Meter) misc TEST TWICE DAILY    budesonide-formoteroL (Symbicort) 160-4.5 mcg/actuation inhaler 2 puffs, inhalation, 2 times daily    CALCIUM ORAL 1 capsule, oral, 2 times daily    cephalexin (KEFLEX) 500 mg, oral, 2 times daily    cholecalciferol (Vitamin D-3) 25 MCG (1000 UT) tablet 1 tablet, oral, Daily    finasteride (PROSCAR) 5 mg, oral, Daily    FLUoxetine (PROZAC) 20 mg, oral, Daily    fluticasone (Flonase) 50 mcg/actuation nasal spray 2 sprays, Each Nostril, Daily    GARLIC ORAL 1 capsule, oral, Daily    losartan (COZAAR) 50 mg, oral, Daily    lubricating eye drops ophthalmic solution 1 drop, Both Eyes, As needed    magnesium oxide (Mag-Ox) 400 mg (241.3 mg magnesium) tablet 1 tablet, oral, Daily    metoprolol tartrate (Lopressor) 25 mg tablet take 1/2 tablet by mouth twice a day    montelukast (SINGULAIR) 10 mg, oral, Nightly    multivitamin (MULTIPLE VITAMINS ORAL) 1 tablet, oral, Daily    omeprazole (PRILOSEC) 40 mg, oral, Daily    oxygen (O2) gas therapy Inhale 3 L/min once daily at bedtime.    psyllium husk, with sugar, (Metamucil Fiber Thin) 2.5 gram wafer wafer oral    simvastatin (ZOCOR) 40 mg, oral, Daily    tamsulosin (Flomax) 0.4 mg 24 hr capsule 1 capsule, oral,  Daily    ubidecarenone (coenzyme Q10) 100 mg tablet 1 tablet, oral, Daily       Physical Exam:  General: alert, oriented and in no acute distress  HEENT: NC/AT; EOMI; PERRLA, external ear is normal  Neck: supple; trachea midline; no masses; no JVD  Chest: clear breath sounds bilaterally; no wheezing  Cardio: regular rhythm, S1S2 normal, no murmurs  Abdomen: Soft, non-tender, non-distension, no organomegaly  Extremities: no clubbing/cyanosis/edema  Neuro: Grossly intact     Psychiatric: Normal mood and affect      Assessment/Plan     Mr. Marco A Diallo is a 83 y.o. non-smoker male being consulted by the Cardiology team for near syncope. Patient with past medical history significant for hypertension, allergies, BPH, depression, hyperlipidemia, reactive airway disease, pulmonary fibrosis, peripheral arterial disease, asthma, osteoarthritis, vertigo, diverticulitis with perforation, has a loop recorder that was placed in January, CVA, prediabetes, GERD, history of DVT, patent foramen ovale, obstructive sleep apnea with CPAP, anxiety. He presented to ED [facility] on 03/11/2024 complaining of 2 episodes of near syncope. He states that around 2am, upon getting up to urinate, he felt dizzy and almost passed out, but he did not lose consciousness. He denies chest pain, shortness of breath, palpitations, leg edema, lightheadedness, headaches, fever, chills, orthopnea or paroxysmal nocturnal dyspnea. Vital signs on presentation to the ER temperature 98.1, pulse ox 89%, pulse 64, blood pressure 140/76.  Oxygen then was started and is now 93%.  Blood work was insignificant with a normal white count, hemoglobin, hematocrit, normal troponin, negative UA, normal BNP, negative for flu AB and COVID, CT scan of the head showed nonspecific scattered white matter hypodensities favored to represent sequela of small vessel ischemia but no acute intracranial hemorrhage mass effect or midline shift.  EKG showed sinus rhythm with RBBB and  LAFB, no signs of ACS.  Chest x-ray was negative for pneumonia.  Patient was admitted for clinical compensation.    Assessment    # Near syncope  - KG showed sinus rhythm with bifascicular block:  RBBB and LAFB, no signs of ACS.   - Patient with loop recorder from January 2023.  - Would suggest EP evaluation as outpatient with loop recorder reading - patient may need permanent pacemaker.  - Would suggest to keep Metoprolol tartrate 12.5mg BID.  - Follow up with EP team    # History of DVT  - Patient on DOAC - Eliquis  - Would suggest to rediscuss DOAC; if the risk for falls is high due to near syncopal episodes, with increased risk for bleeding, would suggest to stop DOAC. Will defer decision to Primary Care Team.    # Hypertension  - Controlled blood pressure.  - Keep current medications including Losartan 50mg daily.  - Patient counseled to keep a healthy lifestyle including regular exercise and low-sodium diet.  - Recommended home blood pressure monitoring.  - Goal of BP < 130/80mmHg.     # Hyperlipidemia  - Keep home medication with Sinvastatin 40mg daily.  - Counseled on healthy diet and regular exercise.     Thank you for allowing me to participate in the care of this patient. Please reach me out if you have any questions or if you need any clarifications regarding the patient's care.     Peripheral IV 03/11/24 20 G Right;Ventral Hand (Active)   Site Assessment Clean;Dry;Intact 03/11/24 0900   Number of days: 0       Code Status:  Full Code    James Costa MD

## 2024-03-11 NOTE — H&P
History Of Present Illness  Marco A Diallo is a 83 y.o. male with past medical history of hypertension, allergies, BPH, depression, hyperlipidemia, reactive airway disease, pulmonary fibrosis, peripheral arterial disease, asthma, osteoarthritis, vertigo, diverticulitis with perforation, has a loop recorder that was placed in January, CVA, prediabetes, GERD, history of DVT, patent foramen ovale, obstructive sleep apnea with CPAP, anxiety who presented to the emergency department this morning for feeling like he was going to pass out x 2.    Vital signs on presentation to the ER temperature 98.1, pulse ox 89%, pulse 64, blood pressure 140/76.  Oxygen then was started and is now 93%.  Blood work was insignificant with a normal white count, hemoglobin, hematocrit, normal troponin, negative UA, normal BNP, negative for flu AB and COVID, CT scan of the head showed nonspecific scattered white matter hypodensities favored to represent sequela of small vessel ischemia but no acute intracranial hemorrhage mass effect or midline shift.  EKG was negative for ST elevation or dysrhythmia.  Chest x-ray was negative for pneumonia.  Patient was then advised to be admitted for syncope     Past Medical History  Past Medical History:   Diagnosis Date    Acute pulmonary embolism without acute cor pulmonale, unspecified pulmonary embolism type (CMS/HCC) 04/12/2023    Localized enlarged lymph nodes     Mediastinal adenopathy    Other nonspecific abnormal finding of lung field     Lung mass    Personal history of other diseases of the respiratory system     History of asthma    Personal history of other specified conditions     History of chronic cough    Personal history of pulmonary embolism     History of pulmonary embolism       Surgical History  Past Surgical History:   Procedure Laterality Date    CATARACT EXTRACTION Left 04/06/2023    CATARACT EXTRACTION Right 07/06/2023    HERNIA REPAIR  01/09/2023    MR HEAD ANGIO WO IV CONTRAST   08/12/2021    MR HEAD ANGIO WO IV CONTRAST 8/12/2021 [facility] CLINICAL LEGACY    OTHER SURGICAL HISTORY  12/04/2018    Cystoscopy    OTHER SURGICAL HISTORY  12/04/2018    Inferior vena cava filter placement    OTHER SURGICAL HISTORY  12/04/2018    Tonsillectomy    OTHER SURGICAL HISTORY  12/04/2018    Ostectomy of calcaneus for spur    OTHER SURGICAL HISTORY  12/04/2018    Hernia repair    OTHER SURGICAL HISTORY  12/04/2018    Epidural steroid injection    OTHER SURGICAL HISTORY  12/04/2018    Transurethral resection of prostate    OTHER SURGICAL HISTORY  05/06/2022    Intra-articular corticosteroid injection    OTHER SURGICAL HISTORY  09/13/2021    Sigmoidectomy    OTHER SURGICAL HISTORY  04/20/2021    Colonoscopy    OTHER SURGICAL HISTORY  10/24/2019    Knee replacement        Social History  He reports that he has never smoked. He has been exposed to tobacco smoke. He has never used smokeless tobacco. Drug use questions deferred to the physician. He reports that he does not drink alcohol.    Family History  Family History   Problem Relation Name Age of Onset    Heart failure Mother      Parkinsonism Mother      Coronary artery disease Father      Heart failure Father      Breast cancer Daughter      Other (hysterectomy) Daughter      Diabetes Other grandfather         Allergies  Patient has no known allergies.    Review of Systems   Constitutional:  Positive for activity change and fatigue.   Respiratory:  Negative for chest tightness and shortness of breath.    Neurological:  Positive for dizziness, syncope and weakness.        Physical Exam  Constitutional:       General: He is not in acute distress.  HENT:      Head: Normocephalic.      Mouth/Throat:      Mouth: Mucous membranes are moist.   Eyes:      General: No scleral icterus.  Neck:      Vascular: No carotid bruit.   Cardiovascular:      Rate and Rhythm: Regular rhythm. Bradycardia present.      Heart sounds: No murmur heard.  Pulmonary:      Breath sounds:  "Normal breath sounds.   Abdominal:      General: Bowel sounds are normal.      Palpations: Abdomen is soft.   Musculoskeletal:      Cervical back: Normal range of motion.   Skin:     General: Skin is warm and dry.   Neurological:      General: No focal deficit present.      Mental Status: He is alert.   Psychiatric:         Mood and Affect: Mood normal.              Last Recorded Vitals  Blood pressure 127/74, pulse 90, temperature 36.5 °C (97.7 °F), temperature source Temporal, resp. rate 18, height 1.905 m (6' 3\"), weight 119 kg (261 lb 7.5 oz), SpO2 94 %.    Relevant Results    Scheduled medications  apixaban, 5 mg, oral, BID  budesonide, 0.5 mg, nebulization, BID  finasteride, 5 mg, oral, Daily  FLUoxetine, 20 mg, oral, Daily  losartan, 50 mg, oral, Daily  magnesium oxide, 400 mg, oral, Daily  metoprolol tartrate, 12.5 mg, oral, BID  montelukast, 10 mg, oral, Nightly  multivitamin with minerals, 1 tablet, oral, Daily  oxygen, 3 L/min, inhalation, Nightly  [START ON 3/12/2024] pantoprazole, 20 mg, oral, Daily before breakfast  simvastatin, 40 mg, oral, Daily  tamsulosin, 0.4 mg, oral, Daily      Continuous medications     PRN medications  PRN medications: acetaminophen, albuterol, lubricating eye drops, oxygen      ECG 12 lead    Result Date: 3/11/2024  Sinus rhythm with 1st degree AV block Right bundle branch block Left anterior fascicular block Bifascicular block  Minimal voltage criteria for LVH, may be normal variant ( R in aVL ) Possible Lateral infarct (cited on or before 09-JAN-2024) Abnormal ECG When compared with ECG of 08-FEB-2024 11:36, Questionable change in initial forces of Lateral leads    CT head wo IV contrast    Result Date: 3/11/2024  Interpreted By:  Finkelstein, Evan, STUDY: CT HEAD WO IV CONTRAST;  3/11/2024 4:52 am   INDICATION: Signs/Symptoms:Headache.   COMPARISON: CT brain 02/08/2025   ACCESSION NUMBER(S): [id number]   ORDERING CLINICIAN: JOVON CHAUDHRY   TECHNIQUE: Axial noncontrast CT " images of the head with coronal and sagittal reconstructions.   FINDINGS: EXTRACRANIAL SOFT TISSUES: Unremarkable.   CALVARIUM: No depressed skull fracture. No destructive osseous lesion.   PARANASAL SINUSES/MASTOIDS: The visualized paranasal sinuses and mastoid air cells are aerated.   HEMORRHAGE: No acute intracranial hemorrhage.   BRAIN PARENCHYMA: Gray-white matter interfaces are preserved. No mass effect or midline shift. There are nonspecific scattered white matter hypodensities.   VENTRICLES and EXTRA-AXIAL SPACES: Parenchymal atrophy with prominence of the ventricles and cortical sulci.   OTHER FINDINGS: There are calcifications within the cavernous carotids       No acute intracranial hemorrhage, mass effect or midline shift.   Nonspecific scattered white matter hypodensities favored to represent sequela of small vessel ischemia.     MACRO: None.   Signed by: Evan Finkelstein 3/11/2024 5:08 AM Dictation workstation:   [id number]    XR chest 1 view    Result Date: 3/11/2024  Interpreted By:  Finkelstein, Evan, STUDY: XR CHEST 1 VIEW;  3/11/2024 4:29 am   INDICATION: Signs/Symptoms:Near syncope.   COMPARISON: Chest radiograph 02/08/2024   ACCESSION NUMBER(S): [id number]   ORDERING CLINICIAN: JOVON CHAUDHRY   FINDINGS: Redemonstrated recording device overlying the left hemithorax.   CARDIOMEDIASTINAL SILHOUETTE: Enlarged cardiac silhouette is similar compared to prior imaging.   LUNGS: Bibasilar opacities favored to represent atelectasis. No sizable pleural effusion or pneumothorax.   ABDOMEN: No remarkable upper abdominal findings.   BONES: No acute osseous abnormality.       Bibasilar atelectasis.   MACRO: None.   Signed by: Evan Finkelstein 3/11/2024 4:33 AM Dictation workstation:   [id number]       Results for orders placed or performed during the hospital encounter of 03/11/24 (from the past 24 hour(s))   ECG 12 lead   Result Value Ref Range    Ventricular Rate 64 BPM    Atrial Rate 64 BPM    AK  Interval 266 ms    QRS Duration 152 ms    QT Interval 448 ms    QTC Calculation(Bazett) 462 ms    P Axis 47 degrees    R Axis -60 degrees    T Axis 37 degrees    QRS Count 10 beats    Q Onset 212 ms    P Onset 79 ms    P Offset 138 ms    T Offset 436 ms    QTC Fredericia 457 ms   CBC and Auto Differential   Result Value Ref Range    WBC 5.8 4.4 - 11.3 x10*3/uL    nRBC 0.0 0.0 - 0.0 /100 WBCs    RBC 4.06 (L) 4.50 - 5.90 x10*6/uL    Hemoglobin 13.0 (L) 13.5 - 17.5 g/dL    Hematocrit 38.1 (L) 41.0 - 52.0 %    MCV 94 80 - 100 fL    MCH 32.0 26.0 - 34.0 pg    MCHC 34.1 32.0 - 36.0 g/dL    RDW 12.2 11.5 - 14.5 %    Platelets 172 150 - 450 x10*3/uL    Neutrophils % 63.0 40.0 - 80.0 %    Immature Granulocytes %, Automated 0.3 0.0 - 0.9 %    Lymphocytes % 22.5 13.0 - 44.0 %    Monocytes % 9.9 2.0 - 10.0 %    Eosinophils % 4.0 0.0 - 6.0 %    Basophils % 0.3 0.0 - 2.0 %    Neutrophils Absolute 3.64 1.60 - 5.50 x10*3/uL    Immature Granulocytes Absolute, Automated 0.02 0.00 - 0.50 x10*3/uL    Lymphocytes Absolute 1.30 0.80 - 3.00 x10*3/uL    Monocytes Absolute 0.57 0.05 - 0.80 x10*3/uL    Eosinophils Absolute 0.23 0.00 - 0.40 x10*3/uL    Basophils Absolute 0.02 0.00 - 0.10 x10*3/uL   Basic metabolic panel   Result Value Ref Range    Glucose 142 (H) 74 - 99 mg/dL    Sodium 135 (L) 136 - 145 mmol/L    Potassium 3.9 3.5 - 5.3 mmol/L    Chloride 105 98 - 107 mmol/L    Bicarbonate 24 21 - 32 mmol/L    Anion Gap 10 10 - 20 mmol/L    Urea Nitrogen 20 6 - 23 mg/dL    Creatinine 0.88 0.50 - 1.30 mg/dL    eGFR 86 >60 mL/min/1.73m*2    Calcium 9.4 8.6 - 10.3 mg/dL   Troponin I, High Sensitivity   Result Value Ref Range    Troponin I, High Sensitivity 16 0 - 20 ng/L   B-Type Natriuretic Peptide   Result Value Ref Range    BNP 52 0 - 99 pg/mL   Sars-CoV-2 and Influenza A/B PCR   Result Value Ref Range    Flu A Result Not Detected Not Detected    Flu B Result Not Detected Not Detected    Coronavirus 2019, PCR Not Detected Not Detected    Urinalysis with Reflex Culture and Microscopic   Result Value Ref Range    Color, Urine Yellow Straw, Yellow    Appearance, Urine Clear Clear    Specific Gravity, Urine 1.010 1.005 - 1.035    pH, Urine 6.0 5.0, 5.5, 6.0, 6.5, 7.0, 7.5, 8.0    Protein, Urine NEGATIVE NEGATIVE mg/dL    Glucose, Urine NEGATIVE NEGATIVE mg/dL    Blood, Urine NEGATIVE NEGATIVE    Ketones, Urine NEGATIVE NEGATIVE mg/dL    Bilirubin, Urine NEGATIVE NEGATIVE    Urobilinogen, Urine <2.0 <2.0 mg/dL    Nitrite, Urine NEGATIVE NEGATIVE    Leukocyte Esterase, Urine TRACE (A) NEGATIVE   Microscopic Only, Urine   Result Value Ref Range    WBC, Urine 6-10 (A) 1-5, NONE /HPF    RBC, Urine 1-2 NONE, 1-2, 3-5 /HPF    Budding Yeast, Urine PRESENT (A) NONE /HPF    Mucus, Urine 1+ Reference range not established. /LPF    Hyaline Casts, Urine OCCASIONAL (A) NONE /LPF                  Assessment/Plan   Principal Problem:    Near syncope      Marco A Diallo is a 83 y.o. male with past medical history of hypertension, allergies, BPH, depression, hyperlipidemia, reactive airway disease, pulmonary fibrosis, peripheral arterial disease, asthma, osteoarthritis, vertigo, diverticulitis with perforation, has a loop recorder that was placed in January, CVA, prediabetes, GERD, history of DVT, patent foramen ovale, obstructive sleep apnea with CPAP, anxiety who presented to the emergency department this morning for feeling like he was going to pass out x 2.    Presyncope  -Patient is established with cardiology and has a loop recorder placed in January.  This was placed by Dr. Anderson at [facility].  -Patient states that they were debating whether or not he needs a pacemaker.  Patient does have a first-degree block, left anterior hemifascicular block and a right bundle branch block  -Consult cardiology due to his past medical history and determine if need of pacemaker is indicated at this time.  -Initially patient's heart rate was 74 and his 12.5 mg dose of metoprolol  was given and his heart rate did drop down to 49-48.  Will discontinue his metoprolol.  -Orthostatic blood pressures were performed and negative.  -Continue telemetry  -Additionally to patient's symptoms of presyncope it was noted at that time his oxygen was 88%.  Patient does have history of pulmonary fibrosis and asthma.  He has been evaluated in the past for whether he needs oxygen at home or not.  Walking pulse ox test was performed and negative  -CT scan of the head was negative for acute findings but showed chronic small vessel ischemia changes.  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  Please note that patient does have chronic dizziness related to this however he states this presyncope episode was different from his normal.      Fatigue  -Will do some screening blood work to rule out other possible etiologies.    Obstructive sleep apnea with CPAP   -CPAP orders with oxygen placed per RT    Anxiety   -Continue Prozac    Hypertension   -Continue losartan  -Hold metoprolol due to bradycardia    Asthma   -Continue Singulair    History of DVT   -Continue Eliquis   -Patient also has history of patent foramen ovale and CVA     Full code       I spent 45 minutes in the professional and overall care of this patient.      Mery Covington PA-C

## 2024-03-11 NOTE — TELEPHONE ENCOUNTER
DAUGHTER, DONNELL SWAN, CALLED TO NOTIFY YOU OF PATIENT'S ADMITTANCE TO [facility] FOR SOB

## 2024-03-11 NOTE — PROGRESS NOTES
"Occupational Therapy    Evaluation    Patient Name: Arvin Diallo \"Marco A\"  MRN: [id number]  Today's Date: 3/11/2024  Time Calculation  Start Time: 1141  Stop Time: 1205         Assessment:  OT Assessment: deficits in balance and activity tolerance with c/o severe dizziness impact ability to perform ADL and mobility at PLOF.  pt would benefit from skilled OT serivices in order to ensure safe transition home  End of Session Communication: Bedside nurse  End of Session Patient Position: Up in chair, Alarm on     Plan:  Treatment Interventions: ADL retraining, Functional transfer training, Endurance training, Neuromuscular reeducation  OT Frequency: 5 times per week  OT Discharge Recommendations: Moderate intensity level of continued care  OT Recommended Transfer Status: Assist of 1  OT - OK to Discharge: Yes (ok to DC once medically stable)  Treatment Interventions: ADL retraining, Functional transfer training, Endurance training, Neuromuscular reeducation    Subjective   Current Problem:  1. Near syncope        2. Generalized weakness          General:  General  Reason for Referral: impaired mobility  Referred By: Adria PT/OT  Past Medical History Relevant to Rehab: TIA, CPAP, BPH  Co-Treatment: PT  Prior to Session Communication: Bedside nurse  Patient Position Received: Bed, 3 rail up, Alarm on  General Comment: Pt to ED 3/11 with complaints of syncopal episode. 3/11 CTH (-) acute.  Precautions:  Medical Precautions: Fall precautions       Pain:  Pain Assessment  Pain Assessment: 0-10  Pain Score: 0 - No pain    Objective   Cognition:  Overall Cognitive Status: Within Functional Limits  Orientation Level: Oriented X4           Home Living:  Type of Home: House  Lives With: Alone  Home Layout: One level  Home Access: Ramped entrance, Stairs to enter with rails  Entrance Stairs-Number of Steps: 2  Bathroom Shower/Tub: Walk-in shower  Bathroom Equipment: Grab bars in shower, Shower chair with back  Home Living Comments: " "Owns shower chair, does not use. Daughter lives 3 miles away.  Prior Function:  Level of [place]: Independent with ADLs and functional transfers, Independent with homemaking with ambulation  Homemaking Assistance: Independent  Ambulatory Assistance: Independent  Prior Function Comments: Pt states independent with all ADLs and IADLs. Uses rollator for longer distances, does not typically use in the house. No falls in last 3 months. Drives, however states his daughter can drive him if needed.       ADL:  Eating Assistance: Independent  Grooming Assistance: Independent  Bathing Assistance: Moderate  UE Dressing Assistance: Stand by  LE Dressing Assistance: Moderate  Activity Tolerance:  Endurance: Endurance does not limit participation in activity  Bed Mobility/Transfers: Bed Mobility  Bed Mobility: Yes  Bed Mobility 1  Bed Mobility 1: Supine to sitting, Sitting to supine  Level of Assistance 1: Minimum assistance    Transfers  Transfer: Yes  Transfer 1  Transfer From 1: Bed to  Transfer to 1: Chair with arms  Technique 1: Sit to stand, Stand to sit  Transfer Level of Assistance 1: Minimum assistance      Functional Mobility:  Functional Mobility  Functional Mobility Performed: No (pt is too dizzy)       Vision:Vision - Basic Assessment  Current Vision: No visual deficits  Sensation:  Light Touch: Partial deficits in the RUE (pt reports numbness and tingling RUE digits 1-3 \"I need carpal tunnel surgery\")  Strength:  Strength Comments: BUE WNL       Coordination:  Movements are Fluid and Coordinated: Yes     Outcome Measures:[facility] Daily Activity  Putting on and taking off regular lower body clothing: A little  Bathing (including washing, rinsing, drying): A little  Putting on and taking off regular upper body clothing: A little  Toileting, which includes using toilet, bedpan or urinal: A little  Taking care of personal grooming such as brushing teeth: A little  Eating Meals: None  Daily Activity - Total Score: " 19        Education Documentation  Body Mechanics, taught by Ashlee Pearce OT at 3/11/2024  1:32 PM.  Learner: Patient  Readiness: Acceptance  Method: Demonstration  Response: Demonstrated Understanding, Verbalizes Understanding    Mobility Training, taught by Ashlee Pearce OT at 3/11/2024  1:32 PM.  Learner: Patient  Readiness: Acceptance  Method: Demonstration  Response: Demonstrated Understanding, Verbalizes Understanding           Goals:  Encounter Problems       Encounter Problems (Active)       ADLs       Patient will perform LB bathing  with supervision level of assistance.       Start:  03/11/24    Expected End:  03/25/24            Patient with complete lower body dressing with supervision level of assistance donning all LE clothes  with PRN adaptive equipment       Start:  03/11/24    Expected End:  03/25/24            Patient will complete toileting including hygiene clothing management/hygiene with supervision level of assistance.       Start:  03/11/24    Expected End:  03/25/24               MOBILITY       Patient will perform Functional mobility stand by assist level of assistance in order to improve safety and functional mobility.       Start:  03/11/24    Expected End:  03/25/24

## 2024-03-11 NOTE — CARE PLAN
The patient's goals for the shift include  no dizziness    The clinical goals for the shift include no dizziness, no falls

## 2024-03-12 LAB
25(OH)D3 SERPL-MCNC: 89 NG/ML (ref 30–100)
ALBUMIN SERPL BCP-MCNC: 3.5 G/DL (ref 3.4–5)
ALP SERPL-CCNC: 71 U/L (ref 33–136)
ALT SERPL W P-5'-P-CCNC: 12 U/L (ref 10–52)
ANION GAP SERPL CALC-SCNC: 8 MMOL/L (ref 10–20)
AST SERPL W P-5'-P-CCNC: 11 U/L (ref 9–39)
BACTERIA UR CULT: NO GROWTH
BILIRUB SERPL-MCNC: 1.3 MG/DL (ref 0–1.2)
BUN SERPL-MCNC: 19 MG/DL (ref 6–23)
CALCIUM SERPL-MCNC: 9.3 MG/DL (ref 8.6–10.3)
CHLORIDE SERPL-SCNC: 105 MMOL/L (ref 98–107)
CO2 SERPL-SCNC: 27 MMOL/L (ref 21–32)
CREAT SERPL-MCNC: 1.04 MG/DL (ref 0.5–1.3)
EGFRCR SERPLBLD CKD-EPI 2021: 72 ML/MIN/1.73M*2
ERYTHROCYTE [DISTWIDTH] IN BLOOD BY AUTOMATED COUNT: 12.2 % (ref 11.5–14.5)
FERRITIN SERPL-MCNC: 93 NG/ML (ref 20–300)
FOLATE SERPL-MCNC: >22.3 NG/ML
GLUCOSE SERPL-MCNC: 121 MG/DL (ref 74–99)
HCT VFR BLD AUTO: 38.2 % (ref 41–52)
HGB BLD-MCNC: 12.9 G/DL (ref 13.5–17.5)
IRON SATN MFR SERPL: 32 % (ref 25–45)
IRON SERPL-MCNC: 77 UG/DL (ref 35–150)
MAGNESIUM SERPL-MCNC: 2.14 MG/DL (ref 1.6–2.4)
MCH RBC QN AUTO: 31.8 PG (ref 26–34)
MCHC RBC AUTO-ENTMCNC: 33.8 G/DL (ref 32–36)
MCV RBC AUTO: 94 FL (ref 80–100)
NRBC BLD-RTO: 0 /100 WBCS (ref 0–0)
PHOSPHATE SERPL-MCNC: 2.9 MG/DL (ref 2.5–4.9)
PLATELET # BLD AUTO: 174 X10*3/UL (ref 150–450)
POTASSIUM SERPL-SCNC: 4.1 MMOL/L (ref 3.5–5.3)
PROT SERPL-MCNC: 5.6 G/DL (ref 6.4–8.2)
RBC # BLD AUTO: 4.06 X10*6/UL (ref 4.5–5.9)
SODIUM SERPL-SCNC: 136 MMOL/L (ref 136–145)
TIBC SERPL-MCNC: 244 UG/DL (ref 240–445)
UIBC SERPL-MCNC: 167 UG/DL (ref 110–370)
VIT B12 SERPL-MCNC: 480 PG/ML (ref 211–911)
WBC # BLD AUTO: 6.2 X10*3/UL (ref 4.4–11.3)

## 2024-03-12 PROCEDURE — 83735 ASSAY OF MAGNESIUM: CPT | Performed by: PHYSICIAN ASSISTANT

## 2024-03-12 PROCEDURE — 2500000004 HC RX 250 GENERAL PHARMACY W/ HCPCS (ALT 636 FOR OP/ED): Performed by: PHYSICIAN ASSISTANT

## 2024-03-12 PROCEDURE — 1200000002 HC GENERAL ROOM WITH TELEMETRY DAILY

## 2024-03-12 PROCEDURE — 2500000005 HC RX 250 GENERAL PHARMACY W/O HCPCS: Performed by: PHYSICIAN ASSISTANT

## 2024-03-12 PROCEDURE — 97116 GAIT TRAINING THERAPY: CPT | Mod: GP,CQ

## 2024-03-12 PROCEDURE — 2500000002 HC RX 250 W HCPCS SELF ADMINISTERED DRUGS (ALT 637 FOR MEDICARE OP, ALT 636 FOR OP/ED): Performed by: PHYSICIAN ASSISTANT

## 2024-03-12 PROCEDURE — 2500000002 HC RX 250 W HCPCS SELF ADMINISTERED DRUGS (ALT 637 FOR MEDICARE OP, ALT 636 FOR OP/ED)

## 2024-03-12 PROCEDURE — 2500000001 HC RX 250 WO HCPCS SELF ADMINISTERED DRUGS (ALT 637 FOR MEDICARE OP): Performed by: PHYSICIAN ASSISTANT

## 2024-03-12 PROCEDURE — 99232 SBSQ HOSP IP/OBS MODERATE 35: CPT | Performed by: STUDENT IN AN ORGANIZED HEALTH CARE EDUCATION/TRAINING PROGRAM

## 2024-03-12 PROCEDURE — 82728 ASSAY OF FERRITIN: CPT | Performed by: PHYSICIAN ASSISTANT

## 2024-03-12 PROCEDURE — 99232 SBSQ HOSP IP/OBS MODERATE 35: CPT

## 2024-03-12 PROCEDURE — 2500000004 HC RX 250 GENERAL PHARMACY W/ HCPCS (ALT 636 FOR OP/ED)

## 2024-03-12 PROCEDURE — 94640 AIRWAY INHALATION TREATMENT: CPT | Mod: MUE

## 2024-03-12 PROCEDURE — 83540 ASSAY OF IRON: CPT | Performed by: PHYSICIAN ASSISTANT

## 2024-03-12 PROCEDURE — 82607 VITAMIN B-12: CPT | Performed by: PHYSICIAN ASSISTANT

## 2024-03-12 PROCEDURE — 2500000001 HC RX 250 WO HCPCS SELF ADMINISTERED DRUGS (ALT 637 FOR MEDICARE OP): Performed by: INTERNAL MEDICINE

## 2024-03-12 PROCEDURE — 94761 N-INVAS EAR/PLS OXIMETRY MLT: CPT | Mod: MUE

## 2024-03-12 PROCEDURE — 80053 COMPREHEN METABOLIC PANEL: CPT | Performed by: PHYSICIAN ASSISTANT

## 2024-03-12 PROCEDURE — 82306 VITAMIN D 25 HYDROXY: CPT | Performed by: PHYSICIAN ASSISTANT

## 2024-03-12 PROCEDURE — 85027 COMPLETE CBC AUTOMATED: CPT | Performed by: PHYSICIAN ASSISTANT

## 2024-03-12 PROCEDURE — 82746 ASSAY OF FOLIC ACID SERUM: CPT | Performed by: PHYSICIAN ASSISTANT

## 2024-03-12 PROCEDURE — 36415 COLL VENOUS BLD VENIPUNCTURE: CPT | Performed by: PHYSICIAN ASSISTANT

## 2024-03-12 PROCEDURE — 84100 ASSAY OF PHOSPHORUS: CPT | Performed by: PHYSICIAN ASSISTANT

## 2024-03-12 PROCEDURE — 97530 THERAPEUTIC ACTIVITIES: CPT | Mod: GO

## 2024-03-12 RX ORDER — FLUTICASONE PROPIONATE 50 MCG
2 SPRAY, SUSPENSION (ML) NASAL DAILY
Status: DISCONTINUED | OUTPATIENT
Start: 2024-03-12 | End: 2024-03-15 | Stop reason: HOSPADM

## 2024-03-12 RX ORDER — MECLIZINE HCL 12.5 MG 12.5 MG/1
12.5 TABLET ORAL EVERY 6 HOURS PRN
Status: DISCONTINUED | OUTPATIENT
Start: 2024-03-12 | End: 2024-03-15 | Stop reason: HOSPADM

## 2024-03-12 RX ORDER — POLYETHYLENE GLYCOL 3350 17 G/17G
17 POWDER, FOR SOLUTION ORAL DAILY
Status: DISCONTINUED | OUTPATIENT
Start: 2024-03-12 | End: 2024-03-15 | Stop reason: HOSPADM

## 2024-03-12 RX ADMIN — BUDESONIDE 0.5 MG: 0.5 INHALANT RESPIRATORY (INHALATION) at 06:56

## 2024-03-12 RX ADMIN — ALBUTEROL SULFATE 2.5 MG: 2.5 SOLUTION RESPIRATORY (INHALATION) at 06:45

## 2024-03-12 RX ADMIN — PANTOPRAZOLE SODIUM 20 MG: 20 TABLET, DELAYED RELEASE ORAL at 09:10

## 2024-03-12 RX ADMIN — Medication 3 L/MIN: at 06:45

## 2024-03-12 RX ADMIN — BUDESONIDE 0.5 MG: 0.5 INHALANT RESPIRATORY (INHALATION) at 18:17

## 2024-03-12 RX ADMIN — MULTIPLE VITAMINS W/ MINERALS TAB 1 TABLET: TAB at 09:10

## 2024-03-12 RX ADMIN — POLYVINYL ALCOHOL, POVIDONE 1 DROP: 14; 6 SOLUTION/ DROPS OPHTHALMIC at 14:41

## 2024-03-12 RX ADMIN — FINASTERIDE 5 MG: 5 TABLET, FILM COATED ORAL at 20:24

## 2024-03-12 RX ADMIN — ACETAMINOPHEN 650 MG: 325 TABLET ORAL at 14:41

## 2024-03-12 RX ADMIN — POLYETHYLENE GLYCOL 3350 17 G: 17 POWDER, FOR SOLUTION ORAL at 09:09

## 2024-03-12 RX ADMIN — POLYVINYL ALCOHOL, POVIDONE 1 DROP: 14; 6 SOLUTION/ DROPS OPHTHALMIC at 18:30

## 2024-03-12 RX ADMIN — LOSARTAN POTASSIUM 50 MG: 50 TABLET, FILM COATED ORAL at 09:09

## 2024-03-12 RX ADMIN — Medication 400 MG: at 09:10

## 2024-03-12 RX ADMIN — APIXABAN 5 MG: 5 TABLET, FILM COATED ORAL at 20:25

## 2024-03-12 RX ADMIN — APIXABAN 5 MG: 5 TABLET, FILM COATED ORAL at 09:10

## 2024-03-12 RX ADMIN — ALBUTEROL SULFATE 2.5 MG: 2.5 SOLUTION RESPIRATORY (INHALATION) at 11:42

## 2024-03-12 RX ADMIN — Medication 3 L/MIN: at 21:00

## 2024-03-12 RX ADMIN — MONTELUKAST 10 MG: 10 TABLET, FILM COATED ORAL at 20:24

## 2024-03-12 RX ADMIN — ALBUTEROL SULFATE 2.5 MG: 2.5 SOLUTION RESPIRATORY (INHALATION) at 23:03

## 2024-03-12 RX ADMIN — POLYVINYL ALCOHOL, POVIDONE 1 DROP: 14; 6 SOLUTION/ DROPS OPHTHALMIC at 20:24

## 2024-03-12 RX ADMIN — FLUOXETINE 20 MG: 20 CAPSULE ORAL at 09:10

## 2024-03-12 RX ADMIN — METOPROLOL TARTRATE 12.5 MG: 25 TABLET, FILM COATED ORAL at 20:25

## 2024-03-12 RX ADMIN — FLUTICASONE PROPIONATE 2 SPRAY: 50 SPRAY, METERED NASAL at 09:18

## 2024-03-12 RX ADMIN — TAMSULOSIN HYDROCHLORIDE 0.4 MG: 0.4 CAPSULE ORAL at 20:24

## 2024-03-12 RX ADMIN — ALBUTEROL SULFATE 2.5 MG: 2.5 SOLUTION RESPIRATORY (INHALATION) at 18:12

## 2024-03-12 ASSESSMENT — PAIN SCALES - GENERAL
PAINLEVEL_OUTOF10: 0 - NO PAIN
PAINLEVEL_OUTOF10: 3
PAINLEVEL_OUTOF10: 0 - NO PAIN
PAINLEVEL_OUTOF10: 0 - NO PAIN

## 2024-03-12 ASSESSMENT — COGNITIVE AND FUNCTIONAL STATUS - GENERAL
TOILETING: A LITTLE
HELP NEEDED FOR BATHING: A LITTLE
DRESSING REGULAR LOWER BODY CLOTHING: A LITTLE
WALKING IN HOSPITAL ROOM: A LITTLE
STANDING UP FROM CHAIR USING ARMS: A LITTLE
TURNING FROM BACK TO SIDE WHILE IN FLAT BAD: A LITTLE
DRESSING REGULAR UPPER BODY CLOTHING: A LITTLE
MOVING TO AND FROM BED TO CHAIR: A LITTLE
DAILY ACTIVITIY SCORE: 20
MOBILITY SCORE: 17
CLIMB 3 TO 5 STEPS WITH RAILING: A LOT
MOVING FROM LYING ON BACK TO SITTING ON SIDE OF FLAT BED WITH BEDRAILS: A LITTLE

## 2024-03-12 ASSESSMENT — PAIN - FUNCTIONAL ASSESSMENT
PAIN_FUNCTIONAL_ASSESSMENT: 0-10

## 2024-03-12 ASSESSMENT — PAIN DESCRIPTION - LOCATION: LOCATION: HEAD

## 2024-03-12 ASSESSMENT — PAIN DESCRIPTION - DESCRIPTORS: DESCRIPTORS: ACHING

## 2024-03-12 NOTE — PROGRESS NOTES
"Marco A Diallo is a 83 y.o. male on day 1 of admission presenting with Near syncope.      Subjective   Patient sitting up in the chair eating breakfast.  States his head feels like it is \"fuzzy and spinning but nothing around me is spinning.\"  States he cannot \"go home feeling like this.\".  Verbalizes he has never \"felt this way before.\"       Objective     Last Recorded Vitals  /75 (BP Location: Right arm)   Pulse 70   Temp 37.8 °C (100 °F) (Temporal)   Resp 16   Wt 119 kg (261 lb 7.5 oz)   SpO2 93%   Intake/Output last 3 Shifts:    Intake/Output Summary (Last 24 hours) at 3/12/2024 1301  Last data filed at 3/12/2024 1156  Gross per 24 hour   Intake 1340 ml   Output 3800 ml   Net -2460 ml       Admission Weight  Weight: 113 kg (250 lb) (03/11/24 0332)    Daily Weight  03/11/24 : 119 kg (261 lb 7.5 oz)    Image Results  ECG 12 lead    Result Date: 3/11/2024  Sinus rhythm with 1st degree AV block Right bundle branch block Left anterior fascicular block  Bifascicular block  Minimal voltage criteria for LVH, may be normal variant ( R in aVL ) Possible Lateral infarct (cited on or before 09-JAN-2024) Abnormal ECG When compared with ECG of 08-FEB-2024 11:36, Questionable change in initial forces of Lateral leads    CT head wo IV contrast    Result Date: 3/11/2024  Interpreted By:  Finkelstein, Evan, STUDY: CT HEAD WO IV CONTRAST;  3/11/2024 4:52 am   INDICATION: Signs/Symptoms:Headache.   COMPARISON: CT brain 02/08/2025   ACCESSION NUMBER(S): [id number]   ORDERING CLINICIAN: JOVON CHAUDHRY   TECHNIQUE: Axial noncontrast CT images of the head with coronal and sagittal reconstructions.   FINDINGS: EXTRACRANIAL SOFT TISSUES: Unremarkable.   CALVARIUM: No depressed skull fracture. No destructive osseous lesion.   PARANASAL SINUSES/MASTOIDS: The visualized paranasal sinuses and mastoid air cells are aerated.   HEMORRHAGE: No acute intracranial hemorrhage.   BRAIN PARENCHYMA: Gray-white matter interfaces are preserved. " No mass effect or midline shift. There are nonspecific scattered white matter hypodensities.   VENTRICLES and EXTRA-AXIAL SPACES: Parenchymal atrophy with prominence of the ventricles and cortical sulci.   OTHER FINDINGS: There are calcifications within the cavernous carotids       No acute intracranial hemorrhage, mass effect or midline shift.   Nonspecific scattered white matter hypodensities favored to represent sequela of small vessel ischemia.     MACRO: None.   Signed by: Evan Finkelstein 3/11/2024 5:08 AM Dictation workstation:   [id number]    XR chest 1 view    Result Date: 3/11/2024  Interpreted By:  Finkelstein, Evan, STUDY: XR CHEST 1 VIEW;  3/11/2024 4:29 am   INDICATION: Signs/Symptoms:Near syncope.   COMPARISON: Chest radiograph 02/08/2024   ACCESSION NUMBER(S): [id number]   ORDERING CLINICIAN: JOVON CHAUDHRY   FINDINGS: Redemonstrated recording device overlying the left hemithorax.   CARDIOMEDIASTINAL SILHOUETTE: Enlarged cardiac silhouette is similar compared to prior imaging.   LUNGS: Bibasilar opacities favored to represent atelectasis. No sizable pleural effusion or pneumothorax.   ABDOMEN: No remarkable upper abdominal findings.   BONES: No acute osseous abnormality.       Bibasilar atelectasis.   MACRO: None.   Signed by: Evan Finkelstein 3/11/2024 4:33 AM Dictation workstation:   [id number]       Physical Exam  General Appearance: AAO x 3, not in acute distress  Skin: skin color pink, warm, and dry; no suspicious rashes or lesions  Eyes : PERRL, EOM's intact  ENT: mucous membranes pink and moist  Neck: normocephalic  Respiratory: lungs clear to auscultation anteriorly; no wheezing, rhonchi, or crackles.   Heart: irregular rate and rhythm. telemetry shows a-fib  Abdomen: Nondistended, positive bowel sounds x4, soft,  nontender  Extremities: no edema   Peripheral pulses: normal x4 extremities  Neuro: alert, coherent and conversant, no focal motor deficits    Relevant Results  Results for orders  placed or performed during the hospital encounter of 03/11/24 (from the past 24 hour(s))   CBC   Result Value Ref Range    WBC 6.2 4.4 - 11.3 x10*3/uL    nRBC 0.0 0.0 - 0.0 /100 WBCs    RBC 4.06 (L) 4.50 - 5.90 x10*6/uL    Hemoglobin 12.9 (L) 13.5 - 17.5 g/dL    Hematocrit 38.2 (L) 41.0 - 52.0 %    MCV 94 80 - 100 fL    MCH 31.8 26.0 - 34.0 pg    MCHC 33.8 32.0 - 36.0 g/dL    RDW 12.2 11.5 - 14.5 %    Platelets 174 150 - 450 x10*3/uL   Comprehensive Metabolic Panel   Result Value Ref Range    Glucose 121 (H) 74 - 99 mg/dL    Sodium 136 136 - 145 mmol/L    Potassium 4.1 3.5 - 5.3 mmol/L    Chloride 105 98 - 107 mmol/L    Bicarbonate 27 21 - 32 mmol/L    Anion Gap 8 (L) 10 - 20 mmol/L    Urea Nitrogen 19 6 - 23 mg/dL    Creatinine 1.04 0.50 - 1.30 mg/dL    eGFR 72 >60 mL/min/1.73m*2    Calcium 9.3 8.6 - 10.3 mg/dL    Albumin 3.5 3.4 - 5.0 g/dL    Alkaline Phosphatase 71 33 - 136 U/L    Total Protein 5.6 (L) 6.4 - 8.2 g/dL    AST 11 9 - 39 U/L    Bilirubin, Total 1.3 (H) 0.0 - 1.2 mg/dL    ALT 12 10 - 52 U/L   Iron and TIBC   Result Value Ref Range    Iron 77 35 - 150 ug/dL    UIBC 167 110 - 370 ug/dL    TIBC 244 240 - 445 ug/dL    % Saturation 32 25 - 45 %   Ferritin   Result Value Ref Range    Ferritin 93 20 - 300 ng/mL   Folate   Result Value Ref Range    Folate, Serum >22.3 >5.0 ng/mL   Vitamin B12   Result Value Ref Range    Vitamin B12 480 211 - 911 pg/mL   Vitamin D 25-Hydroxy,Total (for eval of Vitamin D levels)   Result Value Ref Range    Vitamin D, 25-Hydroxy, Total 89 30 - 100 ng/mL   Magnesium   Result Value Ref Range    Magnesium 2.14 1.60 - 2.40 mg/dL   Phosphorus   Result Value Ref Range    Phosphorus 2.9 2.5 - 4.9 mg/dL     Scheduled medications  apixaban, 5 mg, oral, BID  budesonide, 0.5 mg, nebulization, BID  finasteride, 5 mg, oral, Daily  FLUoxetine, 20 mg, oral, Daily  fluticasone, 2 spray, Each Nostril, Daily  losartan, 50 mg, oral, Daily  magnesium oxide, 400 mg, oral, Daily  metoprolol tartrate,  12.5 mg, oral, BID  montelukast, 10 mg, oral, Nightly  multivitamin with minerals, 1 tablet, oral, Daily  oxygen, 3 L/min, inhalation, Nightly  pantoprazole, 20 mg, oral, Daily before breakfast  polyethylene glycol, 17 g, oral, Daily  [Held by provider] simvastatin, 40 mg, oral, Daily  tamsulosin, 0.4 mg, oral, Daily      Continuous medications     PRN medications  PRN medications: acetaminophen, albuterol, lubricating eye drops, lubricating eye drops, meclizine, oxygen     Assessment/Plan      Principal Problem:    Near syncope    Marco A Diallo is a 83 y.o. male with past medical history of hypertension, allergies, BPH, depression, hyperlipidemia, reactive airway disease, pulmonary fibrosis, peripheral arterial disease, asthma, osteoarthritis, vertigo, diverticulitis with perforation, has a loop recorder that was placed in January, CVA, prediabetes, GERD, history of DVT, patent foramen ovale, obstructive sleep apnea with CPAP, anxiety who presented to the emergency department this morning for feeling like he was going to pass out x 2.     Plan:  Presyncope  -Cardiology following-appreciate recommendations.  Recommended restarting metoprolol.  Will restart and monitor heart rate and symptoms.  Recommends following up with the EP as outpatient for possible pacemaker placement  -Patient follows with Dr. Anderson through [facility] and has a follow-up appointment in April  -Patient states he has used meclizine in the past.  Will order as needed.  Flonase restarted for feeling of fullness in his nasal passage.  Denies complaints of pain when sinus area is palpated  -Continue  telemetry  -Continue to hold Flomax at this time  -PT/OT following.  Appreciate recommendations    Fatigue  -Iron studies done and within normal limits    History of DVT  -Continue Eliquis.  Patient has a history of patent foramen ovale and CVA    BPH  -Continue finasteride.  Continue to hold Flomax    Hypertension  -Continue losartan.  Blood pressure  has been within normal limits    Depression  -Continue Prozac    Discharge disposition: Verbalizes he has daughters in the medical field who assists with medication management.  Anticipate return home when medically ready.  Patient may benefit from home health care    Kelle Lord, APRN-CNP

## 2024-03-12 NOTE — PROGRESS NOTES
Per medical team, patient is not yet medically appropriate for discharge today; will likely require another 24 hours in the hospital.  met with patient at bedside to review discharge plan. SW asked about any perceived need for home health care. Patient declined same, stating that patient wishes to be 'out and about' in the community when patient is medically ready. Patient's current plan is to return home when ready with no Care Transitions needs foreseen. Care Transitions to follow and assist should any new needs arise. MARYLIN Velazquez

## 2024-03-12 NOTE — PROGRESS NOTES
"Physical Therapy    Physical Therapy Treatment    Patient Name: Arvin Diallo \"Marco A\"  MRN: [id number]  Today's Date: 3/12/2024  Time Calculation  Start Time: 1241  Stop Time: 1309  Time Calculation (min): 28 min       Assessment/Plan   PT Assessment  PT Assessment Results: Impaired balance, Decreased mobility  Rehab Prognosis: Excellent  End of Session Communication: Bedside nurse  Assessment Comment: Pt. with fair tolerance with therapy session this date.  Pt. c/o a pressure which he keeps pointing to his forehead.  Pt. c/o dizziness which did increase after activities, when he was sitting down.  Pt. ambulated 75ft with rollator, no LOB noted.  OT pushed wheeled recliner behind the patient in case he had increased dizziness.  Pt. able to walk in the chen and carry on conversation; he would turn his head in both directions while talking.  Reviewed gaze stabilization/VOR with patient.  Continue with POC and progress per tolerance to improve functional mobility with greater ease and decreased dizziness.  End of Session Patient Position: Up in chair, Alarm on (Call light within reach)  PT Plan  Inpatient/Swing Bed or Outpatient: Inpatient  PT Plan  Treatment/Interventions: Bed mobility, Transfer training, Gait training, Stair training, Balance training, Neuromuscular re-education, Strengthening, Endurance training, Range of motion, Therapeutic exercise, Therapeutic activity, Home exercise program  PT Plan: Skilled PT  PT Frequency: Daily  PT Discharge Recommendations: Low intensity level of continued care, Moderate intensity level of continued care (24 hour assist; low vs mod depending on hospital course.)  PT Recommended Transfer Status: Assist x1, Assistive device  PT - OK to Discharge: Yes (PT eval complete; ok to d/c once deemed medically appropriate. Recommend follow up with outpatient vestibular therapy.)    Current Problem:  Patient Active Problem List   Diagnosis    Arthritis of left hip    Asymmetric SNHL " (sensorineural hearing loss)    Bronchomalacia    Cerebrovascular accident (CVA) (CMS/HCC)    Degeneration of lumbar intervertebral disc with acute herniation    Diabetes mellitus (CMS/HCC)    Dizziness    Elevated diaphragm    Enlarged prostate    Fatigue    Hyperlipidemia    Lumbar scoliosis    Meniere's disease    Mild HTN    Mitral valve prolapse    Obstructive sleep apnea, adult    Tinnitus of both ears    Vertigo    RAVI (generalized anxiety disorder)    Gastroesophageal reflux disease    Chronic rhinitis    History of DVT (deep vein thrombosis)    Patent foramen ovale    PAD (peripheral artery disease) (CMS/HCC)    Pulmonary fibrosis, unspecified (CMS/HCC)    Mild intermittent asthma without complication    Sore throat    Acute cough    Near syncope       General Visit Information:   PT  Visit  PT Received On: 03/12/24  General  Co-Treatment: OT  Co-Treatment Reason: to maximize safety with ambulation/balance  Prior to Session Communication: Bedside nurse  Patient Position Received: Up in chair, Alarm on  Subjective     Precautions:  Precautions  Medical Precautions: Fall precautions    Vital Signs:  Vital Signs  SpO2: 95 %  Objective     Pain:  Pain Assessment  Pain Assessment: 0-10    Cognition:       Postural Control:       Extremity/Trunk Assessments:                Activity Tolerance:       Treatments:     Therapeutic Activity  Therapeutic Activity Performed: Yes  Therapeutic Activity 1: SBA/CGA provided as patient stood in place while OT tossed a ball to patient at various angles.        Ambulation/Gait Training  Ambulation/Gait Training Performed: Yes  Ambulation/Gait Training 1  Surface 1: Level tile  Device 1: Rollator  Gait Support Devices: Gait belt  Assistance 1: Contact guard  Comments/Distance (ft) 1: 75ft.  Transfers  Transfer: Yes  Transfer 1  Transfer From 1: Sit to  Transfer to 1: Stand  Transfer Device 1: Gait belt, Walker  Transfer Level of Assistance 1: Close supervision, Contact  guard  Trials/Comments 1: also transferred to/from toilet  Transfers 2  Transfer From 2: Stand to  Transfer to 2: Sit  Transfer Device 2: Gait belt  Transfer Level of Assistance 2: Close supervision, Contact guard          Outcome Measures:  [facility] Basic Mobility  Turning from your back to your side while in a flat bed without using bedrails: A little  Moving from lying on your back to sitting on the side of a flat bed without using bedrails: A little  Moving to and from bed to chair (including a wheelchair): A little  Standing up from a chair using your arms (e.g. wheelchair or bedside chair): A little  To walk in hospital room: A little  Climbing 3-5 steps with railing: A lot  Basic Mobility - Total Score: 17  Education Documentation  Mobility Training, taught by Mary Rachel PTA at 3/12/2024  1:27 PM.  Learner: Patient  Readiness: Acceptance  Method: Explanation  Response: Verbalizes Understanding    Education Comments  No comments found.        EDUCATION:     Encounter Problems       Encounter Problems (Active)       PT Problem       Pt will demonstrate sup > sit and sit > sup bed mobility mod I (Progressing)       Start:  03/11/24    Expected End:  03/25/24            Pt will demo sit > stand and stand > sit transfer with rollator mod I  (Progressing)       Start:  03/11/24    Expected End:  03/25/24            Pt will ambulate 150' with rollator and mod I, without LOB  (Progressing)       Start:  03/11/24    Expected End:  03/25/24            Pt will demonstrate understanding of vestibular HEP (Progressing)       Start:  03/11/24    Expected End:  03/25/24               Pain - Adult

## 2024-03-12 NOTE — PROGRESS NOTES
"Subjective Data:  Patient reports feeling well, no new adverse events overnight. Patient denies any chest pain, shortness of breath, palpitations, dizziness or syncope. Patient is hemodynamically stable.     Overnight Events:    No     Objective Data:  Last Recorded Vitals:  Vitals:    03/11/24 2322 03/12/24 0300 03/12/24 0645 03/12/24 0700   BP:  123/69  133/75   BP Location:    Right arm   Patient Position:       Pulse:  73  70   Resp:  16  16   Temp:  36.2 °C (97.2 °F)  37.8 °C (100 °F)   TempSrc:  Temporal  Temporal   SpO2: 96% 94% 92% 94%   Weight:       Height:           Last Labs:  CBC - 3/12/2024:  4:33 AM  6.2 12.9 174    38.2      CMP - 3/12/2024:  4:33 AM  9.3 5.6 11 --- 1.3   2.9 3.5 12 71      PTT - No results in last year.  _   _ _     TROPHS   Date/Time Value Ref Range Status   03/11/2024 04:40 AM 16 0 - 20 ng/L Final   02/08/2024 01:24 PM 14 0 - 20 ng/L Final   02/08/2024 11:58 AM 14 0 - 20 ng/L Final     BNP   Date/Time Value Ref Range Status   03/11/2024 04:40 AM 52 0 - 99 pg/mL Final   02/08/2024 11:58 AM 74 0 - 99 pg/mL Final     HGBA1C   Date/Time Value Ref Range Status   12/18/2023 12:00 AM 6.5 % Final     Comment:     [facility]   12/15/2023 09:41 AM 6.5 4.0 - 5.6 % Final   04/10/2023 09:35 AM 6.2 4.0 - 5.6 % Final     LDLCALC   Date/Time Value Ref Range Status   12/15/2023 12:00 AM 35 mg/dL Final     VLDL   Date/Time Value Ref Range Status   08/12/2022 10:00 AM 24 0 - 40 mg/dL Final      Last I/O:  I/O last 3 completed shifts:  In: 1740 (14.7 mL/kg) [P.O.:740; IV Piggyback:1000]  Out: 3175 (26.8 mL/kg) [Urine:3175 (0.7 mL/kg/hr)]  Weight: 118.6 kg     Past Cardiology Tests (Last 3 Years):  EKG:  ECG 12 lead 03/11/2024 (Preliminary)      ECG 12 Lead 02/08/2024      ECG 12 Lead 01/10/2024    Echo:  Transthoracic echo (TTE) complete     Ejection Fractions:  No results found for: \"EF\"  Cath:  No results found for this or any previous visit from the past 1095 days.    Stress Test:  No results " found for this or any previous visit from the past 1095 days.    Cardiac Imaging:  XR CHEST ABDOMEN FOR OG NG PLACEMENT 08/18/2021      Inpatient Medications:  Scheduled medications   Medication Dose Route Frequency    apixaban  5 mg oral BID    budesonide  0.5 mg nebulization BID    finasteride  5 mg oral Daily    FLUoxetine  20 mg oral Daily    fluticasone  2 spray Each Nostril Daily    losartan  50 mg oral Daily    magnesium oxide  400 mg oral Daily    [Held by provider] metoprolol tartrate  12.5 mg oral BID    montelukast  10 mg oral Nightly    multivitamin with minerals  1 tablet oral Daily    oxygen  3 L/min inhalation Nightly    pantoprazole  20 mg oral Daily before breakfast    polyethylene glycol  17 g oral Daily    [Held by provider] simvastatin  40 mg oral Daily    tamsulosin  0.4 mg oral Daily     PRN medications   Medication    acetaminophen    albuterol    lubricating eye drops    lubricating eye drops    oxygen     Continuous Medications   Medication Dose Last Rate       Physical Exam:  General: alert, oriented and in no acute distress  HEENT: NC/AT; EOMI; PERRLA, external ear is normal  Neck: supple; trachea midline; no masses; no JVD  Chest: clear breath sounds bilaterally; no wheezing  Cardio: regular rhythm, S1S2 normal, no murmurs  Abdomen: Soft, non-tender, non-distension, no organomegaly  Extremities: no clubbing/cyanosis/edema  Neuro: Grossly intact     Psychiatric: Normal mood and affect      Assessment/Plan     Mr. Marco A Diallo is a 83 y.o. non-smoker male being consulted by the Cardiology team for near syncope. Patient with past medical history significant for hypertension, allergies, BPH, depression, hyperlipidemia, reactive airway disease, pulmonary fibrosis, peripheral arterial disease, asthma, osteoarthritis, vertigo, diverticulitis with perforation, has a loop recorder that was placed in January, CVA, prediabetes, GERD, history of DVT, patent foramen ovale, obstructive sleep apnea with  CPAP, anxiety. He presented to ED [facility] on 03/11/2024 complaining of 2 episodes of near syncope. He states that around 2am, upon getting up to urinate, he felt dizzy and almost passed out, but he did not lose consciousness. He denies chest pain, shortness of breath, palpitations, leg edema, lightheadedness, headaches, fever, chills, orthopnea or paroxysmal nocturnal dyspnea. Vital signs on presentation to the ER temperature 98.1, pulse ox 89%, pulse 64, blood pressure 140/76.  Oxygen then was started and is now 93%.  Blood work was insignificant with a normal white count, hemoglobin, hematocrit, normal troponin, negative UA, normal BNP, negative for flu AB and COVID, CT scan of the head showed nonspecific scattered white matter hypodensities favored to represent sequela of small vessel ischemia but no acute intracranial hemorrhage mass effect or midline shift.  EKG showed sinus rhythm with RBBB and LAFB, no signs of ACS.  Chest x-ray was negative for pneumonia.  Patient was admitted for clinical compensation.     Assessment     # Near syncope  - KG showed sinus rhythm with bifascicular block:  RBBB and LAFB, no signs of ACS.   - Patient with loop recorder from January 2023.  - Would suggest EP evaluation as outpatient with loop recorder reading - patient may need permanent pacemaker.  - Would suggest to keep Metoprolol tartrate 12.5mg BID.  - Follow up with EP team     # History of DVT  - Patient on DOAC - Eliquis  - Would suggest to rediscuss DOAC; if the risk for falls is high due to near syncopal episodes, with increased risk for bleeding, would suggest to stop DOAC. Will defer decision to Primary Care Team.     # Hypertension  - Controlled blood pressure.  - Keep current medications including Losartan 50mg daily.  - Patient counseled to keep a healthy lifestyle including regular exercise and low-sodium diet.  - Recommended home blood pressure monitoring.  - Goal of BP < 130/80mmHg.      #  Hyperlipidemia  - Keep home medication with Sinvastatin 40mg daily.  - Counseled on healthy diet and regular exercise.      Thank you for allowing me to participate in the care of this patient. Please reach me out if you have any questions or if you need any clarifications regarding the patient's care.     Peripheral IV 03/11/24 20 G Right;Ventral Hand (Active)   Site Assessment Clean;Dry;Intact 03/12/24 1000   Dressing Status Clean;Dry 03/12/24 1000   Number of days: 1     Code Status:  Full Code    James Costa MD

## 2024-03-12 NOTE — CARE PLAN
Problem: Pain - Adult  Goal: Verbalizes/displays adequate comfort level or baseline comfort level  Outcome: Progressing     Problem: Safety - Adult  Goal: Free from fall injury  Outcome: Progressing     Problem: Discharge Planning  Goal: Discharge to home or other facility with appropriate resources  Outcome: Progressing     Problem: Chronic Conditions and Co-morbidities  Goal: Patient's chronic conditions and co-morbidity symptoms are monitored and maintained or improved  Outcome: Progressing     Problem: Fall/Injury  Goal: Not fall by end of shift  Outcome: Progressing  Goal: Be free from injury by end of the shift  Outcome: Progressing  Goal: Verbalize understanding of personal risk factors for fall in the hospital  Outcome: Progressing  Goal: Verbalize understanding of risk factor reduction measures to prevent injury from fall in the home  Outcome: Progressing  Goal: Use assistive devices by end of the shift  Outcome: Progressing  Goal: Pace activities to prevent fatigue by end of the shift  Outcome: Progressing     Problem: Diabetes  Goal: Achieve decreasing blood glucose levels by end of shift  Outcome: Progressing  Goal: Increase stability of blood glucose readings by end of shift  Outcome: Progressing  Goal: Maintain electrolyte levels within acceptable range throughout shift  Outcome: Progressing  Goal: Maintain glucose levels >70mg/dl to <250mg/dl throughout shift  Outcome: Progressing  Goal: No changes in neurological exam by end of shift  Outcome: Progressing  Goal: Learn about and adhere to nutrition recommendations by end of shift  Outcome: Progressing  Goal: Vital signs within normal range for age by end of shift  Outcome: Progressing     Problem: Pain  Goal: Takes deep breaths with improved pain control throughout the shift  Outcome: Progressing  Goal: Turns in bed with improved pain control throughout the shift  Outcome: Progressing  Goal: Walks with improved pain control throughout the  shift  Outcome: Progressing  Goal: Performs ADL's with improved pain control throughout shift  Outcome: Progressing  Goal: Participates in PT with improved pain control throughout the shift  Outcome: Progressing  Goal: Free from opioid side effects throughout the shift  Outcome: Progressing  Goal: Free from acute confusion related to pain meds throughout the shift  Outcome: Progressing   The patient's goals for the shift include      The clinical goals for the shift include no dizziness, no falls    Over the shift, the patient did not make progress toward the following goals. Barriers to progression include . Recommendations to address these barriers include .

## 2024-03-12 NOTE — PROGRESS NOTES
"Occupational Therapy    OT Treatment    Patient Name: Arvin Diallo \"[place]\"  MRN: [id number]  Today's Date: 3/12/2024  Time Calculation  Start Time: 1239  Stop Time: 1309  Time Calculation (min): 30 min         Assessment:  pt with improved mobility and ADL tasks with less c/o feeling dizzy and/or lightheaded.  Pt states \"this morning I felt terrible though\"        Plan:  Treatment Interventions: ADL retraining, Functional transfer training, Endurance training, Neuromuscular reeducation  OT Frequency: 5 times per week  OT Discharge Recommendations: Moderate intensity level of continued care  OT Recommended Transfer Status: Assist of 1  OT - OK to Discharge: Yes (ok to DC once medically stable)  Treatment Interventions: ADL retraining, Functional transfer training, Endurance training, Neuromuscular reeducation    Subjective        General:  General  Co-Treatment: PT  Co-Treatment Reason: to maximize pt safety  Prior to Session Communication: Bedside nurse  Patient Position Received: Bed, 3 rail up, Alarm on      Objective         Therapy/Activity: Therapeutic Activity  Therapeutic Activity Performed: Yes (pt engaged in dynamic balance activities in unsupported stand, tossing ball back in forth while incorporating lateral head movements.  pt states that the activity did produce some mild \"dizziness.\"  pt is educated on pausing activities even at home to allow sensation to pass prior to continuation of new task.   Pt able to return demo this skill yet needs min VCs)      Outcome Measures:[facility] Daily Activity  Putting on and taking off regular lower body clothing: A little  Bathing (including washing, rinsing, drying): A little  Putting on and taking off regular upper body clothing: A little  Toileting, which includes using toilet, bedpan or urinal: A little  Taking care of personal grooming such as brushing teeth: None  Eating Meals: None  Daily Activity - Total Score: 20        Goals:  Encounter Problems       Encounter " Problems (Active)       ADLs       Patient will perform LB bathing  with supervision level of assistance. (Progressing)       Start:  03/11/24    Expected End:  03/25/24            Patient with complete lower body dressing with supervision level of assistance donning all LE clothes  with PRN adaptive equipment (Progressing)       Start:  03/11/24    Expected End:  03/25/24            Patient will complete toileting including hygiene clothing management/hygiene with supervision level of assistance. (Progressing)       Start:  03/11/24    Expected End:  03/25/24               MOBILITY       Patient will perform Functional mobility stand by assist level of assistance in order to improve safety and functional mobility. (Progressing)       Start:  03/11/24    Expected End:  03/25/24

## 2024-03-12 NOTE — PROGRESS NOTES
"Music Therapy Note    Arvin WEST \"Bud\" Imani was referred by Blanca Santos, LYNNETTE.     Therapy Session  Referral Type: New referral this admission  Visit Type: New visit  Session Start Time: 1131  Session End Time: 1139  Intervention Delivery: In-person  Conflict of Service: None  Number of family members present: 1  Family Present for Session: Child  Family Participation: Supportive     Pre-assessment  Unable to Assess Reason: Outcomes not assessed  Mood/Affect: Calm, Appropriate         Treatment/Interventions  Music Therapy Interventions: Assessment    Post-assessment  Unable to Assess Reason: Did not provide expressive therapy intervention  Mood/Affect: Calm, Appropriate  Total Session Time (min): 8 minutes    Narrative  Assessment Detail: Pt found resting in bed, awake/alert, upon arrival of music therapist, with adopted Daughter Delmy present. Pt expressed that he was admitted at 3am and that his EMT niece helped him decide whether to go to ER or not. Pt stating that he's been experiencing fatigue and struggling with his health for awhile before this. Pt receptive to music therapy education and rack card. Pt stating his favorite music is country. Pt open to follow-up this afternoon. Upon second arrival of MT at 3:15, pt asleep.  Follow-up: MT will follow-up as applicable.    Education Documentation  Relaxation, taught by DENISSE Quintero at 3/12/2024  9:04 AM.  Learner: Family, Patient  Readiness: Acceptance  Method: Handout  Response: Verbalizes Understanding    Integrative Health, taught by DENISSE Quintero at 3/12/2024  9:04 AM.  Learner: Family, Patient  Readiness: Acceptance  Method: Handout  Response: Verbalizes Understanding            "

## 2024-03-13 ENCOUNTER — APPOINTMENT (OUTPATIENT)
Dept: CARDIOLOGY | Facility: HOSPITAL | Age: 84
DRG: 287 | End: 2024-03-13
Payer: MEDICARE

## 2024-03-13 PROBLEM — R07.89 OTHER CHEST PAIN: Status: ACTIVE | Noted: 2024-03-11

## 2024-03-13 LAB
ANION GAP SERPL CALC-SCNC: 9 MMOL/L (ref 10–20)
AORTIC VALVE MEAN GRADIENT: 3 MMHG
AORTIC VALVE PEAK VELOCITY: 1.12 M/S
AV PEAK GRADIENT: 5 MMHG
AVA (PEAK VEL): 4.91 CM2
AVA (VTI): 4.42 CM2
BODY SURFACE AREA: 2.51 M2
BUN SERPL-MCNC: 17 MG/DL (ref 6–23)
CALCIUM SERPL-MCNC: 9.4 MG/DL (ref 8.6–10.3)
CARDIAC TROPONIN I PNL SERPL HS: 14 NG/L (ref 0–20)
CARDIAC TROPONIN I PNL SERPL HS: 16 NG/L (ref 0–20)
CHLORIDE SERPL-SCNC: 106 MMOL/L (ref 98–107)
CO2 SERPL-SCNC: 25 MMOL/L (ref 21–32)
CREAT SERPL-MCNC: 0.86 MG/DL (ref 0.5–1.3)
EGFRCR SERPLBLD CKD-EPI 2021: 86 ML/MIN/1.73M*2
EJECTION FRACTION APICAL 4 CHAMBER: 57.4
EJECTION FRACTION: 55 %
ERYTHROCYTE [DISTWIDTH] IN BLOOD BY AUTOMATED COUNT: 12.3 % (ref 11.5–14.5)
GLUCOSE SERPL-MCNC: 122 MG/DL (ref 74–99)
HCT VFR BLD AUTO: 37.9 % (ref 41–52)
HGB BLD-MCNC: 13.1 G/DL (ref 13.5–17.5)
LEFT ATRIUM VOLUME AREA LENGTH INDEX BSA: 19.4 ML/M2
LEFT VENTRICLE INTERNAL DIMENSION DIASTOLE: 5.49 CM (ref 3.5–6)
LEFT VENTRICULAR OUTFLOW TRACT DIAMETER: 2.5 CM
MCH RBC QN AUTO: 32.6 PG (ref 26–34)
MCHC RBC AUTO-ENTMCNC: 34.6 G/DL (ref 32–36)
MCV RBC AUTO: 94 FL (ref 80–100)
MITRAL VALVE E/A RATIO: 0.63
NRBC BLD-RTO: 0 /100 WBCS (ref 0–0)
PLATELET # BLD AUTO: 181 X10*3/UL (ref 150–450)
PMV BLD AUTO: 9.6 FL (ref 7.5–11.5)
POTASSIUM SERPL-SCNC: 4.2 MMOL/L (ref 3.5–5.3)
RBC # BLD AUTO: 4.02 X10*6/UL (ref 4.5–5.9)
RIGHT VENTRICLE FREE WALL PEAK S': 14 CM/S
SODIUM SERPL-SCNC: 136 MMOL/L (ref 136–145)
WBC # BLD AUTO: 5.9 X10*3/UL (ref 4.4–11.3)

## 2024-03-13 PROCEDURE — 36415 COLL VENOUS BLD VENIPUNCTURE: CPT

## 2024-03-13 PROCEDURE — 94761 N-INVAS EAR/PLS OXIMETRY MLT: CPT

## 2024-03-13 PROCEDURE — 2500000001 HC RX 250 WO HCPCS SELF ADMINISTERED DRUGS (ALT 637 FOR MEDICARE OP): Performed by: INTERNAL MEDICINE

## 2024-03-13 PROCEDURE — 36415 COLL VENOUS BLD VENIPUNCTURE: CPT | Performed by: INTERNAL MEDICINE

## 2024-03-13 PROCEDURE — 93005 ELECTROCARDIOGRAM TRACING: CPT

## 2024-03-13 PROCEDURE — 93306 TTE W/DOPPLER COMPLETE: CPT | Performed by: STUDENT IN AN ORGANIZED HEALTH CARE EDUCATION/TRAINING PROGRAM

## 2024-03-13 PROCEDURE — 2500000004 HC RX 250 GENERAL PHARMACY W/ HCPCS (ALT 636 FOR OP/ED)

## 2024-03-13 PROCEDURE — 2500000002 HC RX 250 W HCPCS SELF ADMINISTERED DRUGS (ALT 637 FOR MEDICARE OP, ALT 636 FOR OP/ED): Mod: MUE | Performed by: PHYSICIAN ASSISTANT

## 2024-03-13 PROCEDURE — 93306 TTE W/DOPPLER COMPLETE: CPT

## 2024-03-13 PROCEDURE — 2500000001 HC RX 250 WO HCPCS SELF ADMINISTERED DRUGS (ALT 637 FOR MEDICARE OP): Performed by: PHYSICIAN ASSISTANT

## 2024-03-13 PROCEDURE — 93010 ELECTROCARDIOGRAM REPORT: CPT | Performed by: STUDENT IN AN ORGANIZED HEALTH CARE EDUCATION/TRAINING PROGRAM

## 2024-03-13 PROCEDURE — 1200000002 HC GENERAL ROOM WITH TELEMETRY DAILY

## 2024-03-13 PROCEDURE — 80048 BASIC METABOLIC PNL TOTAL CA: CPT

## 2024-03-13 PROCEDURE — 84484 ASSAY OF TROPONIN QUANT: CPT

## 2024-03-13 PROCEDURE — 99232 SBSQ HOSP IP/OBS MODERATE 35: CPT

## 2024-03-13 PROCEDURE — 2500000001 HC RX 250 WO HCPCS SELF ADMINISTERED DRUGS (ALT 637 FOR MEDICARE OP)

## 2024-03-13 PROCEDURE — 97110 THERAPEUTIC EXERCISES: CPT | Mod: GP,CQ

## 2024-03-13 PROCEDURE — 97116 GAIT TRAINING THERAPY: CPT | Mod: GP,CQ

## 2024-03-13 PROCEDURE — 2500000004 HC RX 250 GENERAL PHARMACY W/ HCPCS (ALT 636 FOR OP/ED): Performed by: HOSPITALIST

## 2024-03-13 PROCEDURE — 94640 AIRWAY INHALATION TREATMENT: CPT | Mod: MUE

## 2024-03-13 PROCEDURE — 84484 ASSAY OF TROPONIN QUANT: CPT | Performed by: INTERNAL MEDICINE

## 2024-03-13 PROCEDURE — 2500000005 HC RX 250 GENERAL PHARMACY W/O HCPCS: Performed by: PHYSICIAN ASSISTANT

## 2024-03-13 PROCEDURE — 2500000004 HC RX 250 GENERAL PHARMACY W/ HCPCS (ALT 636 FOR OP/ED): Performed by: PHYSICIAN ASSISTANT

## 2024-03-13 PROCEDURE — 85027 COMPLETE CBC AUTOMATED: CPT

## 2024-03-13 RX ORDER — LIDOCAINE HYDROCHLORIDE 20 MG/ML
15 SOLUTION OROPHARYNGEAL ONCE AS NEEDED
Status: DISCONTINUED | OUTPATIENT
Start: 2024-03-13 | End: 2024-03-15 | Stop reason: HOSPADM

## 2024-03-13 RX ORDER — ALUMINUM HYDROXIDE, MAGNESIUM HYDROXIDE, AND SIMETHICONE 1200; 120; 1200 MG/30ML; MG/30ML; MG/30ML
15 SUSPENSION ORAL ONCE AS NEEDED
Status: DISCONTINUED | OUTPATIENT
Start: 2024-03-13 | End: 2024-03-15 | Stop reason: HOSPADM

## 2024-03-13 RX ORDER — NITROGLYCERIN 0.4 MG/1
0.4 TABLET SUBLINGUAL EVERY 5 MIN PRN
Status: DISCONTINUED | OUTPATIENT
Start: 2024-03-13 | End: 2024-03-15 | Stop reason: HOSPADM

## 2024-03-13 RX ORDER — NAPROXEN SODIUM 220 MG/1
325 TABLET, FILM COATED ORAL DAILY
Status: COMPLETED | OUTPATIENT
Start: 2024-03-14 | End: 2024-03-14

## 2024-03-13 RX ADMIN — POLYVINYL ALCOHOL, POVIDONE 1 DROP: 14; 6 SOLUTION/ DROPS OPHTHALMIC at 08:24

## 2024-03-13 RX ADMIN — METOPROLOL TARTRATE 12.5 MG: 25 TABLET, FILM COATED ORAL at 20:18

## 2024-03-13 RX ADMIN — NITROGLYCERIN 0.4 MG: 0.4 TABLET SUBLINGUAL at 08:08

## 2024-03-13 RX ADMIN — NITROGLYCERIN 0.4 MG: 0.4 TABLET SUBLINGUAL at 08:23

## 2024-03-13 RX ADMIN — ALBUTEROL SULFATE 2.5 MG: 2.5 SOLUTION RESPIRATORY (INHALATION) at 06:12

## 2024-03-13 RX ADMIN — Medication 3 L/MIN: at 21:00

## 2024-03-13 RX ADMIN — Medication 400 MG: at 08:18

## 2024-03-13 RX ADMIN — ACETAMINOPHEN 650 MG: 325 TABLET ORAL at 07:31

## 2024-03-13 RX ADMIN — LOSARTAN POTASSIUM 50 MG: 50 TABLET, FILM COATED ORAL at 08:18

## 2024-03-13 RX ADMIN — TAMSULOSIN HYDROCHLORIDE 0.4 MG: 0.4 CAPSULE ORAL at 20:17

## 2024-03-13 RX ADMIN — BUDESONIDE 0.5 MG: 0.5 INHALANT RESPIRATORY (INHALATION) at 18:27

## 2024-03-13 RX ADMIN — FLUOXETINE 20 MG: 20 CAPSULE ORAL at 08:19

## 2024-03-13 RX ADMIN — MONTELUKAST 10 MG: 10 TABLET, FILM COATED ORAL at 20:17

## 2024-03-13 RX ADMIN — FINASTERIDE 5 MG: 5 TABLET, FILM COATED ORAL at 20:17

## 2024-03-13 RX ADMIN — PERFLUTREN 2 ML OF DILUTION: 6.52 INJECTION, SUSPENSION INTRAVENOUS at 09:55

## 2024-03-13 RX ADMIN — FLUTICASONE PROPIONATE 2 SPRAY: 50 SPRAY, METERED NASAL at 08:24

## 2024-03-13 RX ADMIN — ALBUTEROL SULFATE 2.5 MG: 2.5 SOLUTION RESPIRATORY (INHALATION) at 18:22

## 2024-03-13 RX ADMIN — POLYETHYLENE GLYCOL 3350 17 G: 17 POWDER, FOR SOLUTION ORAL at 08:24

## 2024-03-13 RX ADMIN — POLYVINYL ALCOHOL, POVIDONE 1 DROP: 14; 6 SOLUTION/ DROPS OPHTHALMIC at 18:37

## 2024-03-13 RX ADMIN — BUDESONIDE 0.5 MG: 0.5 INHALANT RESPIRATORY (INHALATION) at 06:22

## 2024-03-13 RX ADMIN — POLYVINYL ALCOHOL, POVIDONE 1 DROP: 14; 6 SOLUTION/ DROPS OPHTHALMIC at 20:20

## 2024-03-13 RX ADMIN — MULTIPLE VITAMINS W/ MINERALS TAB 1 TABLET: TAB at 08:19

## 2024-03-13 RX ADMIN — PANTOPRAZOLE SODIUM 20 MG: 20 TABLET, DELAYED RELEASE ORAL at 08:35

## 2024-03-13 RX ADMIN — METOPROLOL TARTRATE 12.5 MG: 25 TABLET, FILM COATED ORAL at 08:36

## 2024-03-13 ASSESSMENT — PAIN SCALES - GENERAL
PAINLEVEL_OUTOF10: 0 - NO PAIN
PAINLEVEL_OUTOF10: 5 - MODERATE PAIN
PAINLEVEL_OUTOF10: 0 - NO PAIN
PAINLEVEL_OUTOF10: 5 - MODERATE PAIN
PAINLEVEL_OUTOF10: 0 - NO PAIN
PAINLEVEL_OUTOF10: 10 - WORST POSSIBLE PAIN

## 2024-03-13 ASSESSMENT — COGNITIVE AND FUNCTIONAL STATUS - GENERAL
TURNING FROM BACK TO SIDE WHILE IN FLAT BAD: A LITTLE
TURNING FROM BACK TO SIDE WHILE IN FLAT BAD: A LITTLE
MOBILITY SCORE: 17
STANDING UP FROM CHAIR USING ARMS: A LITTLE
DAILY ACTIVITIY SCORE: 20
TOILETING: A LITTLE
MOBILITY SCORE: 17
CLIMB 3 TO 5 STEPS WITH RAILING: A LOT
CLIMB 3 TO 5 STEPS WITH RAILING: A LITTLE
MOBILITY SCORE: 20
MOVING FROM LYING ON BACK TO SITTING ON SIDE OF FLAT BED WITH BEDRAILS: A LITTLE
MOVING FROM LYING ON BACK TO SITTING ON SIDE OF FLAT BED WITH BEDRAILS: A LITTLE
WALKING IN HOSPITAL ROOM: A LITTLE
MOVING TO AND FROM BED TO CHAIR: A LITTLE
WALKING IN HOSPITAL ROOM: A LITTLE
STANDING UP FROM CHAIR USING ARMS: A LITTLE
WALKING IN HOSPITAL ROOM: A LITTLE
DRESSING REGULAR UPPER BODY CLOTHING: A LITTLE
CLIMB 3 TO 5 STEPS WITH RAILING: A LOT
MOVING TO AND FROM BED TO CHAIR: A LITTLE
MOVING FROM LYING ON BACK TO SITTING ON SIDE OF FLAT BED WITH BEDRAILS: A LITTLE
STANDING UP FROM CHAIR USING ARMS: A LITTLE
DRESSING REGULAR LOWER BODY CLOTHING: A LITTLE
HELP NEEDED FOR BATHING: A LITTLE

## 2024-03-13 ASSESSMENT — PAIN - FUNCTIONAL ASSESSMENT
PAIN_FUNCTIONAL_ASSESSMENT: 0-10

## 2024-03-13 ASSESSMENT — PAIN DESCRIPTION - LOCATION: LOCATION: CHEST

## 2024-03-13 ASSESSMENT — PAIN DESCRIPTION - ORIENTATION: ORIENTATION: MID

## 2024-03-13 NOTE — PROGRESS NOTES
"Physical Therapy    Physical Therapy Treatment    Patient Name: Arvin Diallo \"[place]\"  MRN: [id number]  Today's Date: 3/13/2024  Time Calculation  Start Time: 1151  Stop Time: 1218  Time Calculation (min): 27 min       Assessment/Plan   PT Assessment  Assessment Comment:  Bedside nurse okayed patient to cont therapy. Patient awake and alert in room sitting in recliner. Patient reported no chest pain and had vitals WNL for therapy session. Patient tolerated seated exercises with noted decreased strength in R ankle. Patient demonstrated fair understanding of VOR exercices with verbal explanation and review. Patient denied dizziness symptoms during seated exercises but had some dizziness during standing VOR and ambulation. Sxs resolved with standing rest break. Patient able to ambulate 136' with light conversation. Patient demonstrated decreased step length and deminished heel strike on R LE. Patient left in room with lunch. Plan to continue VOR exercises in standing to help reduce symptoms of dizziness during ambulation and ADL's.  Paula Saba SPTA all treatment and clinical decision making directly supervised by Delmy Snow PTA [id number]  End of Session Patient Position: Up in chair, Alarm on  PT Plan  Inpatient/Swing Bed or Outpatient: Inpatient  PT Plan  Treatment/Interventions: Bed mobility, Transfer training, Gait training, Stair training, Balance training, Neuromuscular re-education, Strengthening, Endurance training, Range of motion, Therapeutic exercise, Therapeutic activity, Home exercise program  PT Plan: Skilled PT  PT Frequency: Daily  PT Discharge Recommendations: Low intensity level of continued care, Moderate intensity level of continued care (24 hour assist; low vs mod depending on hospital course.)  PT Recommended Transfer Status: Assist x1, Assistive device  PT - OK to Discharge: Yes (PT eval complete; ok to d/c once deemed medically appropriate. Recommend follow up with outpatient vestibular " therapy.)      General Visit Information:   PT  Visit  PT Received On: 03/13/24       Subjective  He denies chest pain and reported mild dizziness with standing VOR exercises.   Precautions:     Vital Signs:  Vital Signs  Heart Rate: 56  SpO2: 92 %    Objective   Pain:  Pain Assessment  Pain Assessment: 0-10  Pain Score: 0 - No pain (Simultaneous filing. User may not have seen previous data.)  Pain Type: Acute pain  Pain Location: Chest  Pain Orientation: Mid  Cognition:  Cognition  Overall Cognitive Status: Within Functional Limits  Postural Control:     Extremity/Trunk Assessments:    Activity Tolerance:     Treatments:  Therapeutic Exercise  Therapeutic Exercise Performed: Yes  Therapeutic Exercise Activity 1: Seated HR/TR x 10  Therapeutic Exercise Activity 2: LAQ x 10  Therapeutic Exercise Activity 3: Seated Marches x 10  Therapeutic Exercise Activity 4: Resisted HSC x 10  Therapeutic Exercise Activity 5: Hip Add ISO x 10  Therapeutic Exercise Activity 6: Clamshells x 10  Therapeutic Exercise Activity 7: Bilat Resisted DF x 10    Balance/Neuromuscular Re-Education  Balance/Neuromuscular Re-Education Activity Performed: Yes  Balance/Neuromuscular Re-Education Activity 1: Seated VOR  Balance/Neuromuscular Re-Education Activity 2: Standing Marches x 10  Balance/Neuromuscular Re-Education Activity 3: Standing HR/TR x 10  Balance/Neuromuscular Re-Education Activity 4: Lateral Step lunges x 10         Ambulation/Gait Training  Ambulation/Gait Training Performed: Yes  Ambulation/Gait Training 1  Surface 1: Level tile  Device 1: Rollator  Gait Support Devices: Gait belt  Assistance 1: Contact guard  Quality of Gait 1: Diminished heel strike  Comments/Distance (ft) 1: 136'  Transfers  Transfer: Yes  Transfer 1  Transfer From 1: Chair with arms to  Transfer to 1: Chair with arms  Technique 1: Sit to stand, Stand to sit  Transfer Device 1: Gait belt  Transfer Level of Assistance 1: Contact guard  Trials/Comments 1: one  trial, requires cue to reach back for armwrests for stand to sit    Outcome Measures:  [facility] Basic Mobility  Turning from your back to your side while in a flat bed without using bedrails: A little  Moving from lying on your back to sitting on the side of a flat bed without using bedrails: None  Moving to and from bed to chair (including a wheelchair): None  Standing up from a chair using your arms (e.g. wheelchair or bedside chair): A little  To walk in hospital room: A little  Climbing 3-5 steps with railing: A little  Basic Mobility - Total Score: 20    Education Documentation  Home Exercise Program, taught by ERIKA Hernandez at 3/13/2024  1:50 PM.  Learner: Patient  Readiness: Acceptance  Method: Explanation  Response: Verbalizes Understanding    Mobility Training, taught by ERIKA Hernandez at 3/13/2024  1:50 PM.  Learner: Patient  Readiness: Acceptance  Method: Explanation  Response: Verbalizes Understanding    Education Comments  No comments found.        OP EDUCATION:       Encounter Problems       Encounter Problems (Active)       PT Problem       Pt will demonstrate sup > sit and sit > sup bed mobility mod I (Progressing)       Start:  03/11/24    Expected End:  03/25/24            Pt will demo sit > stand and stand > sit transfer with rollator mod I  (Progressing)       Start:  03/11/24    Expected End:  03/25/24            Pt will ambulate 150' with rollator and mod I, without LOB  (Progressing)       Start:  03/11/24    Expected End:  03/25/24            Pt will demonstrate understanding of vestibular HEP (Progressing)       Start:  03/11/24    Expected End:  03/25/24               Pain - Adult

## 2024-03-13 NOTE — PROGRESS NOTES
Marco A Arnold is a 83 y.o. male on day 2 of admission presenting with Near syncope.      Subjective   Patient lying in bed with nursing at bedside.  Complains of midsternal chest pain.  Patient is very vague in description and believes that pain is a 5 out of 10.  Patient to describe pain and he is able to say pain is sharp and constant.  Oxygen at 3 L was applied.  Complain of frontal headache that he has had for a couple of weeks.       Objective     Last Recorded Vitals  /70 (BP Location: Right arm, Patient Position: Lying)   Pulse 63   Temp 36.9 °C (98.4 °F) (Temporal)   Resp 18   Wt 119 kg (261 lb 7.5 oz)   SpO2 94%   Intake/Output last 3 Shifts:    Intake/Output Summary (Last 24 hours) at 3/13/2024 1200  Last data filed at 3/13/2024 0835  Gross per 24 hour   Intake 320 ml   Output 3200 ml   Net -2880 ml       Admission Weight  Weight: 113 kg (250 lb) (03/11/24 0332)    Daily Weight  03/11/24 : 119 kg (261 lb 7.5 oz)    Image Results  Transthoracic Echo (TTE) [place] [address]  Phone [phone], Fax [phone]    TRANSTHORACIC ECHOCARDIOGRAM REPORT       Patient Name:      MEME ARNOLD       Reading Physician:    [id number] Riley Dobson MD  Study Date:        3/13/2024             Ordering Provider:    [id number] JUAN SAMS  MRN/PID:           [id number]              Fellow:  Accession#:        [id number]          Nurse:                Sarita Rendon RN  Date of Birth/Age: 1940 / 83 years  Sonographer:          Loco Fowler RDCS  Gender:            M                     Additional Staff:  Height:            190.50 cm             Admit Date:           3/12/2024  Weight:            117.94 kg             Admission Status:     Inpatient - STAT  BSA / BMI:         2.45 m2 / 32.50 kg/m2  Department Location:  [address]  Blood Pressure: 124 /71 mmHg    Study Type:    TRANSTHORACIC ECHO (TTE) COMPLETE  Diagnosis/ICD: Other chest pain-R07.89  CPT Codes:     Echo Complete w Full Doppler-21781   Study Detail: The following Echo studies were performed: 2D, Doppler, M-Mode and                color flow. Definity used as a contrast agent for endocardial                border definition. Total contrast used for this procedure was                2.00cc mL via IV push.       PHYSICIAN INTERPRETATION:  Left Ventricle: Left ventricular systolic function is low normal, with an estimated ejection fraction of 50%. There are no regional wall motion abnormalities. The left ventricular cavity size is normal. Spectral Doppler shows an impaired relaxation pattern of left ventricular diastolic filling.  Left Atrium: The left atrium is normal in size.  Right Ventricle: The right ventricle is mildly enlarged. There is normal right ventricular global systolic function.  Right Atrium: The right atrium is normal in size.  Aortic Valve: The aortic valve is trileaflet. There is no evidence of aortic valve regurgitation. The peak instantaneous gradient of the aortic valve is 5.0 mmHg. The mean gradient of the aortic valve is 3.0 mmHg.  Mitral Valve: The mitral valve is normal in structure. There is no evidence of mitral valve regurgitation.  Tricuspid Valve: The tricuspid valve is structurally normal. No evidence of tricuspid regurgitation.  Pulmonic Valve: The pulmonic valve is not well visualized. There is no indication of pulmonic valve regurgitation.  Pericardium: There is no pericardial effusion noted.  Aorta: The aortic root is normal.  Systemic Veins: The inferior vena cava appears to be of normal size. There is IVC inspiratory collapse greater than 50%.       CONCLUSIONS:   1. Left ventricular systolic function is low normal with a 50% estimated ejection fraction.   2. Spectral Doppler shows an impaired relaxation  pattern of left ventricular diastolic filling.    QUANTITATIVE DATA SUMMARY:  2D MEASUREMENTS:                            Normal Ranges:  Ao Root d:     4.40 cm    (2.0-3.7cm)  LAs:           3.30 cm    (2.7-4.0cm)  IVSd:          1.15 cm    (0.6-1.1cm)  LVPWd:         1.11 cm    (0.6-1.1cm)  LVIDd:         5.49 cm    (3.9-5.9cm)  LVIDs:         3.89 cm  LV Mass Index: 102.0 g/m2  LV % FS        29.1 %    LA VOLUME:                                Normal Ranges:  LA Vol A4C:        34.2 ml    (22+/-6mL/m2)  LA Vol A2C:        55.5 ml  LA Vol BP:         47.7 ml  LA Vol Index A4C:  13.9ml/m2  LA Vol Index A2C:  22.6 ml/m2  LA Vol Index BP:   19.4 ml/m2  LA Area A4C:       14.2 cm2  LA Area A2C:       19.8 cm2  LA Major Axis A4C: 5.0 cm  LA Major Axis A2C: 6.0 cm  LA Volume Index:   13.7 ml/m2  LA Vol A4C:        33.5 ml  LA Vol A2C:        56.2 ml    LV SYSTOLIC FUNCTION BY 2D PLANIMETRY (MOD):                      Normal Ranges:  EF-A4C View: 57.4 % (>=55%)  EF-A2C View: 52.9 %  EF-Biplane:  55.3 %    LV DIASTOLIC FUNCTION:                      Normal Ranges:  MV Peak E: 0.42 m/s (0.7-1.2 m/s)  MV Peak A: 0.66 m/s (0.42-0.7 m/s)  E/A Ratio: 0.63     (1.0-2.2)    AORTIC VALVE:                                    Normal Ranges:  AoV Vmax:                1.12 m/s (<=1.7m/s)  AoV Peak P.0 mmHg (<20mmHg)  AoV Mean PG:             3.0 mmHg (1.7-11.5mmHg)  LVOT Max Cristofer:            1.12 m/s (<=1.1m/s)  AoV VTI:                 27.10 cm (18-25cm)  LVOT VTI:                24.40 cm  LVOT Diameter:           2.50 cm  (1.8-2.4cm)  AoV Area, VTI:           4.42 cm2 (2.5-5.5cm2)  AoV Area,Vmax:           4.91 cm2 (2.5-4.5cm2)  AoV Dimensionless Index: 0.90       RIGHT VENTRICLE:  RV Basal 4.56 cm  RV Mid   3.35 cm  RV Major 7.8 cm  RV s'    0.14 m/s       [id number] Riley Dobson MD  Electronically signed on 3/13/2024 at 9:55:50 AM       ** Final **  ECG 12 Lead  Accelerated Junctional rhythm  Left axis  deviation  Minimal voltage criteria for LVH, may be normal variant  ST & T wave abnormality, consider lateral ischemia  Prolonged QT  Abnormal ECG  When compared with ECG of 11-MAR-2024 03:37, (unconfirmed)  Junctional rhythm has replaced Sinus rhythm  (RBBB and left anterior fascicular block) is no longer Present  Borderline criteria for Lateral infarct are no longer Present      Physical Exam  General Appearance: AAO x 3, not in acute distress  Skin: skin color pink, warm, and dry; no suspicious rashes or lesions  Eyes : PERRL, EOM's intact  ENT: mucous membranes pink and moist  Neck: normocephalic  Respiratory: lungs clear to auscultation anteriorly; no wheezing, rhonchi, or crackles.   Heart: regular rate and rhythm. telemetry shows sinus rhythm  Abdomen: Nondistended, positive bowel sounds x4, soft,  nontender  Extremities: no edema   Peripheral pulses: normal x4 extremities  Neuro: alert, coherent and conversant, no focal motor deficits. C/o's frontal headache that he has had for a couple of weeks    Relevant Results  Results for orders placed or performed during the hospital encounter of 03/11/24 (from the past 24 hour(s))   CBC   Result Value Ref Range    WBC 5.9 4.4 - 11.3 x10*3/uL    nRBC 0.0 0.0 - 0.0 /100 WBCs    RBC 4.02 (L) 4.50 - 5.90 x10*6/uL    Hemoglobin 13.1 (L) 13.5 - 17.5 g/dL    Hematocrit 37.9 (L) 41.0 - 52.0 %    MCV 94 80 - 100 fL    MCH 32.6 26.0 - 34.0 pg    MCHC 34.6 32.0 - 36.0 g/dL    RDW 12.3 11.5 - 14.5 %    Platelets 181 150 - 450 x10*3/uL    MPV 9.6 7.5 - 11.5 fL   Basic Metabolic Panel   Result Value Ref Range    Glucose 122 (H) 74 - 99 mg/dL    Sodium 136 136 - 145 mmol/L    Potassium 4.2 3.5 - 5.3 mmol/L    Chloride 106 98 - 107 mmol/L    Bicarbonate 25 21 - 32 mmol/L    Anion Gap 9 (L) 10 - 20 mmol/L    Urea Nitrogen 17 6 - 23 mg/dL    Creatinine 0.86 0.50 - 1.30 mg/dL    eGFR 86 >60 mL/min/1.73m*2    Calcium 9.4 8.6 - 10.3 mg/dL   ECG 12 Lead   Result Value Ref Range     Ventricular Rate 66 BPM    Atrial Rate 85 BPM    QRS Duration 112 ms    QT Interval 464 ms    QTC Calculation(Bazett) 486 ms    R Axis -30 degrees    T Axis 210 degrees    QRS Count 11 beats    Q Onset 215 ms    T Offset 447 ms    QTC Fredericia 479 ms   Troponin I, High Sensitivity   Result Value Ref Range    Troponin I, High Sensitivity 16 0 - 20 ng/L   Transthoracic Echo (TTE) Complete   Result Value Ref Range    LVOT diam 2.50 cm    MV E/A ratio 0.63     AV pk amos 1.12 m/s    AV mn grad 3.0 mmHg    LV biplane EF 55 %    LA vol index A/L 19.4 ml/m2    RV free wall pk S' 14.00 cm/s    LVIDd 5.49 cm    Aortic Valve Area by Continuity of Peak Velocity 4.91 cm2    Aortic Valve Area by Continuity of VTI 4.42 cm2    AV pk grad 5.0 mmHg    LV A4C EF 57.4     BSA 2.51 m2     Scheduled medications  [Held by provider] apixaban, 5 mg, oral, BID  budesonide, 0.5 mg, nebulization, BID  finasteride, 5 mg, oral, Daily  FLUoxetine, 20 mg, oral, Daily  fluticasone, 2 spray, Each Nostril, Daily  losartan, 50 mg, oral, Daily  magnesium oxide, 400 mg, oral, Daily  metoprolol tartrate, 12.5 mg, oral, BID  montelukast, 10 mg, oral, Nightly  multivitamin with minerals, 1 tablet, oral, Daily  oxygen, 3 L/min, inhalation, Nightly  pantoprazole, 20 mg, oral, Daily before breakfast  polyethylene glycol, 17 g, oral, Daily  [Held by provider] simvastatin, 40 mg, oral, Daily  tamsulosin, 0.4 mg, oral, Daily      Continuous medications     PRN medications  PRN medications: acetaminophen, albuterol, lubricating eye drops, lubricating eye drops, meclizine, nitroglycerin, oxygen     Assessment/Plan      Principal Problem:    Near syncope  Active Problems:    Other chest pain      Marco A Diallo is a 83 y.o. male with past medical history of hypertension, allergies, BPH, depression, hyperlipidemia, reactive airway disease, pulmonary fibrosis, peripheral arterial disease, asthma, osteoarthritis, vertigo, diverticulitis with perforation, has a loop  recorder that was placed in January, CVA, prediabetes, GERD, history of DVT, patent foramen ovale, obstructive sleep apnea with CPAP, anxiety who presented to the emergency department this morning for feeling like he was going to pass out x 2.      Plan:  Presyncope  -Cardiology following-appreciate recommendations. Metoprolol restarted. Telemetry reviewed and lowest heart rate 56. Daughter, Shayla, says heart rate normally ranges in the upper 50's to 60's.  Recommends following up with the EP as outpatient for possible pacemaker placement  -Patient follows with Dr. Anderson through [facility] and has a follow-up appointment in April. Daughter says patient sees a Dr. Ramirez for cardiology  -Developed chest pain this morning.  Patient received nitro x 2 and states pain decreased but did not disappear.  Oxygen was applied.  EKGs x 2 done.  First EKG showed junctional rhythm and consider lateral ischemia.  Cardiology at bedside  -Plan for cardiac cath in a.m. patient NPO after midnight  -Continue  telemetry  -PT/OT following.  Appreciate recommendations  -much time spent in conversation regarding testing for vertigo.  Was informed that PT notes suspect hypoactive vestibular  -Time spent in conversation with daughter, Shayla.  Shayla states patient has had MRIs and MRAs and carotid ultrasound studies done that have been insignificant.  Talked with daughter about possible repeating MRI/MRA during this admission declines secondary to recent testing and recent placement of loop recorder     History of DVT  -eliquis on hold for cardiac cath in AM.  Patient has a history of patent foramen ovale and CVA     BPH  -Continue finasteride and flomax     Hypertension  -Continue losartan and metoprolol     Depression  -Continue Prozac     Discharge disposition: anticipate discharge home when medically stable. Time spent in conversation with daughter answering questions and providing support.  Daughter states patient is a vague historian  and difficult to get answers from him regarding symptoms. Plan for cardiac cath in AM. Patient would benefit from [facility]    Kelle Lord, SUSU-CNP

## 2024-03-13 NOTE — PROGRESS NOTES
"Subjective Data:  Patient examined and reports 6/10 chest pain at 0830. At approximately 0700, he reported 20/10 mid-sternal chest pain stating \"it feels as though someone is sitting on my chest, I have never felt this pain before.\" He also has been experiencing worsening shortness of breath on exertion x 1 month. Patient given 2 nitroglycerin tabs with complete cessation of chest pain. EKG and STAT echo ordered.      Objective Data:  Last Recorded Vitals:  Vitals:    03/12/24 2306 03/13/24 0613 03/13/24 0707 03/13/24 0731   BP:   124/71 123/70   BP Location:   Right arm Right arm   Patient Position:   Lying Lying   Pulse:   68 63   Resp: 16 16 18    Temp:   37 °C (98.6 °F) 36.9 °C (98.4 °F)   TempSrc:   Temporal Temporal   SpO2:  96% 96% 96%   Weight:       Height:           Last Labs:  CBC - 3/13/2024:  4:33 AM  5.9 13.1 181    37.9      CMP - 3/13/2024:  4:33 AM  9.4 5.6 11 --- 1.3   2.9 3.5 12 71      PTT - No results in last year.  _   _ _     TROPHS   Date/Time Value Ref Range Status   03/13/2024 07:27 AM 16 0 - 20 ng/L Final   03/11/2024 04:40 AM 16 0 - 20 ng/L Final   02/08/2024 01:24 PM 14 0 - 20 ng/L Final     BNP   Date/Time Value Ref Range Status   03/11/2024 04:40 AM 52 0 - 99 pg/mL Final   02/08/2024 11:58 AM 74 0 - 99 pg/mL Final     HGBA1C   Date/Time Value Ref Range Status   12/18/2023 12:00 AM 6.5 % Final     Comment:     [facility]   12/15/2023 09:41 AM 6.5 4.0 - 5.6 % Final   04/10/2023 09:35 AM 6.2 4.0 - 5.6 % Final     LDLCALC   Date/Time Value Ref Range Status   12/15/2023 12:00 AM 35 mg/dL Final     VLDL   Date/Time Value Ref Range Status   08/12/2022 10:00 AM 24 0 - 40 mg/dL Final      Last I/O:  I/O last 3 completed shifts:  In: 1000 (8.4 mL/kg) [P.O.:1000]  Out: 5900 (49.7 mL/kg) [Urine:5900 (1.4 mL/kg/hr)]  Weight: 118.6 kg     Past Cardiology Tests (Last 3 Years):  EKG:  ECG 12 Lead 03/13/2024 (Preliminary)      ECG 12 lead 03/11/2024 (Preliminary)      ECG 12 Lead " 02/08/2024      ECG 12 Lead 01/10/2024    Echo:  Transthoracic Echo (TTE) Complete 03/13/2024      Transthoracic echo (TTE) complete     Ejection Fractions:  EF   Date/Time Value Ref Range Status   03/13/2024 10:21 AM 55 %      Cath:  No results found for this or any previous visit from the past 1095 days.    Stress Test:  No results found for this or any previous visit from the past 1095 days.    Cardiac Imaging:  XR CHEST ABDOMEN FOR OG NG PLACEMENT 08/18/2021      Inpatient Medications:  Scheduled medications   Medication Dose Route Frequency    [Held by provider] apixaban  5 mg oral BID    budesonide  0.5 mg nebulization BID    finasteride  5 mg oral Daily    FLUoxetine  20 mg oral Daily    fluticasone  2 spray Each Nostril Daily    losartan  50 mg oral Daily    magnesium oxide  400 mg oral Daily    metoprolol tartrate  12.5 mg oral BID    montelukast  10 mg oral Nightly    multivitamin with minerals  1 tablet oral Daily    oxygen  3 L/min inhalation Nightly    pantoprazole  20 mg oral Daily before breakfast    polyethylene glycol  17 g oral Daily    [Held by provider] simvastatin  40 mg oral Daily    tamsulosin  0.4 mg oral Daily     PRN medications   Medication    acetaminophen    albuterol    lubricating eye drops    lubricating eye drops    meclizine    nitroglycerin    oxygen     Continuous Medications   Medication Dose Last Rate       Physical Exam:  General: awake, alert and oriented. No acute distress.   Skin: Skin is warm, dry and intact without rashes or lesions.   HEENT: normocephalic, atraumatic; conjunctivae are clear without exudates or hemorrhage. Sclera is non-icteric. Eyelids are normal in appearance without swelling or lesions. Hearing intact. Nares are patent bilaterally. Moist mucous membranes.   Cardiovascular: heart rate and rhythm are normal. No murmurs, gallops, or rubs are auscultated. S1 and S2 are heard and are of normal intensity. No JVD, no carotid bruits  Respiratory: bilateral lung  sounds clear to auscultations without rales, rhonchi, or wheezes. No accessory muscle use or stridor  Gastrointestinal: non-distended, non-tender  Genitourinary: exam deferred  Musculoskeletal: no deformities  Extremities: pulses palpable bilaterally; no swelling or erythema  Neurological: no focal deficits  Psychiatric: appropriate mood and affect; good judgment and insight       Assessment/Plan     Chest pain, typical:  -Patient reported 20/10 chest pain at 0700, followed by 6/10 chest pain at 0830; troponin of 16; patient currently asymptomatic   -EKG showed T wave inversions in the inferior leads   -Echo LVSF is low normal with a 50% EF; grade I diastolic dysfunction. Previous echo (July, 2023) showing 60% EF; LVH: PFO  -Pre-test probability of CAD is high  -Patient agreeable to further ischemic evaluation; due to the patient is currently asymptomatic and took Eliquis last night we will schedule LHC tomorrow with Dr. Costa. If patient develops more chest pain, we will send for emergent cath. Patient and daughter agreeable  -Patient to be NPO at midnight; continue to hold Eliquis     Peripheral IV 03/11/24 20 G Right;Ventral Hand (Active)   Site Assessment Clean;Dry;Intact 03/13/24 1000   Dressing Status Clean;Dry 03/13/24 1000   Number of days: 2       Code Status:  Full Code    Thank you for allowing me to participate in the care of this patient. Please reach me out if you have any questions or if you need any clarifications regarding the patient's care.          Amisha Thompson, APRN-CNP, DNP

## 2024-03-13 NOTE — PROGRESS NOTES
"Music Therapy Note    Arvin WEST \"Bud\" Imani was referred by Blanca Santos RN.     Therapy Session  Referral Type: New referral this admission  Visit Type: Follow-up visit  Session Start Time: 1058  Session End Time: 1141  Intervention Delivery: In-person  Conflict of Service: None  Family Present for Session: None     Pre-assessment  Pain Score: 0 - No pain  Stress Level (0-10): 5  Anxiety Level (0-10): 7  Coping Level (0-10): 5  Mood/Affect: Calm, Appropriate         Treatment/Interventions  Music Therapy Interventions: Assessment    Post-assessment  Total Session Time (min): 43 minutes    Narrative  Assessment Detail: Pt found resting in recliner, awake/alert, upon arrival of music therapist. Pt expressed that he thinks his fatigue is due to depression and feeling more positive than yesterday. Pt shared other updates since talking to MT yesterday. Pt requesting music therapy session at this time.  Plan: Plan to provide live music listening intervention as a means of coping and anxiety reduction.  Intervention: During intervention pt participated by requesting Ring of Fire when given a list of choices by MT. Pt receptively received the music as evidenced by resting eye gaze on MT and/or the wall during the songs. 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 MT provided other songs via voice and guitar, such as 'd Daughter. MT provided listening/supportive presence and encouraged pt to continue using music as a means of coping.  Evaluation: Pt responded to intervention by an increase in relaxed breathing and posture. Pt stating it's good to relax to music\" and requesting follow-up.  Follow-up: MT will follow-up as applicable.    Education Documentation  No documentation found.        Expressive Therapies Note  "

## 2024-03-13 NOTE — PROGRESS NOTES
Per medical team, patient has been experiencing vertigo and is not yet medically appropriate for discharge today; will likely require another 24 hours in the hospital.  - 1325: Per NP/Samara, patient's heart cath procedure has been moved to tomorrow, so patient may need to be here another 48 hours. Per NP/Samara, patient's dtr requests HHC for patient. SW to revisit with patient as patient stated yesterday that patient did not intend to be home-bound.  - 1620:  met with patient at bedside to review discharge plan. Patient agreeable to [facility] whom patient has used in the past, but patient hopes same will not be necessary. SW expressed understanding. SW sent referral to same through [facility]. Plan for patient TBD: Patient has been hoping to return home with no needs, vs. [facility] only if necessary. Care Transitions to follow and assist. MARYLIN Velazquez

## 2024-03-13 NOTE — CARE PLAN
The patient's goals for the shift include use the call light    The clinical goals for the shift include no passing out    Over the shift, the patient did make progress toward the following goals. Barriers to progression include chest pain, but subsided.

## 2024-03-14 PROBLEM — R55 NEAR SYNCOPE: Status: RESOLVED | Noted: 2024-03-11 | Resolved: 2024-03-14

## 2024-03-14 PROBLEM — R07.89 OTHER CHEST PAIN: Status: RESOLVED | Noted: 2024-03-11 | Resolved: 2024-03-14

## 2024-03-14 LAB
ANION GAP SERPL CALC-SCNC: 9 MMOL/L (ref 10–20)
ATRIAL RATE: 53 BPM
ATRIAL RATE: 85 BPM
BUN SERPL-MCNC: 18 MG/DL (ref 6–23)
CALCIUM SERPL-MCNC: 9.3 MG/DL (ref 8.6–10.3)
CHLORIDE SERPL-SCNC: 105 MMOL/L (ref 98–107)
CO2 SERPL-SCNC: 24 MMOL/L (ref 21–32)
CREAT SERPL-MCNC: 0.93 MG/DL (ref 0.5–1.3)
EGFRCR SERPLBLD CKD-EPI 2021: 82 ML/MIN/1.73M*2
ERYTHROCYTE [DISTWIDTH] IN BLOOD BY AUTOMATED COUNT: 12.2 % (ref 11.5–14.5)
GLUCOSE SERPL-MCNC: 119 MG/DL (ref 74–99)
HCT VFR BLD AUTO: 39.4 % (ref 41–52)
HGB BLD-MCNC: 13.3 G/DL (ref 13.5–17.5)
MCH RBC QN AUTO: 31.8 PG (ref 26–34)
MCHC RBC AUTO-ENTMCNC: 33.8 G/DL (ref 32–36)
MCV RBC AUTO: 94 FL (ref 80–100)
NRBC BLD-RTO: 0 /100 WBCS (ref 0–0)
P AXIS: -20 DEGREES
P OFFSET: 147 MS
P ONSET: 94 MS
PLATELET # BLD AUTO: 193 X10*3/UL (ref 150–450)
POTASSIUM SERPL-SCNC: 4.1 MMOL/L (ref 3.5–5.3)
PR INTERVAL: 242 MS
Q ONSET: 215 MS
Q ONSET: 215 MS
QRS COUNT: 11 BEATS
QRS COUNT: 9 BEATS
QRS DURATION: 112 MS
QRS DURATION: 150 MS
QT INTERVAL: 464 MS
QT INTERVAL: 468 MS
QTC CALCULATION(BAZETT): 439 MS
QTC CALCULATION(BAZETT): 486 MS
QTC FREDERICIA: 449 MS
QTC FREDERICIA: 479 MS
R AXIS: -30 DEGREES
R AXIS: -30 DEGREES
RBC # BLD AUTO: 4.18 X10*6/UL (ref 4.5–5.9)
SODIUM SERPL-SCNC: 134 MMOL/L (ref 136–145)
T AXIS: -24 DEGREES
T AXIS: 210 DEGREES
T OFFSET: 447 MS
T OFFSET: 449 MS
VENTRICULAR RATE: 53 BPM
VENTRICULAR RATE: 66 BPM
WBC # BLD AUTO: 6.8 X10*3/UL (ref 4.4–11.3)

## 2024-03-14 PROCEDURE — C1894 INTRO/SHEATH, NON-LASER: HCPCS | Performed by: STUDENT IN AN ORGANIZED HEALTH CARE EDUCATION/TRAINING PROGRAM

## 2024-03-14 PROCEDURE — 94640 AIRWAY INHALATION TREATMENT: CPT | Mod: MUE

## 2024-03-14 PROCEDURE — 2500000004 HC RX 250 GENERAL PHARMACY W/ HCPCS (ALT 636 FOR OP/ED): Performed by: STUDENT IN AN ORGANIZED HEALTH CARE EDUCATION/TRAINING PROGRAM

## 2024-03-14 PROCEDURE — 1200000002 HC GENERAL ROOM WITH TELEMETRY DAILY

## 2024-03-14 PROCEDURE — C1887 CATHETER, GUIDING: HCPCS | Performed by: STUDENT IN AN ORGANIZED HEALTH CARE EDUCATION/TRAINING PROGRAM

## 2024-03-14 PROCEDURE — 93458 L HRT ARTERY/VENTRICLE ANGIO: CPT | Performed by: STUDENT IN AN ORGANIZED HEALTH CARE EDUCATION/TRAINING PROGRAM

## 2024-03-14 PROCEDURE — 80048 BASIC METABOLIC PNL TOTAL CA: CPT

## 2024-03-14 PROCEDURE — 7100000009 HC PHASE TWO TIME - INITIAL BASE CHARGE: Performed by: STUDENT IN AN ORGANIZED HEALTH CARE EDUCATION/TRAINING PROGRAM

## 2024-03-14 PROCEDURE — 99232 SBSQ HOSP IP/OBS MODERATE 35: CPT

## 2024-03-14 PROCEDURE — 2720000007 HC OR 272 NO HCPCS: Performed by: STUDENT IN AN ORGANIZED HEALTH CARE EDUCATION/TRAINING PROGRAM

## 2024-03-14 PROCEDURE — 94761 N-INVAS EAR/PLS OXIMETRY MLT: CPT

## 2024-03-14 PROCEDURE — 2500000001 HC RX 250 WO HCPCS SELF ADMINISTERED DRUGS (ALT 637 FOR MEDICARE OP)

## 2024-03-14 PROCEDURE — 85027 COMPLETE CBC AUTOMATED: CPT

## 2024-03-14 PROCEDURE — 7100000010 HC PHASE TWO TIME - EACH INCREMENTAL 1 MINUTE: Performed by: STUDENT IN AN ORGANIZED HEALTH CARE EDUCATION/TRAINING PROGRAM

## 2024-03-14 PROCEDURE — 2500000004 HC RX 250 GENERAL PHARMACY W/ HCPCS (ALT 636 FOR OP/ED)

## 2024-03-14 PROCEDURE — 2500000002 HC RX 250 W HCPCS SELF ADMINISTERED DRUGS (ALT 637 FOR MEDICARE OP, ALT 636 FOR OP/ED): Performed by: PHYSICIAN ASSISTANT

## 2024-03-14 PROCEDURE — 36415 COLL VENOUS BLD VENIPUNCTURE: CPT

## 2024-03-14 PROCEDURE — 2500000001 HC RX 250 WO HCPCS SELF ADMINISTERED DRUGS (ALT 637 FOR MEDICARE OP): Performed by: PHYSICIAN ASSISTANT

## 2024-03-14 PROCEDURE — 2500000002 HC RX 250 W HCPCS SELF ADMINISTERED DRUGS (ALT 637 FOR MEDICARE OP, ALT 636 FOR OP/ED)

## 2024-03-14 PROCEDURE — 2500000005 HC RX 250 GENERAL PHARMACY W/O HCPCS: Performed by: STUDENT IN AN ORGANIZED HEALTH CARE EDUCATION/TRAINING PROGRAM

## 2024-03-14 PROCEDURE — 4A023N7 MEASUREMENT OF CARDIAC SAMPLING AND PRESSURE, LEFT HEART, PERCUTANEOUS APPROACH: ICD-10-PCS | Performed by: STUDENT IN AN ORGANIZED HEALTH CARE EDUCATION/TRAINING PROGRAM

## 2024-03-14 PROCEDURE — B2111ZZ FLUOROSCOPY OF MULTIPLE CORONARY ARTERIES USING LOW OSMOLAR CONTRAST: ICD-10-PCS | Performed by: STUDENT IN AN ORGANIZED HEALTH CARE EDUCATION/TRAINING PROGRAM

## 2024-03-14 PROCEDURE — 2550000001 HC RX 255 CONTRASTS: Performed by: STUDENT IN AN ORGANIZED HEALTH CARE EDUCATION/TRAINING PROGRAM

## 2024-03-14 PROCEDURE — 2500000001 HC RX 250 WO HCPCS SELF ADMINISTERED DRUGS (ALT 637 FOR MEDICARE OP): Performed by: INTERNAL MEDICINE

## 2024-03-14 PROCEDURE — 97535 SELF CARE MNGMENT TRAINING: CPT | Mod: GO

## 2024-03-14 RX ORDER — VERAPAMIL HYDROCHLORIDE 2.5 MG/ML
INJECTION, SOLUTION INTRAVENOUS AS NEEDED
Status: DISCONTINUED | OUTPATIENT
Start: 2024-03-14 | End: 2024-03-14 | Stop reason: HOSPADM

## 2024-03-14 RX ORDER — NITROGLYCERIN 40 MG/100ML
INJECTION INTRAVENOUS AS NEEDED
Status: DISCONTINUED | OUTPATIENT
Start: 2024-03-14 | End: 2024-03-14 | Stop reason: HOSPADM

## 2024-03-14 RX ORDER — HEPARIN SODIUM 1000 [USP'U]/ML
INJECTION, SOLUTION INTRAVENOUS; SUBCUTANEOUS AS NEEDED
Status: DISCONTINUED | OUTPATIENT
Start: 2024-03-14 | End: 2024-03-14 | Stop reason: HOSPADM

## 2024-03-14 RX ORDER — FENTANYL CITRATE 50 UG/ML
INJECTION, SOLUTION INTRAMUSCULAR; INTRAVENOUS AS NEEDED
Status: DISCONTINUED | OUTPATIENT
Start: 2024-03-14 | End: 2024-03-14 | Stop reason: HOSPADM

## 2024-03-14 RX ORDER — MIDAZOLAM HYDROCHLORIDE 1 MG/ML
INJECTION, SOLUTION INTRAMUSCULAR; INTRAVENOUS AS NEEDED
Status: DISCONTINUED | OUTPATIENT
Start: 2024-03-14 | End: 2024-03-14 | Stop reason: HOSPADM

## 2024-03-14 RX ORDER — LIDOCAINE HYDROCHLORIDE 20 MG/ML
INJECTION, SOLUTION INFILTRATION; PERINEURAL AS NEEDED
Status: DISCONTINUED | OUTPATIENT
Start: 2024-03-14 | End: 2024-03-14 | Stop reason: HOSPADM

## 2024-03-14 RX ADMIN — POLYVINYL ALCOHOL, POVIDONE 1 DROP: 14; 6 SOLUTION/ DROPS OPHTHALMIC at 08:30

## 2024-03-14 RX ADMIN — BUDESONIDE 0.5 MG: 0.5 INHALANT RESPIRATORY (INHALATION) at 18:11

## 2024-03-14 RX ADMIN — MULTIPLE VITAMINS W/ MINERALS TAB 1 TABLET: TAB at 14:30

## 2024-03-14 RX ADMIN — ALBUTEROL SULFATE 2.5 MG: 2.5 SOLUTION RESPIRATORY (INHALATION) at 18:17

## 2024-03-14 RX ADMIN — ALBUTEROL SULFATE 2.5 MG: 2.5 SOLUTION RESPIRATORY (INHALATION) at 06:52

## 2024-03-14 RX ADMIN — METOPROLOL TARTRATE 12.5 MG: 25 TABLET, FILM COATED ORAL at 08:19

## 2024-03-14 RX ADMIN — ALBUTEROL SULFATE 2.5 MG: 2.5 SOLUTION RESPIRATORY (INHALATION) at 12:26

## 2024-03-14 RX ADMIN — FINASTERIDE 5 MG: 5 TABLET, FILM COATED ORAL at 08:18

## 2024-03-14 RX ADMIN — FLUTICASONE PROPIONATE 2 SPRAY: 50 SPRAY, METERED NASAL at 14:38

## 2024-03-14 RX ADMIN — POLYVINYL ALCOHOL, POVIDONE 1 DROP: 14; 6 SOLUTION/ DROPS OPHTHALMIC at 21:00

## 2024-03-14 RX ADMIN — METOPROLOL TARTRATE 12.5 MG: 25 TABLET, FILM COATED ORAL at 21:00

## 2024-03-14 RX ADMIN — MONTELUKAST 10 MG: 10 TABLET, FILM COATED ORAL at 21:00

## 2024-03-14 RX ADMIN — Medication 400 MG: at 14:29

## 2024-03-14 RX ADMIN — ACETAMINOPHEN 650 MG: 325 TABLET ORAL at 08:18

## 2024-03-14 RX ADMIN — BUDESONIDE 0.5 MG: 0.5 INHALANT RESPIRATORY (INHALATION) at 07:03

## 2024-03-14 RX ADMIN — ASPIRIN 81 MG CHEWABLE TABLET 324 MG: 81 TABLET CHEWABLE at 08:24

## 2024-03-14 RX ADMIN — POLYETHYLENE GLYCOL 3350 17 G: 17 POWDER, FOR SOLUTION ORAL at 14:29

## 2024-03-14 RX ADMIN — PANTOPRAZOLE SODIUM 20 MG: 20 TABLET, DELAYED RELEASE ORAL at 08:19

## 2024-03-14 RX ADMIN — LOSARTAN POTASSIUM 50 MG: 50 TABLET, FILM COATED ORAL at 08:19

## 2024-03-14 RX ADMIN — TAMSULOSIN HYDROCHLORIDE 0.4 MG: 0.4 CAPSULE ORAL at 14:30

## 2024-03-14 RX ADMIN — FLUOXETINE 20 MG: 20 CAPSULE ORAL at 14:30

## 2024-03-14 ASSESSMENT — PAIN - FUNCTIONAL ASSESSMENT
PAIN_FUNCTIONAL_ASSESSMENT: 0-10

## 2024-03-14 ASSESSMENT — PAIN SCALES - GENERAL
PAINLEVEL_OUTOF10: 0 - NO PAIN
PAINLEVEL_OUTOF10: 3
PAINLEVEL_OUTOF10: 0 - NO PAIN

## 2024-03-14 ASSESSMENT — COGNITIVE AND FUNCTIONAL STATUS - GENERAL
TOILETING: A LITTLE
HELP NEEDED FOR BATHING: A LITTLE
DAILY ACTIVITIY SCORE: 21
DRESSING REGULAR LOWER BODY CLOTHING: A LITTLE

## 2024-03-14 ASSESSMENT — ACTIVITIES OF DAILY LIVING (ADL)
BATHING_LEVEL_OF_ASSISTANCE: MODERATE ASSISTANCE
HOME_MANAGEMENT_TIME_ENTRY: 32
BATHING_WHERE_ASSESSED: SHOWER

## 2024-03-14 ASSESSMENT — COLUMBIA-SUICIDE SEVERITY RATING SCALE - C-SSRS
6. HAVE YOU EVER DONE ANYTHING, STARTED TO DO ANYTHING, OR PREPARED TO DO ANYTHING TO END YOUR LIFE?: NO
2. HAVE YOU ACTUALLY HAD ANY THOUGHTS OF KILLING YOURSELF?: NO
1. IN THE PAST MONTH, HAVE YOU WISHED YOU WERE DEAD OR WISHED YOU COULD GO TO SLEEP AND NOT WAKE UP?: NO

## 2024-03-14 ASSESSMENT — PAIN DESCRIPTION - LOCATION: LOCATION: HEAD

## 2024-03-14 NOTE — POST-PROCEDURE NOTE
Physician Transition of Care Summary  Invasive Cardiovascular Lab    Procedure Date: 3/14/2024  Attending:    * James Costa - Primary  Resident/Fellow/Other Assistant: Surgeon(s) and Role:    Indications:   Pre-op Diagnosis     * Other chest pain [R07.89]    Post-procedure diagnosis:   Post-op Diagnosis     * Other chest pain [R07.89]    Procedure(s):   Left Heart Cath  80006 - DE CATH PLMT L HRT & ARTS W/NJX & ANGIO IMG S&I    Procedure Findings:   Non-obstructive CAD  Preserved Low-normal LV systolic function    Description of the Procedure:   Procedure: Left Heart Catheterization    R radial artery access with 6F sheath. LV and Ao hemodynamic measurements. S/P Left Heart Catheterization that showed non-obstructive coronary artery disease. Preserved Low-normal LV systolic function. Patient remained stable during the entire procedure. No complications. Hemostasis with TR Band.    Plan: Patient may be discharged home after bed rest for 3 hours. Constant check for bleeding. Maintain compression band (CB) for 45 minutes past procedure. Remove 3mL of air from CB every 15 minutes until fully deflated. If recurrent bleeding occurs, re-inflate with enough air to fully restore hemostasis (2 to 3 mL, maximum of 18 mL). Maintain CB at this same level for 30 minutes before attempting to re-deflate. Once fully deflated, carefully remove CB and place a sterile dressing over access site. Observe for 15 minutes and check for pulse and for signs of bleeding. Instruct patient not to use or bend their wrist for four hours.     Complications:   No    Stents/Implants:   No    Anticoagulation/Antiplatelet Plan:   Resume Eliquis after 48 hours    Estimated Blood Loss:   5 mL    Anesthesia: Moderate Sedation Anesthesia Staff: No anesthesia staff entered.    Any Specimen(s) Removed:   No specimens collected during this procedure.    Disposition:   Home    Electronically signed by: James Costa MD, 3/14/2024  10:32 AM

## 2024-03-14 NOTE — PROGRESS NOTES
"Occupational Therapy    OT Treatment    Patient Name: Arvin Diallo \"Wendell\"  MRN: [id number]  Today's Date: 3/14/2024  Time Calculation  Start Time: 0715  Stop Time: 0747  Time Calculation (min): 32 min         Assessment:  OT Assessment: pt continues to c/o dizziness yet is progressing in all areas of ADL.  pt is educated on varous techniques/safety tips during ADLs when experiencing dizziness.  End of Session Communication: Bedside nurse  End of Session Patient Position: Up in chair, Alarm on       Plan:  Treatment Interventions: ADL retraining, Functional transfer training, Endurance training, Neuromuscular reeducation  OT Frequency: 5 times per week  OT Discharge Recommendations: Low intensity level of continued care  OT Recommended Transfer Status: Assist of 1  OT - OK to Discharge: Yes (ok to DC once medically stable)  Treatment Interventions: ADL retraining, Functional transfer training, Endurance training, Neuromuscular reeducation  Subjective     Current Problem:  Patient Active Problem List   Diagnosis    Arthritis of left hip    Asymmetric SNHL (sensorineural hearing loss)    Bronchomalacia    Cerebrovascular accident (CVA) (CMS/HCC)    Degeneration of lumbar intervertebral disc with acute herniation    Diabetes mellitus (CMS/HCC)    Dizziness    Elevated diaphragm    Enlarged prostate    Fatigue    Hyperlipidemia    Lumbar scoliosis    Meniere's disease    Mild HTN    Mitral valve prolapse    Obstructive sleep apnea, adult    Tinnitus of both ears    Vertigo    RAVI (generalized anxiety disorder)    Gastroesophageal reflux disease    Chronic rhinitis    History of DVT (deep vein thrombosis)    Patent foramen ovale    PAD (peripheral artery disease) (CMS/HCC)    Pulmonary fibrosis, unspecified (CMS/HCC)    Mild intermittent asthma without complication    Sore throat    Acute cough    Near syncope    Other chest pain       General:  OT Received On: 03/14/24  Reason for Referral: impaired mobility  Referred " By: Adria PT/OT  Past Medical History Relevant to Rehab: TIA, CPAP, BPH  Co-Treatment: PT  Co-Treatment Reason: to maximize pt safety  Prior to Session Communication: Bedside nurse  Patient Position Received: Bed, 3 rail up, Alarm on  General Comment: Pt to ED 3/11 with complaints of syncopal episode. 3/11 CTH (-) acute.    Pain:  Pain Assessment  Pain Assessment: 0-10  Pain Score: 0 - No pain  Objective      Activities of Daily Living:    Grooming  Grooming Level of Assistance: Independent  UE Bathing  UE Bathing Level of Assistance: Close supervision  UE Bathing Where Assessed: Shower  LE Bathing  LE Bathing Level of Assistance: Moderate assistance  LE Bathing Where Assessed: Shower  LE Bathing Comments: unfortunely had to cover pt's entire right (dominant)  hand due to IV placement.  pt is educated on techniques to wash LB at home if he is experiencing dizziness ( sit on shower seat, avoid bending: use figure 4 method or long handled sponge)  UE Dressing  UE Dressing Level of Assistance: Setup  LE Dressing  LE Dressing: Yes  Pants Level of Assistance: Close supervision  Sock Level of Assistance: Close supervision  LE Dressing Where Assessed: Chair  Toileting  Toileting Level of Assistance: Close supervision  Where Assessed: Toilet  Toileting Comments: standing to urinate.  pt educated on sitting for toileting if he is experiencing dizziness.                   Strength:  Strength Comments: JOHNATHON WNL        Outcome Measures:[facility] Daily Activity  Putting on and taking off regular lower body clothing: A little  Bathing (including washing, rinsing, drying): A little  Putting on and taking off regular upper body clothing: None  Toileting, which includes using toilet, bedpan or urinal: A little  Taking care of personal grooming such as brushing teeth: None  Eating Meals: None  Daily Activity - Total Score: 21  Education Documentation  Body Mechanics and Self care, taught by Ashlee Pearce OT at 3/14/2024  8:36 AM.  Learner:  Patient  Readiness: Eager  Method: Explanation, Demonstration  Response: Demonstrated Understanding, Verbalizes Understanding        Goals:  Encounter Problems       Encounter Problems (Active)       ADLs       Patient will perform LB bathing  with supervision level of assistance. (Progressing)       Start:  03/11/24    Expected End:  03/25/24            Patient with complete lower body dressing with supervision level of assistance donning all LE clothes  with PRN adaptive equipment (Progressing)       Start:  03/11/24    Expected End:  03/25/24            Patient will complete toileting including hygiene clothing management/hygiene with supervision level of assistance. (Progressing)       Start:  03/11/24    Expected End:  03/25/24               MOBILITY       Patient will perform Functional mobility stand by assist level of assistance in order to improve safety and functional mobility. (Progressing)       Start:  03/11/24    Expected End:  03/25/24

## 2024-03-14 NOTE — PROGRESS NOTES
Subjective Data:  Patient is s/p LHC with Dr. Costa; no complaints.     Overnight Events:    No further episodes of chest pain      Objective Data:  Last Recorded Vitals:  Vitals:    03/13/24 2105 03/14/24 0652 03/14/24 0700 03/14/24 1014   BP: 138/64  139/69    BP Location:       Patient Position:       Pulse: 69  79    Resp: 18  18    Temp: 36.5 °C (97.7 °F)  36.9 °C (98.4 °F)    TempSrc:       SpO2: 93% 95% 91% (!) 2%   Weight:       Height:           Last Labs:  CBC - 3/14/2024:  4:33 AM  6.8 13.3 193    39.4      CMP - 3/14/2024:  4:33 AM  9.3 5.6 11 --- 1.3   2.9 3.5 12 71      PTT - No results in last year.  _   _ _     TROPHS   Date/Time Value Ref Range Status   03/13/2024 01:22 PM 14 0 - 20 ng/L Final   03/13/2024 07:27 AM 16 0 - 20 ng/L Final   03/11/2024 04:40 AM 16 0 - 20 ng/L Final     BNP   Date/Time Value Ref Range Status   03/11/2024 04:40 AM 52 0 - 99 pg/mL Final   02/08/2024 11:58 AM 74 0 - 99 pg/mL Final     HGBA1C   Date/Time Value Ref Range Status   12/18/2023 12:00 AM 6.5 % Final     Comment:     [facility]   12/15/2023 09:41 AM 6.5 4.0 - 5.6 % Final   04/10/2023 09:35 AM 6.2 4.0 - 5.6 % Final     LDLCALC   Date/Time Value Ref Range Status   12/15/2023 12:00 AM 35 mg/dL Final     VLDL   Date/Time Value Ref Range Status   08/12/2022 10:00 AM 24 0 - 40 mg/dL Final      Last I/O:  I/O last 3 completed shifts:  In: 1960 (16.5 mL/kg) [P.O.:1960]  Out: 4450 (37.5 mL/kg) [Urine:4450 (1 mL/kg/hr)]  Weight: 118.6 kg     Past Cardiology Tests (Last 3 Years):  EKG:  ECG 12 lead 03/13/2024 (Preliminary)      ECG 12 Lead 03/13/2024 (Preliminary)      ECG 12 lead 03/11/2024 (Preliminary)      ECG 12 Lead 02/08/2024      ECG 12 Lead 01/10/2024    Echo:  Transthoracic Echo (TTE) Complete 03/13/2024      Transthoracic echo (TTE) complete     Ejection Fractions:  EF   Date/Time Value Ref Range Status   03/13/2024 10:21 AM 55 %      Cath:  Cardiac Catheterization Procedure 03/14/2024    Stress Test:  No  results found for this or any previous visit from the past 1095 days.    Cardiac Imaging:  XR CHEST ABDOMEN FOR OG NG PLACEMENT 08/18/2021      Inpatient Medications:  Scheduled medications   Medication Dose Route Frequency    [Held by provider] apixaban  5 mg oral BID    budesonide  0.5 mg nebulization BID    finasteride  5 mg oral Daily    FLUoxetine  20 mg oral Daily    fluticasone  2 spray Each Nostril Daily    losartan  50 mg oral Daily    magnesium oxide  400 mg oral Daily    metoprolol tartrate  12.5 mg oral BID    montelukast  10 mg oral Nightly    multivitamin with minerals  1 tablet oral Daily    oxygen  3 L/min inhalation Nightly    pantoprazole  20 mg oral Daily before breakfast    polyethylene glycol  17 g oral Daily    [Held by provider] simvastatin  40 mg oral Daily    tamsulosin  0.4 mg oral Daily     PRN medications   Medication    acetaminophen    albuterol    lidocaine    And    alum-mag hydroxide-simeth    lubricating eye drops    lubricating eye drops    meclizine    nitroglycerin    oxygen     Continuous Medications   Medication Dose Last Rate       Physical Exam:  General: awake, alert and oriented. No acute distress.   Skin: Skin is warm, dry and intact without rashes or lesions.   HEENT: normocephalic, atraumatic; conjunctivae are clear without exudates or hemorrhage. Sclera is non-icteric. Eyelids are normal in appearance without swelling or lesions. Hearing intact. Nares are patent bilaterally. Moist mucous membranes.   Cardiovascular: heart rate and rhythm are normal. No murmurs, gallops, or rubs are auscultated. S1 and S2 are heard and are of normal intensity. No JVD, no carotid bruits  Respiratory: bilateral lung sounds clear to auscultations without rales, rhonchi, or wheezes. No accessory muscle use or stridor  Gastrointestinal: non-distended, non-tender  Genitourinary: exam deferred  Musculoskeletal: no deformities  Extremities: pulses palpable bilaterally; no swelling or  erythema  Neurological: no focal deficits  Psychiatric: appropriate mood and affect; good judgment and insight       Assessment/Plan     Chest pain:  -Patient currently asymptomatic and has had no further episodes of chest pain since yesterday morning  -S/p LHC with Dr. Costa showing non-obstructive CAD  -Patient should resume his statin medication at discharge  -No need for ASA 81mg as patient is on DOAC    Pre-syncope:  -Carotid duplex negative (report from OSH in October, 2023)  -Loop recorder report at the beginning of the month shows no abnormal findings  -Recommend patient follow with EP after discharge     Hx of DVT:  -Resume Eliquis on Saturday, March 16th  Peripheral IV 03/11/24 20 G Right;Ventral Hand (Active)   Site Assessment Dry;Intact 03/14/24 0945   Line Status Blood return noted 03/14/24 0945   Dressing Status Dry;Old drainage 03/14/24 0945   Number of days: 3       Code Status:  Full Code    Thank you for allowing me to participate in the care of this patient. Please reach me out if you have any questions or if you need any clarifications regarding the patient's care.          Amisha Thompson, APRN-CNP, DNP

## 2024-03-14 NOTE — DISCHARGE INSTRUCTIONS
CARDIAC CATHETERIZATION DISCHARGE INSTRUCTIONS     FOR SUDDEN AND SEVERE CHEST PAIN, SHORTNESS OF BREATH, EXCESSIVE BLEEDING, SIGNS OF STROKE, OR CHANGES IN MENTAL STATUS YOU SHOULD CALL 911 IMMEDIATELY.     If your provider has prescribed aspirin and/or clopidogrel (Plavix), or prasugrel (Effient), or ticagrelor (Brilinta), DO NOT STOP THESE MEDICATIONS for any reason without talking to your cardiologist first. If any of these were prescribed, you must take them every day without missing a single dose. If you are getting low on these medications, contact your provider immediately for a refill.     FOR NEXT 24 HOURS  - Upon discharge, you should return home and rest for the remainder of the day and evening. You do not have to stay on bed rest but should not be very active.  It is recommended a responsible adult be with you for the first 24 hours after the procedure.    - No driving for 24 hours after procedure. Please arrange for someone to drive you home from the hospital today.     - Do not drive, operate machinery, or use power tools for 24 hours after your procedure.     - Do not make any legal decisions for 24 hours after your procedure.     - Do not drink alcoholic beverages for 24 hours after your procedure.    WOUND CARE     *FOR RADIAL (WRIST) ACCESS*  ·      No lifting more than 5 pounds or excessive use of the wrist for 24 hours - for example, treat your wrist as if it is sprained.  ·      Do not engage in vigorous activities (tennis, golf, bowling, weights) for at least 48 hours after the procedure.  ·      Do not submerge the wrist for 7 days after the procedure.  ·      You should expect mild tingling in your hand and tenderness at the puncture site for up to 3 days.    - The transparent dressing should be removed from the site 24 hours after the procedure.  Wash the site gently with soap and water. Rinse well and pat dry. Keep the area clean and dry. You may apply a Band-Aid to the site. Avoid  lotions, ointments, or powders until fully healed.     - You may shower the day after your procedure.      - It is normal to notice a small bruise around the puncture site and/or a small grape sized or smaller lump. Any large bruising or large lump warrants a call to the office.     - If bleeding should occur, lay down and apply pressure to the affected area for 10 minutes.  If the bleeding stops notify your physician.  If there is a large amount of bleeding or spurting of blood CALL 911 immediately.  DO NOT drive yourself to the hospital.    - You may experience some tenderness, bruising or minimal inflammation.  If you have any concerns, you may contact the Cath Lab or if any of these symptoms become excessive, contact your cardiologist or go to the emergency room.     OTHER INSTRUCTIONS  - You may take acetaminophen (Tylenol) as directed for discomfort.  If pain is not relieved with acetaminophen (Tylenol), contact your doctor.    - If you notice or experience any of the following, you should notify your doctor or seek medical attention  Chest pain or discomfort  Change in mental status or weakness in extremities.  Dizziness, light headedness, or feeling faint.  Change in the site where the procedure was performed, such as bleeding or an increased area of bruising or swelling.  Tingling, numbness, pain, or coolness in the leg/arm beyond the site where the procedure was performed.  Signs of infection (i.e. shaking chills, temperature > 100 degrees Fahrenheit, warmth, redness) in the leg/arm area where the procedure was performed.  Changes in urination   Bloody or black stools  Vomiting blood  Severe nose bleeds  Any excessive bleeding    - If you DO NOT have an appointment with your cardiologist within 2-4 weeks following your procedure, please contact their office.      Lifestyle Recommendations for a Healthier Life:    The following lifestyle changes can have a significant positive impact on your overall health  - helping you to achieve healthy weight, lower metabolic and heart disease risk and manage stress more effectively:      1. Eat whole, fresh foods. Food is one of the biggest drivers of our overall health.  Make your meals whole foods based, focusing on a wide variety of non starchy vegetables and fruits.  It also beneficial to include various nuts, seeds and high-quality animal proteins for overall balanced diet.    2. Remove the sweeteners from your diet.  Artificial sweeteners have a negative impact on our metabolic health - they can cause increase in both glucose and insulin, increasing the risk or potential for insulin resistance and abnormal blood sugar levels.  Artificial sweeteners are also excessively sweeter than regular sugar, they trick our taste buds into craving extreme forms of sweet, like an addiction.  I encourage you to avoid sugar, but also stevia, aspartame, sucralose, sugar alcohols like xylitol and maltitol.    3. Get rid of the inflammatory factors in your diet - this mainly comes from sugars of all kinds and refined vegetable oils.  These can increase our risk for changes in our metabolic health which can lead to diabetes, heart disease and other chronic disease.  You can reverse or combat the effective of inflammatory foods by increasing your intake of anti-inflammatory foods like wild-caught fish, fresh ground flax seed, fish oil and variety of fruits & vegetables.      4. Eat plenty of fiber!!  The standard American diet has very low levels of daily dietary fiber, many people barely get 15 grams per day.  There is plenty of nutrition research that shows how high-fiber foods can help lower our blood sugar.   Eat a wide variety of fiber-rich plant-based foods including nuts, seeds, fruits, vegetables, and legumes.    5. Get enough sleep. Studies from experts in endocrinology & metabolism have shown that even a few nights of poor sleep can increase the risk of insulin resistance and abnormal  blood sugar values. Make sleep a priority - it is an important factor in your health.  Develop a sleep routine including: avoid eating two-three hours before bed, practice relaxation techniques (warm bath, meditation, yoga, mindfulness) to help relax your muscles and prepare you for sleep.    Go to bed and wake up at consistent times.  Avoid screen time for at least 60-90 minutes before bedtime.    6. Make exercise part of your regular routine.  Exercise helps us manage blood sugar more effectively and makes our cells more receptive to the effect of the insulin our body makes (lowers blood sugar).  Regular aerobic exercise AND interval or strength training are ideal to help maintain a healthy weight, metabolism & lower the risk of chronic disease.      7. Control stress levels. Chronic stress can lead to elevated blood sugar, elevated blood pressure, accumulation of fat around the belly and these things increase the risk for metabolic syndrome, diabetes and heart disease.  We all have various levels of stress in our lives, we can't control all of it, but we can control how we respond to it and manage it's impact.  Find healthy outlets for stress management like meditation, yoga, deep breathing, or exercise.    Home BP monitoring instructions:   Goal < 130 / 80   Remain still, Avoid smoking, caffeinated beverages, or exercise within 30 min before BP measurements.  Ensure 5 min of quiet rest before BP measurements.  Sit correctly with back straight and supported (on a straight-backed dining chair, for example, rather than a sofa).  Sit with feet flat on the floor and legs uncrossed.  Keep arm supported on a flat surface (such as a table), with the upper arm at heart level.  Bottom of the cuff should be placed directly above the bend of the elbow  Take a reading in the AM before breakfast 2-3 times / week   Record all readings accurately:  A written log should be brought to all appointments   Monitors with built-in  memory should be brought to all clinic appointments and calibrated to our machines      Weight Management:  Since food equals calories, in order to lose weight you must either eat fewer calories, exercise more to burn off calories with activity, or both. Food that is not used to fuel the body is stored as fat.    A major component of losing weight is to make smarter food choices. Here's how:    Limit non-nutritious foods, such as:  Sugar, honey, syrups and candy  Pastries, donuts, pies, cakes and cookies  Soft drinks, sweetened juices and alcoholic beverages    Cut down on high-fat foods by:  Choosing poultry, fish or lean red meat  Choosing low-fat cooking methods, such as baking, broiling, steaming, grilling and boiling  Using low-fat or non-fat dairy products  Using vinaigrette, herbs, lemon or fat-free salad dressings  Avoiding fatty meats, such as enriquez, sausage, kimbrough, ribs and luncheon meats  Avoiding high-fat snacks like nuts, chips and chocolate  Avoiding fried foods  Using less butter, margarine, oil and mayonnaise  Avoiding high-fat gravies, cream sauces and cream-based soups    Eat a variety of foods, including:  Fruit and vegetables that are raw, steamed or baked  Whole grains, breads, cereal, rice and pasta  Dairy products, such as low-fat or non-fat milk or yogurt, low-fat cottage cheese and low-fat cheese  Protein-rich foods like chicken, turkey, fish, lean meat and legumes, or beans    Change your eating habits:  Eat three balanced meals a day to help control your hunger  Watch portion sizes and eat small servings of a variety of foods  Choose low-calorie snacks  Eat only when you are hungry and stop when you are satisfied  Eat slowly and try not to perform other tasks while eating  Find other activities to distract you from food, such as walking, taking up a hobby or being involved in the community  Include regular exercise in your daily routine  Find a support group, if necessary, for emotional  support in your weight loss effort    Patient Education: Smoking Cessation  Making the commitment to quit smoking is one of the best choices that smokers can make for themselves, but also a difficult one. There are various opportunities for support available. Here is an overview of them.  There are various forms of nicotine replacement therapy available to assist with minimizing these symptoms. They are nicotine gum, nicotine patch, nicotine nasal spray, nicotine inhaler, and nicotine lozenge.  Which kind of nicotine replacement therapy will best work for you can be discussed with your doctor or nurse to help you understand the pros and cons of each.  Non-nicotine replacement therapy is also available. Bupropion (Wellbutrin, Zyban) is a mild anti-depressant that is also effective in helping people to quit smoking. It can be used alone or with nicotine replacement therapy.   Varenicline (Chantix) is another medication that helps people who what to quit. This medication is not a form of nicotine replacement therapy, but it helps with the withdrawal symptoms.  Studies have shown that the best success rates for people trying to quit include counseling and/or support groups such as the National Hel[employer]ine that is a free, confidential, 24/7, 365-day-a-year treatment referral and information service:  9-164-681-HELP (1176)    Some helpful hints include:  Avoidance. Stay away from smokers and places where you are tempted to smoke.  Activities. Exercise or do hobbies that keep your hands busy.  Alternatives. Use oral substitutes such as sugarless gum, hard candy, and carrot sticks.  Change of habits. For example, if you usually smoke during a coffee break, then go for a walk instead.  Deep breathing. When you have the urge to smoke, do a deep breathing exercise and remind yourself why you quit.  Delay tactic. If you feel that you are about to light up, delay. Tell yourself you must wait 10 minutes. Often this simple trick will  allow you to move beyond the urge  Discussion. Call a friend for support.  Drink of water. Use as an oral substitute such as water.  Many people say the reason they smoke is to deal with stress. Unfortunately, stress is a part of all our lives. When quitting, you will need to find new strategies to deal with stress. There are stress-management classes, and self-help books that can help you discover new ways to reduce and deal with stress.  The bottom line is: don't just try to go it alone-reach out for support to help you achieve your goal.     Discharge:    Discharge home with [facility]  Resume home meds. Restart eliquis on Saturday. Hold supplements  Follow-up with PCP in 1 week  Follow-up with cardiology as scheduled in April    Thank you for allowing  Karen to participate in your care. Return to the ER  if symptoms worsen

## 2024-03-14 NOTE — DISCHARGE SUMMARY
Discharge Diagnosis  Near syncope    Issues Requiring Follow-Up  Near syncope    Discharge Meds     Your medication list        CONTINUE taking these medications        Instructions Last Dose Given Next Dose Due   blood-glucose meter misc  Commonly known as: True Metrix Glucose Meter      TEST TWICE DAILY              ASK your doctor about these medications        Instructions Last Dose Given Next Dose Due   acetaminophen 325 mg tablet  Commonly known as: Tylenol           albuterol 90 mcg/actuation inhaler      Inhale 2 puffs every 4 hours if needed for shortness of breath.       apixaban 5 mg tablet  Commonly known as: Eliquis      Take 1 tablet (5 mg) by mouth 2 times a day.       ascorbic acid 500 mg tablet  Commonly known as: Vitamin C           b complex 0.4 mg tablet           Bacillus coagulans-inulin 1 billion-250 cell-mg capsule      Take 1 capsule by mouth once daily.       budesonide-formoteroL 160-4.5 mcg/actuation inhaler  Commonly known as: Symbicort      Inhale 2 puffs 2 times a day.       CALCIUM ORAL           cephalexin 500 mg capsule  Commonly known as: Keflex      Take 1 capsule (500 mg) by mouth 2 times a day.       cholecalciferol 25 MCG (1000 UT) tablet  Commonly known as: Vitamin D-3           coenzyme Q10 100 mg tablet           finasteride 5 mg tablet  Commonly known as: Proscar      Take 1 tablet (5 mg) by mouth once daily.       FLUoxetine 20 mg capsule  Commonly known as: PROzac      Take 1 capsule (20 mg) by mouth once daily.       fluticasone 50 mcg/actuation nasal spray  Commonly known as: Flonase      INSTILL 2 SPRAYS IN EACH NOSTRIL DAILY       GARLIC ORAL           losartan 50 mg tablet  Commonly known as: Cozaar      Take 1 tablet (50 mg) by mouth once daily.       lubricating eye drops ophthalmic solution           magnesium oxide 400 mg (241.3 mg magnesium) tablet  Commonly known as: Mag-Ox           Metamucil Fiber Thin 2.5 gram wafer wafer  Generic drug: psyllium husk (with  sugar)           metoprolol tartrate 25 mg tablet  Commonly known as: Lopressor      take 1/2 tablet by mouth twice a day       montelukast 10 mg tablet  Commonly known as: Singulair      Take 1 tablet (10 mg) by mouth once daily at bedtime.       MULTIPLE VITAMINS ORAL           omeprazole 40 mg DR capsule  Commonly known as: PriLOSEC      TAKE 1 CAPSULE EVERY DAY       oxygen gas therapy  Commonly known as: O2           simvastatin 40 mg tablet  Commonly known as: Zocor      Take 1 tablet (40 mg) by mouth early in the morning..       tamsulosin 0.4 mg 24 hr capsule  Commonly known as: Flomax           True Metrix Glucose Test Strip strip  Generic drug: blood sugar diagnostic      120 strips 4 times a day.                Test Results Pending At Discharge  Pending Labs       No current pending labs.            Hospital Course   Marco A Diallo is a 83 y.o. male with past medical history of hypertension, allergies, BPH, depression, hyperlipidemia, reactive airway disease, pulmonary fibrosis, peripheral arterial disease, asthma, osteoarthritis, vertigo, diverticulitis with perforation, has a loop recorder that was placed in January, CVA, prediabetes, GERD, history of DVT, patent foramen ovale, obstructive sleep apnea with CPAP, anxiety who presented to the emergency department this morning for feeling like he was going to pass out x 2.     Vital signs on presentation to the ER temperature 98.1, pulse ox 89%, pulse 64, blood pressure 140/76.  Oxygen then was started and is now 93%.  Blood work was insignificant with a normal white count, hemoglobin, hematocrit, normal troponin, negative UA, normal BNP, negative for flu AB and COVID, CT scan of the head showed nonspecific scattered white matter hypodensities favored to represent sequela of small vessel ischemia but no acute intracranial hemorrhage mass effect or midline shift.  EKG was negative for ST elevation or dysrhythmia.  Chest x-ray was negative for pneumonia.   Patient was then advised to be admitted for syncope    Patient was seen by cardiology who recommended restarting metoprolol at 12.5mg.  Had episode of chest pain with negative troponins.  Cardiac cath was performed that was within normal limits.  Patient is in fair condition to discharge home with home health care.  Resume home meds except stop supplements and to follow-up with PCP.  Resume Eliquis on Saturday.  Follow cardiac discharge instructions regarding site care.  Follow-up with PCP in 1 week.  Follow-up with Dr. Anderson, cardiology as scheduled    Pertinent Physical Exam At Time of Discharge  Physical Exam  General Appearance: AAO x 3, not in acute distress  Skin: skin color pink, warm, and dry; no suspicious rashes or lesions  Eyes : PERRL, EOM's intact  ENT: mucous membranes pink and moist  Neck: normocephalic  Respiratory: lungs clear to auscultation anteriorly; no wheezing, rhonchi, or crackles.   Heart: regular rate and rhythm. telemetry shows sinus rhythm  Abdomen: Nondistended, positive bowel sounds x4, soft,  nontender  Extremities: no edema   Peripheral pulses: normal x4 extremities  Neuro: alert, coherent and conversant, no focal motor deficits. C/o's frontal headache that he has had for a couple of weeks  Outpatient Follow-Up  Future Appointments   Date Time Provider Department Center   4/22/2024  9:40 AM Santiago Beckwith MD [id number]         SUSU Lucas-CNP

## 2024-03-14 NOTE — PROGRESS NOTES
"Physical Therapy                 Therapy Communication Note    Patient Name: Arvin Diallo \"Pollard\"  MRN: [id number]  Today's Date: 3/14/2024     Discipline: Physical Therapy    Missed Visit Reason: Missed Visit Reason: Patient in a medical procedure    Missed Time: Attempt  Tx deferred secondary to heart cath. 3/14/24    "

## 2024-03-14 NOTE — PROGRESS NOTES
"Music Therapy Note    Arvin WEST \"Bud\" Imani was referred by Blanca Santos RN.     Therapy Session  Referral Type: New referral this admission  Visit Type: New visit  Session Start Time: 1400  Session End Time: 1445  Intervention Delivery: In-person  Conflict of Service: None  Family Present for Session: None     Pre-assessment  Pain Score: 0 - No pain  Stress Level (0-10): 5  Anxiety Level (0-10): 5  Coping Level (0-10): 0  Mood/Affect: Calm, Appropriate         Treatment/Interventions  Areas of Focus: Anxiety reduction, Stress reduction, Coping  Music Therapy Interventions: Live music listening    Post-assessment  Pain Score: 0 - No pain  Stress Level (0-10): 3  Anxiety Level (0-10): 4  Coping Level (0-10): 7  Mood/Affect: Calm, Appropriate  Total Session Time (min): 45 minutes    Narrative  Assessment Detail: Pt found resting in recliner, awake/alert, upon arrival of music therapist. Pt expressed having a chest pain episode this morning and \"my pain was a 20\". Pt states doing better since and requesting session at this time.  Plan: Plan to provide live music listening intervention as a means of coping and stress/anxiety reduction.  Intervention: During intervention, pt participated by making music selections for Nitesh Storm, Fracisco Franklin, and Jared Nguyen, when given a list of choice. Pt participated with receptive listening as evidenced by resting eye gaze toward MT/guitar. Pt shared about his grandkids, kids, growing up in [place], and other memories elicited by the music. MT encouraged pt to continue using music as a means of coping.  Evaluation: Pt responded to music therapy intervention by stating \"it helps having someone come in and do that, it's a blessing\". Pt requesting follow-up if still admitted.  Follow-up: MT will follow-up with pt as applicable.    Education Documentation  No documentation found.        Expressive Therapies Note  "

## 2024-03-14 NOTE — PROGRESS NOTES
Per medical team, patient is now agreeable to home health care, and may be medically appropriate for discharge today.  sent updated notes to [facility] through [facility]. SW to meet with patient to review plan and Medicare IMM.  - 1430: SW received a call from patient's dtr/Glory who spoke with SW at length about patient's situation, recent bereavement of patient's wife over a year ago, and patient's loneliness. SW offered support and understanding, as well as local resources for in-home and community supports.  - 1535: Per NP/Samara, patient will not discharge until tomorrow.  - 1550: SW met with patient at bedside to review discharge plan and Medicare IMM. Patient confirmed understanding and agreement with same. Plan for patient is to return home when medically ready, with new services to begin through [facility]. [facility] to follow and assist. MARYLIN Velazquez

## 2024-03-14 NOTE — PROGRESS NOTES
"Marco A Arnold is a 83 y.o. male on day 3 of admission presenting with Near syncope.      Subjective   Patient is awaiting cath.  Sitting up in chair this a.m.  Denies complaints of chest pain or pressure.  Complains of \"a little headache\" and rates less than a 5.  Denies complaints of dizziness       Objective     Last Recorded Vitals  /73   Pulse 55   Temp 36.3 °C (97.3 °F)   Resp 18   Wt 119 kg (261 lb 7.5 oz)   SpO2 95%   Intake/Output last 3 Shifts:    Intake/Output Summary (Last 24 hours) at 3/14/2024 1500  Last data filed at 3/14/2024 1030  Gross per 24 hour   Intake 1260 ml   Output 1605 ml   Net -345 ml       Admission Weight  Weight: 113 kg (250 lb) (03/11/24 0332)    Daily Weight  03/11/24 : 119 kg (261 lb 7.5 oz)    Image Results  Cardiac Catheterization Procedure           [facility]     [address] [place] [address]  Phone [phone], Fax [phone]    Cardiovascular Catheterization Report    Patient Name:      MEME ARNOLD      Performing Physician: [id number] Costa MD  Study Date:        3/14/2024            Verifying Physician:  [id number] Costa MD  MRN/PID:           [id number]             Cardiologist:  Accession#:        [id number]         Ordering Provider:    [id number] JUAN SAMS  Date of Birth/Age: 1940 / 83 years Fellow:  Gender:            M                    Fellow:  Encounter#:        [id number]       Study: Left Heart Cath       Indications:  MEEM ARNOLD is a 83 year old male who presents with hypertension, dyslipidemia, atrial fibrillation and a chest pain assessment of typical angina. Worsening angina.  LVEF Assessed: Yes. LVEF = 50%.       Procedure Description:  After infiltration with 2% Lidocaine, the " right radial artery was cannulated with a modified Seldinger technique. Subsequently a 6 Scottish sheath was placed in the right radial artery. Selective coronary catheterization was performed using a 6 Fr catheter(s) exchanged over a guide wire to cannulate the coronary arteries. A 6 Fr Tiger catheter was used for left and right coronary artery injections.  Multiple injections of contrast were made into the left and right coronary arteries with angiograms recorded in multiple projections. After completion of the procedure, the arterial sheath was pulled and a TR Band Radial Compression Device was utilized to obtain patent hemostasis.     Coronary Angiography:  The coronary circulation is right dominant.     Left Main Coronary Artery:  The left main coronary artery is a normal caliber vessel. The left main arises normally from the left coronary sinus of Valsalva and bifurcates into the LAD and circumflex coronary arteries. The left main coronary artery showed a normal vessel.     Left Anterior Descending Coronary Artery Distribution:  The left anterior descending coronary artery is a normal caliber vessel. The LAD arises normally from the left main coronary artery. The LAD demonstrated no significant disease or stenosis greater than 30%. The 1st diagonal branch is a normal caliber vessel. The 1st diagonal branch showed no significant disease or stenosis greater than 30%.     Circumflex Coronary Artery Distribution:  The circumflex coronary artery is a normal caliber vessel. The circumflex arises normally from the left main coronary artery and terminates in the AV groove. The circumflex revealed no significant disease or stenosis greater than 30%. The 1st obtuse marginal branch is a normal caliber vessel. The 1st obtuse marginal branch showed no significant disease or stenosis greater than 30%.     Right Coronary Artery Distribution:    The right coronary artery is a normal caliber vessel. The RCA arises normally from  the right sinus of Valsalva. The RCA showed no significant disease or stenosis greater than 30%. The right posterolateral branch is a normal caliber vessel. The right posterolateral branch showed no significant disease or stenosis greater than 30%. The right posterior descending artery is a normal caliber vessel. The right posterior descending artery showed no significant disease or stenosis greater than 30%.       Left Ventriculography:  The LV ejection fraction was 50 to 55%. All left ventricular regional wall segments contract normally.     Hemo Personnel:  +-----------------------------+---------+  Name                         Duty       +-----------------------------+---------+  James AquinoROC MD 1  +-----------------------------+---------+       Hemodynamic Pressures:     +----+---------------+----------+-------------+--------------+-------+---------+  Site   Date Time   Phase NameSystolic mmHgDiastolic mmHgED mmHgMean mmHg  +----+---------------+----------+-------------+--------------+-------+---------+    LV      3/14/2024  AIR REST           92             3     11                   10:24:16 AM                                                       +----+---------------+----------+-------------+--------------+-------+---------+    LV      3/14/2024  AIR REST           90             3     12                   10:24:23 AM                                                       +----+---------------+----------+-------------+--------------+-------+---------+   LVp      3/14/2024  AIR REST           96             4     16                   10:24:45 AM                                                       +----+---------------+----------+-------------+--------------+-------+---------+   AOp      3/14/2024  AIR REST           93            53              70          10:24:52 AM                                                        +----+---------------+----------+-------------+--------------+-------+---------+    AO      3/14/2024  AIR REST           91            13              69          10:25:09 AM                                                       +----+---------------+----------+-------------+--------------+-------+---------+       Complications:  No in-lab complications observed.     Cardiac Cath Post Procedure Notes:  Post Procedure Diagnosis: Angiographically normal coronary arteries.  Blood Loss:               Estimated blood loss during the procedure was 0 mls.  Specimens Removed:        Number of specimen(s) removed: none.       Recommendations:  Maximize medical therapy.  Agressive risk factor modification efforts.  Follow-up with cardiology clinic.  Medical management of coronary artery disease.    ____________________________________________________________________________________  CONCLUSIONS:   1. Right coronary artery system dominance.   2. Non-obstructive coronary artery disease.   3. Preserved low-normal LV systolic function.   4. Hypertrophic myocardium.    ICD 10 Codes:  Chest pain, unspecified-R07.9     CPT Codes:  Left Heart Cath (visualization of coronaries) and LV-[id number]; Moderate Sedation Services initial 15 minutes patient >5 years-[id number]     [id number] James Costa MD  Performing Physician  Electronically signed by [id number] James Costa MD on 3/14/2024 at  10:38:25 AM         ** Final **  ECG 12 lead  Sinus bradycardia with 1st degree AV block  Left axis deviation  Right bundle branch block  Minimal voltage criteria for LVH, may be normal variant  Abnormal ECG  When compared with ECG of 13-MAR-2024 09:41, (unconfirmed)  MANUAL COMPARISON REQUIRED PREVIOUS ECG IS INCOMPATIBLE      Physical Exam  General Appearance: AAO x 3, not in acute distress  Skin: skin color pink, warm, and dry; no suspicious rashes or lesions  Eyes : PERRL, EOM's intact  ENT: mucous membranes  pink and moist  Neck: normocephalic  Respiratory: lungs clear to auscultation anteriorly; no wheezing, rhonchi, or crackles.   Heart: regular rate and rhythm. telemetry shows sinus rhythm  Abdomen: Nondistended, positive bowel sounds x4, soft,  nontender  Extremities: no edema   Peripheral pulses: normal x4 extremities  Neuro: alert, coherent and conversant, no focal motor deficits. C/o's frontal headache that he has had for a couple of weeks    Relevant Results  Results for orders placed or performed during the hospital encounter of 03/11/24 (from the past 24 hour(s))   CBC   Result Value Ref Range    WBC 6.8 4.4 - 11.3 x10*3/uL    nRBC 0.0 0.0 - 0.0 /100 WBCs    RBC 4.18 (L) 4.50 - 5.90 x10*6/uL    Hemoglobin 13.3 (L) 13.5 - 17.5 g/dL    Hematocrit 39.4 (L) 41.0 - 52.0 %    MCV 94 80 - 100 fL    MCH 31.8 26.0 - 34.0 pg    MCHC 33.8 32.0 - 36.0 g/dL    RDW 12.2 11.5 - 14.5 %    Platelets 193 150 - 450 x10*3/uL   Basic Metabolic Panel   Result Value Ref Range    Glucose 119 (H) 74 - 99 mg/dL    Sodium 134 (L) 136 - 145 mmol/L    Potassium 4.1 3.5 - 5.3 mmol/L    Chloride 105 98 - 107 mmol/L    Bicarbonate 24 21 - 32 mmol/L    Anion Gap 9 (L) 10 - 20 mmol/L    Urea Nitrogen 18 6 - 23 mg/dL    Creatinine 0.93 0.50 - 1.30 mg/dL    eGFR 82 >60 mL/min/1.73m*2    Calcium 9.3 8.6 - 10.3 mg/dL     Scheduled medications  [START ON 3/16/2024] apixaban, 5 mg, oral, BID  budesonide, 0.5 mg, nebulization, BID  finasteride, 5 mg, oral, Daily  FLUoxetine, 20 mg, oral, Daily  fluticasone, 2 spray, Each Nostril, Daily  losartan, 50 mg, oral, Daily  magnesium oxide, 400 mg, oral, Daily  metoprolol tartrate, 12.5 mg, oral, BID  montelukast, 10 mg, oral, Nightly  multivitamin with minerals, 1 tablet, oral, Daily  oxygen, 3 L/min, inhalation, Nightly  pantoprazole, 20 mg, oral, Daily before breakfast  polyethylene glycol, 17 g, oral, Daily  [Held by provider] simvastatin, 40 mg, oral, Daily  tamsulosin, 0.4 mg, oral,  Daily      Continuous medications     PRN medications  PRN medications: acetaminophen, albuterol, lidocaine **AND** alum-mag hydroxide-simeth, lubricating eye drops, lubricating eye drops, meclizine, nitroglycerin, oxygen     Assessment/Plan      Principal Problem:    Near syncope  Active Problems:    Other chest pain    Marco A Diallo is a 83 y.o. male with past medical history of hypertension, allergies, BPH, depression, hyperlipidemia, reactive airway disease, pulmonary fibrosis, peripheral arterial disease, asthma, osteoarthritis, vertigo, diverticulitis with perforation, has a loop recorder that was placed in January, CVA, prediabetes, GERD, history of DVT, patent foramen ovale, obstructive sleep apnea with CPAP, anxiety who presented to the emergency department this morning for feeling like he was going to pass out x 2.      Plan:  Presyncope  -Cardiology following-appreciate recommendations.  Recommends following up with the EP as outpatient for possible pacemaker placement  -Patient follows with Dr. Anderson through [facility] and has a follow-up appointment in April. Daughter says patient sees a Dr. Ramirez for cardiology  -S/P cardiac cath was normal  -continue metoprolol  -Continue  telemetry  -PT/OT following.  Appreciate recommendations  -much time spent in conversation regarding testing for vertigo.  Was informed that PT notes suspect hypoactive vestibular  -Time spent in conversation with daughter, Shayla.  Shayla states patient has had MRIs and MRAs and carotid ultrasound studies done that have been insignificant.  Talked with daughter about possible repeating MRI/MRA during this admission declines secondary to recent testing and recent placement of loop recorder  -continue ASA     History of DVT  -eliquis on hold for cardiac cath. Restart eliquis on Saturday     BPH  -Continue finasteride and flomax     Hypertension  -Continue losartan and metoprolol     Depression  -Continue Prozac     Discharge disposition:  "Patient is medically ready for discharge.  Patient is asking to stay until tomorrow.  Verbalizes he does not feel \"ready\" to go home.  Reviewed with him that he is medically ready for discharge but will discharge in a.m. nursing stated that patient mentioned he would appealed discharge    Kelle Lord, APRN-CNP      "

## 2024-03-14 NOTE — INTERVAL H&P NOTE
H&P reviewed. The patient developed sudden onset chest pain on 3/13/24 at 0700. Echo was normal but EKG did show some T wave inversions in the lateral leads, therefore, patient presents for LHC to rule out ischemia.

## 2024-03-15 VITALS
WEIGHT: 261.47 LBS | OXYGEN SATURATION: 96 % | TEMPERATURE: 97.8 F | HEART RATE: 62 BPM | DIASTOLIC BLOOD PRESSURE: 77 MMHG | HEIGHT: 75 IN | SYSTOLIC BLOOD PRESSURE: 127 MMHG | RESPIRATION RATE: 18 BRPM | BODY MASS INDEX: 32.51 KG/M2

## 2024-03-15 DIAGNOSIS — R55 POSTURAL DIZZINESS WITH PRESYNCOPE: Primary | ICD-10-CM

## 2024-03-15 DIAGNOSIS — R42 POSTURAL DIZZINESS WITH PRESYNCOPE: Primary | ICD-10-CM

## 2024-03-15 LAB
ANION GAP SERPL CALC-SCNC: 8 MMOL/L (ref 10–20)
BUN SERPL-MCNC: 18 MG/DL (ref 6–23)
CALCIUM SERPL-MCNC: 9.5 MG/DL (ref 8.6–10.3)
CHLORIDE SERPL-SCNC: 104 MMOL/L (ref 98–107)
CO2 SERPL-SCNC: 25 MMOL/L (ref 21–32)
CREAT SERPL-MCNC: 0.95 MG/DL (ref 0.5–1.3)
EGFRCR SERPLBLD CKD-EPI 2021: 80 ML/MIN/1.73M*2
ERYTHROCYTE [DISTWIDTH] IN BLOOD BY AUTOMATED COUNT: 12.3 % (ref 11.5–14.5)
GLUCOSE SERPL-MCNC: 132 MG/DL (ref 74–99)
HCT VFR BLD AUTO: 39.3 % (ref 41–52)
HGB BLD-MCNC: 13.3 G/DL (ref 13.5–17.5)
MCH RBC QN AUTO: 31.9 PG (ref 26–34)
MCHC RBC AUTO-ENTMCNC: 33.8 G/DL (ref 32–36)
MCV RBC AUTO: 94 FL (ref 80–100)
NRBC BLD-RTO: 0 /100 WBCS (ref 0–0)
PLATELET # BLD AUTO: 199 X10*3/UL (ref 150–450)
POTASSIUM SERPL-SCNC: 4.1 MMOL/L (ref 3.5–5.3)
RBC # BLD AUTO: 4.17 X10*6/UL (ref 4.5–5.9)
SODIUM SERPL-SCNC: 133 MMOL/L (ref 136–145)
WBC # BLD AUTO: 6.4 X10*3/UL (ref 4.4–11.3)

## 2024-03-15 PROCEDURE — 94640 AIRWAY INHALATION TREATMENT: CPT | Mod: MUE

## 2024-03-15 PROCEDURE — 2500000004 HC RX 250 GENERAL PHARMACY W/ HCPCS (ALT 636 FOR OP/ED)

## 2024-03-15 PROCEDURE — 2500000001 HC RX 250 WO HCPCS SELF ADMINISTERED DRUGS (ALT 637 FOR MEDICARE OP)

## 2024-03-15 PROCEDURE — 94761 N-INVAS EAR/PLS OXIMETRY MLT: CPT

## 2024-03-15 PROCEDURE — 36415 COLL VENOUS BLD VENIPUNCTURE: CPT

## 2024-03-15 PROCEDURE — 80048 BASIC METABOLIC PNL TOTAL CA: CPT

## 2024-03-15 PROCEDURE — 2500000002 HC RX 250 W HCPCS SELF ADMINISTERED DRUGS (ALT 637 FOR MEDICARE OP, ALT 636 FOR OP/ED)

## 2024-03-15 PROCEDURE — 85027 COMPLETE CBC AUTOMATED: CPT

## 2024-03-15 RX ADMIN — FLUTICASONE PROPIONATE 2 SPRAY: 50 SPRAY, METERED NASAL at 09:00

## 2024-03-15 RX ADMIN — BUDESONIDE 0.5 MG: 0.5 INHALANT RESPIRATORY (INHALATION) at 06:13

## 2024-03-15 RX ADMIN — METOPROLOL TARTRATE 12.5 MG: 25 TABLET, FILM COATED ORAL at 08:41

## 2024-03-15 RX ADMIN — LOSARTAN POTASSIUM 50 MG: 50 TABLET, FILM COATED ORAL at 08:41

## 2024-03-15 RX ADMIN — FINASTERIDE 5 MG: 5 TABLET, FILM COATED ORAL at 08:40

## 2024-03-15 RX ADMIN — Medication 400 MG: at 08:40

## 2024-03-15 RX ADMIN — PANTOPRAZOLE SODIUM 20 MG: 20 TABLET, DELAYED RELEASE ORAL at 06:26

## 2024-03-15 RX ADMIN — ALBUTEROL SULFATE 2.5 MG: 2.5 SOLUTION RESPIRATORY (INHALATION) at 06:04

## 2024-03-15 RX ADMIN — FLUOXETINE 20 MG: 20 CAPSULE ORAL at 08:40

## 2024-03-15 RX ADMIN — MULTIPLE VITAMINS W/ MINERALS TAB 1 TABLET: TAB at 08:40

## 2024-03-15 RX ADMIN — TAMSULOSIN HYDROCHLORIDE 0.4 MG: 0.4 CAPSULE ORAL at 08:40

## 2024-03-15 ASSESSMENT — COGNITIVE AND FUNCTIONAL STATUS - GENERAL
STANDING UP FROM CHAIR USING ARMS: A LITTLE
CLIMB 3 TO 5 STEPS WITH RAILING: A LITTLE
MOBILITY SCORE: 21
WALKING IN HOSPITAL ROOM: A LITTLE

## 2024-03-15 ASSESSMENT — PAIN SCALES - GENERAL: PAINLEVEL_OUTOF10: 0 - NO PAIN

## 2024-03-15 ASSESSMENT — PAIN - FUNCTIONAL ASSESSMENT: PAIN_FUNCTIONAL_ASSESSMENT: 0-10

## 2024-03-15 NOTE — NURSING NOTE
Discharge Note: 3/15/2024 1119 Discharge instructions and pt responsibilities reviewed with pt and copy given. Heart cath, chest pain, education reviewed with pt and information sheets given. Pt verbalizes understanding of instructions received, verbalizes understanding of when to seek medical attention, denies any home going or personal care needs. Denies further questions or concerns. Reviewed follow up appts with pt and verbalizes understanding. Osvaldo CHURCH

## 2024-03-15 NOTE — PROGRESS NOTES
Per medical team, patient is medically appropriate for discharge today. SW to send updated notes and final orders to [facility] through [facility] when complete. Plan for patient to discharge home today with new services to begin through [facility]. No further Care Transitions needs foreseen. Care Transitions available upon request. MARYLIN Velazquez

## 2024-03-16 LAB
ATRIAL RATE: 64 BPM
P AXIS: 47 DEGREES
P OFFSET: 138 MS
P ONSET: 79 MS
PR INTERVAL: 266 MS
Q ONSET: 212 MS
QRS COUNT: 10 BEATS
QRS DURATION: 152 MS
QT INTERVAL: 448 MS
QTC CALCULATION(BAZETT): 462 MS
QTC FREDERICIA: 457 MS
R AXIS: -60 DEGREES
T AXIS: 37 DEGREES
T OFFSET: 436 MS
VENTRICULAR RATE: 64 BPM

## 2024-03-18 ENCOUNTER — TELEPHONE (OUTPATIENT)
Dept: PRIMARY CARE | Facility: CLINIC | Age: 84
End: 2024-03-18
Payer: MEDICARE

## 2024-03-18 NOTE — TELEPHONE ENCOUNTER
Patient was just in the hospital at [facility], I do not believe he was sent home on oxygen.  He also sees pulmonary medicine in [place], I would appreciate relaying this information to his pulmonologist.  He may need to be on some supplemental oxygen.

## 2024-03-19 ENCOUNTER — PATIENT OUTREACH (OUTPATIENT)
Dept: CARE COORDINATION | Facility: CLINIC | Age: 84
End: 2024-03-19
Payer: MEDICARE

## 2024-03-19 DIAGNOSIS — R55 NEAR SYNCOPE: ICD-10-CM

## 2024-03-19 NOTE — PROGRESS NOTES
Discharge Facility:[facility]  Discharge Diagnosis:Near syncope  Admission Date:3.11.24  Discharge Date: 3.15.24    PCP Appointment Date:3.22.24  Specialist Appointment Date:   Hospital Encounter and Summary: Linked   See discharge assessment below for further details   Engagement  Call Start Time: 1020 (3/19/2024 10:20 AM)    Medications  Medications reviewed with patient/caregiver?: Yes (3/19/2024 10:20 AM)  Is the patient having any side effects they believe may be caused by any medication additions or changes?: No (3/19/2024 10:20 AM)  Does the patient have all medications ordered at discharge?: Yes (3/19/2024 10:20 AM)  Care Management Interventions: No intervention needed (3/19/2024 10:20 AM)  Is the patient taking all medications as directed (includes completed medication regime)?: Yes (3/19/2024 10:20 AM)  Care Management Interventions: Provided patient education (3/19/2024 10:20 AM)    Appointments  Does the patient have a primary care provider?: Yes (3/19/2024 10:20 AM)  Care Management Interventions: Verified appointment date/time/provider (3/19/2024 10:20 AM)  Has the patient kept scheduled appointments due by today?: Yes (3/19/2024 10:20 AM)  Care Management Interventions: Advised patient to keep appointment (3/19/2024 10:20 AM)    Self Management  What is the home health agency?: n/a (3/19/2024 10:20 AM)  What Durable Medical Equipment (DME) was ordered?: n/a (3/19/2024 10:20 AM)    Patient Teaching  Does the patient have access to their discharge instructions?: Yes (3/19/2024 10:20 AM)  Care Management Interventions: Reviewed instructions with patient (3/19/2024 10:20 AM)  What is the patient's perception of their health status since discharge?: Same (3/19/2024 10:20 AM)  Is the patient/caregiver able to teach back the hierarchy of who to call/visit for symptoms/problems? PCP, Specialist, Home Health nurse, Urgent Care, ED, 911: Yes (3/19/2024 10:20 AM)  Patient/Caregiver Education Comments: Spoke  with patient. States still not feeling well. No chest pain but is weak and SOB. Is using oxygen. No med changes. Advised if near syncope occurs again to go back to ED. Reminded of pcp appt. (3/19/2024 10:20 AM)

## 2024-03-21 ENCOUNTER — HOSPITAL ENCOUNTER (OUTPATIENT)
Dept: HOSPITAL 100 - PSN | Age: 84
Discharge: HOME | End: 2024-03-21
Payer: MEDICARE

## 2024-03-21 ENCOUNTER — HOSPITAL ENCOUNTER (EMERGENCY)
Age: 84
Discharge: HOME | End: 2024-03-21
Payer: MEDICARE

## 2024-03-21 VITALS
OXYGEN SATURATION: 95 % | RESPIRATION RATE: 18 BRPM | SYSTOLIC BLOOD PRESSURE: 117 MMHG | TEMPERATURE: 97.3 F | DIASTOLIC BLOOD PRESSURE: 66 MMHG | HEART RATE: 68 BPM

## 2024-03-21 VITALS — OXYGEN SATURATION: 95 %

## 2024-03-21 VITALS
RESPIRATION RATE: 20 BRPM | HEART RATE: 72 BPM | DIASTOLIC BLOOD PRESSURE: 89 MMHG | SYSTOLIC BLOOD PRESSURE: 147 MMHG | OXYGEN SATURATION: 98 %

## 2024-03-21 VITALS
TEMPERATURE: 98.06 F | HEART RATE: 59 BPM | SYSTOLIC BLOOD PRESSURE: 162 MMHG | RESPIRATION RATE: 18 BRPM | DIASTOLIC BLOOD PRESSURE: 80 MMHG | OXYGEN SATURATION: 93 %

## 2024-03-21 VITALS — BODY MASS INDEX: 32.9 KG/M2

## 2024-03-21 VITALS — OXYGEN SATURATION: 97 % | HEART RATE: 79 BPM

## 2024-03-21 VITALS — RESPIRATION RATE: 16 BRPM | HEART RATE: 62 BPM | SYSTOLIC BLOOD PRESSURE: 117 MMHG | DIASTOLIC BLOOD PRESSURE: 62 MMHG

## 2024-03-21 VITALS — OXYGEN SATURATION: 93 %

## 2024-03-21 DIAGNOSIS — I45.2: ICD-10-CM

## 2024-03-21 DIAGNOSIS — J44.9: Primary | ICD-10-CM

## 2024-03-21 DIAGNOSIS — R55: ICD-10-CM

## 2024-03-21 DIAGNOSIS — R51.9: Primary | ICD-10-CM

## 2024-03-21 DIAGNOSIS — R42: ICD-10-CM

## 2024-03-21 DIAGNOSIS — E11.40: ICD-10-CM

## 2024-03-21 DIAGNOSIS — J44.9: ICD-10-CM

## 2024-03-21 DIAGNOSIS — E78.5: ICD-10-CM

## 2024-03-21 DIAGNOSIS — R06.02: ICD-10-CM

## 2024-03-21 DIAGNOSIS — R53.1: ICD-10-CM

## 2024-03-21 DIAGNOSIS — I11.0: ICD-10-CM

## 2024-03-21 DIAGNOSIS — I50.9: ICD-10-CM

## 2024-03-21 LAB
ANION GAP: 6 (ref 5–15)
BUN SERPL-MCNC: 26 MG/DL (ref 7–18)
BUN/CREAT RATIO: 24.8 RATIO (ref 10–20)
CALCIUM SERPL-MCNC: 9.4 MG/DL (ref 8.5–10.1)
CARBON DIOXIDE: 25 MMOL/L (ref 21–32)
CHLORIDE: 105 MMOL/L (ref 98–107)
DEPRECATED RDW RBC: 42.3 FL (ref 35.1–43.9)
ERYTHROCYTE [DISTWIDTH] IN BLOOD: 12.2 % (ref 11.6–14.6)
EST GLOM FILT RATE - AFR AMER: 87 ML/MIN (ref 60–?)
ESTIMATED CREATININE CLEARANCE: 75.57 ML/MIN
GLUCOSE: 147 MG/DL (ref 74–106)
HCT VFR BLD AUTO: 37.9 % (ref 40–54)
HGB BLD-MCNC: 12.9 G/DL (ref 13–16.5)
IMMATURE GRANULOCYTES COUNT: 0.02 X10^3/UL (ref 0–0)
LEUKOCYTE ESTERASE UR QL STRIP: 100 /UL
MCV RBC: 93.8 FL (ref 80–94)
MEAN CORP HGB CONC: 34 G/DL (ref 32–36)
MEAN PLATELET VOL.: 9.2 FL (ref 6.2–12)
MUCOUS THREADS URNS QL MICRO: (no result) /HPF
NRBC FLAGGED BY ANALYZER: 0 % (ref 0–5)
PLATELET # BLD: 199 K/MM3 (ref 150–450)
POTASSIUM: 4.2 MMOL/L (ref 3.5–5.1)
PROT UR QL STRIP.AUTO: 15 MG/DL
RBC # BLD AUTO: 4.04 M/MM3 (ref 4.6–6.2)
RBC UR QL: (no result) /HPF (ref 0–5)
RBC UR QL: 10 /UL
SP GR UR: 1.02 (ref 1–1.03)
SQUAMOUS URNS QL MICRO: (no result) /HPF (ref 0–5)
TROPONIN-I HS: 16 PG/ML (ref 3–78)
URINE PRESERVATIVE: (no result)
WBC # BLD AUTO: 7.5 K/MM3 (ref 4.4–11)

## 2024-03-21 PROCEDURE — 87086 URINE CULTURE/COLONY COUNT: CPT

## 2024-03-21 PROCEDURE — 70450 CT HEAD/BRAIN W/O DYE: CPT

## 2024-03-21 PROCEDURE — 94618 PULMONARY STRESS TESTING: CPT

## 2024-03-21 PROCEDURE — 80048 BASIC METABOLIC PNL TOTAL CA: CPT

## 2024-03-21 PROCEDURE — A4216 STERILE WATER/SALINE, 10 ML: HCPCS

## 2024-03-21 PROCEDURE — 96374 THER/PROPH/DIAG INJ IV PUSH: CPT

## 2024-03-21 PROCEDURE — 93005 ELECTROCARDIOGRAM TRACING: CPT

## 2024-03-21 PROCEDURE — 99285 EMERGENCY DEPT VISIT HI MDM: CPT

## 2024-03-21 PROCEDURE — 87088 URINE BACTERIA CULTURE: CPT

## 2024-03-21 PROCEDURE — 84484 ASSAY OF TROPONIN QUANT: CPT

## 2024-03-21 PROCEDURE — 85025 COMPLETE CBC W/AUTO DIFF WBC: CPT

## 2024-03-21 PROCEDURE — 81001 URINALYSIS AUTO W/SCOPE: CPT

## 2024-03-21 PROCEDURE — 71046 X-RAY EXAM CHEST 2 VIEWS: CPT

## 2024-03-21 PROCEDURE — 87631 RESP VIRUS 3-5 TARGETS: CPT

## 2024-03-22 ENCOUNTER — OFFICE VISIT (OUTPATIENT)
Dept: PRIMARY CARE | Facility: CLINIC | Age: 84
End: 2024-03-22
Payer: MEDICARE

## 2024-03-22 VITALS
WEIGHT: 259.5 LBS | BODY MASS INDEX: 32.27 KG/M2 | HEART RATE: 68 BPM | HEIGHT: 75 IN | OXYGEN SATURATION: 95 % | DIASTOLIC BLOOD PRESSURE: 64 MMHG | SYSTOLIC BLOOD PRESSURE: 110 MMHG

## 2024-03-22 DIAGNOSIS — Q21.12 PATENT FORAMEN OVALE (HHS-HCC): ICD-10-CM

## 2024-03-22 DIAGNOSIS — I10 MILD HTN: ICD-10-CM

## 2024-03-22 DIAGNOSIS — R55 VASODEPRESSOR SYNCOPE: ICD-10-CM

## 2024-03-22 DIAGNOSIS — F41.1 GAD (GENERALIZED ANXIETY DISORDER): Primary | ICD-10-CM

## 2024-03-22 DIAGNOSIS — N40.0 ENLARGED PROSTATE: ICD-10-CM

## 2024-03-22 DIAGNOSIS — J98.09 BRONCHOMALACIA: ICD-10-CM

## 2024-03-22 DIAGNOSIS — J45.909 ASTHMA, UNSPECIFIED ASTHMA SEVERITY, UNSPECIFIED WHETHER COMPLICATED, UNSPECIFIED WHETHER PERSISTENT (HHS-HCC): ICD-10-CM

## 2024-03-22 PROBLEM — R05.1 ACUTE COUGH: Status: RESOLVED | Noted: 2024-02-01 | Resolved: 2024-03-22

## 2024-03-22 PROBLEM — J31.0 CHRONIC RHINITIS: Status: RESOLVED | Noted: 2024-01-25 | Resolved: 2024-03-22

## 2024-03-22 PROBLEM — J02.9 SORE THROAT: Status: RESOLVED | Noted: 2024-02-01 | Resolved: 2024-03-22

## 2024-03-22 PROCEDURE — 1111F DSCHRG MED/CURRENT MED MERGE: CPT | Performed by: FAMILY MEDICINE

## 2024-03-22 PROCEDURE — 99214 OFFICE O/P EST MOD 30 MIN: CPT | Performed by: FAMILY MEDICINE

## 2024-03-22 PROCEDURE — 1157F ADVNC CARE PLAN IN RCRD: CPT | Performed by: FAMILY MEDICINE

## 2024-03-22 PROCEDURE — 1160F RVW MEDS BY RX/DR IN RCRD: CPT | Performed by: FAMILY MEDICINE

## 2024-03-22 PROCEDURE — 3078F DIAST BP <80 MM HG: CPT | Performed by: FAMILY MEDICINE

## 2024-03-22 PROCEDURE — 1159F MED LIST DOCD IN RCRD: CPT | Performed by: FAMILY MEDICINE

## 2024-03-22 PROCEDURE — 3074F SYST BP LT 130 MM HG: CPT | Performed by: FAMILY MEDICINE

## 2024-03-22 PROCEDURE — 1036F TOBACCO NON-USER: CPT | Performed by: FAMILY MEDICINE

## 2024-03-22 RX ORDER — MONTELUKAST SODIUM 10 MG/1
10 TABLET ORAL NIGHTLY
Qty: 90 TABLET | Refills: 3 | Status: SHIPPED | OUTPATIENT
Start: 2024-03-22

## 2024-03-22 RX ORDER — LOSARTAN POTASSIUM 50 MG/1
50 TABLET ORAL DAILY
Qty: 90 TABLET | Refills: 3 | Status: SHIPPED | OUTPATIENT
Start: 2024-03-22 | End: 2025-03-22

## 2024-03-22 RX ORDER — FINASTERIDE 5 MG/1
5 TABLET, FILM COATED ORAL DAILY
Qty: 90 TABLET | Refills: 3 | Status: SHIPPED | OUTPATIENT
Start: 2024-03-22 | End: 2025-03-22

## 2024-03-22 RX ORDER — BUDESONIDE AND FORMOTEROL FUMARATE DIHYDRATE 160; 4.5 UG/1; UG/1
2 AEROSOL RESPIRATORY (INHALATION) 2 TIMES DAILY
Qty: 30.6 G | Refills: 3 | Status: SHIPPED | OUTPATIENT
Start: 2024-03-22 | End: 2025-03-22

## 2024-03-22 ASSESSMENT — ENCOUNTER SYMPTOMS
DIFFICULTY URINATING: 0
ACTIVITY CHANGE: 0
ADENOPATHY: 0
APPETITE CHANGE: 0
ABDOMINAL DISTENTION: 0
VOMITING: 0
DIARRHEA: 0
WHEEZING: 0
DYSPHORIC MOOD: 0
RHINORRHEA: 0
TROUBLE SWALLOWING: 0
DIZZINESS: 1
CONSTIPATION: 0
SLEEP DISTURBANCE: 0
ARTHRALGIAS: 0
LIGHT-HEADEDNESS: 0
NUMBNESS: 0
NERVOUS/ANXIOUS: 1
SHORTNESS OF BREATH: 1
HEADACHES: 1
PALPITATIONS: 0
COUGH: 0
NAUSEA: 0
ABDOMINAL PAIN: 0
FATIGUE: 1
UNEXPECTED WEIGHT CHANGE: 0
DECREASED CONCENTRATION: 0

## 2024-03-22 NOTE — ASSESSMENT & PLAN NOTE
Patient had positive tilt table test in the past 6 months, is currently following with cardiology with a loop recorder to see if he needs a pacemaker.  Try off of beta-blocker for now given abnormalities on EKGs, had a normal heart catheterization done in the past month that demonstrated no evidence of coronary blockages and his ejection fraction was preserved.

## 2024-03-22 NOTE — ASSESSMENT & PLAN NOTE
Blood pressure normal, try off of beta-blocker given history of right bundle branch block with first-degree AV block, concerned that this might be attributing to some of his dizziness.  Patient currently has a loop recorder in place and has follow-up with cardiology in the next month.  Tilt table testing in the past was grossly positive.

## 2024-03-22 NOTE — PROGRESS NOTES
"Subjective   Patient ID: Marco A Diallo is a 83 y.o. male who presents for ER F/U Near Syncope.    HPI   Has been in [facility] and [facility] since last seen, has appointments with cardiology in the next month  Was doing 6 minute walk at the hospital, sent to ER due to dizziness  Has C since hospital stay, nurse and PT, uses O2 at night and through the day at times  Seen pulmonary in the past month, no change in medicines  Had a cath in the past month (normal)  Having near syncope issues in the past 2 weeks  Stopped taking some vitamins since ER visit      Review of Systems   Constitutional:  Positive for fatigue. Negative for activity change, appetite change and unexpected weight change.   HENT:  Negative for ear pain, nosebleeds, rhinorrhea, sneezing and trouble swallowing.    Respiratory:  Positive for shortness of breath. Negative for cough and wheezing.    Cardiovascular:  Negative for chest pain, palpitations and leg swelling.   Gastrointestinal:  Negative for abdominal distention, abdominal pain, constipation, diarrhea, nausea and vomiting.   Genitourinary:  Negative for difficulty urinating.   Musculoskeletal:  Positive for gait problem. Negative for arthralgias.   Skin:  Negative for rash.   Neurological:  Positive for dizziness and headaches. Negative for light-headedness and numbness.   Hematological:  Negative for adenopathy.   Psychiatric/Behavioral:  Negative for behavioral problems, decreased concentration, dysphoric mood and sleep disturbance. The patient is nervous/anxious.    All other systems reviewed and are negative.      Objective   /64   Pulse 68   Ht 1.905 m (6' 3\")   Wt 118 kg (259 lb 8 oz)   SpO2 95%   BMI 32.44 kg/m²     Physical Exam  Vitals and nursing note reviewed.   Constitutional:       Appearance: Normal appearance.   HENT:      Head: Normocephalic and atraumatic.      Right Ear: Tympanic membrane, ear canal and external ear normal.      Left Ear: Tympanic membrane, ear " canal and external ear normal.      Nose: Nose normal.      Mouth/Throat:      Mouth: Mucous membranes are moist.      Pharynx: Oropharynx is clear.   Cardiovascular:      Rate and Rhythm: Normal rate and regular rhythm.      Pulses: Normal pulses.      Heart sounds: Normal heart sounds.   Pulmonary:      Effort: Pulmonary effort is normal.      Breath sounds: Normal breath sounds.   Musculoskeletal:      Cervical back: Normal range of motion and neck supple.   Neurological:      Mental Status: He is alert.   Psychiatric:         Mood and Affect: Mood normal.         Behavior: Behavior normal.         Assessment/Plan   Problem List Items Addressed This Visit             ICD-10-CM    Bronchomalacia J98.09    Relevant Medications    budesonide-formoteroL (Symbicort) 160-4.5 mcg/actuation inhaler    Enlarged prostate N40.0    Relevant Medications    finasteride (Proscar) 5 mg tablet    Mild HTN I10     Blood pressure normal, try off of beta-blocker given history of right bundle branch block with first-degree AV block, concerned that this might be attributing to some of his dizziness.  Patient currently has a loop recorder in place and has follow-up with cardiology in the next month.  Tilt table testing in the past was grossly positive.         Relevant Medications    losartan (Cozaar) 50 mg tablet    RAVI (generalized anxiety disorder) - Primary F41.1     Change fluoxetine adjustment, again advised patient and the company of his daughter about recommendations for possible assisted living.         Patent foramen ovale Q21.12     Continue with anticoagulation given history of stroke and history of DVT.         Vasodepressor syncope R55     Patient had positive tilt table test in the past 6 months, is currently following with cardiology with a loop recorder to see if he needs a pacemaker.  Try off of beta-blocker for now given abnormalities on EKGs, had a normal heart catheterization done in the past month that  demonstrated no evidence of coronary blockages and his ejection fraction was preserved.          Other Visit Diagnoses         Codes    Asthma, unspecified asthma severity, unspecified whether complicated, unspecified whether persistent     J45.909    Relevant Medications    montelukast (Singulair) 10 mg tablet

## 2024-03-22 NOTE — ASSESSMENT & PLAN NOTE
Change fluoxetine adjustment, again advised patient and the company of his daughter about recommendations for possible assisted living.

## 2024-03-25 DIAGNOSIS — J45.20 MILD INTERMITTENT ASTHMA WITHOUT COMPLICATION (HHS-HCC): ICD-10-CM

## 2024-03-25 DIAGNOSIS — E11.9 TYPE 2 DIABETES MELLITUS WITHOUT COMPLICATION, WITHOUT LONG-TERM CURRENT USE OF INSULIN (MULTI): ICD-10-CM

## 2024-03-25 DIAGNOSIS — I63.40 CEREBROVASCULAR ACCIDENT (CVA) DUE TO EMBOLISM OF CEREBRAL ARTERY (MULTI): Primary | ICD-10-CM

## 2024-03-27 ENCOUNTER — TELEPHONE (OUTPATIENT)
Dept: PRIMARY CARE | Facility: CLINIC | Age: 84
End: 2024-03-27
Payer: MEDICARE

## 2024-03-27 NOTE — TELEPHONE ENCOUNTER
"PATIENT FORGOT TO MENTION AT LAST OFFICE VISIT THE DIFFICULTY HE HAS WITH SLEEP. HE WILL GET UP AFTER SLEEPING FOR APPROXIMATELY 3 HOURS TO URINATE, THEN HAS DIFFICULTY RESUMING SLEEP - IS UP FOR 2-3 HOURS. STATES HE GETS UP 3 TIMES PER NIGHT TO URINATE. IS COMPLIANT WITH CPAP. STILL AWAKES FATIGUED, \"CAN'T FUNCTION\". HE ESTIMATES TOTAL SLEEP TO BE 7-8 HOURS/NIGHT. TRIED OTC \"PM\" WITHOUT SUCCESS. PLEASE RECOMMEND OR PRESCRIBE SOMETHING.   "

## 2024-03-27 NOTE — TELEPHONE ENCOUNTER
Normal sleep amount is about 8 hours a night.  Try to limit any napping during the day time.  Having to get up to urinate is due to prostate issues for which he is on medicine.  Moods can also affect sleep, he had been started on fluoxetine in the past month, this should help in time as it works.  He can try over the counter melatonin 3-10 mg a night if he wants.

## 2024-03-27 NOTE — TELEPHONE ENCOUNTER
"Normal sleep amount is about 8 hours a night.  Try to limit any napping during the day time.  Having to get up to urinate is due to prostate issues for which he is on medicine.  Moods can also affect sleep, he had been started on fluoxetine in the past month, this should help in time as it works.  He can try over the counter melatonin 3-10 mg a night if he wants.         Note        You routed conversation to Santiago Beckwith MD3 hours ago (11:18 AM)     You3 hours ago (11:18 AM)       PATIENT FORGOT TO MENTION AT LAST OFFICE VISIT THE DIFFICULTY HE HAS WITH SLEEP. HE WILL GET UP AFTER SLEEPING FOR APPROXIMATELY 3 HOURS TO URINATE, THEN HAS DIFFICULTY RESUMING SLEEP - IS UP FOR 2-3 HOURS. STATES HE GETS UP 3 TIMES PER NIGHT TO URINATE. IS COMPLIANT WITH CPAP. STILL AWAKES FATIGUED, \"CAN'T FUNCTION\". HE ESTIMATES TOTAL SLEEP TO BE 7-8 HOURS/NIGHT. TRIED OTC \"PM\" WITHOUT SUCCESS. PLEASE RECOMMEND OR PRESCRIBE SOMETHING.         PATIENT NOTIFIED. VERBALIZED UNDERSTANDING.  "

## 2024-03-28 ENCOUNTER — PATIENT OUTREACH (OUTPATIENT)
Dept: CARE COORDINATION | Facility: CLINIC | Age: 84
End: 2024-03-28
Payer: MEDICARE

## 2024-03-28 NOTE — PROGRESS NOTES
Call regarding appt. with PCP after hospitalization.  At time of outreach call the patient feels as if their condition has improved since last visit.  Reviewed with patient the PCP appointment and any questions or concerns regarding the appt.

## 2024-04-11 ENCOUNTER — TELEPHONE (OUTPATIENT)
Dept: PRIMARY CARE | Facility: CLINIC | Age: 84
End: 2024-04-11
Payer: MEDICARE

## 2024-04-11 NOTE — TELEPHONE ENCOUNTER
Thanks for the observations, he has an appointment on 22 April and we can address these issues then.

## 2024-04-11 NOTE — TELEPHONE ENCOUNTER
Nurse from  called leaving FYI    Pt has been taking Melatonin 9mg for 2wks now and it does nothing for Pt. Nurse recommending trazadone. Also Pt is complaining of sharp pain in Left Upper Abd.

## 2024-04-22 ENCOUNTER — OFFICE VISIT (OUTPATIENT)
Dept: PRIMARY CARE | Facility: CLINIC | Age: 84
End: 2024-04-22
Payer: MEDICARE

## 2024-04-22 VITALS
WEIGHT: 259.7 LBS | HEIGHT: 75 IN | DIASTOLIC BLOOD PRESSURE: 66 MMHG | BODY MASS INDEX: 32.29 KG/M2 | HEART RATE: 86 BPM | OXYGEN SATURATION: 94 % | SYSTOLIC BLOOD PRESSURE: 120 MMHG

## 2024-04-22 DIAGNOSIS — I10 MILD HTN: ICD-10-CM

## 2024-04-22 DIAGNOSIS — I63.40 CEREBROVASCULAR ACCIDENT (CVA) DUE TO EMBOLISM OF CEREBRAL ARTERY (MULTI): ICD-10-CM

## 2024-04-22 DIAGNOSIS — K21.9 GASTROESOPHAGEAL REFLUX DISEASE, UNSPECIFIED WHETHER ESOPHAGITIS PRESENT: ICD-10-CM

## 2024-04-22 DIAGNOSIS — J31.0 CHRONIC RHINITIS: ICD-10-CM

## 2024-04-22 DIAGNOSIS — E78.2 MIXED HYPERLIPIDEMIA: ICD-10-CM

## 2024-04-22 DIAGNOSIS — Z86.718 HISTORY OF DVT (DEEP VEIN THROMBOSIS): ICD-10-CM

## 2024-04-22 DIAGNOSIS — Q21.12 PATENT FORAMEN OVALE (HHS-HCC): ICD-10-CM

## 2024-04-22 DIAGNOSIS — Z00.00 ROUTINE GENERAL MEDICAL EXAMINATION AT HEALTH CARE FACILITY: Primary | ICD-10-CM

## 2024-04-22 DIAGNOSIS — R55 VASODEPRESSOR SYNCOPE: ICD-10-CM

## 2024-04-22 DIAGNOSIS — J45.20 MILD INTERMITTENT ASTHMA WITHOUT COMPLICATION (HHS-HCC): ICD-10-CM

## 2024-04-22 DIAGNOSIS — I73.9 PAD (PERIPHERAL ARTERY DISEASE) (CMS-HCC): ICD-10-CM

## 2024-04-22 DIAGNOSIS — F41.1 GAD (GENERALIZED ANXIETY DISORDER): ICD-10-CM

## 2024-04-22 DIAGNOSIS — R53.82 CHRONIC FATIGUE: ICD-10-CM

## 2024-04-22 DIAGNOSIS — E11.9 TYPE 2 DIABETES MELLITUS WITHOUT COMPLICATION, WITHOUT LONG-TERM CURRENT USE OF INSULIN (MULTI): ICD-10-CM

## 2024-04-22 PROCEDURE — 1036F TOBACCO NON-USER: CPT | Performed by: FAMILY MEDICINE

## 2024-04-22 PROCEDURE — 3078F DIAST BP <80 MM HG: CPT | Performed by: FAMILY MEDICINE

## 2024-04-22 PROCEDURE — 1170F FXNL STATUS ASSESSED: CPT | Performed by: FAMILY MEDICINE

## 2024-04-22 PROCEDURE — 1157F ADVNC CARE PLAN IN RCRD: CPT | Performed by: FAMILY MEDICINE

## 2024-04-22 PROCEDURE — 1159F MED LIST DOCD IN RCRD: CPT | Performed by: FAMILY MEDICINE

## 2024-04-22 PROCEDURE — 1160F RVW MEDS BY RX/DR IN RCRD: CPT | Performed by: FAMILY MEDICINE

## 2024-04-22 PROCEDURE — G0439 PPPS, SUBSEQ VISIT: HCPCS | Performed by: FAMILY MEDICINE

## 2024-04-22 PROCEDURE — 3074F SYST BP LT 130 MM HG: CPT | Performed by: FAMILY MEDICINE

## 2024-04-22 PROCEDURE — 99214 OFFICE O/P EST MOD 30 MIN: CPT | Performed by: FAMILY MEDICINE

## 2024-04-22 ASSESSMENT — PATIENT HEALTH QUESTIONNAIRE - PHQ9
SUM OF ALL RESPONSES TO PHQ QUESTIONS 1-9: 6
2. FEELING DOWN, DEPRESSED OR HOPELESS: MORE THAN HALF THE DAYS
5. POOR APPETITE OR OVEREATING: NOT AT ALL
7. TROUBLE CONCENTRATING ON THINGS, SUCH AS READING THE NEWSPAPER OR WATCHING TELEVISION: SEVERAL DAYS
9. THOUGHTS THAT YOU WOULD BE BETTER OFF DEAD, OR OF HURTING YOURSELF: NOT AT ALL
3. TROUBLE FALLING OR STAYING ASLEEP OR SLEEPING TOO MUCH: SEVERAL DAYS
1. LITTLE INTEREST OR PLEASURE IN DOING THINGS: SEVERAL DAYS
8. MOVING OR SPEAKING SO SLOWLY THAT OTHER PEOPLE COULD HAVE NOTICED. OR THE OPPOSITE, BEING SO FIGETY OR RESTLESS THAT YOU HAVE BEEN MOVING AROUND A LOT MORE THAN USUAL: NOT AT ALL
10. IF YOU CHECKED OFF ANY PROBLEMS, HOW DIFFICULT HAVE THESE PROBLEMS MADE IT FOR YOU TO DO YOUR WORK, TAKE CARE OF THINGS AT HOME, OR GET ALONG WITH OTHER PEOPLE: SOMEWHAT DIFFICULT
SUM OF ALL RESPONSES TO PHQ9 QUESTIONS 1 AND 2: 3
4. FEELING TIRED OR HAVING LITTLE ENERGY: SEVERAL DAYS
6. FEELING BAD ABOUT YOURSELF - OR THAT YOU ARE A FAILURE OR HAVE LET YOURSELF OR YOUR FAMILY DOWN: NOT AT ALL

## 2024-04-22 ASSESSMENT — ENCOUNTER SYMPTOMS
COUGH: 0
NUMBNESS: 0
ADENOPATHY: 0
SHORTNESS OF BREATH: 1
SLEEP DISTURBANCE: 1
WHEEZING: 0
FATIGUE: 1
OCCASIONAL FEELINGS OF UNSTEADINESS: 1
BACK PAIN: 0
ARTHRALGIAS: 0
APPETITE CHANGE: 0
DYSPHORIC MOOD: 1
LOSS OF SENSATION IN FEET: 0
DIFFICULTY URINATING: 0
VOMITING: 0
TROUBLE SWALLOWING: 0
RHINORRHEA: 0
LIGHT-HEADEDNESS: 0
NAUSEA: 0
DEPRESSION: 1
ACTIVITY CHANGE: 0
HEADACHES: 1
ABDOMINAL DISTENTION: 0
CONSTIPATION: 0
PALPITATIONS: 0
DIZZINESS: 1
UNEXPECTED WEIGHT CHANGE: 0
ABDOMINAL PAIN: 0
DIARRHEA: 0

## 2024-04-22 ASSESSMENT — ACTIVITIES OF DAILY LIVING (ADL)
BATHING: INDEPENDENT
DOING_HOUSEWORK: NEEDS ASSISTANCE
MANAGING_FINANCES: NEEDS ASSISTANCE
TAKING_MEDICATION: NEEDS ASSISTANCE
DRESSING: INDEPENDENT
GROCERY_SHOPPING: NEEDS ASSISTANCE

## 2024-04-22 NOTE — ASSESSMENT & PLAN NOTE
"Patient with persistent complaints of \"wooziness and dizziness and pressure in his head\".  Patient has seen neurology, cardiology, ENT physicians in the past, will evaluate to see if he might be a candidate to see a different neurologist within the [facility] system.  Believe the most of his symptoms are related to history of cerebellar stroke, currently doing physical therapy at home, no change with medication or therapeutics today.  "

## 2024-04-22 NOTE — PROGRESS NOTES
Subjective   Reason for Visit: Arvin Diallo is an 83 y.o. male here for a Medicare Wellness visit.     Past Medical, Surgical, and Family History reviewed and updated in chart.    Reviewed all medications by prescribing practitioner or clinical pharmacist (such as prescriptions, OTCs, herbal therapies and supplements) and documented in the medical record.    HPI  No headache, chest pain, shortness of breath, dizziness, lightheadedness, or edema  No low blood sugars since last OV, seen opthalmology in the past year, and no numbness or tingling in feet, skin normal.  Taking and tolerating Eliquis  Follows with pulmonary, cardiology and urology at [facility] (Cardiology/[place]), loop recorder normal so far, considering PFO repair  Woozy, dizzy in head, feels like getting worse  Has stopped tamsulosin and metoprolol  Using compression socks and trying to exercise  HHC PT/OT at home  SOB at times/BARRON at times, wears out with normal activity at times, feels better if using O2  No N/V/D/C, appetite OK  Not sleeping well (hard to fall asleep when wakes up), using melatonin at night  Blue and depressed feelings, naps at times during the day  No more falls in the past few months  Using a walker, no falls    Patient Care Team:  Santiago Beckwith MD as PCP - General (Family Medicine)  Santiago Beckwith MD as PCP - [employer] PCP  Carol Cabral RN as Care Manager (Case Management)     Review of Systems   Constitutional:  Positive for fatigue. Negative for activity change, appetite change and unexpected weight change.   HENT:  Negative for congestion, ear pain, nosebleeds, rhinorrhea, sneezing and trouble swallowing.    Respiratory:  Positive for shortness of breath. Negative for cough and wheezing.    Cardiovascular:  Negative for chest pain, palpitations and leg swelling.   Gastrointestinal:  Negative for abdominal distention, abdominal pain, constipation, diarrhea, nausea and vomiting.  "  Genitourinary:  Negative for difficulty urinating.   Musculoskeletal:  Positive for gait problem. Negative for arthralgias and back pain.   Skin:  Negative for rash.   Neurological:  Positive for dizziness and headaches. Negative for light-headedness and numbness.   Hematological:  Negative for adenopathy.   Psychiatric/Behavioral:  Positive for dysphoric mood and sleep disturbance. Negative for behavioral problems.    All other systems reviewed and are negative.      Objective   Vitals:  /66   Pulse 86   Ht 1.905 m (6' 3\")   Wt 118 kg (259 lb 11.2 oz)   SpO2 94%   BMI 32.46 kg/m²       Physical Exam  Vitals and nursing note reviewed.   Constitutional:       Appearance: Normal appearance.   HENT:      Head: Normocephalic and atraumatic.      Right Ear: Tympanic membrane, ear canal and external ear normal.      Left Ear: Tympanic membrane, ear canal and external ear normal.      Nose: Nose normal.      Mouth/Throat:      Mouth: Mucous membranes are moist.      Pharynx: Oropharynx is clear.   Cardiovascular:      Rate and Rhythm: Normal rate and regular rhythm.      Pulses: Normal pulses.      Heart sounds: Normal heart sounds.   Pulmonary:      Effort: Pulmonary effort is normal.      Breath sounds: Normal breath sounds.   Musculoskeletal:      Cervical back: Normal range of motion and neck supple.   Neurological:      Mental Status: He is alert.   Psychiatric:         Mood and Affect: Mood normal.         Behavior: Behavior normal.         Assessment/Plan   Problem List Items Addressed This Visit       Cerebrovascular accident (CVA) (Multi)    Current Assessment & Plan     Patient with persistent complaints of \"wooziness and dizziness and pressure in his head\".  Patient has seen neurology, cardiology, ENT physicians in the past, will evaluate to see if he might be a candidate to see a different neurologist within the [facility] system.  Believe the most of his symptoms are related to history of " cerebellar stroke, currently doing physical therapy at home, no change with medication or therapeutics today.         Diabetes mellitus (Multi)    Current Assessment & Plan     A1c testing below 7, no change with medication.         Fatigue    Hyperlipidemia    Current Assessment & Plan     Stable with simvastatin, no change.         Mild HTN    Current Assessment & Plan     Blood pressure stable, renal function stable, no change.         RAVI (generalized anxiety disorder)    Current Assessment & Plan     Continue with current dosing of fluoxetine.         Gastroesophageal reflux disease    Current Assessment & Plan     Stable no change.         History of DVT (deep vein thrombosis)    Current Assessment & Plan     Taking and tolerating anticoagulation.         Patent foramen ovale ([facility])    Current Assessment & Plan     Follows with cardiology in [place], contemplating PFO closure.         PAD (peripheral artery disease) (CMS-HCC)    Current Assessment & Plan     No current issues with claudication.         Mild intermittent asthma without complication ([facility])    Overview     refill JOSE; encouraged to use with spacer         Current Assessment & Plan     Follows with pulmonary medicine.         Vasodepressor syncope    Current Assessment & Plan     Continue with current medications, believe related to history of cerebellar stroke.          Other Visit Diagnoses       Routine general medical examination at health care facility    -  Primary    Chronic rhinitis

## 2024-04-29 ENCOUNTER — TELEPHONE (OUTPATIENT)
Dept: PRIMARY CARE | Facility: CLINIC | Age: 84
End: 2024-04-29
Payer: MEDICARE

## 2024-04-29 ENCOUNTER — PATIENT OUTREACH (OUTPATIENT)
Dept: CARE COORDINATION | Facility: CLINIC | Age: 84
End: 2024-04-29
Payer: MEDICARE

## 2024-04-29 DIAGNOSIS — I63.30 CEREBROVASCULAR ACCIDENT (CVA) DUE TO THROMBOSIS OF CEREBRAL ARTERY (MULTI): ICD-10-CM

## 2024-04-29 NOTE — PROGRESS NOTES
Hello, I just called [place] to schedule his neurology referral.  The computer does a thing call a decision tree,  I have to answer questions then it shows me the providers I can schedule to.  The first available I have for all the providers is 9/19/24.  Pt wants something closer due to he can't drive right know.  Can you see if he can get into [place] or [place] and he would like sooner.

## 2024-04-29 NOTE — TELEPHONE ENCOUNTER
I do not have a specific neurologist to send the patient to, I would appreciate any help or guidance with this, patient is complaining of chronic frequent headaches, a sense of unsteadiness in his head, MRI scans in the past have shown cerebellar strokes.  He seen multiple neurologist in the past at different health systems, if we can get him to see a neurologist that specializes in headaches in patients post stroke that would be very helpful.  I am not sure quite where to start.

## 2024-04-29 NOTE — PROGRESS NOTES
Call placed regarding one month post discharge follow up call.             At time of outreach call the patient feels as if their condition has              worsened since initial visit with PCP or specialist.             Questions or concerns regarding recovery period addressed at this time.              Reviewed any PCP or specialists progress notes/labs/radiology reports if applicable             and addressed any questions or concerns. Complaining of severe headaches. Message sent to office regarding setting up neuro appt mentioned in last office note.

## 2024-04-30 NOTE — PROGRESS NOTES
Ok I will call Glory.  I know who she is from working at [facility].  And out of all the providers it suggested that was the soonest apt for any provider.  Ill call Glory now!  Thanks

## 2024-04-30 NOTE — PROGRESS NOTES
Follow up... I looked for the first available again from yesterday and yes 9/24/24 was the soonest.  I called Glory I did the DT again and it showed 5/30/24 at 10:00 we grabbed that.  I told her they must have just canceled someone out of that spot.  I just hope they were moving a pt to block that spot.  Ollie

## 2024-05-13 ENCOUNTER — HOSPITAL ENCOUNTER (EMERGENCY)
Facility: HOSPITAL | Age: 84
Discharge: HOME | End: 2024-05-13
Attending: EMERGENCY MEDICINE
Payer: MEDICARE

## 2024-05-13 ENCOUNTER — APPOINTMENT (OUTPATIENT)
Dept: RADIOLOGY | Facility: HOSPITAL | Age: 84
End: 2024-05-13
Payer: MEDICARE

## 2024-05-13 VITALS
HEART RATE: 58 BPM | BODY MASS INDEX: 31.08 KG/M2 | HEIGHT: 75 IN | TEMPERATURE: 98.4 F | RESPIRATION RATE: 18 BRPM | WEIGHT: 250 LBS | DIASTOLIC BLOOD PRESSURE: 71 MMHG | OXYGEN SATURATION: 93 % | SYSTOLIC BLOOD PRESSURE: 135 MMHG

## 2024-05-13 DIAGNOSIS — N39.0 BACTERIAL UTI: Primary | ICD-10-CM

## 2024-05-13 DIAGNOSIS — A09 DIARRHEA OF INFECTIOUS ORIGIN: ICD-10-CM

## 2024-05-13 DIAGNOSIS — R53.1 WEAKNESS GENERALIZED: ICD-10-CM

## 2024-05-13 DIAGNOSIS — A49.9 BACTERIAL UTI: Primary | ICD-10-CM

## 2024-05-13 LAB
ALBUMIN SERPL BCP-MCNC: 3.7 G/DL (ref 3.4–5)
ALP SERPL-CCNC: 67 U/L (ref 33–136)
ALT SERPL W P-5'-P-CCNC: 13 U/L (ref 10–52)
ANION GAP SERPL CALC-SCNC: 11 MMOL/L (ref 10–20)
APPEARANCE UR: ABNORMAL
APTT PPP: 34 SECONDS (ref 27–38)
AST SERPL W P-5'-P-CCNC: 13 U/L (ref 9–39)
BASOPHILS # BLD AUTO: 0.01 X10*3/UL (ref 0–0.1)
BASOPHILS NFR BLD AUTO: 0.2 %
BILIRUB DIRECT SERPL-MCNC: 0.3 MG/DL (ref 0–0.3)
BILIRUB SERPL-MCNC: 1.7 MG/DL (ref 0–1.2)
BILIRUB UR STRIP.AUTO-MCNC: NEGATIVE MG/DL
BUN SERPL-MCNC: 18 MG/DL (ref 6–23)
CALCIUM SERPL-MCNC: 9.5 MG/DL (ref 8.6–10.3)
CARDIAC TROPONIN I PNL SERPL HS: 12 NG/L (ref 0–20)
CHLORIDE SERPL-SCNC: 105 MMOL/L (ref 98–107)
CO2 SERPL-SCNC: 26 MMOL/L (ref 21–32)
COLOR UR: YELLOW
CREAT SERPL-MCNC: 0.85 MG/DL (ref 0.5–1.3)
EGFRCR SERPLBLD CKD-EPI 2021: 87 ML/MIN/1.73M*2
EOSINOPHIL # BLD AUTO: 0.06 X10*3/UL (ref 0–0.4)
EOSINOPHIL NFR BLD AUTO: 1.1 %
ERYTHROCYTE [DISTWIDTH] IN BLOOD BY AUTOMATED COUNT: 12.1 % (ref 11.5–14.5)
GLUCOSE SERPL-MCNC: 148 MG/DL (ref 74–99)
GLUCOSE UR STRIP.AUTO-MCNC: ABNORMAL MG/DL
HCT VFR BLD AUTO: 40.8 % (ref 41–52)
HGB BLD-MCNC: 14.1 G/DL (ref 13.5–17.5)
HOLD SPECIMEN: NORMAL
IMM GRANULOCYTES # BLD AUTO: 0.02 X10*3/UL (ref 0–0.5)
IMM GRANULOCYTES NFR BLD AUTO: 0.4 % (ref 0–0.9)
INR PPP: 1.4 (ref 0.9–1.1)
KETONES UR STRIP.AUTO-MCNC: NEGATIVE MG/DL
LACTATE SERPL-SCNC: 1.4 MMOL/L (ref 0.4–2)
LACTATE SERPL-SCNC: 2.1 MMOL/L (ref 0.4–2)
LEUKOCYTE ESTERASE UR QL STRIP.AUTO: ABNORMAL
LIPASE SERPL-CCNC: 10 U/L (ref 9–82)
LYMPHOCYTES # BLD AUTO: 1.05 X10*3/UL (ref 0.8–3)
LYMPHOCYTES NFR BLD AUTO: 18.5 %
MAGNESIUM SERPL-MCNC: 1.93 MG/DL (ref 1.6–2.4)
MCH RBC QN AUTO: 33 PG (ref 26–34)
MCHC RBC AUTO-ENTMCNC: 34.6 G/DL (ref 32–36)
MCV RBC AUTO: 96 FL (ref 80–100)
MONOCYTES # BLD AUTO: 0.46 X10*3/UL (ref 0.05–0.8)
MONOCYTES NFR BLD AUTO: 8.1 %
MUCOUS THREADS #/AREA URNS AUTO: ABNORMAL /LPF
NEUTROPHILS # BLD AUTO: 4.07 X10*3/UL (ref 1.6–5.5)
NEUTROPHILS NFR BLD AUTO: 71.7 %
NITRITE UR QL STRIP.AUTO: NEGATIVE
NRBC BLD-RTO: 0 /100 WBCS (ref 0–0)
PH UR STRIP.AUTO: 6 [PH]
PLATELET # BLD AUTO: 204 X10*3/UL (ref 150–450)
POTASSIUM SERPL-SCNC: 4.1 MMOL/L (ref 3.5–5.3)
PROT SERPL-MCNC: 5.8 G/DL (ref 6.4–8.2)
PROT UR STRIP.AUTO-MCNC: NEGATIVE MG/DL
PROTHROMBIN TIME: 15.7 SECONDS (ref 9.8–12.8)
RBC # BLD AUTO: 4.27 X10*6/UL (ref 4.5–5.9)
RBC # UR STRIP.AUTO: ABNORMAL /UL
RBC #/AREA URNS AUTO: >20 /HPF
SODIUM SERPL-SCNC: 138 MMOL/L (ref 136–145)
SP GR UR STRIP.AUTO: 1.02
TSH SERPL-ACNC: 0.52 MIU/L (ref 0.44–3.98)
UROBILINOGEN UR STRIP.AUTO-MCNC: <2 MG/DL
WBC # BLD AUTO: 5.7 X10*3/UL (ref 4.4–11.3)
WBC #/AREA URNS AUTO: ABNORMAL /HPF

## 2024-05-13 PROCEDURE — 81001 URINALYSIS AUTO W/SCOPE: CPT | Performed by: EMERGENCY MEDICINE

## 2024-05-13 PROCEDURE — 71101 X-RAY EXAM UNILAT RIBS/CHEST: CPT | Mod: RIGHT SIDE | Performed by: RADIOLOGY

## 2024-05-13 PROCEDURE — 87040 BLOOD CULTURE FOR BACTERIA: CPT | Mod: SAMLAB | Performed by: EMERGENCY MEDICINE

## 2024-05-13 PROCEDURE — 84443 ASSAY THYROID STIM HORMONE: CPT | Performed by: EMERGENCY MEDICINE

## 2024-05-13 PROCEDURE — 84484 ASSAY OF TROPONIN QUANT: CPT | Performed by: EMERGENCY MEDICINE

## 2024-05-13 PROCEDURE — 2500000004 HC RX 250 GENERAL PHARMACY W/ HCPCS (ALT 636 FOR OP/ED): Performed by: EMERGENCY MEDICINE

## 2024-05-13 PROCEDURE — 82248 BILIRUBIN DIRECT: CPT | Performed by: EMERGENCY MEDICINE

## 2024-05-13 PROCEDURE — 80048 BASIC METABOLIC PNL TOTAL CA: CPT | Performed by: EMERGENCY MEDICINE

## 2024-05-13 PROCEDURE — 96365 THER/PROPH/DIAG IV INF INIT: CPT

## 2024-05-13 PROCEDURE — 87086 URINE CULTURE/COLONY COUNT: CPT | Mod: SAMLAB | Performed by: EMERGENCY MEDICINE

## 2024-05-13 PROCEDURE — 85730 THROMBOPLASTIN TIME PARTIAL: CPT | Performed by: EMERGENCY MEDICINE

## 2024-05-13 PROCEDURE — 71101 X-RAY EXAM UNILAT RIBS/CHEST: CPT | Mod: RT

## 2024-05-13 PROCEDURE — 83735 ASSAY OF MAGNESIUM: CPT | Performed by: EMERGENCY MEDICINE

## 2024-05-13 PROCEDURE — 83605 ASSAY OF LACTIC ACID: CPT | Performed by: EMERGENCY MEDICINE

## 2024-05-13 PROCEDURE — 85025 COMPLETE CBC W/AUTO DIFF WBC: CPT | Performed by: EMERGENCY MEDICINE

## 2024-05-13 PROCEDURE — 36415 COLL VENOUS BLD VENIPUNCTURE: CPT | Performed by: EMERGENCY MEDICINE

## 2024-05-13 PROCEDURE — 85610 PROTHROMBIN TIME: CPT | Performed by: EMERGENCY MEDICINE

## 2024-05-13 PROCEDURE — 96361 HYDRATE IV INFUSION ADD-ON: CPT

## 2024-05-13 PROCEDURE — 99284 EMERGENCY DEPT VISIT MOD MDM: CPT | Mod: 25

## 2024-05-13 PROCEDURE — 83690 ASSAY OF LIPASE: CPT | Performed by: EMERGENCY MEDICINE

## 2024-05-13 PROCEDURE — 93010 ELECTROCARDIOGRAM REPORT: CPT | Performed by: STUDENT IN AN ORGANIZED HEALTH CARE EDUCATION/TRAINING PROGRAM

## 2024-05-13 RX ORDER — DIPHENOXYLATE HYDROCHLORIDE AND ATROPINE SULFATE 2.5; .025 MG/1; MG/1
1 TABLET ORAL 4 TIMES DAILY PRN
Qty: 12 TABLET | Refills: 0 | Status: SHIPPED | OUTPATIENT
Start: 2024-05-13 | End: 2024-05-16

## 2024-05-13 RX ORDER — CEFTRIAXONE 2 G/50ML
2 INJECTION, SOLUTION INTRAVENOUS ONCE
Status: COMPLETED | OUTPATIENT
Start: 2024-05-13 | End: 2024-05-13

## 2024-05-13 RX ORDER — CIPROFLOXACIN 500 MG/1
500 TABLET ORAL 2 TIMES DAILY
Qty: 14 TABLET | Refills: 0 | Status: SHIPPED | OUTPATIENT
Start: 2024-05-13 | End: 2024-05-23 | Stop reason: WASHOUT

## 2024-05-13 RX ORDER — SODIUM CHLORIDE 9 MG/ML
125 INJECTION, SOLUTION INTRAVENOUS CONTINUOUS
Status: DISCONTINUED | OUTPATIENT
Start: 2024-05-13 | End: 2024-05-13 | Stop reason: HOSPADM

## 2024-05-13 RX ADMIN — CEFTRIAXONE SODIUM 2 G: 2 INJECTION, SOLUTION INTRAVENOUS at 11:12

## 2024-05-13 RX ADMIN — SODIUM CHLORIDE 500 ML: 9 INJECTION, SOLUTION INTRAVENOUS at 10:09

## 2024-05-13 RX ADMIN — SODIUM CHLORIDE 500 ML: 9 INJECTION, SOLUTION INTRAVENOUS at 11:12

## 2024-05-13 ASSESSMENT — ENCOUNTER SYMPTOMS
NECK STIFFNESS: 0
COUGH: 0
ADENOPATHY: 0
PSYCHIATRIC NEGATIVE: 1
ARTHRALGIAS: 0
NAUSEA: 0
MYALGIAS: 0
NECK PAIN: 0
FLANK PAIN: 0
WEAKNESS: 1
PHOTOPHOBIA: 0
VOMITING: 0
DIZZINESS: 1
FEVER: 0
SHORTNESS OF BREATH: 0
ABDOMINAL PAIN: 0
CHILLS: 0
WOUND: 0
BRUISES/BLEEDS EASILY: 0
DIARRHEA: 1
FATIGUE: 0

## 2024-05-13 ASSESSMENT — PAIN SCALES - GENERAL: PAINLEVEL_OUTOF10: 0 - NO PAIN

## 2024-05-13 ASSESSMENT — PAIN - FUNCTIONAL ASSESSMENT: PAIN_FUNCTIONAL_ASSESSMENT: 0-10

## 2024-05-13 NOTE — ED PROVIDER NOTES
Chief Complaint: WEAK/DIARRHEA    This is an 83-year-old male who has had diarrhea for the past week with episodes of loose stools at least twice a day for the last 7 days he is took in some Imodium at home with some minimal relief.  Denies any blood in his stools he complains some generalized weakness also denies any abdominal cramping or discomfort otherwise denies any chest pains or palpitations.  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  He has another neurology appointment next month at [facility] also.  Denies any headache at this time denies any chest pain or shortness of breath no fever chills and presents now for evaluation           Review of Systems   Constitutional:  Negative for chills, fatigue and fever.   HENT: Negative.     Eyes:  Negative for photophobia.   Respiratory:  Negative for cough and shortness of breath.    Cardiovascular:  Negative for chest pain and leg swelling.   Gastrointestinal:  Positive for diarrhea. Negative for abdominal pain, nausea and vomiting.   Genitourinary:  Negative for flank pain.   Musculoskeletal:  Negative for arthralgias, myalgias, neck pain and neck stiffness.   Skin:  Negative for rash and wound.   Neurological:  Positive for dizziness and weakness.   Hematological:  Negative for adenopathy. Does not bruise/bleed easily.   Psychiatric/Behavioral: Negative.     All other systems reviewed and are negative.       Physical Exam  Constitutional:       General: He is not in acute distress.     Appearance: Normal appearance. He is obese. He is not ill-appearing.   HENT:      Head: Normocephalic and atraumatic.      Right Ear: Tympanic membrane normal.      Left Ear: Tympanic membrane normal.      Nose: Nose normal.      Mouth/Throat:      Mouth: Mucous membranes are moist.      Pharynx: Oropharynx is clear. No oropharyngeal exudate.   Eyes:      Extraocular Movements:  Extraocular movements intact.      Conjunctiva/sclera: Conjunctivae normal.      Pupils: Pupils are equal, round, and reactive to light.   Cardiovascular:      Rate and Rhythm: Normal rate.      Pulses: Normal pulses.      Heart sounds: No murmur heard.  Pulmonary:      Effort: Pulmonary effort is normal. No respiratory distress.      Breath sounds: Normal breath sounds. No stridor. No rhonchi.   Abdominal:      General: Abdomen is flat. There is no distension.      Palpations: Abdomen is soft.      Tenderness: There is no abdominal tenderness. There is no rebound.      Hernia: A hernia is present.   Musculoskeletal:         General: No swelling or tenderness. Normal range of motion.      Cervical back: Normal range of motion and neck supple. No rigidity.      Right lower leg: No edema.      Left lower leg: No edema.   Skin:     General: Skin is warm and dry.      Capillary Refill: Capillary refill takes less than 2 seconds.      Findings: No rash.   Neurological:      General: No focal deficit present.      Mental Status: He is alert and oriented to person, place, and time.      Sensory: No sensory deficit.      Motor: No weakness.   Psychiatric:         Mood and Affect: Mood normal.          Labs Reviewed   CBC WITH AUTO DIFFERENTIAL - Abnormal       Result Value    WBC 5.7      nRBC 0.0      RBC 4.27 (*)     Hemoglobin 14.1      Hematocrit 40.8 (*)     MCV 96      MCH 33.0      MCHC 34.6      RDW 12.1      Platelets 204      Neutrophils % 71.7      Immature Granulocytes %, Automated 0.4      Lymphocytes % 18.5      Monocytes % 8.1      Eosinophils % 1.1      Basophils % 0.2      Neutrophils Absolute 4.07      Immature Granulocytes Absolute, Automated 0.02      Lymphocytes Absolute 1.05      Monocytes Absolute 0.46      Eosinophils Absolute 0.06      Basophils Absolute 0.01     BASIC METABOLIC PANEL - Abnormal    Glucose 148 (*)     Sodium 138      Potassium 4.1      Chloride 105      Bicarbonate 26      Anion Gap  11      Urea Nitrogen 18      Creatinine 0.85      eGFR 87      Calcium 9.5     HEPATIC FUNCTION PANEL - Abnormal    Albumin 3.7      Bilirubin, Total 1.7 (*)     Bilirubin, Direct 0.3      Alkaline Phosphatase 67      ALT 13      AST 13      Total Protein 5.8 (*)    LACTATE - Abnormal    Lactate 2.1 (*)     Narrative:     Venipuncture immediately after or during the administration of Metamizole may lead to falsely low results. Testing should be performed immediately  prior to Metamizole dosing.   PROTIME-INR - Abnormal    Protime 15.7 (*)     INR 1.4 (*)    URINALYSIS WITH REFLEX CULTURE AND MICROSCOPIC - Abnormal    Color, Urine Yellow      Appearance, Urine Hazy (*)     Specific Gravity, Urine 1.019      pH, Urine 6.0      Protein, Urine NEGATIVE      Glucose, Urine 50 (1+) (*)     Blood, Urine MODERATE (2+) (*)     Ketones, Urine NEGATIVE      Bilirubin, Urine NEGATIVE      Urobilinogen, Urine <2.0      Nitrite, Urine NEGATIVE      Leukocyte Esterase, Urine MODERATE (2+) (*)    MICROSCOPIC ONLY, URINE - Abnormal    WBC, Urine 21-50 (*)     RBC, Urine >20 (*)     Mucus, Urine 1+     MAGNESIUM - Normal    Magnesium 1.93     LIPASE - Normal    Lipase 10      Narrative:     Venipuncture immediately after or during the administration of Metamizole may lead to falsely low results. Testing should be performed immediately prior to Metamizole dosing.   TROPONIN I, HIGH SENSITIVITY - Normal    Troponin I, High Sensitivity 12      Narrative:     Less than 99th percentile of normal range cutoff-  Female and children under 18 years old <14 ng/L; Male <21 ng/L: Negative  Repeat testing should be performed if clinically indicated.     Female and children under 18 years old 14-50 ng/L; Male 21-50 ng/L:  Consistent with possible cardiac damage and possible increased clinical   risk. Serial measurements may help to assess extent of myocardial damage.     >50 ng/L: Consistent with cardiac damage, increased clinical risk  and  myocardial infarction. Serial measurements may help assess extent of   myocardial damage.      NOTE: Children less than 1 year old may have higher baseline troponin   levels and results should be interpreted in conjunction with the overall   clinical context.     NOTE: Troponin I testing is performed using a different   testing methodology at [facility] than at other   [facility]. Direct result comparisons should only   be made within the same method.   APTT - Normal    aPTT 34      Narrative:     The APTT is no longer used for monitoring Unfractionated Heparin Therapy. For monitoring Heparin Therapy, use the Heparin Assay.   TSH WITH REFLEX TO FREE T4 IF ABNORMAL - Normal    Thyroid Stimulating Hormone 0.52      Narrative:     TSH testing is performed using different testing methodology at [facility] than at other [facility]. Direct result comparisons should only be made within the same method.     LACTATE - Normal    Lactate 1.4      Narrative:     Venipuncture immediately after or during the administration of Metamizole may lead to falsely low results. Testing should be performed immediately  prior to Metamizole dosing.   BLOOD CULTURE   BLOOD CULTURE   STOOL PATHOGEN PANEL, PCR   C. DIFFICILE, PCR   URINE CULTURE   URINALYSIS WITH REFLEX CULTURE AND MICROSCOPIC    Narrative:     The following orders were created for panel order Urinalysis with Reflex Culture and Microscopic.  Procedure                               Abnormality         Status                     ---------                               -----------         ------                     Urinalysis with Reflex C...[[id number]]  Abnormal            Final result               Extra Urine Gray Tube[[id number]]                            In process                   Please view results for these tests on the individual orders.   EXTRA URINE GRAY TUBE        XR ribs right 2 views w chest pa or ap   Final Result   No  acute pathologic findings are identified. No evidence for a rib   fracture is seen.        MACRO:   none        Signed by: Wesly Calderon 5/13/2024 10:35 AM   Dictation workstation:   [id number]           Procedures     Medical Decision Making  Differential diagnosis include dehydration anemia C. difficile infectious diarrhea.  An IV was ordered and normal saline bolus was given and appropriate labs were ordered at this time.  His initial lactate was slightly elevated but repeat was normal at 1.4 his troponin was negative lipase was normal liver function testing was normal metabolic panel showed blood sugar 148 but otherwise was negative also.  Magnesium is 1.93 INR is 1.4 white count is 5.7 with a normal hemoglobin hematocrit urinalysis showed a significant UTI with 21-50 white cells and greater than 20 RBCs.  X-rays of his chest and ribs were also negative.  Patient received normal saline bolus and normal saline infusion patient received Rocephin 2 g intravenously will be started on Cipro and Lomotil at home as needed he was unable to give a stool specimen while in the emergency department and follow-up with Dr. Beckwith his primary care physician in 2 days for recheck    Amount and/or Complexity of Data Reviewed  ECG/medicine tests: independent interpretation performed.     Details: Of lead EKG showed sinus rhythm first-degree block right bundle branch block and LVH with some early repolarization but no change from EKG of March 13, 2024         Diagnoses as of 05/13/24 1242   Bacterial UTI   Diarrhea of infectious origin   Weakness generalized                    Tavon River MD  05/13/24 1242

## 2024-05-14 LAB — BACTERIA UR CULT: NORMAL

## 2024-05-17 LAB
BACTERIA BLD CULT: NORMAL
BACTERIA BLD CULT: NORMAL

## 2024-05-22 LAB
ATRIAL RATE: 82 BPM
P AXIS: 60 DEGREES
P OFFSET: 138 MS
P ONSET: 79 MS
PR INTERVAL: 264 MS
Q ONSET: 211 MS
QRS COUNT: 14 BEATS
QRS DURATION: 152 MS
QT INTERVAL: 404 MS
QTC CALCULATION(BAZETT): 472 MS
QTC FREDERICIA: 448 MS
R AXIS: -69 DEGREES
T AXIS: 84 DEGREES
T OFFSET: 413 MS
VENTRICULAR RATE: 82 BPM

## 2024-05-23 ENCOUNTER — OFFICE VISIT (OUTPATIENT)
Dept: PRIMARY CARE | Facility: CLINIC | Age: 84
End: 2024-05-23
Payer: MEDICARE

## 2024-05-23 VITALS
HEART RATE: 87 BPM | OXYGEN SATURATION: 95 % | BODY MASS INDEX: 32.08 KG/M2 | SYSTOLIC BLOOD PRESSURE: 120 MMHG | WEIGHT: 258 LBS | DIASTOLIC BLOOD PRESSURE: 70 MMHG | HEIGHT: 75 IN

## 2024-05-23 DIAGNOSIS — E78.2 MIXED HYPERLIPIDEMIA: Primary | ICD-10-CM

## 2024-05-23 DIAGNOSIS — J45.20 MILD INTERMITTENT ASTHMA WITHOUT COMPLICATION (HHS-HCC): ICD-10-CM

## 2024-05-23 DIAGNOSIS — R53.82 CHRONIC FATIGUE: ICD-10-CM

## 2024-05-23 DIAGNOSIS — I10 MILD HTN: ICD-10-CM

## 2024-05-23 DIAGNOSIS — F41.1 GAD (GENERALIZED ANXIETY DISORDER): ICD-10-CM

## 2024-05-23 DIAGNOSIS — I63.40 CEREBROVASCULAR ACCIDENT (CVA) DUE TO EMBOLISM OF CEREBRAL ARTERY (MULTI): ICD-10-CM

## 2024-05-23 DIAGNOSIS — E11.9 TYPE 2 DIABETES MELLITUS WITHOUT COMPLICATION, WITHOUT LONG-TERM CURRENT USE OF INSULIN (MULTI): ICD-10-CM

## 2024-05-23 DIAGNOSIS — Q21.12 PATENT FORAMEN OVALE (HHS-HCC): ICD-10-CM

## 2024-05-23 PROCEDURE — 99214 OFFICE O/P EST MOD 30 MIN: CPT | Performed by: FAMILY MEDICINE

## 2024-05-23 PROCEDURE — 1159F MED LIST DOCD IN RCRD: CPT | Performed by: FAMILY MEDICINE

## 2024-05-23 PROCEDURE — 3078F DIAST BP <80 MM HG: CPT | Performed by: FAMILY MEDICINE

## 2024-05-23 PROCEDURE — 1036F TOBACCO NON-USER: CPT | Performed by: FAMILY MEDICINE

## 2024-05-23 PROCEDURE — 1160F RVW MEDS BY RX/DR IN RCRD: CPT | Performed by: FAMILY MEDICINE

## 2024-05-23 PROCEDURE — 1157F ADVNC CARE PLAN IN RCRD: CPT | Performed by: FAMILY MEDICINE

## 2024-05-23 PROCEDURE — 3074F SYST BP LT 130 MM HG: CPT | Performed by: FAMILY MEDICINE

## 2024-05-23 ASSESSMENT — ENCOUNTER SYMPTOMS
COUGH: 0
DIZZINESS: 1
NAUSEA: 0
NUMBNESS: 0
DIFFICULTY URINATING: 0
FATIGUE: 1
ARTHRALGIAS: 0
TROUBLE SWALLOWING: 0
RHINORRHEA: 0
SHORTNESS OF BREATH: 0
WHEEZING: 0
DIARRHEA: 0
ACTIVITY CHANGE: 0
CONSTIPATION: 0
ABDOMINAL DISTENTION: 0
PALPITATIONS: 0
VOMITING: 0
LIGHT-HEADEDNESS: 0
UNEXPECTED WEIGHT CHANGE: 0
ABDOMINAL PAIN: 0
ADENOPATHY: 0
HEADACHES: 1
APPETITE CHANGE: 0

## 2024-05-23 NOTE — ASSESSMENT & PLAN NOTE
Follows with cardiology in [place], has loop recorder in place, still determining whether or not to repair PFO

## 2024-05-23 NOTE — PROGRESS NOTES
"Subjective   Patient ID: Marco A Diallo is a 83 y.o. male who presents for 1 MO F/U.    HPI   Was in ER 5/13 for UTI, established with urology in [place], also sees cardiology and pulmonary medicine at [place], has also seen cardiology specialist in [place] for PFO  To see neurology at  next week  Tired and fatigued often, head feels woozy and like going to explode  No findings with Loop recorder, considering PFO repair  Home PT ended the beginning of month, tries to do some HEP  Using a walker most of the time, BARRON at times  Using APAP for headaches      Review of Systems   Constitutional:  Positive for fatigue. Negative for activity change, appetite change and unexpected weight change.   HENT:  Negative for congestion, ear pain, nosebleeds, rhinorrhea, sneezing and trouble swallowing.    Respiratory:  Negative for cough, shortness of breath and wheezing.    Cardiovascular:  Negative for chest pain, palpitations and leg swelling.   Gastrointestinal:  Negative for abdominal distention, abdominal pain, constipation, diarrhea, nausea and vomiting.   Genitourinary:  Negative for difficulty urinating.   Musculoskeletal:  Negative for arthralgias.   Skin:  Negative for rash.   Neurological:  Positive for dizziness and headaches. Negative for light-headedness and numbness.   Hematological:  Negative for adenopathy.   Psychiatric/Behavioral:  Negative for behavioral problems.    All other systems reviewed and are negative.      Objective   /70   Pulse 87   Ht 1.905 m (6' 3\")   Wt 117 kg (258 lb)   SpO2 95%   BMI 32.25 kg/m²     Physical Exam  Vitals and nursing note reviewed.   Constitutional:       Appearance: Normal appearance.   HENT:      Head: Normocephalic and atraumatic.      Right Ear: Tympanic membrane, ear canal and external ear normal.      Left Ear: Tympanic membrane, ear canal and external ear normal.      Nose: Nose normal.      Mouth/Throat:      Mouth: Mucous membranes are moist.      Pharynx: " Oropharynx is clear.   Cardiovascular:      Rate and Rhythm: Normal rate and regular rhythm.      Pulses: Normal pulses.      Heart sounds: Normal heart sounds.   Pulmonary:      Effort: Pulmonary effort is normal.      Breath sounds: Normal breath sounds.   Musculoskeletal:      Cervical back: Normal range of motion and neck supple.   Skin:     General: Skin is warm and dry.   Neurological:      General: No focal deficit present.      Mental Status: He is alert and oriented to person, place, and time. Mental status is at baseline.      Cranial Nerves: No cranial nerve deficit.   Psychiatric:         Mood and Affect: Mood normal.         Behavior: Behavior normal.         Assessment/Plan   Problem List Items Addressed This Visit             ICD-10-CM    Cerebrovascular accident (CVA) (Multi) I63.9     To see different neurologist next week, continue with current medication.         Relevant Orders    Follow Up In Primary Care - Established    Diabetes mellitus (Multi) E11.9    Relevant Orders    Follow Up In Primary Care - Established    Cholesterol, LDL Direct    Comprehensive Metabolic Panel    Hemoglobin A1C    Fatigue R53.83     Extensive workup in the past including cardiology evaluation neurology evaluation, ENT evaluation, pulmonary medicine evaluation, multiple blood tests have all been stable.  Recently finished home physical therapy, and home health care.         Relevant Orders    Follow Up In Primary Care - Established    Comprehensive Metabolic Panel    CBC and Auto Differential    Vitamin B12    Hyperlipidemia - Primary E78.5    Relevant Orders    Follow Up In Primary Care - Established    Cholesterol, LDL Direct    Comprehensive Metabolic Panel    Meniere's disease H81.09     Patient with increasing dizziness, has had this for a long time, refer to outpatient PT for help with vestibular retraining and balance.  Patient very high risk for falling.         Mild HTN I10     Blood pressure stable recent  "hospital stay reveals normal electrolyte and renal functions.         Relevant Orders    Follow Up In Primary Care - Established    Comprehensive Metabolic Panel    Vertigo R42     Try therapy to help reduce risk for falling and balance issues.         RAVI (generalized anxiety disorder) F41.1     Seems to be stable currently, still very concerned about pressure in his head\" and wooziness\".         Relevant Orders    Follow Up In Primary Care - Established    Patent foramen ovale ([facility]) Q21.12     Follows with cardiology in [place], has loop recorder in place, still determining whether or not to repair PFO         Mild intermittent asthma without complication ([facility]) J45.20     Follows with pulmonary medicine in [place], uses his CPAP nightly.         Relevant Orders    Follow Up In Primary Care - Established          Patient was identified as a fall risk. Risk prevention instructions provided.  "

## 2024-05-23 NOTE — PATIENT INSTRUCTIONS
Try to be active, consider assisted living options and see neurology next week        Ways to Help Prevent Falls at Home    Quick Tips   ? Ask for help if you need it. Most people want to help!   ? Get up slowly after sitting or laying down   ? Wear a medical alert device or keep cell phone in your pocket   ? Use night lights, especially areas near a bathroom   ? Keep the items you use often within reach on a small stool or end table   ? Use an assistive device such as walker or cane, as directed by provider/physical therapy   ? Use a non-slip mat and grab bars in your bathroom. Look for home health sections for best options     Other Areas to Focus On   ? Exercise and nutrition: Regular exercise or taking a falls prevention class are great ways improve strength and balance. Don’t forget to stay hydrated and bring a snack!   ? Medicine side effects: Some medicines can make you sleepy or dizzy, which could cause a fall. Ask your healthcare provider about the side effects your medicines could cause. Be sure to let them know if you take any vitamins or supplements as well.   ? Tripping hazards: Remove items you could trip on, such as loose mats, rugs, cords, and clutter. Wear closed toe shoes with rubber soles.   ? Health and wellness: Get regular checkups with your healthcare provider, plus routine vision and hearing screenings. Talk with your healthcare provider about:   o Your medicines and the possible side effects - bring them in a bag if that is easier!   o Problems with balance or feeling dizzy   o Ways to promote bone health, such as Vitamin D and calcium supplements   o Questions or concerns about falling     *Ask your healthcare team if you have questions     [facility], 2022

## 2024-05-23 NOTE — ASSESSMENT & PLAN NOTE
Extensive workup in the past including cardiology evaluation neurology evaluation, ENT evaluation, pulmonary medicine evaluation, multiple blood tests have all been stable.  Recently finished home physical therapy, and home health care.

## 2024-05-23 NOTE — ASSESSMENT & PLAN NOTE
"Seems to be stable currently, still very concerned about pressure in his head\" and wooziness\".  "

## 2024-05-29 ENCOUNTER — TELEPHONE (OUTPATIENT)
Dept: PRIMARY CARE | Facility: CLINIC | Age: 84
End: 2024-05-29
Payer: MEDICARE

## 2024-05-29 NOTE — TELEPHONE ENCOUNTER
PATIENT'S DAUGHTER ANDRE CALLING.   DAD HAD HEADACHE YESTERDAY,WITH PRESSURE IN HEAD,   ANDRE GAVE HIM EXCEDRIN  MIGRAINE 250 OF ASA.    CARDIO DOES NOT WANT HIM TO HAVE ANY ASA.    SEEING DELORIS TOMORROW.    EXCEDRIN MIGRAINE GOT RID OF HEADACHE,PRESSURE IN HEAD GONE.    ANY MIGRAINE  MED HE CAN USE ?   ALSO TOLD ANDRE TO CHECK WITH NEUROI TOMORROW.   ANDRE  [phone]

## 2024-05-29 NOTE — TELEPHONE ENCOUNTER
The reason they want him to avoid Excedrin is because he is on a blood thinning medication.  I would bring this up with neurology tomorrow, I am not sure if he would benefit from something like Fioricet or Fiorinal, I would leave that up to the neurologist.

## 2024-05-30 ENCOUNTER — OFFICE VISIT (OUTPATIENT)
Dept: NEUROLOGY | Facility: CLINIC | Age: 84
End: 2024-05-30
Payer: MEDICARE

## 2024-05-30 VITALS
HEART RATE: 78 BPM | HEIGHT: 75 IN | DIASTOLIC BLOOD PRESSURE: 74 MMHG | WEIGHT: 259.6 LBS | SYSTOLIC BLOOD PRESSURE: 110 MMHG | BODY MASS INDEX: 32.28 KG/M2 | TEMPERATURE: 97.3 F

## 2024-05-30 DIAGNOSIS — I63.30 CEREBROVASCULAR ACCIDENT (CVA) DUE TO THROMBOSIS OF CEREBRAL ARTERY (MULTI): ICD-10-CM

## 2024-05-30 DIAGNOSIS — R42 DIZZINESS: ICD-10-CM

## 2024-05-30 DIAGNOSIS — R51.9 CHRONIC DAILY HEADACHE: Primary | ICD-10-CM

## 2024-05-30 PROCEDURE — 1157F ADVNC CARE PLAN IN RCRD: CPT | Performed by: PSYCHIATRY & NEUROLOGY

## 2024-05-30 PROCEDURE — 99205 OFFICE O/P NEW HI 60 MIN: CPT | Performed by: PSYCHIATRY & NEUROLOGY

## 2024-05-30 PROCEDURE — 1159F MED LIST DOCD IN RCRD: CPT | Performed by: PSYCHIATRY & NEUROLOGY

## 2024-05-30 PROCEDURE — 1036F TOBACCO NON-USER: CPT | Performed by: PSYCHIATRY & NEUROLOGY

## 2024-05-30 PROCEDURE — 3078F DIAST BP <80 MM HG: CPT | Performed by: PSYCHIATRY & NEUROLOGY

## 2024-05-30 PROCEDURE — 1160F RVW MEDS BY RX/DR IN RCRD: CPT | Performed by: PSYCHIATRY & NEUROLOGY

## 2024-05-30 PROCEDURE — 3074F SYST BP LT 130 MM HG: CPT | Performed by: PSYCHIATRY & NEUROLOGY

## 2024-05-30 RX ORDER — CIPROFLOXACIN 500 MG/1
500 TABLET ORAL 2 TIMES DAILY
COMMUNITY

## 2024-05-30 RX ORDER — DIPHENOXYLATE HYDROCHLORIDE AND ATROPINE SULFATE 2.5; .025 MG/1; MG/1
1 TABLET ORAL 4 TIMES DAILY PRN
COMMUNITY

## 2024-05-30 RX ORDER — TOPIRAMATE 50 MG/1
TABLET, FILM COATED ORAL
Qty: 60 TABLET | Refills: 6 | Status: SHIPPED | OUTPATIENT
Start: 2024-05-30

## 2024-05-30 ASSESSMENT — PATIENT HEALTH QUESTIONNAIRE - PHQ9
SUM OF ALL RESPONSES TO PHQ9 QUESTIONS 1 & 2: 1
2. FEELING DOWN, DEPRESSED OR HOPELESS: NOT AT ALL
1. LITTLE INTEREST OR PLEASURE IN DOING THINGS: SEVERAL DAYS

## 2024-05-30 ASSESSMENT — LIFESTYLE VARIABLES
SKIP TO QUESTIONS 9-10: 1
HOW MANY STANDARD DRINKS CONTAINING ALCOHOL DO YOU HAVE ON A TYPICAL DAY: PATIENT DOES NOT DRINK
HOW OFTEN DO YOU HAVE A DRINK CONTAINING ALCOHOL: NEVER
AUDIT-C TOTAL SCORE: 0
HOW OFTEN DO YOU HAVE SIX OR MORE DRINKS ON ONE OCCASION: NEVER

## 2024-05-30 NOTE — PROGRESS NOTES
"Consulting Physician: Dr. Beckwith    Chief Complaint: Headaches    History Of Present Illness  Arvin Diallo \"Marco A\" is a 83 y.o. male presenting with headaches and dizziness.    For over 1 year, he has noted dizziness.  He describes the feeling as if things are spinning.  It is often provoked by turning his head.  The dizziness can last several seconds.  There are times where he's noticed a pre-syncopal episode.  He does note dizzy episodes on a daily basis.  He denies any double vision, loss of peripheral vision, slurred speech, facial droop, weakness, or numbness.  He has seen ENT - he was told that he does not have vertigo.    He has a history of migraines during his adulthood.  Over the last year, he has noted daily headaches.  He describes a bifrontal pressure sensation.  He has no associated photophobia, phonophobia, or nausea.  He does not take any medications to break it.  He has tried Tylenol which does not help.  However, he does take Tylenol every 6 hours.  He has tried Excedrin Migraine once which did break the headache.      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  He is currently on Eliquis for a PE.      He has a loop recorder.         Past Medical History  Past Medical History:   Diagnosis Date    Acute pulmonary embolism without acute cor pulmonale, unspecified pulmonary embolism type (Multi) 04/12/2023    Localized enlarged lymph nodes     Mediastinal adenopathy    Other nonspecific abnormal finding of lung field     Lung mass    Personal history of other diseases of the respiratory system     History of asthma    Personal history of other specified conditions     History of chronic cough    Personal history of pulmonary embolism     History of pulmonary embolism       Surgical History  Past Surgical History:   Procedure Laterality Date    CARDIAC CATHETERIZATION Left 3/14/2024    Procedure: Left Heart Cath;  Surgeon: James Costa MD;  " Location: [facility] Cardiac Cath Lab;  Service: Cardiovascular;  Laterality: Left;    CARDIAC CATHETERIZATION N/A 3/14/2024    Procedure: Left Ventriculography;  Surgeon: James Costa MD;  Location: [facility] Cardiac Cath Lab;  Service: Cardiovascular;  Laterality: N/A;    CATARACT EXTRACTION Left 04/06/2023    CATARACT EXTRACTION Right 07/06/2023    HERNIA REPAIR  01/09/2023    MR HEAD ANGIO WO IV CONTRAST  08/12/2021    MR HEAD ANGIO WO IV CONTRAST 8/12/2021 [facility] CLINICAL LEGACY    OTHER SURGICAL HISTORY  12/04/2018    Cystoscopy    OTHER SURGICAL HISTORY  12/04/2018    Inferior vena cava filter placement    OTHER SURGICAL HISTORY  12/04/2018    Tonsillectomy    OTHER SURGICAL HISTORY  12/04/2018    Ostectomy of calcaneus for spur    OTHER SURGICAL HISTORY  12/04/2018    Hernia repair    OTHER SURGICAL HISTORY  12/04/2018    Epidural steroid injection    OTHER SURGICAL HISTORY  12/04/2018    Transurethral resection of prostate    OTHER SURGICAL HISTORY  05/06/2022    Intra-articular corticosteroid injection    OTHER SURGICAL HISTORY  09/13/2021    Sigmoidectomy    OTHER SURGICAL HISTORY  04/20/2021    Colonoscopy    OTHER SURGICAL HISTORY  10/24/2019    Knee replacement       Family History  Family History   Problem Relation Name Age of Onset    Heart failure Mother      Parkinsonism Mother      Coronary artery disease Father      Heart failure Father      Breast cancer Daughter      Other (hysterectomy) Daughter      Diabetes Other grandfather         Social History   reports that he has never smoked. He has been exposed to tobacco smoke. He has never used smokeless tobacco. Drug use questions deferred to the physician. He reports that he does not drink alcohol.     Allergies  Patient has no known allergies.    Medications    Current Outpatient Medications:     acetaminophen (Tylenol) 325 mg tablet, Take 2 tablets (650 mg) by mouth every 8 hours., Disp: , Rfl:     albuterol 90 mcg/actuation inhaler, Inhale 2  puffs every 4 hours if needed for shortness of breath., Disp: 18 g, Rfl: 11    apixaban (Eliquis) 5 mg tablet, Take 1 tablet (5 mg) by mouth 2 times a day., Disp: 180 tablet, Rfl: 3    blood sugar diagnostic (True Metrix Glucose Test Strip) strip, 120 strips 4 times a day., Disp: 120 strip, Rfl: 11    blood-glucose meter (True Metrix Glucose Meter) misc, TEST TWICE DAILY, Disp: 1 each, Rfl: 0    budesonide-formoteroL (Symbicort) 160-4.5 mcg/actuation inhaler, Inhale 2 puffs 2 times a day., Disp: 30.6 g, Rfl: 3    cephalexin (Keflex) 500 mg capsule, Take 1 capsule (500 mg) by mouth 2 times a day., Disp: 180 capsule, Rfl: 3    cholecalciferol (Vitamin D-3) 125 MCG (5000 UT) capsule, Take 1 capsule (125 mcg) by mouth once daily., Disp: , Rfl:     ciprofloxacin (Cipro) 500 mg tablet, Take 1 tablet (500 mg) by mouth 2 times a day., Disp: , Rfl:     diphenoxylate-atropine (Lomotil) 2.5-0.025 mg tablet, Take 1 tablet by mouth 4 times a day as needed for diarrhea., Disp: , Rfl:     finasteride (Proscar) 5 mg tablet, Take 1 tablet (5 mg) by mouth once daily., Disp: 90 tablet, Rfl: 3    FLUoxetine (PROzac) 20 mg capsule, Take 1 capsule (20 mg) by mouth once daily., Disp: 30 capsule, Rfl: 11    fluticasone (Flonase) 50 mcg/actuation nasal spray, INSTILL 2 SPRAYS IN EACH NOSTRIL DAILY, Disp: 48 g, Rfl: 3    losartan (Cozaar) 50 mg tablet, Take 1 tablet (50 mg) by mouth once daily., Disp: 90 tablet, Rfl: 3    lubricating eye drops ophthalmic solution, Administer 1 drop into both eyes 3 times a day., Disp: , Rfl:     magnesium oxide (Mag-Ox) 400 mg (241.3 mg magnesium) tablet, Take 1 tablet (400 mg) by mouth once daily., Disp: , Rfl:     montelukast (Singulair) 10 mg tablet, Take 1 tablet (10 mg) by mouth once daily at bedtime., Disp: 90 tablet, Rfl: 3    multivitamin (MULTIPLE VITAMINS ORAL), Take 1 tablet by mouth once daily., Disp: , Rfl:     omeprazole (PriLOSEC) 40 mg DR capsule, TAKE 1 CAPSULE EVERY DAY, Disp: 90 capsule,  "Rfl: 3    oxygen (O2) gas therapy, Inhale 3 L/min once daily at bedtime., Disp: , Rfl:     psyllium husk, with sugar, (Metamucil Fiber Thin) 2.5 gram wafer wafer, Take by mouth once daily at bedtime. WITH CRANBERRY POWDER, Disp: , Rfl:     simvastatin (Zocor) 40 mg tablet, Take 1 tablet (40 mg) by mouth early in the morning.., Disp: 90 tablet, Rfl: 3      Last Recorded Vitals   Blood pressure 110/74, pulse 78, temperature 36.3 °C (97.3 °F), temperature source Temporal, height 1.905 m (6' 3\"), weight 118 kg (259 lb 9.6 oz).    Objective:  Gen: NAD  Neuro:  --HIF: A&O X 3, repetition and naming intact  --CN:  PERRLA, EOMI, VFF, no visible facial asymmetry, facial sensation intact, no tongue or palatal deviation, SCM intact  --Motor: Moves all 4 extremities equally; no focal deficits except the following:       Right DF 4, Inversion 4  --Sensory: Intact to light touch, intact to pinprick  --Reflex: absent throughout, toes down  --Cerebellum: FTN and HTS intact  --Gait: Normal, narrow based.  Toe and Heal Walking Intact.  Tandem Intact    Relevant Results  Lab Results   Component Value Date    WBC 5.7 05/13/2024    HGB 14.1 05/13/2024    HCT 40.8 (L) 05/13/2024    MCV 96 05/13/2024     05/13/2024       Lab Results   Component Value Date    GLUCOSE 148 (H) 05/13/2024    CALCIUM 9.5 05/13/2024     05/13/2024    K 4.1 05/13/2024    CO2 26 05/13/2024     05/13/2024    BUN 18 05/13/2024    CREATININE 0.85 05/13/2024       Lab Results   Component Value Date    HGBA1C 6.5 (H) 04/15/2024       Lab Results   Component Value Date    CHOL 110 12/15/2023    CHOL 119 08/12/2022     Lab Results   Component Value Date    HDL 46.0 12/15/2023    HDL 36.0 (A) 08/12/2022     Lab Results   Component Value Date    LDLCALC 35 12/15/2023     Lab Results   Component Value Date    TRIG 144 12/15/2023    TRIG 120 08/12/2022     No components found for: \"CHOLHDL\"    MRI Brain (I personally reviewed the images/tracings with the " following interpretation)  No evidence of acute stroke  Chronic infarcts noted in the right cerebellum and left frontal region     Assessment:   Dizziness  - the patient describes daily episodes of a room spinning sensation often triggered by turning his head  - MRI brain showed a chronic right cerebellar stroke  - I doubt this finding is the cause of his dizziness  - ddx includes peripheral vertigo  - recommend trial of vestibular therapy    2.  Chronic Daily Headache - likely migraine,, medication overuse headache  - recommend starting Topamax and titrate to 50 mg BID (reviewed side effects)  - keep hydrated to reduce risk of kidney stones  - check ESR, CRP  - avoid taking Tylenol on a daily basis    Follow up in 4 months        Israel Humphries MD  [facility] [place]  Department of Neurology      A copy of this note was sent to the referring provider.

## 2024-06-04 ENCOUNTER — TELEPHONE (OUTPATIENT)
Dept: NEUROLOGY | Facility: CLINIC | Age: 84
End: 2024-06-04
Payer: MEDICARE

## 2024-06-04 NOTE — TELEPHONE ENCOUNTER
I would have him hold off on Topamax for now.     Call us in one week with an update in symptoms.

## 2024-06-04 NOTE — TELEPHONE ENCOUNTER
Pts daughter is calling in and states that her father is more dizzy and more weaker since he started the Topamax she wants to know what she should do for him? He started the medication 5/31.    Please Advise.

## 2024-06-05 ENCOUNTER — HOSPITAL ENCOUNTER (OUTPATIENT)
Dept: CARDIOLOGY | Facility: HOSPITAL | Age: 84
Discharge: HOME | End: 2024-06-05
Payer: MEDICARE

## 2024-06-05 ENCOUNTER — APPOINTMENT (OUTPATIENT)
Dept: RADIOLOGY | Facility: HOSPITAL | Age: 84
End: 2024-06-05
Payer: MEDICARE

## 2024-06-05 ENCOUNTER — HOSPITAL ENCOUNTER (EMERGENCY)
Facility: HOSPITAL | Age: 84
Discharge: HOME | End: 2024-06-05
Attending: EMERGENCY MEDICINE
Payer: MEDICARE

## 2024-06-05 VITALS
HEIGHT: 75 IN | BODY MASS INDEX: 31.08 KG/M2 | OXYGEN SATURATION: 95 % | WEIGHT: 250 LBS | HEART RATE: 65 BPM | TEMPERATURE: 97 F | RESPIRATION RATE: 15 BRPM | DIASTOLIC BLOOD PRESSURE: 62 MMHG | SYSTOLIC BLOOD PRESSURE: 124 MMHG

## 2024-06-05 DIAGNOSIS — R51.9 CHRONIC NONINTRACTABLE HEADACHE, UNSPECIFIED HEADACHE TYPE: Primary | ICD-10-CM

## 2024-06-05 DIAGNOSIS — G89.29 CHRONIC NONINTRACTABLE HEADACHE, UNSPECIFIED HEADACHE TYPE: Primary | ICD-10-CM

## 2024-06-05 DIAGNOSIS — R42 DIZZINESS: ICD-10-CM

## 2024-06-05 DIAGNOSIS — N39.0 CHRONIC UTI: ICD-10-CM

## 2024-06-05 LAB
ALBUMIN SERPL BCP-MCNC: 3.6 G/DL (ref 3.4–5)
ALP SERPL-CCNC: 68 U/L (ref 33–136)
ALT SERPL W P-5'-P-CCNC: 14 U/L (ref 10–52)
AMORPH CRY #/AREA UR COMP ASSIST: ABNORMAL /HPF
ANION GAP SERPL CALC-SCNC: 9 MMOL/L (ref 10–20)
APPEARANCE UR: ABNORMAL
APTT PPP: 34 SECONDS (ref 27–38)
AST SERPL W P-5'-P-CCNC: 12 U/L (ref 9–39)
BASOPHILS # BLD AUTO: 0.03 X10*3/UL (ref 0–0.1)
BASOPHILS NFR BLD AUTO: 0.4 %
BILIRUB DIRECT SERPL-MCNC: 0.3 MG/DL (ref 0–0.3)
BILIRUB SERPL-MCNC: 1.7 MG/DL (ref 0–1.2)
BILIRUB UR STRIP.AUTO-MCNC: NEGATIVE MG/DL
BUN SERPL-MCNC: 20 MG/DL (ref 6–23)
CALCIUM SERPL-MCNC: 9.4 MG/DL (ref 8.6–10.3)
CARDIAC TROPONIN I PNL SERPL HS: 16 NG/L (ref 0–20)
CHLORIDE SERPL-SCNC: 107 MMOL/L (ref 98–107)
CO2 SERPL-SCNC: 23 MMOL/L (ref 21–32)
COLOR UR: YELLOW
CREAT SERPL-MCNC: 0.91 MG/DL (ref 0.5–1.3)
EGFRCR SERPLBLD CKD-EPI 2021: 84 ML/MIN/1.73M*2
EOSINOPHIL # BLD AUTO: 0.06 X10*3/UL (ref 0–0.4)
EOSINOPHIL NFR BLD AUTO: 0.9 %
ERYTHROCYTE [DISTWIDTH] IN BLOOD BY AUTOMATED COUNT: 12.5 % (ref 11.5–14.5)
GLUCOSE SERPL-MCNC: 111 MG/DL (ref 74–99)
GLUCOSE UR STRIP.AUTO-MCNC: NORMAL MG/DL
HCT VFR BLD AUTO: 37.8 % (ref 41–52)
HGB BLD-MCNC: 12.6 G/DL (ref 13.5–17.5)
HOLD SPECIMEN: NORMAL
IMM GRANULOCYTES # BLD AUTO: 0.02 X10*3/UL (ref 0–0.5)
IMM GRANULOCYTES NFR BLD AUTO: 0.3 % (ref 0–0.9)
INR PPP: 1.3 (ref 0.9–1.1)
KETONES UR STRIP.AUTO-MCNC: NEGATIVE MG/DL
LEUKOCYTE ESTERASE UR QL STRIP.AUTO: ABNORMAL
LIPASE SERPL-CCNC: 21 U/L (ref 9–82)
LYMPHOCYTES # BLD AUTO: 1.25 X10*3/UL (ref 0.8–3)
LYMPHOCYTES NFR BLD AUTO: 18 %
MAGNESIUM SERPL-MCNC: 2.21 MG/DL (ref 1.6–2.4)
MCH RBC QN AUTO: 32.1 PG (ref 26–34)
MCHC RBC AUTO-ENTMCNC: 33.3 G/DL (ref 32–36)
MCV RBC AUTO: 96 FL (ref 80–100)
MONOCYTES # BLD AUTO: 0.72 X10*3/UL (ref 0.05–0.8)
MONOCYTES NFR BLD AUTO: 10.4 %
MUCOUS THREADS #/AREA URNS AUTO: ABNORMAL /LPF
NEUTROPHILS # BLD AUTO: 4.86 X10*3/UL (ref 1.6–5.5)
NEUTROPHILS NFR BLD AUTO: 70 %
NITRITE UR QL STRIP.AUTO: NEGATIVE
NRBC BLD-RTO: 0 /100 WBCS (ref 0–0)
PH UR STRIP.AUTO: 7 [PH]
PLATELET # BLD AUTO: 228 X10*3/UL (ref 150–450)
POTASSIUM SERPL-SCNC: 4.3 MMOL/L (ref 3.5–5.3)
PROT SERPL-MCNC: 5.6 G/DL (ref 6.4–8.2)
PROT UR STRIP.AUTO-MCNC: NEGATIVE MG/DL
PROTHROMBIN TIME: 15.5 SECONDS (ref 9.8–12.8)
RBC # BLD AUTO: 3.92 X10*6/UL (ref 4.5–5.9)
RBC # UR STRIP.AUTO: NEGATIVE /UL
RBC #/AREA URNS AUTO: ABNORMAL /HPF
SODIUM SERPL-SCNC: 135 MMOL/L (ref 136–145)
SP GR UR STRIP.AUTO: 1.02
UROBILINOGEN UR STRIP.AUTO-MCNC: NORMAL MG/DL
WBC # BLD AUTO: 6.9 X10*3/UL (ref 4.4–11.3)
WBC #/AREA URNS AUTO: ABNORMAL /HPF

## 2024-06-05 PROCEDURE — 96374 THER/PROPH/DIAG INJ IV PUSH: CPT

## 2024-06-05 PROCEDURE — 82248 BILIRUBIN DIRECT: CPT | Performed by: EMERGENCY MEDICINE

## 2024-06-05 PROCEDURE — 93005 ELECTROCARDIOGRAM TRACING: CPT

## 2024-06-05 PROCEDURE — 71045 X-RAY EXAM CHEST 1 VIEW: CPT

## 2024-06-05 PROCEDURE — 96361 HYDRATE IV INFUSION ADD-ON: CPT

## 2024-06-05 PROCEDURE — 83735 ASSAY OF MAGNESIUM: CPT | Performed by: EMERGENCY MEDICINE

## 2024-06-05 PROCEDURE — 85025 COMPLETE CBC W/AUTO DIFF WBC: CPT | Performed by: EMERGENCY MEDICINE

## 2024-06-05 PROCEDURE — 2500000004 HC RX 250 GENERAL PHARMACY W/ HCPCS (ALT 636 FOR OP/ED): Performed by: EMERGENCY MEDICINE

## 2024-06-05 PROCEDURE — 71045 X-RAY EXAM CHEST 1 VIEW: CPT | Performed by: RADIOLOGY

## 2024-06-05 PROCEDURE — 81001 URINALYSIS AUTO W/SCOPE: CPT | Performed by: EMERGENCY MEDICINE

## 2024-06-05 PROCEDURE — 85730 THROMBOPLASTIN TIME PARTIAL: CPT | Performed by: EMERGENCY MEDICINE

## 2024-06-05 PROCEDURE — 36415 COLL VENOUS BLD VENIPUNCTURE: CPT | Performed by: EMERGENCY MEDICINE

## 2024-06-05 PROCEDURE — 70450 CT HEAD/BRAIN W/O DYE: CPT | Performed by: RADIOLOGY

## 2024-06-05 PROCEDURE — 85610 PROTHROMBIN TIME: CPT | Performed by: EMERGENCY MEDICINE

## 2024-06-05 PROCEDURE — 70450 CT HEAD/BRAIN W/O DYE: CPT

## 2024-06-05 PROCEDURE — 96375 TX/PRO/DX INJ NEW DRUG ADDON: CPT

## 2024-06-05 PROCEDURE — 87086 URINE CULTURE/COLONY COUNT: CPT | Mod: SAMLAB | Performed by: EMERGENCY MEDICINE

## 2024-06-05 PROCEDURE — 84484 ASSAY OF TROPONIN QUANT: CPT | Performed by: EMERGENCY MEDICINE

## 2024-06-05 PROCEDURE — 83690 ASSAY OF LIPASE: CPT | Performed by: EMERGENCY MEDICINE

## 2024-06-05 PROCEDURE — 99285 EMERGENCY DEPT VISIT HI MDM: CPT | Mod: 25

## 2024-06-05 RX ORDER — MORPHINE SULFATE 4 MG/ML
4 INJECTION, SOLUTION INTRAMUSCULAR; INTRAVENOUS ONCE
Status: COMPLETED | OUTPATIENT
Start: 2024-06-05 | End: 2024-06-05

## 2024-06-05 RX ORDER — ONDANSETRON HYDROCHLORIDE 2 MG/ML
4 INJECTION, SOLUTION INTRAVENOUS ONCE
Status: COMPLETED | OUTPATIENT
Start: 2024-06-05 | End: 2024-06-05

## 2024-06-05 RX ORDER — NITROFURANTOIN MACROCRYSTALS 50 MG/1
50 CAPSULE ORAL 2 TIMES DAILY
Qty: 14 CAPSULE | Refills: 0 | Status: SHIPPED | OUTPATIENT
Start: 2024-06-05 | End: 2024-06-12

## 2024-06-05 RX ORDER — TRAMADOL HYDROCHLORIDE 50 MG/1
50 TABLET ORAL 4 TIMES DAILY
Qty: 12 TABLET | Refills: 0 | Status: SHIPPED | OUTPATIENT
Start: 2024-06-05 | End: 2024-06-08

## 2024-06-05 RX ORDER — SODIUM CHLORIDE 9 MG/ML
125 INJECTION, SOLUTION INTRAVENOUS CONTINUOUS
Status: DISCONTINUED | OUTPATIENT
Start: 2024-06-05 | End: 2024-06-05 | Stop reason: HOSPADM

## 2024-06-05 RX ADMIN — ONDANSETRON 4 MG: 2 INJECTION INTRAMUSCULAR; INTRAVENOUS at 11:54

## 2024-06-05 RX ADMIN — SODIUM CHLORIDE 500 ML: 9 INJECTION, SOLUTION INTRAVENOUS at 12:00

## 2024-06-05 RX ADMIN — MORPHINE SULFATE 4 MG: 4 INJECTION, SOLUTION INTRAMUSCULAR; INTRAVENOUS at 11:54

## 2024-06-05 ASSESSMENT — ENCOUNTER SYMPTOMS
FREQUENCY: 0
DIZZINESS: 1
PSYCHIATRIC NEGATIVE: 1
PALPITATIONS: 0
NAUSEA: 0
CHILLS: 0
ARTHRALGIAS: 0
ABDOMINAL PAIN: 0
VOMITING: 0
DYSURIA: 0
HEADACHES: 1
FEVER: 0
CHEST TIGHTNESS: 0
SHORTNESS OF BREATH: 1
WHEEZING: 1
WEAKNESS: 1
NECK STIFFNESS: 0
FLANK PAIN: 0
FATIGUE: 1
PHOTOPHOBIA: 0
SORE THROAT: 0
MYALGIAS: 0
NECK PAIN: 0

## 2024-06-05 ASSESSMENT — PAIN SCALES - GENERAL
PAINLEVEL_OUTOF10: 0 - NO PAIN
PAINLEVEL_OUTOF10: 7
PAINLEVEL_OUTOF10: 7
PAINLEVEL_OUTOF10: 5 - MODERATE PAIN
PAINLEVEL_OUTOF10: 4

## 2024-06-05 ASSESSMENT — PAIN DESCRIPTION - LOCATION: LOCATION: HEAD

## 2024-06-05 ASSESSMENT — PAIN DESCRIPTION - DESCRIPTORS: DESCRIPTORS: ACHING;PRESSURE

## 2024-06-05 ASSESSMENT — PAIN DESCRIPTION - ORIENTATION: ORIENTATION: MID

## 2024-06-05 ASSESSMENT — PAIN - FUNCTIONAL ASSESSMENT
PAIN_FUNCTIONAL_ASSESSMENT: 0-10
PAIN_FUNCTIONAL_ASSESSMENT: 0-10

## 2024-06-05 ASSESSMENT — PAIN DESCRIPTION - PAIN TYPE: TYPE: ACUTE PAIN

## 2024-06-05 NOTE — ED PROVIDER NOTES
Chief Complaint: HEADACHE/WEAKNESS    This is an 83-year-old male who presents he complains of frontal headache he is also had ataxia and dizziness most likely related to previous cerebellar infarct his main complaint is severe weakness today.  He was recently started on Topamax for what appeared to be chronic headaches by his neurologist.  His symptoms seem to worsen after being on the Topamax which she just stopped today.  His daughter had given him Excedrin Migraine for the headaches which helped significantly however he is on Eliquis and should not be taken any type of aspirin products.  He denies any fever cough or congestion he is Bal's chronic COPD and has a PAP mask and oxygen wears at night occasionally wears oxygen at home complains of some dyspnea but not necessarily exertional denies any chest pain.  He has had no nausea vomiting or diarrhea.  He was here recently was treated for UTI with Keflex but never had his urine rechecked to see if the infection had cleared up he presents now for evaluation of his weakness and chronic headache           Review of Systems   Constitutional:  Positive for fatigue. Negative for chills and fever.   HENT:  Negative for congestion and sore throat.    Eyes:  Negative for photophobia and visual disturbance.   Respiratory:  Positive for shortness of breath and wheezing. Negative for chest tightness.    Cardiovascular:  Negative for chest pain, palpitations and leg swelling.   Gastrointestinal:  Negative for abdominal pain, nausea and vomiting.   Genitourinary:  Negative for dysuria, flank pain and frequency.   Musculoskeletal:  Negative for arthralgias, myalgias, neck pain and neck stiffness.   Skin:  Negative for rash.   Neurological:  Positive for dizziness, weakness and headaches.   Psychiatric/Behavioral: Negative.     All other systems reviewed and are negative.       Physical Exam  Vitals reviewed.   Constitutional:       General: He is not in acute distress.      Appearance: He is obese. He is not toxic-appearing.   HENT:      Head: Normocephalic and atraumatic.      Right Ear: Tympanic membrane normal.      Left Ear: Tympanic membrane normal.      Nose: Nose normal. No congestion.      Mouth/Throat:      Mouth: Mucous membranes are moist.      Pharynx: Oropharynx is clear. No oropharyngeal exudate or posterior oropharyngeal erythema.   Eyes:      Conjunctiva/sclera: Conjunctivae normal.      Pupils: Pupils are equal, round, and reactive to light.   Cardiovascular:      Rate and Rhythm: Normal rate.      Pulses: Normal pulses.      Heart sounds: No murmur heard.  Pulmonary:      Effort: No respiratory distress.      Breath sounds: No wheezing.      Comments: No dyspnea or tachypnea he is able speak full sentences breath sounds are slightly diminished  Abdominal:      General: There is no distension.      Palpations: There is no mass.      Tenderness: There is no abdominal tenderness.      Comments: With an incisional/umbilical hernia but no tenderness no rebound or guarding no other masses   Musculoskeletal:         General: No swelling or tenderness. Normal range of motion.      Cervical back: Normal range of motion and neck supple. No rigidity or tenderness.      Right lower leg: No edema.      Left lower leg: No edema.   Skin:     General: Skin is warm and dry.      Capillary Refill: Capillary refill takes less than 2 seconds.      Coloration: Skin is not jaundiced.      Findings: No erythema.   Neurological:      General: No focal deficit present.      Mental Status: He is alert and oriented to person, place, and time.      Sensory: No sensory deficit.      Motor: Weakness present.   Psychiatric:         Mood and Affect: Mood normal.         Behavior: Behavior normal.          Labs Reviewed   URINALYSIS WITH REFLEX CULTURE AND MICROSCOPIC    Narrative:     The following orders were created for panel order Urinalysis with Reflex Culture and Microscopic.  Procedure                                Abnormality         Status                     ---------                               -----------         ------                     Urinalysis with Reflex C...[[id number]]                                                 Extra Urine Gray Tube[[id number]]                                                         Please view results for these tests on the individual orders.   CBC WITH AUTO DIFFERENTIAL   BASIC METABOLIC PANEL   MAGNESIUM   LIPASE   HEPATIC FUNCTION PANEL   TROPONIN I, HIGH SENSITIVITY   PROTIME-INR   APTT   URINALYSIS WITH REFLEX CULTURE AND MICROSCOPIC   EXTRA URINE GRAY TUBE   GRAY TOP        XR chest 1 view    (Results Pending)   CT head wo IV contrast    (Results Pending)        Procedures     Medical Decision Making  [place] diagnosis included frontal headache sinusitis COPD exacerbation medication reaction UTI electrolyte abnormality.  IV was established and normal saline 500 cc bolus was ordered as well as normal saline infusion at 125 cc/h Zofran 4 mg intravenously morphine sulfate 4 mg IV for his headache CT scan of the head as well as chest x-ray EKG and appropriate labs were ordered at this time.  Patient was reexamined felt markedly improved his headache completely resolved he was sleeping soundly at this time.  Urinalysis shows a minimally chronic UTI with 11-20 white cells per high-powered field his troponin was negative.  Metabolic panel was unremarkable.  INR was 1.3 white count 6.9 with a normal hemoglobin hematocrit.  CT scan of the head was negative at this time except for's previously identified frontal and cerebellar infarcts.  Long conversation held with the family about nursing care assisted living and apparently they were not ready to make that transition at this time.  He will be given Ultram for headache is to stop Topamax and is to start Macrodantin for chronic UTI and to schedule a follow-up with his neurologist    Amount and/or Complexity of Data  Reviewed  ECG/medicine tests: independent interpretation performed.     Details: Lead EKG showed sinus rhythm at 77/min there is right bundle branch block pattern left anterior hemiblock some voltage criteria for LVH however there is no change in EKG from May 13, 2024 as interpreted by myself the emergency physician         Diagnoses as of 06/05/24 1432   Chronic nonintractable headache, unspecified headache type   Dizziness   Chronic UTI                    Tavon River MD  06/05/24 1433

## 2024-06-06 LAB
ATRIAL RATE: 77 BPM
HOLD SPECIMEN: NORMAL
P AXIS: 4 DEGREES
P OFFSET: 157 MS
P ONSET: 96 MS
PR INTERVAL: 228 MS
Q ONSET: 210 MS
QRS COUNT: 13 BEATS
QRS DURATION: 148 MS
QT INTERVAL: 408 MS
QTC CALCULATION(BAZETT): 461 MS
QTC FREDERICIA: 443 MS
R AXIS: -59 DEGREES
T AXIS: 22 DEGREES
T OFFSET: 414 MS
VENTRICULAR RATE: 77 BPM

## 2024-06-07 LAB — BACTERIA UR CULT: NO GROWTH

## 2024-06-11 NOTE — TELEPHONE ENCOUNTER
Patient's daughter Shayla called in stating that patient is feeling even worse after 1 week off of the medication. He has continuous pressure in his head, dizziness, weakness and difficulty walking. He was evaluated at the hospital but the daughter states that they did not do much. She would like to know what you advise and hoping to speak with you regarding next steps.     [phone]

## 2024-06-11 NOTE — TELEPHONE ENCOUNTER
I spoke to the patient's daughter.  Recommend re-starting Topamax and titrate to 50 mg BID.  Discuss with PCP about shortness of breath.

## 2024-06-13 ENCOUNTER — PATIENT OUTREACH (OUTPATIENT)
Dept: CARE COORDINATION | Facility: CLINIC | Age: 84
End: 2024-06-13
Payer: MEDICARE

## 2024-06-13 ENCOUNTER — TELEPHONE (OUTPATIENT)
Dept: PRIMARY CARE | Facility: CLINIC | Age: 84
End: 2024-06-13
Payer: MEDICARE

## 2024-06-13 NOTE — TELEPHONE ENCOUNTER
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

## 2024-06-13 NOTE — TELEPHONE ENCOUNTER
PT called for orders but he is asking for Dx of BPV? Neuro states it but does not actually have un his Hx. Please advise

## 2024-06-13 NOTE — PROGRESS NOTES
Call placed regarding 90 days discharge follow up call.             At time of outreach call the patient feels as if their condition has              Worsened since initial visit with PCP or specialist.             Questions or concerns regarding recovery period addressed at this time.              Reviewed any PCP or specialists progress notes/labs/radiology reports if applicable             and addressed any questions or concerns. Still has headaches. Working with Neuro and [facility] for therapy.

## 2024-06-14 LAB
ATRIAL RATE: 77 BPM
P AXIS: 4 DEGREES
P OFFSET: 157 MS
P ONSET: 96 MS
PR INTERVAL: 228 MS
Q ONSET: 210 MS
QRS COUNT: 13 BEATS
QRS DURATION: 148 MS
QT INTERVAL: 408 MS
QTC CALCULATION(BAZETT): 461 MS
QTC FREDERICIA: 443 MS
R AXIS: -59 DEGREES
T AXIS: 22 DEGREES
T OFFSET: 414 MS
VENTRICULAR RATE: 77 BPM

## 2024-06-17 NOTE — ASSESSMENT & PLAN NOTE
Patient with increasing dizziness, has had this for a long time, refer to outpatient PT for help with vestibular retraining and balance.  Patient very high risk for falling.

## 2024-07-01 ENCOUNTER — TELEPHONE (OUTPATIENT)
Dept: PRIMARY CARE | Facility: CLINIC | Age: 84
End: 2024-07-01
Payer: MEDICARE

## 2024-07-01 NOTE — TELEPHONE ENCOUNTER
GUERITAI: DAUGHTER, DONNELL SWAN, WANTED YOU TO BE AWARE PATIENT WILL BE MOVING TO ASSISTED LIVING TO THE [facility] AT [facility]

## 2024-07-16 ENCOUNTER — HOSPITAL ENCOUNTER (EMERGENCY)
Facility: HOSPITAL | Age: 84
Discharge: HOME | End: 2024-07-16
Attending: EMERGENCY MEDICINE
Payer: MEDICARE

## 2024-07-16 ENCOUNTER — APPOINTMENT (OUTPATIENT)
Dept: RADIOLOGY | Facility: HOSPITAL | Age: 84
End: 2024-07-16
Payer: MEDICARE

## 2024-07-16 VITALS
RESPIRATION RATE: 16 BRPM | WEIGHT: 260 LBS | TEMPERATURE: 97.8 F | HEART RATE: 61 BPM | BODY MASS INDEX: 32.33 KG/M2 | SYSTOLIC BLOOD PRESSURE: 135 MMHG | HEIGHT: 75 IN | DIASTOLIC BLOOD PRESSURE: 69 MMHG | OXYGEN SATURATION: 94 %

## 2024-07-16 DIAGNOSIS — Z23 NEED FOR TETANUS, DIPHTHERIA, AND ACELLULAR PERTUSSIS (TDAP) VACCINE: ICD-10-CM

## 2024-07-16 DIAGNOSIS — S92.424B OPEN NONDISPLACED FRACTURE OF DISTAL PHALANX OF RIGHT GREAT TOE, INITIAL ENCOUNTER: Primary | ICD-10-CM

## 2024-07-16 PROCEDURE — 11760 REPAIR OF NAIL BED: CPT | Mod: T5

## 2024-07-16 PROCEDURE — 2500000001 HC RX 250 WO HCPCS SELF ADMINISTERED DRUGS (ALT 637 FOR MEDICARE OP): Performed by: NURSE PRACTITIONER

## 2024-07-16 PROCEDURE — 96372 THER/PROPH/DIAG INJ SC/IM: CPT | Performed by: NURSE PRACTITIONER

## 2024-07-16 PROCEDURE — 99283 EMERGENCY DEPT VISIT LOW MDM: CPT | Mod: 25

## 2024-07-16 PROCEDURE — 73630 X-RAY EXAM OF FOOT: CPT | Mod: RIGHT SIDE | Performed by: RADIOLOGY

## 2024-07-16 PROCEDURE — 90471 IMMUNIZATION ADMIN: CPT | Performed by: NURSE PRACTITIONER

## 2024-07-16 PROCEDURE — 73630 X-RAY EXAM OF FOOT: CPT | Mod: RT

## 2024-07-16 PROCEDURE — 90715 TDAP VACCINE 7 YRS/> IM: CPT | Performed by: NURSE PRACTITIONER

## 2024-07-16 PROCEDURE — 2500000004 HC RX 250 GENERAL PHARMACY W/ HCPCS (ALT 636 FOR OP/ED): Performed by: NURSE PRACTITIONER

## 2024-07-16 RX ORDER — BACITRACIN ZINC 500 UNIT/G
OINTMENT IN PACKET (EA) TOPICAL ONCE
Status: COMPLETED | OUTPATIENT
Start: 2024-07-16 | End: 2024-07-16

## 2024-07-16 RX ORDER — CEPHALEXIN 500 MG/1
500 CAPSULE ORAL 4 TIMES DAILY
Qty: 20 CAPSULE | Refills: 0 | Status: SHIPPED | OUTPATIENT
Start: 2024-07-16 | End: 2024-07-21

## 2024-07-16 RX ORDER — CEFAZOLIN 1 G/1
1 INJECTION, POWDER, FOR SOLUTION INTRAVENOUS ONCE
Status: COMPLETED | OUTPATIENT
Start: 2024-07-16 | End: 2024-07-16

## 2024-07-16 ASSESSMENT — COLUMBIA-SUICIDE SEVERITY RATING SCALE - C-SSRS
6. HAVE YOU EVER DONE ANYTHING, STARTED TO DO ANYTHING, OR PREPARED TO DO ANYTHING TO END YOUR LIFE?: NO
1. IN THE PAST MONTH, HAVE YOU WISHED YOU WERE DEAD OR WISHED YOU COULD GO TO SLEEP AND NOT WAKE UP?: NO
2. HAVE YOU ACTUALLY HAD ANY THOUGHTS OF KILLING YOURSELF?: NO

## 2024-07-17 NOTE — ED PROVIDER NOTES
Chief Complaint   Patient presents with    Laceration     Laceration noted to right great toe, pt reports stubbing toe after showering. Will need tetanus updated. On eliquis        Patient History    Past Medical History:   Diagnosis Date    Acute pulmonary embolism without acute cor pulmonale, unspecified pulmonary embolism type (Multi) 04/12/2023    Localized enlarged lymph nodes     Mediastinal adenopathy    Other nonspecific abnormal finding of lung field     Lung mass    Personal history of other diseases of the respiratory system     History of asthma    Personal history of other specified conditions     History of chronic cough    Personal history of pulmonary embolism     History of pulmonary embolism      Past Surgical History:   Procedure Laterality Date    CARDIAC CATHETERIZATION Left 3/14/2024    Procedure: Left Heart Cath;  Surgeon: James Costa MD;  Location: [facility] Cardiac Cath Lab;  Service: Cardiovascular;  Laterality: Left;    CARDIAC CATHETERIZATION N/A 3/14/2024    Procedure: Left Ventriculography;  Surgeon: James Costa MD;  Location: [facility] Cardiac Cath Lab;  Service: Cardiovascular;  Laterality: N/A;    CATARACT EXTRACTION Left 04/06/2023    CATARACT EXTRACTION Right 07/06/2023    HERNIA REPAIR  01/09/2023    MR HEAD ANGIO WO IV CONTRAST  08/12/2021    MR HEAD ANGIO WO IV CONTRAST 8/12/2021 [facility] CLINICAL LEGACY    OTHER SURGICAL HISTORY  12/04/2018    Cystoscopy    OTHER SURGICAL HISTORY  12/04/2018    Inferior vena cava filter placement    OTHER SURGICAL HISTORY  12/04/2018    Tonsillectomy    OTHER SURGICAL HISTORY  12/04/2018    Ostectomy of calcaneus for spur    OTHER SURGICAL HISTORY  12/04/2018    Hernia repair    OTHER SURGICAL HISTORY  12/04/2018    Epidural steroid injection    OTHER SURGICAL HISTORY  12/04/2018    Transurethral resection of prostate    OTHER SURGICAL HISTORY  05/06/2022    Intra-articular corticosteroid injection    OTHER SURGICAL HISTORY   "09/13/2021    Sigmoidectomy    OTHER SURGICAL HISTORY  04/20/2021    Colonoscopy    OTHER SURGICAL HISTORY  10/24/2019    Knee replacement      Family History   Problem Relation Name Age of Onset    Heart failure Mother      Parkinsonism Mother      Coronary artery disease Father      Heart failure Father      Breast cancer Daughter      Other (hysterectomy) Daughter      Diabetes Other grandfather       Social History     Social History Narrative    Not on file      No Known Allergies     PMH: Reviewed  PSH: Reviewed  Social History: Reviewed.   Allergies reviewed.     HPI: Arvin Diallo \"Marco A\" is a 83 y.o. male who presents to the ED today accompanied by his daughter with complaints of right great toe laceration. He stubbed his toe on the floor threshold coming out of his bathroom tonight. He is on Eliquis. Denies other injury.  Unknown last tetanus update.     PHYSICAL EXAM:    GENERAL: Vitals noted, no distress. Alert and oriented x 3. Non-toxic.       HEAD: Normocephalic, atraumatic. .    NECK: Supple. No midline or paraspinal tenderness through full range of motion.      CARDIAC: Regular rate, rhythm. No murmurs or rubs.    RESPIRATORY: Lungs clear and equal bilaterally. No respiratory distress.     MUSCULOSKELETAL & SKIN:  Warm, dry, and intact except for the right great toe. There is a 4cm jagged laceration at the base of the toenail extending medially down the toe. Small portion of thickened toenail remains (normal for patient). There is also a 1cm linear laceration at the tip of the toe. Both lacerations actively oozing blood. Little sensation due to neuropathy. No rash/lesions. No peripheral edema.     NEURO: No focal neurologic deficits, acting appropriately.     Labs Reviewed - No data to display     XR foot right 3+ views   Final Result   Nondisplaced fracture through the base of the 1st distal phalanx             MACRO:   None        Signed by: Dimas Garcia 7/16/2024 10:56 PM   Dictation " workstation:   [id number]           Medical Decision Making         ED COURSE: This patient was seen and examined by myself independently. Tetanus updated. Xray of the right foot shows a nondisplaced fracture through the base of the 1st distal phalanx. He was set for suture repair. His right foot is draped in a sterile fashion and the site was cleansed using surgical scrub. He was anesthetized with 1% lidocaine. The wound was copiously irrigated using normal saline. It was explored under a bloodless field. No foreign bodies, no tendon or bony involvement are noted. He had a total of 6 simple interrupted sutures placed using 5.0 ethilon, 5 sutures in the wound at the base  of the nailbed and 1 suture in the tip of the nailbed. The wound edges are well approximated post-closure. He has gelfoam/adaptic/gauze dressing applied. He is educated on scar formation and wound care. Sutures are to come out in 10-14 days by primary care or by returning to the ED. Pt tolerated the suturing well. Post-op shoe placed. He's given IM ancef for the open fracture. He's chronically on Keflex BID. Advised to increase this to QID for 5 days and then resume BID dosing (new prescription printed for patient in case he does not have enough pills at home). Follow up with Dr Bran or his own podiatrist for the toe fracture. He is discharged home in a stable condition with computer instructions given and is encouraged to return to the ER for any new or worsening symptoms.       DIAGNOSTIC IMPRESSION: #1 right great toe open fracture #2 tetanus update     SUSU Gallagher-JEAN-CLAUDE  07/16/24 [id number]

## 2024-08-01 ENCOUNTER — TELEPHONE (OUTPATIENT)
Dept: PRIMARY CARE | Facility: CLINIC | Age: 84
End: 2024-08-01
Payer: MEDICARE

## 2024-08-23 ENCOUNTER — TELEPHONE (OUTPATIENT)
Age: 84
End: 2024-08-23

## 2024-08-23 ENCOUNTER — APPOINTMENT (OUTPATIENT)
Dept: PRIMARY CARE | Facility: CLINIC | Age: 84
End: 2024-08-23
Payer: MEDICARE

## 2024-08-23 VITALS
SYSTOLIC BLOOD PRESSURE: 110 MMHG | HEART RATE: 90 BPM | DIASTOLIC BLOOD PRESSURE: 70 MMHG | BODY MASS INDEX: 33.07 KG/M2 | HEIGHT: 75 IN | WEIGHT: 266 LBS | OXYGEN SATURATION: 94 %

## 2024-08-23 DIAGNOSIS — Z86.718 HISTORY OF DVT (DEEP VEIN THROMBOSIS): ICD-10-CM

## 2024-08-23 DIAGNOSIS — E11.9 TYPE 2 DIABETES MELLITUS WITHOUT COMPLICATION, WITHOUT LONG-TERM CURRENT USE OF INSULIN (MULTI): Primary | ICD-10-CM

## 2024-08-23 DIAGNOSIS — G47.33 OBSTRUCTIVE SLEEP APNEA, ADULT: ICD-10-CM

## 2024-08-23 DIAGNOSIS — I63.40 CEREBROVASCULAR ACCIDENT (CVA) DUE TO EMBOLISM OF CEREBRAL ARTERY (MULTI): ICD-10-CM

## 2024-08-23 DIAGNOSIS — E78.2 MIXED HYPERLIPIDEMIA: ICD-10-CM

## 2024-08-23 DIAGNOSIS — F41.1 GAD (GENERALIZED ANXIETY DISORDER): ICD-10-CM

## 2024-08-23 DIAGNOSIS — K21.9 GASTROESOPHAGEAL REFLUX DISEASE WITHOUT ESOPHAGITIS: ICD-10-CM

## 2024-08-23 DIAGNOSIS — R53.82 CHRONIC FATIGUE: ICD-10-CM

## 2024-08-23 DIAGNOSIS — J45.20 MILD INTERMITTENT ASTHMA WITHOUT COMPLICATION (HHS-HCC): ICD-10-CM

## 2024-08-23 DIAGNOSIS — R42 DIZZINESS: ICD-10-CM

## 2024-08-23 DIAGNOSIS — I10 MILD HTN: ICD-10-CM

## 2024-08-23 PROCEDURE — 1036F TOBACCO NON-USER: CPT | Performed by: FAMILY MEDICINE

## 2024-08-23 PROCEDURE — 1159F MED LIST DOCD IN RCRD: CPT | Performed by: FAMILY MEDICINE

## 2024-08-23 PROCEDURE — 3074F SYST BP LT 130 MM HG: CPT | Performed by: FAMILY MEDICINE

## 2024-08-23 PROCEDURE — 3078F DIAST BP <80 MM HG: CPT | Performed by: FAMILY MEDICINE

## 2024-08-23 PROCEDURE — 99214 OFFICE O/P EST MOD 30 MIN: CPT | Performed by: FAMILY MEDICINE

## 2024-08-23 PROCEDURE — 1157F ADVNC CARE PLAN IN RCRD: CPT | Performed by: FAMILY MEDICINE

## 2024-08-23 PROCEDURE — 1160F RVW MEDS BY RX/DR IN RCRD: CPT | Performed by: FAMILY MEDICINE

## 2024-08-23 ASSESSMENT — ENCOUNTER SYMPTOMS
COUGH: 0
APPETITE CHANGE: 0
VOMITING: 0
ABDOMINAL DISTENTION: 0
ADENOPATHY: 0
DIFFICULTY URINATING: 0
SHORTNESS OF BREATH: 0
DYSPHORIC MOOD: 0
LIGHT-HEADEDNESS: 1
NAUSEA: 0
DIZZINESS: 1
NUMBNESS: 0
PALPITATIONS: 0
FATIGUE: 1
SLEEP DISTURBANCE: 0
UNEXPECTED WEIGHT CHANGE: 0
ACTIVITY CHANGE: 0
RHINORRHEA: 0
BACK PAIN: 1
ARTHRALGIAS: 0
WHEEZING: 0
HEADACHES: 1
ABDOMINAL PAIN: 0
NERVOUS/ANXIOUS: 0
CONSTIPATION: 0
DIARRHEA: 0
TROUBLE SWALLOWING: 0

## 2024-08-23 NOTE — PROGRESS NOTES
"Subjective   Patient ID: Marco A Diallo is a 83 y.o. male who presents for 3 MO LABs.    HPI   Injured right foot, cut foot, broke tip of toe, using a wheeled walker, seen podiatry  To have CTR right hand in the next month  Seen Cardiology and pulmonary in June  Seen urology in June  No change in medicines  Neurology started on Topamax for headache and dizziness, not helpful  Living at [facility], doing well, participating in activities  Uses a brace for hernia  Fatigued and off balance often, pressure in head, using APAP as needed for pain and pressure  Uses O2 and CPAP at night  Patient feels like pressure in head is worse      Review of Systems   Constitutional:  Positive for fatigue. Negative for activity change, appetite change and unexpected weight change.   HENT:  Negative for ear pain, nosebleeds, rhinorrhea, sneezing and trouble swallowing.    Respiratory:  Negative for cough, shortness of breath and wheezing.    Cardiovascular:  Negative for chest pain, palpitations and leg swelling.   Gastrointestinal:  Negative for abdominal distention, abdominal pain, constipation, diarrhea, nausea and vomiting.   Genitourinary:  Negative for difficulty urinating.   Musculoskeletal:  Positive for back pain and gait problem. Negative for arthralgias.   Skin:  Negative for rash.   Neurological:  Positive for dizziness, light-headedness and headaches. Negative for numbness.   Hematological:  Negative for adenopathy.   Psychiatric/Behavioral:  Negative for behavioral problems, dysphoric mood and sleep disturbance. The patient is not nervous/anxious.    All other systems reviewed and are negative.    Objective   /70   Pulse 90   Ht 1.905 m (6' 3\")   Wt 121 kg (266 lb)   SpO2 94%   BMI 33.25 kg/m²     Physical Exam  Vitals and nursing note reviewed.   Constitutional:       Appearance: Normal appearance.   HENT:      Head: Normocephalic and atraumatic.      Right Ear: Tympanic membrane, ear canal and external ear " "normal.      Left Ear: Tympanic membrane, ear canal and external ear normal.      Nose: Nose normal.      Mouth/Throat:      Mouth: Mucous membranes are moist.      Pharynx: Oropharynx is clear.   Cardiovascular:      Rate and Rhythm: Normal rate and regular rhythm.      Pulses: Normal pulses.      Heart sounds: Normal heart sounds.   Pulmonary:      Effort: Pulmonary effort is normal.      Breath sounds: Normal breath sounds.   Musculoskeletal:      Cervical back: Normal range of motion and neck supple.   Skin:     General: Skin is warm and dry.   Neurological:      General: No focal deficit present.      Mental Status: He is alert and oriented to person, place, and time. Mental status is at baseline.   Psychiatric:         Mood and Affect: Mood normal.         Behavior: Behavior normal.         Assessment/Plan   Problem List Items Addressed This Visit             ICD-10-CM    Cerebrovascular accident (CVA) (Multi) I63.9     Continue with blood pressure medication and statin.  Patient still complaining of persistent \"wooziness\", pressure in his head and balance issues.  Has seen neurology, started topiramate, patient feels as though it is not really helping, will send communication to neurologist to see if they would like either add something or exchanged the topiramate before his appointment in October to follow-up.  It is hard to tell whether or not this is getting worse or not, the patient complains that it is the worst its ever been though he usually complains about that.         Relevant Orders    Follow Up In Primary Care - Established    Comprehensive Metabolic Panel    Lipid Panel    Diabetes mellitus (Multi) - Primary E11.9     A1c testing below 7, not currently on any prescription medicines for diabetes.         Relevant Orders    Follow Up In Primary Care - Established    Comprehensive Metabolic Panel    Lipid Panel    Hemoglobin A1C    Dizziness R42     Chronic complaint, laboratory testing stable, " patient frustrated had been through physical therapy in the past 6 months, had been to ENT physician multiple neurologist, seen cardiologist as well, had evaluation for patent foramen ovale which they did not think was significant, and history of cerebrovascular injury, has follow-up with neurologist in October.         Fatigue R53.83    Relevant Orders    Follow Up In Primary Care - Established    CBC and Auto Differential    Comprehensive Metabolic Panel    Hyperlipidemia E78.5     Labs stable with simvastatin, continue with current medication due to his history of cerebrovascular disease         Relevant Orders    Follow Up In Primary Care - Established    Comprehensive Metabolic Panel    Lipid Panel    Mild HTN I10     Blood pressure stable renal function stable, no change.         Relevant Orders    Follow Up In Primary Care - Established    Comprehensive Metabolic Panel    Obstructive sleep apnea, adult G47.33     Patient has been using their CPAP nightly and is experiencing restful sleep, no morning headaches, no witnessed snoring, and notices a difference if they do not use the device.           RAVI (generalized anxiety disorder) F41.1     Try discontinuing fluoxetine per patient's daughter's request.         Relevant Orders    Follow Up In Primary Care - Established    Gastroesophageal reflux disease K21.9     Try off of PPI         History of DVT (deep vein thrombosis) Z86.718     Maintained on chronic apixaban, CBC and renal function stable.         Mild intermittent asthma without complication ([facility]) J45.20     Follows with pulmonary medicine, continue with current inhalers.  Patient uses oxygen and CPAP therapy at night.         Relevant Orders    Follow Up In Primary Care - Established

## 2024-08-23 NOTE — ASSESSMENT & PLAN NOTE
"Continue with blood pressure medication and statin.  Patient still complaining of persistent \"wooziness\", pressure in his head and balance issues.  Has seen neurology, started topiramate, patient feels as though it is not really helping, will send communication to neurologist to see if they would like either add something or exchanged the topiramate before his appointment in October to follow-up.  It is hard to tell whether or not this is getting worse or not, the patient complains that it is the worst its ever been though he usually complains about that.  "

## 2024-08-23 NOTE — TELEPHONE ENCOUNTER
"I saw this patient in the office today for routine follow-up, he is seen you since I saw him 3 months ago, continues to complain of pressure in his head, \"wooziness\" and intermittent dizziness.  He usually always claims that this is the worst its ever been, it is hard to .  He does not have an appointment to see you again until October, it appears as though he was started on topiramate which she feels is not really helping.  I did not know if you had any other suggestions about anything that we might do to try to help him before he sees you in October?  He is already been through physical therapy multiple times in the past, seeing different ear nose and throat physicians, multiple other neurologist and recently was being evaluated by cardiologist for issues related to patent foramen ovale.  Any help in his situation would be helpful.  "

## 2024-08-23 NOTE — PATIENT INSTRUCTIONS
Calcium 1200 mg a day and Vitamin D3 [id number] international units a day, stop fluoxetine, omeprazole

## 2024-08-23 NOTE — ASSESSMENT & PLAN NOTE
Labs stable with simvastatin, continue with current medication due to his history of cerebrovascular disease

## 2024-08-23 NOTE — ASSESSMENT & PLAN NOTE
Follows with pulmonary medicine, continue with current inhalers.  Patient uses oxygen and CPAP therapy at night.

## 2024-08-27 ENCOUNTER — TELEPHONE (OUTPATIENT)
Dept: NEUROLOGY | Facility: CLINIC | Age: 84
End: 2024-08-27
Payer: MEDICARE

## 2024-08-27 NOTE — TELEPHONE ENCOUNTER
Pt has not been getting any better in the 3 mths taking the topamax 50 mg. He has been getting a lot of pain and pressure from temple to temple. He feels like his head is going to explode and doesn't feel that he will make to his appt 10/29 with these severe headaches and wants to know if you could get him in sooner? Please advise

## 2024-09-04 ENCOUNTER — TELEPHONE (OUTPATIENT)
Dept: NEUROLOGY | Facility: CLINIC | Age: 84
End: 2024-09-04
Payer: MEDICARE

## 2024-09-04 NOTE — TELEPHONE ENCOUNTER
Let's have him wean off of Topamax.  Recommend the following schedule:  Day 1-5:  25 mg BID  Day 6-10: 25 mg/day  Day 11: stop    Once he is off Topamax, give our office a call.    I will then put him on a different headache preventative medication.

## 2024-09-04 NOTE — TELEPHONE ENCOUNTER
Daughter called again to say that his headaches are getting worse and worse since he was put on the topamax. I did let her know that he is on the cancellation list.

## 2024-09-19 ENCOUNTER — APPOINTMENT (OUTPATIENT)
Dept: NEUROLOGY | Facility: CLINIC | Age: 84
End: 2024-09-19
Payer: MEDICARE

## 2024-09-30 DIAGNOSIS — N41.1 CHRONIC PROSTATITIS: ICD-10-CM

## 2024-09-30 RX ORDER — CEPHALEXIN 500 MG/1
500 CAPSULE ORAL 2 TIMES DAILY
Qty: 60 CAPSULE | Refills: 0 | Status: SHIPPED | OUTPATIENT
Start: 2024-09-30 | End: 2025-09-30

## 2024-10-10 DIAGNOSIS — N41.1 CHRONIC PROSTATITIS: ICD-10-CM

## 2024-10-10 RX ORDER — CEPHALEXIN 500 MG/1
500 CAPSULE ORAL 2 TIMES DAILY
Qty: 60 CAPSULE | Refills: 0 | Status: SHIPPED | OUTPATIENT
Start: 2024-10-10 | End: 2025-10-10

## 2024-10-29 ENCOUNTER — APPOINTMENT (OUTPATIENT)
Dept: NEUROLOGY | Facility: CLINIC | Age: 84
End: 2024-10-29
Payer: MEDICARE

## 2024-11-10 DIAGNOSIS — N41.1 CHRONIC PROSTATITIS: ICD-10-CM

## 2024-11-12 RX ORDER — CEPHALEXIN 500 MG/1
500 CAPSULE ORAL 2 TIMES DAILY
Qty: 60 CAPSULE | Refills: 11 | Status: SHIPPED | OUTPATIENT
Start: 2024-11-12

## 2024-11-12 NOTE — TELEPHONE ENCOUNTER
PATIENT REQUESTED A REFILL FOR CEPHALEXIN, 500MG, BID. HOWEVER, HE DOES NOT KNOW WHY HE IS TAKING IT, OR FOR HOW LONG.

## 2024-11-19 ENCOUNTER — APPOINTMENT (OUTPATIENT)
Age: 84
End: 2024-11-19
Payer: MEDICARE

## 2024-11-19 VITALS
OXYGEN SATURATION: 94 % | BODY MASS INDEX: 34.86 KG/M2 | DIASTOLIC BLOOD PRESSURE: 80 MMHG | HEART RATE: 94 BPM | HEIGHT: 75 IN | WEIGHT: 280.4 LBS | SYSTOLIC BLOOD PRESSURE: 120 MMHG

## 2024-11-19 DIAGNOSIS — R53.82 CHRONIC FATIGUE: ICD-10-CM

## 2024-11-19 DIAGNOSIS — E11.9 TYPE 2 DIABETES MELLITUS WITHOUT COMPLICATION, WITHOUT LONG-TERM CURRENT USE OF INSULIN (MULTI): Primary | ICD-10-CM

## 2024-11-19 DIAGNOSIS — Q21.12 PATENT FORAMEN OVALE (HHS-HCC): ICD-10-CM

## 2024-11-19 DIAGNOSIS — I10 MILD HTN: ICD-10-CM

## 2024-11-19 DIAGNOSIS — E78.2 MIXED HYPERLIPIDEMIA: ICD-10-CM

## 2024-11-19 DIAGNOSIS — F41.1 GAD (GENERALIZED ANXIETY DISORDER): ICD-10-CM

## 2024-11-19 DIAGNOSIS — I63.40 CEREBROVASCULAR ACCIDENT (CVA) DUE TO EMBOLISM OF CEREBRAL ARTERY (MULTI): ICD-10-CM

## 2024-11-19 DIAGNOSIS — Z86.718 HISTORY OF DVT (DEEP VEIN THROMBOSIS): ICD-10-CM

## 2024-11-19 DIAGNOSIS — I73.9 PAD (PERIPHERAL ARTERY DISEASE) (CMS-HCC): ICD-10-CM

## 2024-11-19 DIAGNOSIS — J45.20 MILD INTERMITTENT ASTHMA WITHOUT COMPLICATION (HHS-HCC): ICD-10-CM

## 2024-11-19 PROCEDURE — 1036F TOBACCO NON-USER: CPT | Performed by: FAMILY MEDICINE

## 2024-11-19 PROCEDURE — G2211 COMPLEX E/M VISIT ADD ON: HCPCS | Performed by: FAMILY MEDICINE

## 2024-11-19 PROCEDURE — 1159F MED LIST DOCD IN RCRD: CPT | Performed by: FAMILY MEDICINE

## 2024-11-19 PROCEDURE — 1157F ADVNC CARE PLAN IN RCRD: CPT | Performed by: FAMILY MEDICINE

## 2024-11-19 PROCEDURE — 3074F SYST BP LT 130 MM HG: CPT | Performed by: FAMILY MEDICINE

## 2024-11-19 PROCEDURE — 3079F DIAST BP 80-89 MM HG: CPT | Performed by: FAMILY MEDICINE

## 2024-11-19 PROCEDURE — 1160F RVW MEDS BY RX/DR IN RCRD: CPT | Performed by: FAMILY MEDICINE

## 2024-11-19 PROCEDURE — 99214 OFFICE O/P EST MOD 30 MIN: CPT | Performed by: FAMILY MEDICINE

## 2024-11-19 RX ORDER — DIVALPROEX SODIUM 125 MG/1
125 TABLET, DELAYED RELEASE ORAL 2 TIMES DAILY
Qty: 60 TABLET | Refills: 11 | Status: SHIPPED | OUTPATIENT
Start: 2024-11-19 | End: 2025-11-19

## 2024-11-19 RX ORDER — DIVALPROEX SODIUM 125 MG/1
125 TABLET, DELAYED RELEASE ORAL EVERY MORNING
COMMUNITY
Start: 2024-11-08 | End: 2024-11-19 | Stop reason: DRUGHIGH

## 2024-11-19 ASSESSMENT — ENCOUNTER SYMPTOMS
COUGH: 0
ABDOMINAL DISTENTION: 0
VOMITING: 0
DYSPHORIC MOOD: 0
APPETITE CHANGE: 0
ARTHRALGIAS: 0
NERVOUS/ANXIOUS: 0
DIZZINESS: 0
ADENOPATHY: 0
DIFFICULTY URINATING: 0
FATIGUE: 1
HEADACHES: 1
ACTIVITY CHANGE: 0
LIGHT-HEADEDNESS: 1
RHINORRHEA: 0
CONSTIPATION: 0
BACK PAIN: 1
SLEEP DISTURBANCE: 0
ABDOMINAL PAIN: 0
WHEEZING: 0
NUMBNESS: 0
PALPITATIONS: 0
SHORTNESS OF BREATH: 0
WEAKNESS: 1
UNEXPECTED WEIGHT CHANGE: 0
TROUBLE SWALLOWING: 0
DIARRHEA: 0
NAUSEA: 0

## 2024-11-19 NOTE — ASSESSMENT & PLAN NOTE
Follows with pulmonary medicine, not on maintenance inhaler anymore, not needing to use albuterol, uses oxygen when asleep.

## 2024-11-19 NOTE — PROGRESS NOTES
"Subjective   Patient ID: Marco A Diallo is a 84 y.o. male who presents for 3 MO LABS.    HPI   Seeing neurology for headaches and \"wooziness\", changing medicines, had MRI done (stable), had tried gabapentin, no help, now trying depakote, ordered PT  Sees Cardiology at [facility] for PFO, has follow-up next week, considering PFO closure  Sees urology in [place] for BPH  Follows with pulmonary medicine in [place]  Resident at [facility]  No low blood sugars since last OV, seen opthalmology in the past year, and no numbness or tingling in feet, skin normal.  No headache, chest pain, shortness of breath, dizziness, lightheadedness, or edema  Taking and tolerating Eliquis  Gained 30 pounds in 6 months  Using Excedrin daily  Using a walker, no falls, issues with balance, attending exercise classes at [facility] SOB with exertion  No urine or bowel issues  Using O2 at night    Review of Systems   Constitutional:  Positive for fatigue. Negative for activity change, appetite change and unexpected weight change.   HENT:  Negative for ear pain, nosebleeds, rhinorrhea, sneezing and trouble swallowing.    Respiratory:  Negative for cough, shortness of breath and wheezing.    Cardiovascular:  Negative for chest pain, palpitations and leg swelling.   Gastrointestinal:  Negative for abdominal distention, abdominal pain, constipation, diarrhea, nausea and vomiting.   Genitourinary:  Negative for difficulty urinating.   Musculoskeletal:  Positive for back pain and gait problem. Negative for arthralgias.   Skin:  Negative for rash.   Neurological:  Positive for weakness, light-headedness and headaches. Negative for dizziness and numbness.   Hematological:  Negative for adenopathy.   Psychiatric/Behavioral:  Negative for behavioral problems, dysphoric mood and sleep disturbance. The patient is not nervous/anxious.    All other systems reviewed and are negative.      Objective   /80   Pulse 94   Ht 1.905 m (6' 3\")  "  Wt 127 kg (280 lb 6.4 oz)   SpO2 94%   BMI 35.05 kg/m²     Physical Exam  Vitals and nursing note reviewed.   Constitutional:       General: He is not in acute distress.     Appearance: Normal appearance. He is not toxic-appearing.   HENT:      Head: Normocephalic and atraumatic.      Right Ear: Tympanic membrane, ear canal and external ear normal.      Left Ear: Tympanic membrane, ear canal and external ear normal.      Nose: Nose normal.      Mouth/Throat:      Mouth: Mucous membranes are moist.      Pharynx: Oropharynx is clear.   Eyes:      Extraocular Movements: Extraocular movements intact.      Conjunctiva/sclera: Conjunctivae normal.      Pupils: Pupils are equal, round, and reactive to light.   Cardiovascular:      Rate and Rhythm: Normal rate and regular rhythm.      Pulses: Normal pulses.      Heart sounds: Normal heart sounds.   Pulmonary:      Effort: Pulmonary effort is normal.      Breath sounds: Normal breath sounds.   Musculoskeletal:      Cervical back: Normal range of motion and neck supple.   Skin:     General: Skin is warm and dry.      Capillary Refill: Capillary refill takes less than 2 seconds.   Neurological:      General: No focal deficit present.      Mental Status: He is alert and oriented to person, place, and time. Mental status is at baseline.   Psychiatric:         Mood and Affect: Mood normal.         Behavior: Behavior normal.         Assessment/Plan   Problem List Items Addressed This Visit             ICD-10-CM    Cerebrovascular accident (CVA) (Multi) I63.9     Persistent issues with dizziness and headaches, following with neurology.         Relevant Orders    Follow Up In Primary Care - Established    Diabetes mellitus (Multi) - Primary E11.9     A1c testing below 7, tolerating medication, seems to be under good control for sugar.         Relevant Orders    Follow Up In Primary Care - Established    Cholesterol, LDL Direct    Comprehensive Metabolic Panel    Hemoglobin A1C     Fatigue R53.83    Relevant Orders    Follow Up In Primary Care - Established    Hyperlipidemia E78.5     Labs stable with simvastatin, continue with current medication due to his history of cerebrovascular disease         Relevant Orders    Follow Up In Primary Care - Established    Cholesterol, LDL Direct    Comprehensive Metabolic Panel    Mild HTN I10     Blood pressure stable renal function stable, no change.         Relevant Orders    Follow Up In Primary Care - Established    Comprehensive Metabolic Panel    RAVI (generalized anxiety disorder) F41.1     Stable, not on medication currently, seems to be adjusting well to assisted living.         Relevant Orders    Follow Up In Primary Care - Established    History of DVT (deep vein thrombosis) Z86.718     Tolerating apixaban.  CBC stable.         Relevant Orders    Follow Up In Primary Care - Established    CBC and Auto Differential    Comprehensive Metabolic Panel    Patent foramen ovale ([facility]) Q21.12     Follows with cardiology at [facility], anticipating possible repair.         PAD (peripheral artery disease) (CMS-HCC) I73.9     Asymptomatic.         Mild intermittent asthma without complication ([facility]) J45.20     Follows with pulmonary medicine, not on maintenance inhaler anymore, not needing to use albuterol, uses oxygen when asleep.         Relevant Orders    Follow Up In Primary Care - Established

## 2024-11-19 NOTE — ASSESSMENT & PLAN NOTE
Follows with cardiology at [facility], anticipating possible repair.

## 2024-11-25 ENCOUNTER — APPOINTMENT (OUTPATIENT)
Dept: RADIOLOGY | Facility: HOSPITAL | Age: 84
DRG: 690 | End: 2024-11-25
Payer: MEDICARE

## 2024-11-25 ENCOUNTER — HOSPITAL ENCOUNTER (INPATIENT)
Facility: HOSPITAL | Age: 84
LOS: 6 days | Discharge: HOME | End: 2024-12-01
Attending: INTERNAL MEDICINE | Admitting: INTERNAL MEDICINE
Payer: MEDICARE

## 2024-11-25 DIAGNOSIS — N39.0 URINARY TRACT INFECTION WITHOUT HEMATURIA, SITE UNSPECIFIED: Primary | ICD-10-CM

## 2024-11-25 DIAGNOSIS — R55 NEAR SYNCOPE: ICD-10-CM

## 2024-11-25 LAB
ALBUMIN SERPL BCP-MCNC: 3.8 G/DL (ref 3.4–5)
ALP SERPL-CCNC: 86 U/L (ref 33–136)
ALT SERPL W P-5'-P-CCNC: 23 U/L (ref 10–52)
ANION GAP SERPL CALC-SCNC: 8 MMOL/L (ref 10–20)
APPEARANCE UR: CLEAR
AST SERPL W P-5'-P-CCNC: 24 U/L (ref 9–39)
BACTERIA #/AREA URNS AUTO: ABNORMAL /HPF
BILIRUB SERPL-MCNC: 1 MG/DL (ref 0–1.2)
BILIRUB UR STRIP.AUTO-MCNC: NEGATIVE MG/DL
BUN SERPL-MCNC: 22 MG/DL (ref 6–23)
CALCIUM SERPL-MCNC: 9.5 MG/DL (ref 8.6–10.3)
CARDIAC TROPONIN I PNL SERPL HS: 17 NG/L (ref 0–20)
CARDIAC TROPONIN I PNL SERPL HS: 19 NG/L (ref 0–20)
CHLORIDE SERPL-SCNC: 100 MMOL/L (ref 98–107)
CO2 SERPL-SCNC: 29 MMOL/L (ref 21–32)
COLOR UR: ABNORMAL
CREAT SERPL-MCNC: 1.09 MG/DL (ref 0.5–1.3)
EGFRCR SERPLBLD CKD-EPI 2021: 67 ML/MIN/1.73M*2
ERYTHROCYTE [DISTWIDTH] IN BLOOD BY AUTOMATED COUNT: 11.9 % (ref 11.5–14.5)
FLUAV RNA RESP QL NAA+PROBE: NOT DETECTED
FLUBV RNA RESP QL NAA+PROBE: NOT DETECTED
GLUCOSE BLD MANUAL STRIP-MCNC: 195 MG/DL (ref 74–99)
GLUCOSE SERPL-MCNC: 185 MG/DL (ref 74–99)
GLUCOSE UR STRIP.AUTO-MCNC: NORMAL MG/DL
HCT VFR BLD AUTO: 42.6 % (ref 41–52)
HGB BLD-MCNC: 14.4 G/DL (ref 13.5–17.5)
KETONES UR STRIP.AUTO-MCNC: NEGATIVE MG/DL
LEUKOCYTE ESTERASE UR QL STRIP.AUTO: ABNORMAL
MCH RBC QN AUTO: 31.6 PG (ref 26–34)
MCHC RBC AUTO-ENTMCNC: 33.8 G/DL (ref 32–36)
MCV RBC AUTO: 93 FL (ref 80–100)
NITRITE UR QL STRIP.AUTO: NEGATIVE
NRBC BLD-RTO: 0 /100 WBCS (ref 0–0)
PH UR STRIP.AUTO: 7 [PH]
PLATELET # BLD AUTO: 212 X10*3/UL (ref 150–450)
POTASSIUM SERPL-SCNC: 4.3 MMOL/L (ref 3.5–5.3)
PROT SERPL-MCNC: 6.3 G/DL (ref 6.4–8.2)
PROT UR STRIP.AUTO-MCNC: NEGATIVE MG/DL
RBC # BLD AUTO: 4.56 X10*6/UL (ref 4.5–5.9)
RBC # UR STRIP.AUTO: ABNORMAL /UL
RBC #/AREA URNS AUTO: ABNORMAL /HPF
SARS-COV-2 RNA RESP QL NAA+PROBE: NOT DETECTED
SODIUM SERPL-SCNC: 133 MMOL/L (ref 136–145)
SP GR UR STRIP.AUTO: 1.01
UROBILINOGEN UR STRIP.AUTO-MCNC: NORMAL MG/DL
WBC # BLD AUTO: 6.3 X10*3/UL (ref 4.4–11.3)
WBC #/AREA URNS AUTO: ABNORMAL /HPF
WBC CLUMPS #/AREA URNS AUTO: ABNORMAL /HPF

## 2024-11-25 PROCEDURE — 81001 URINALYSIS AUTO W/SCOPE: CPT | Performed by: PHYSICIAN ASSISTANT

## 2024-11-25 PROCEDURE — 1100000001 HC PRIVATE ROOM DAILY

## 2024-11-25 PROCEDURE — 71045 X-RAY EXAM CHEST 1 VIEW: CPT

## 2024-11-25 PROCEDURE — 84484 ASSAY OF TROPONIN QUANT: CPT | Performed by: PHYSICIAN ASSISTANT

## 2024-11-25 PROCEDURE — 99285 EMERGENCY DEPT VISIT HI MDM: CPT | Mod: 25

## 2024-11-25 PROCEDURE — 94664 DEMO&/EVAL PT USE INHALER: CPT

## 2024-11-25 PROCEDURE — 87086 URINE CULTURE/COLONY COUNT: CPT | Mod: SAMLAB | Performed by: PHYSICIAN ASSISTANT

## 2024-11-25 PROCEDURE — 36415 COLL VENOUS BLD VENIPUNCTURE: CPT | Performed by: PHYSICIAN ASSISTANT

## 2024-11-25 PROCEDURE — 9420000001 HC RT PATIENT EDUCATION 5 MIN

## 2024-11-25 PROCEDURE — 85027 COMPLETE CBC AUTOMATED: CPT | Performed by: PHYSICIAN ASSISTANT

## 2024-11-25 PROCEDURE — 82947 ASSAY GLUCOSE BLOOD QUANT: CPT

## 2024-11-25 PROCEDURE — 71045 X-RAY EXAM CHEST 1 VIEW: CPT | Performed by: RADIOLOGY

## 2024-11-25 PROCEDURE — 99222 1ST HOSP IP/OBS MODERATE 55: CPT | Performed by: INTERNAL MEDICINE

## 2024-11-25 PROCEDURE — 94660 CPAP INITIATION&MGMT: CPT

## 2024-11-25 PROCEDURE — 80053 COMPREHEN METABOLIC PANEL: CPT | Performed by: PHYSICIAN ASSISTANT

## 2024-11-25 PROCEDURE — 70450 CT HEAD/BRAIN W/O DYE: CPT

## 2024-11-25 PROCEDURE — 87636 SARSCOV2 & INF A&B AMP PRB: CPT | Performed by: PHYSICIAN ASSISTANT

## 2024-11-25 PROCEDURE — 84443 ASSAY THYROID STIM HORMONE: CPT | Performed by: INTERNAL MEDICINE

## 2024-11-25 PROCEDURE — 70450 CT HEAD/BRAIN W/O DYE: CPT | Performed by: RADIOLOGY

## 2024-11-25 RX ORDER — CEFTRIAXONE 2 G/50ML
2 INJECTION, SOLUTION INTRAVENOUS ONCE
Status: DISCONTINUED | OUTPATIENT
Start: 2024-11-25 | End: 2024-11-26

## 2024-11-25 SDOH — ECONOMIC STABILITY: INCOME INSECURITY: IN THE PAST 12 MONTHS HAS THE ELECTRIC, GAS, OIL, OR WATER COMPANY THREATENED TO SHUT OFF SERVICES IN YOUR HOME?: NO

## 2024-11-25 SDOH — SOCIAL STABILITY: SOCIAL INSECURITY: ARE THERE ANY APPARENT SIGNS OF INJURIES/BEHAVIORS THAT COULD BE RELATED TO ABUSE/NEGLECT?: NO

## 2024-11-25 SDOH — SOCIAL STABILITY: SOCIAL INSECURITY: WITHIN THE LAST YEAR, HAVE YOU BEEN AFRAID OF YOUR PARTNER OR EX-PARTNER?: NO

## 2024-11-25 SDOH — ECONOMIC STABILITY: FOOD INSECURITY: WITHIN THE PAST 12 MONTHS, THE FOOD YOU BOUGHT JUST DIDN'T LAST AND YOU DIDN'T HAVE MONEY TO GET MORE.: NEVER TRUE

## 2024-11-25 SDOH — SOCIAL STABILITY: SOCIAL INSECURITY: DO YOU FEEL UNSAFE GOING BACK TO THE PLACE WHERE YOU ARE LIVING?: NO

## 2024-11-25 SDOH — SOCIAL STABILITY: SOCIAL INSECURITY
WITHIN THE LAST YEAR, HAVE YOU BEEN KICKED, HIT, SLAPPED, OR OTHERWISE PHYSICALLY HURT BY YOUR PARTNER OR EX-PARTNER?: NO

## 2024-11-25 SDOH — SOCIAL STABILITY: SOCIAL INSECURITY: DOES ANYONE TRY TO KEEP YOU FROM HAVING/CONTACTING OTHER FRIENDS OR DOING THINGS OUTSIDE YOUR HOME?: NO

## 2024-11-25 SDOH — SOCIAL STABILITY: SOCIAL INSECURITY
WITHIN THE LAST YEAR, HAVE YOU BEEN RAPED OR FORCED TO HAVE ANY KIND OF SEXUAL ACTIVITY BY YOUR PARTNER OR EX-PARTNER?: NO

## 2024-11-25 SDOH — SOCIAL STABILITY: SOCIAL INSECURITY: HAVE YOU HAD THOUGHTS OF HARMING ANYONE ELSE?: NO

## 2024-11-25 SDOH — SOCIAL STABILITY: SOCIAL INSECURITY: DO YOU FEEL ANYONE HAS EXPLOITED OR TAKEN ADVANTAGE OF YOU FINANCIALLY OR OF YOUR PERSONAL PROPERTY?: NO

## 2024-11-25 SDOH — SOCIAL STABILITY: SOCIAL INSECURITY: WITHIN THE LAST YEAR, HAVE YOU BEEN HUMILIATED OR EMOTIONALLY ABUSED IN OTHER WAYS BY YOUR PARTNER OR EX-PARTNER?: NO

## 2024-11-25 SDOH — SOCIAL STABILITY: SOCIAL INSECURITY: HAVE YOU HAD ANY THOUGHTS OF HARMING ANYONE ELSE?: NO

## 2024-11-25 SDOH — SOCIAL STABILITY: SOCIAL INSECURITY: ABUSE: ADULT

## 2024-11-25 SDOH — ECONOMIC STABILITY: FOOD INSECURITY: WITHIN THE PAST 12 MONTHS, YOU WORRIED THAT YOUR FOOD WOULD RUN OUT BEFORE YOU GOT THE MONEY TO BUY MORE.: NEVER TRUE

## 2024-11-25 SDOH — SOCIAL STABILITY: SOCIAL INSECURITY: HAS ANYONE EVER THREATENED TO HURT YOUR FAMILY OR YOUR PETS?: NO

## 2024-11-25 SDOH — SOCIAL STABILITY: SOCIAL INSECURITY: ARE YOU OR HAVE YOU BEEN THREATENED OR ABUSED PHYSICALLY, EMOTIONALLY, OR SEXUALLY BY ANYONE?: NO

## 2024-11-25 SDOH — SOCIAL STABILITY: SOCIAL INSECURITY: WERE YOU ABLE TO COMPLETE ALL THE BEHAVIORAL HEALTH SCREENINGS?: YES

## 2024-11-25 ASSESSMENT — COGNITIVE AND FUNCTIONAL STATUS - GENERAL
MOBILITY SCORE: 20
DAILY ACTIVITIY SCORE: 23
DRESSING REGULAR LOWER BODY CLOTHING: A LITTLE
PATIENT BASELINE BEDBOUND: NO
CLIMB 3 TO 5 STEPS WITH RAILING: TOTAL
WALKING IN HOSPITAL ROOM: A LITTLE

## 2024-11-25 ASSESSMENT — ACTIVITIES OF DAILY LIVING (ADL)
PATIENT'S MEMORY ADEQUATE TO SAFELY COMPLETE DAILY ACTIVITIES?: YES
ASSISTIVE_DEVICE: OTHER (COMMENT)
BATHING: INDEPENDENT
DRESSING YOURSELF: INDEPENDENT
LACK_OF_TRANSPORTATION: NO
HEARING - RIGHT EAR: HEARING AID
GROOMING: INDEPENDENT
FEEDING YOURSELF: INDEPENDENT
HEARING - LEFT EAR: HEARING AID
ADEQUATE_TO_COMPLETE_ADL: YES
TOILETING: INDEPENDENT
WALKS IN HOME: INDEPENDENT
JUDGMENT_ADEQUATE_SAFELY_COMPLETE_DAILY_ACTIVITIES: YES

## 2024-11-25 ASSESSMENT — LIFESTYLE VARIABLES
HOW OFTEN DO YOU HAVE 6 OR MORE DRINKS ON ONE OCCASION: NEVER
HOW MANY STANDARD DRINKS CONTAINING ALCOHOL DO YOU HAVE ON A TYPICAL DAY: PATIENT DOES NOT DRINK
HOW OFTEN DO YOU HAVE A DRINK CONTAINING ALCOHOL: NEVER
SKIP TO QUESTIONS 9-10: 1
AUDIT-C TOTAL SCORE: 0
AUDIT-C TOTAL SCORE: 0

## 2024-11-25 ASSESSMENT — PATIENT HEALTH QUESTIONNAIRE - PHQ9
1. LITTLE INTEREST OR PLEASURE IN DOING THINGS: NOT AT ALL
SUM OF ALL RESPONSES TO PHQ9 QUESTIONS 1 & 2: 0
2. FEELING DOWN, DEPRESSED OR HOPELESS: NOT AT ALL

## 2024-11-25 ASSESSMENT — PAIN SCALES - GENERAL
PAINLEVEL_OUTOF10: 0 - NO PAIN
PAINLEVEL_OUTOF10: 0 - NO PAIN

## 2024-11-25 ASSESSMENT — PAIN - FUNCTIONAL ASSESSMENT
PAIN_FUNCTIONAL_ASSESSMENT: 0-10
PAIN_FUNCTIONAL_ASSESSMENT: 0-10

## 2024-11-26 LAB
GLUCOSE BLD MANUAL STRIP-MCNC: 112 MG/DL (ref 74–99)
HOLD SPECIMEN: NORMAL
TSH SERPL-ACNC: 1.34 MIU/L (ref 0.44–3.98)

## 2024-11-26 PROCEDURE — 97165 OT EVAL LOW COMPLEX 30 MIN: CPT | Mod: GO

## 2024-11-26 PROCEDURE — 1100000001 HC PRIVATE ROOM DAILY

## 2024-11-26 PROCEDURE — 2500000004 HC RX 250 GENERAL PHARMACY W/ HCPCS (ALT 636 FOR OP/ED): Performed by: HOSPITALIST

## 2024-11-26 PROCEDURE — 94761 N-INVAS EAR/PLS OXIMETRY MLT: CPT

## 2024-11-26 PROCEDURE — 97161 PT EVAL LOW COMPLEX 20 MIN: CPT | Mod: GP | Performed by: PHYSICAL THERAPIST

## 2024-11-26 PROCEDURE — 99233 SBSQ HOSP IP/OBS HIGH 50: CPT | Performed by: HOSPITALIST

## 2024-11-26 PROCEDURE — 2500000004 HC RX 250 GENERAL PHARMACY W/ HCPCS (ALT 636 FOR OP/ED): Performed by: INTERNAL MEDICINE

## 2024-11-26 PROCEDURE — 94660 CPAP INITIATION&MGMT: CPT

## 2024-11-26 PROCEDURE — 2500000001 HC RX 250 WO HCPCS SELF ADMINISTERED DRUGS (ALT 637 FOR MEDICARE OP): Performed by: INTERNAL MEDICINE

## 2024-11-26 PROCEDURE — 82947 ASSAY GLUCOSE BLOOD QUANT: CPT

## 2024-11-26 PROCEDURE — 2500000002 HC RX 250 W HCPCS SELF ADMINISTERED DRUGS (ALT 637 FOR MEDICARE OP, ALT 636 FOR OP/ED): Performed by: INTERNAL MEDICINE

## 2024-11-26 PROCEDURE — 2500000005 HC RX 250 GENERAL PHARMACY W/O HCPCS: Performed by: INTERNAL MEDICINE

## 2024-11-26 RX ORDER — ONDANSETRON HYDROCHLORIDE 2 MG/ML
4 INJECTION, SOLUTION INTRAVENOUS EVERY 8 HOURS PRN
Status: DISCONTINUED | OUTPATIENT
Start: 2024-11-26 | End: 2024-12-01 | Stop reason: HOSPADM

## 2024-11-26 RX ORDER — KETOROLAC TROMETHAMINE 30 MG/ML
15 INJECTION, SOLUTION INTRAMUSCULAR; INTRAVENOUS ONCE
Status: COMPLETED | OUTPATIENT
Start: 2024-11-26 | End: 2024-11-26

## 2024-11-26 RX ORDER — DIVALPROEX SODIUM 125 MG/1
125 TABLET, DELAYED RELEASE ORAL 2 TIMES DAILY
Status: DISCONTINUED | OUTPATIENT
Start: 2024-11-26 | End: 2024-11-27

## 2024-11-26 RX ORDER — ONDANSETRON 4 MG/1
4 TABLET, ORALLY DISINTEGRATING ORAL EVERY 8 HOURS PRN
Status: DISCONTINUED | OUTPATIENT
Start: 2024-11-26 | End: 2024-12-01 | Stop reason: HOSPADM

## 2024-11-26 RX ORDER — ACETAMINOPHEN 650 MG/1
650 SUPPOSITORY RECTAL EVERY 4 HOURS PRN
Status: DISCONTINUED | OUTPATIENT
Start: 2024-11-26 | End: 2024-12-01 | Stop reason: HOSPADM

## 2024-11-26 RX ORDER — RIBOFLAVIN (VITAMIN B2) 100 MG
200 TABLET ORAL DAILY
COMMUNITY
Start: 2024-11-09 | End: 2024-12-01 | Stop reason: HOSPADM

## 2024-11-26 RX ORDER — LOSARTAN POTASSIUM 50 MG/1
50 TABLET ORAL DAILY
Status: DISCONTINUED | OUTPATIENT
Start: 2024-11-26 | End: 2024-12-01 | Stop reason: HOSPADM

## 2024-11-26 RX ORDER — ENOXAPARIN SODIUM 100 MG/ML
40 INJECTION SUBCUTANEOUS EVERY 24 HOURS
Status: DISCONTINUED | OUTPATIENT
Start: 2024-11-26 | End: 2024-11-26

## 2024-11-26 RX ORDER — ACETAMINOPHEN, DIPHENHYDRAMINE HCL, PHENYLEPHRINE HCL 325; 25; 5 MG/1; MG/1; MG/1
TABLET ORAL NIGHTLY
COMMUNITY

## 2024-11-26 RX ORDER — FINASTERIDE 5 MG/1
5 TABLET, FILM COATED ORAL DAILY
Status: DISCONTINUED | OUTPATIENT
Start: 2024-11-26 | End: 2024-12-01 | Stop reason: HOSPADM

## 2024-11-26 RX ORDER — MONTELUKAST SODIUM 10 MG/1
10 TABLET ORAL NIGHTLY
Status: DISCONTINUED | OUTPATIENT
Start: 2024-11-26 | End: 2024-12-01 | Stop reason: HOSPADM

## 2024-11-26 RX ORDER — ACETAMINOPHEN 325 MG/1
650 TABLET ORAL EVERY 4 HOURS PRN
Status: DISCONTINUED | OUTPATIENT
Start: 2024-11-26 | End: 2024-12-01 | Stop reason: HOSPADM

## 2024-11-26 RX ORDER — SIMVASTATIN 20 MG/1
40 TABLET, FILM COATED ORAL DAILY
Status: DISCONTINUED | OUTPATIENT
Start: 2024-11-26 | End: 2024-12-01 | Stop reason: HOSPADM

## 2024-11-26 RX ORDER — ACETAMINOPHEN 160 MG/5ML
650 SOLUTION ORAL EVERY 4 HOURS PRN
Status: DISCONTINUED | OUTPATIENT
Start: 2024-11-26 | End: 2024-12-01 | Stop reason: HOSPADM

## 2024-11-26 RX ORDER — ADHESIVE BANDAGE
30 BANDAGE TOPICAL DAILY PRN
Status: DISCONTINUED | OUTPATIENT
Start: 2024-11-26 | End: 2024-12-01 | Stop reason: HOSPADM

## 2024-11-26 RX ORDER — CIPROFLOXACIN 2 MG/ML
400 INJECTION, SOLUTION INTRAVENOUS EVERY 12 HOURS
Status: DISCONTINUED | OUTPATIENT
Start: 2024-11-26 | End: 2024-11-27

## 2024-11-26 ASSESSMENT — PAIN - FUNCTIONAL ASSESSMENT
PAIN_FUNCTIONAL_ASSESSMENT: 0-10

## 2024-11-26 ASSESSMENT — COGNITIVE AND FUNCTIONAL STATUS - GENERAL
MOVING TO AND FROM BED TO CHAIR: A LITTLE
WALKING IN HOSPITAL ROOM: A LITTLE
HELP NEEDED FOR BATHING: A LITTLE
DRESSING REGULAR LOWER BODY CLOTHING: A LITTLE
CLIMB 3 TO 5 STEPS WITH RAILING: A LITTLE
DAILY ACTIVITIY SCORE: 23
CLIMB 3 TO 5 STEPS WITH RAILING: A LITTLE
MOBILITY SCORE: 21
DAILY ACTIVITIY SCORE: 22
MOBILITY SCORE: 22
DRESSING REGULAR LOWER BODY CLOTHING: A LITTLE
WALKING IN HOSPITAL ROOM: A LITTLE

## 2024-11-26 ASSESSMENT — PAIN SCALES - GENERAL
PAINLEVEL_OUTOF10: 10 - WORST POSSIBLE PAIN
PAINLEVEL_OUTOF10: 0 - NO PAIN
PAINLEVEL_OUTOF10: 8

## 2024-11-26 ASSESSMENT — ACTIVITIES OF DAILY LIVING (ADL)
LACK_OF_TRANSPORTATION: NO
ADL_ASSISTANCE: INDEPENDENT
BATHING_ASSISTANCE: STAND BY
ADL_ASSISTANCE: INDEPENDENT

## 2024-11-26 ASSESSMENT — PAIN DESCRIPTION - LOCATION: LOCATION: HEAD

## 2024-11-26 NOTE — PROGRESS NOTES
"Arvin Diallo \"Marco A\" is a 84 y.o. male on day 1 of admission presenting with Urinary tract infection without hematuria, site unspecified.    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   Heart: regular rate and rhythm. telemetry shows sinus rhythm  Abdomen: Nondistended, positive bowel sounds x4, soft,  nontender  Extremities: no edema   Peripheral pulses: normal x4 extremities  Neuro: alert, coherent and conversant, no focal motor deficits  Last Recorded Vitals  Blood pressure 138/76, pulse 71, temperature 36.4 °C (97.5 °F), temperature source Temporal, resp. rate 18, height 1.905 m (6' 3\"), weight 123 kg (271 lb 13.2 oz), SpO2 92%.  Intake/Output last 3 Shifts:  I/O last 3 completed shifts:  In: - (0 mL/kg)   Out: 500 (4.1 mL/kg) [Urine:500 (0.1 mL/kg/hr)]  Weight: 123.3 kg     Relevant Results  Scheduled medications  apixaban, 5 mg, oral, BID  ciprofloxacin, 400 mg, intravenous, q12h  divalproex, 125 mg, oral, BID  finasteride, 5 mg, oral, Daily  losartan, 50 mg, oral, Daily  montelukast, 10 mg, oral, Nightly  oxygen, , inhalation, Continuous - Inhalation  simvastatin, 40 mg, oral, Daily      Continuous medications     PRN medications  PRN medications: acetaminophen **OR** acetaminophen **OR** acetaminophen, magnesium hydroxide, ondansetron ODT **OR** " ondansetron    Results for orders placed or performed during the hospital encounter of 11/25/24 (from the past 24 hours)   ECG 12 lead   Result Value Ref Range    Ventricular Rate 77 BPM    Atrial Rate 77 BPM    WV Interval 268 ms    QRS Duration 160 ms    QT Interval 426 ms    QTC Calculation(Bazett) 482 ms    P Axis 44 degrees    R Axis -76 degrees    T Axis 69 degrees    QRS Count 12 beats    Q Onset 209 ms    P Onset 75 ms    P Offset 141 ms    T Offset 422 ms    QTC Fredericia 462 ms   POCT GLUCOSE   Result Value Ref Range    POCT Glucose 195 (H) 74 - 99 mg/dL   Influenza A, and B PCR   Result Value Ref Range    Flu A Result Not Detected Not Detected    Flu B Result Not Detected Not Detected   Sars-CoV-2 PCR   Result Value Ref Range    Coronavirus 2019, PCR Not Detected Not Detected   Comprehensive metabolic panel   Result Value Ref Range    Glucose 185 (H) 74 - 99 mg/dL    Sodium 133 (L) 136 - 145 mmol/L    Potassium 4.3 3.5 - 5.3 mmol/L    Chloride 100 98 - 107 mmol/L    Bicarbonate 29 21 - 32 mmol/L    Anion Gap 8 (L) 10 - 20 mmol/L    Urea Nitrogen 22 6 - 23 mg/dL    Creatinine 1.09 0.50 - 1.30 mg/dL    eGFR 67 >60 mL/min/1.73m*2    Calcium 9.5 8.6 - 10.3 mg/dL    Albumin 3.8 3.4 - 5.0 g/dL    Alkaline Phosphatase 86 33 - 136 U/L    Total Protein 6.3 (L) 6.4 - 8.2 g/dL    AST 24 9 - 39 U/L    Bilirubin, Total 1.0 0.0 - 1.2 mg/dL    ALT 23 10 - 52 U/L   CBC   Result Value Ref Range    WBC 6.3 4.4 - 11.3 x10*3/uL    nRBC 0.0 0.0 - 0.0 /100 WBCs    RBC 4.56 4.50 - 5.90 x10*6/uL    Hemoglobin 14.4 13.5 - 17.5 g/dL    Hematocrit 42.6 41.0 - 52.0 %    MCV 93 80 - 100 fL    MCH 31.6 26.0 - 34.0 pg    MCHC 33.8 32.0 - 36.0 g/dL    RDW 11.9 11.5 - 14.5 %    Platelets 212 150 - 450 x10*3/uL   Troponin I, High Sensitivity, Initial   Result Value Ref Range    Troponin I, High Sensitivity 19 0 - 20 ng/L   Urinalysis with Reflex Culture and Microscopic   Result Value Ref Range    Color, Urine Light-Yellow Light-Yellow,  Yellow, Dark-Yellow    Appearance, Urine Clear Clear    Specific Gravity, Urine 1.007 1.005 - 1.035    pH, Urine 7.0 5.0, 5.5, 6.0, 6.5, 7.0, 7.5, 8.0    Protein, Urine NEGATIVE NEGATIVE, 10 (TRACE), 20 (TRACE) mg/dL    Glucose, Urine Normal Normal mg/dL    Blood, Urine 0.5 (2+) (A) NEGATIVE    Ketones, Urine NEGATIVE NEGATIVE mg/dL    Bilirubin, Urine NEGATIVE NEGATIVE    Urobilinogen, Urine Normal Normal mg/dL    Nitrite, Urine NEGATIVE NEGATIVE    Leukocyte Esterase, Urine 250 Simone/µL (A) NEGATIVE   Extra Urine Gray Tube   Result Value Ref Range    Extra Tube Hold for add-ons.    Microscopic Only, Urine   Result Value Ref Range    WBC, Urine 11-20 (A) 1-5, NONE /HPF    WBC Clumps, Urine RARE Reference range not established. /HPF    RBC, Urine 11-20 (A) NONE, 1-2, 3-5 /HPF    Bacteria, Urine 1+ (A) NONE SEEN /HPF   Troponin, High Sensitivity, 1 Hour   Result Value Ref Range    Troponin I, High Sensitivity 17 0 - 20 ng/L   TSH with reflex to Free T4 if abnormal   Result Value Ref Range    Thyroid Stimulating Hormone 1.34 0.44 - 3.98 mIU/L   POCT GLUCOSE   Result Value Ref Range    POCT Glucose 112 (H) 74 - 99 mg/dL     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  Generalized anxiety  Obstructive sleep apnea  GERD  Vestibular disequilibrium  Possible BPH  Presented with  Near syncope  UTI  Mild hyponatremia  Plan  Continue cardiopulmonary monitoring  On ciprofloxacin 400 Mg IV twice daily and follow urine cultures  Encourage hydration  TSH fine  PT OT  Continue home medicines as tolerated  Tylenol, montelukast, losartan, simvastatin,  Eliquis, antiemetics  Finasteride  CPAP at night  Discussed with family  Patient on Depakote for migraine and given Toradol for immediate relief, patient follows with neurology and ENT outpatient  DNR CCA  Neurochecks  Follow mentation  Fall, aspiration, seizure precautions  Watch polypharmacy, avoid sedatives  DVT prophylaxis addressed  Encourage nutrition  CT head was negative for acute process, chest x-ray negative for acute process  Daily CBC BMP follow vitals  Possible DC in 24 to 48 hours if stable in coordination with case management                               Sergio Rivera MD

## 2024-11-26 NOTE — PROGRESS NOTES
"Occupational Therapy    Evaluation    Patient Name: Arvin Diallo \"Marco A\"  MRN: [id number]  Today's Date: 11/26/2024  Time Calculation  Start Time: 0815  Stop Time: 0838  Time Calculation (min): 23 min  308/308-A    Assessment  IP OT Assessment  OT Assessment: pt is able to complete all ADLs/mobility and is negative for orthostatic hypotension.  pt c/o overall quick fatigue and generalized weakness.  no further OT recommended at this time as pt has no ADL deficits.  Prognosis: Excellent  Barriers to Discharge: None  Evaluation/Treatment Tolerance: Patient tolerated treatment well  Medical Staff Made Aware: Yes  End of Session Communication: Bedside nurse (communication on whiteboard)  End of Session Patient Position: Up in chair, Alarm on    Plan:  No Skilled OT: Independent with ADLs  OT Discharge Recommendations: No further acute OT, No OT needed after discharge  OT Recommended Transfer Status: Assist of 1  OT - OK to Discharge: Yes (once medically stable)    Subjective     Current Problem:  1. Urinary tract infection without hematuria, site unspecified        2. Near syncope            General:  General  Reason for Referral: 84 year old male admitted for near syncope.   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 stand 132/83    Pain:  Pain Assessment  Pain Assessment: " "0-10  0-10 (Numeric) Pain Score:  (pt c/o headache forehead.  does not rate pain but states \"it's just enough to not be able to enjoy anything\")    Objective     Cognition:  Overall Cognitive Status: Within Functional Limits  Orientation Level: Oriented X4  Memory: Exceptions to WFL             Home Living:  Type of Home: Assisted living ([facility])  Lives With: Alone  Bathroom Shower/Tub: Walk-in shower     Prior Function:  ADL Assistance: Independent  Homemaking Assistance: Needs assistance  Ambulatory Assistance: Independent (modified indep with upright rollator)      ADL:  Eating Assistance: Independent  Grooming Assistance: Independent  Bathing Assistance: Stand by  UE Dressing Assistance: Independent  LE Dressing Assistance: Stand by  Toileting Assistance with Device: Independent    Activity Tolerance:  Endurance: Decreased tolerance for upright activites (pt c/o quick fatigue overall feeling of body weakness)    Bed Mobility/Transfers:   Bed Mobility  Bed Mobility: Yes  Bed Mobility 1  Bed Mobility 1: Supine to sitting  Level of Assistance 1: Independent  Transfers  Transfer: Yes  Transfer 1  Technique 1: Sit to stand, Stand to sit  Transfer Device 1: Gait belt, Walker  Transfer Level of Assistance 1: Close supervision    Ambulation/Gait Training:  Functional Mobility  Functional Mobility Performed: Yes  Functional Mobility 1  Surface 1: Level tile  Device 1: Rollator  Functional Mobility Support Devices: Gait belt  Assistance 1: Close supervision  Comments 1: supervision only due to recent c/o near syncope    Vision: Vision - Basic Assessment  Current Vision: No visual deficits    Sensation:  Light Touch: No apparent deficits    Strength:  Strength Comments: BUE WNL    Coordination:  Movements are Fluid and Coordinated: Yes         Outcome Measures: [facility] Daily Activity  Putting on and taking off regular lower body clothing: A little  Bathing (including washing, rinsing, drying): A little  Putting " on and taking off regular upper body clothing: None  Toileting, which includes using toilet, bedpan or urinal: None  Taking care of personal grooming such as brushing teeth: None  Eating Meals: None  Daily Activity - Total Score: 22

## 2024-11-26 NOTE — H&P
"History Of Present Illness  Arvin Diallo \"Marco A\" is a 84 y.o. male  Who presented to the emergency room for near syncopal episode.  On presentation, vital signs grossly within normal limits.  Pertinent findings on blood workup; glucose 185 and sodium 133.  Urinalysis came back positive for leukocyte esterase.  And WBC cells.  CT of the head did not show any acute findings.  There was however chronic ischemic changes in the left frontal lobe.  Patient was given in the emergency room 2 g IV ceftriaxone and then admitted to the medical service for further investigation and management.  Upon encounter, patient resting comfortably in his bed.  He does report feeling generalized weakness.  Denies having any urinary symptoms.  No fever or chills.  No nausea or vomiting.  No constipation or diarrhea.  No flulike symptoms.     ROS  10 systems were reviewed and were negative except for those noted in the history of present illness.    Past Medical History  Past Medical History:   Diagnosis Date    Acute pulmonary embolism without acute cor pulmonale, unspecified pulmonary embolism type (Multi) 04/12/2023    Localized enlarged lymph nodes     Mediastinal adenopathy    Other nonspecific abnormal finding of lung field     Lung mass    Personal history of other diseases of the respiratory system     History of asthma    Personal history of other specified conditions     History of chronic cough    Personal history of pulmonary embolism     History of pulmonary embolism     Pertinent medical history also documented in my below narrative    Surgical History  Past Surgical History:   Procedure Laterality Date    CARDIAC CATHETERIZATION Left 3/14/2024    Procedure: Left Heart Cath;  Surgeon: James Costa MD;  Location: [facility] Cardiac Cath Lab;  Service: Cardiovascular;  Laterality: Left;    CARDIAC CATHETERIZATION N/A 3/14/2024    Procedure: Left Ventriculography;  Surgeon: James Costa MD;  [facility];  Service: Cardiovascular;  Laterality: N/A;    CATARACT EXTRACTION Left 04/06/2023    CATARACT EXTRACTION Right 07/06/2023    HERNIA REPAIR  01/09/2023    MR HEAD ANGIO WO IV CONTRAST  08/12/2021    MR HEAD ANGIO WO IV CONTRAST 8/12/2021 [facility] CLINICAL LEGACY    OTHER SURGICAL HISTORY  12/04/2018    Cystoscopy    OTHER SURGICAL HISTORY  12/04/2018    Inferior vena cava filter placement    OTHER SURGICAL HISTORY  12/04/2018    Tonsillectomy    OTHER SURGICAL HISTORY  12/04/2018    Ostectomy of calcaneus for spur    OTHER SURGICAL HISTORY  12/04/2018    Hernia repair    OTHER SURGICAL HISTORY  12/04/2018    Epidural steroid injection    OTHER SURGICAL HISTORY  12/04/2018    Transurethral resection of prostate    OTHER SURGICAL HISTORY  05/06/2022    Intra-articular corticosteroid injection    OTHER SURGICAL HISTORY  09/13/2021    Sigmoidectomy    OTHER SURGICAL HISTORY  04/20/2021    Colonoscopy    OTHER SURGICAL HISTORY  10/24/2019    Knee replacement      Pertinent surgical history also documented in my below narrative    Social History  He reports that he has never smoked. He has been exposed to tobacco smoke. He has never used smokeless tobacco. Drug use questions deferred to the physician. He reports that he does not drink alcohol.    Family History  Family History   Problem Relation Name Age of Onset    Heart failure Mother      Parkinsonism Mother      Coronary artery disease Father      Heart failure Father      Breast cancer Daughter      Other (hysterectomy) Daughter      Diabetes Other grandfather         Allergies  Patient has no known allergies.    Medications Prior to Admission   Medication Sig Dispense Refill Last Dose/Taking    apixaban (Eliquis) 5 mg tablet Take 1 tablet (5 mg) by mouth 2 times a day. 180 tablet 3     aspirin-acetaminophen-caffeine (Excedrin Migraine) 250-250-65 mg tablet Take 1 tablet by mouth once daily.       cephalexin (Keflex) 500 mg capsule TAKE 1 CAPSULE TWICE  "DAILY 60 capsule 11     divalproex (Depakote) 125 mg EC tablet Take 1 tablet (125 mg) by mouth 2 times a day. 1 tab in the morning and 2 tab in evening 60 tablet 11     finasteride (Proscar) 5 mg tablet Take 1 tablet (5 mg) by mouth once daily. 90 tablet 3     fluticasone (Flonase) 50 mcg/actuation nasal spray INSTILL 2 SPRAYS IN EACH NOSTRIL DAILY 48 g 3     losartan (Cozaar) 50 mg tablet Take 1 tablet (50 mg) by mouth once daily. 90 tablet 3     lubricating eye drops ophthalmic solution Administer 1 drop into both eyes 3 times a day.       magnesium oxide (Mag-Ox) 400 mg (241.3 mg magnesium) tablet Take 1 tablet (400 mg) by mouth once daily.       montelukast (Singulair) 10 mg tablet Take 1 tablet (10 mg) by mouth once daily at bedtime. 90 tablet 3     multivitamin (MULTIPLE VITAMINS ORAL) Take 1 tablet by mouth once daily.       oxygen (O2) gas therapy Inhale 3 L/min once daily at bedtime.       psyllium husk, with sugar, (Metamucil Fiber Thin) 2.5 gram wafer wafer Take by mouth once daily at bedtime. WITH CRANBERRY POWDER       simvastatin (Zocor) 40 mg tablet Take 1 tablet (40 mg) by mouth early in the morning.. 90 tablet 3         Last Recorded Vitals  Blood pressure 150/82, pulse 76, temperature 36.7 °C (98.1 °F), temperature source Temporal, resp. rate 18, height 1.905 m (6' 3\"), weight 123 kg (271 lb 13.2 oz), SpO2 98%.    Physical Exam  Constitutional:       General: He is not in acute distress.     Appearance: He is not ill-appearing.      Comments: Awake alert and oriented x3   HENT:      Mouth/Throat:      Pharynx: Oropharynx is clear.   Eyes:      Pupils: Pupils are equal, round, and reactive to light.   Cardiovascular:      Rate and Rhythm: Normal rate and regular rhythm.      Heart sounds: Normal heart sounds.   Pulmonary:      Effort: No respiratory distress.      Breath sounds: Normal breath sounds. No wheezing or rhonchi.   Abdominal:      General: Abdomen is flat. Bowel sounds are normal. There " is no distension.      Palpations: Abdomen is soft.      Tenderness: There is no abdominal tenderness.   Musculoskeletal:         General: No swelling.   Skin:     General: Skin is warm.   Neurological:      General: No focal deficit present.   Psychiatric:         Mood and Affect: Mood normal.         Behavior: Behavior normal.         Thought Content: Thought content normal.         Judgment: Judgment normal.           Relevant Results  Results for orders placed or performed during the hospital encounter of 11/25/24 (from the past 24 hours)   POCT GLUCOSE   Result Value Ref Range    POCT Glucose 195 (H) 74 - 99 mg/dL   Influenza A, and B PCR   Result Value Ref Range    Flu A Result Not Detected Not Detected    Flu B Result Not Detected Not Detected   Sars-CoV-2 PCR   Result Value Ref Range    Coronavirus 2019, PCR Not Detected Not Detected   Comprehensive metabolic panel   Result Value Ref Range    Glucose 185 (H) 74 - 99 mg/dL    Sodium 133 (L) 136 - 145 mmol/L    Potassium 4.3 3.5 - 5.3 mmol/L    Chloride 100 98 - 107 mmol/L    Bicarbonate 29 21 - 32 mmol/L    Anion Gap 8 (L) 10 - 20 mmol/L    Urea Nitrogen 22 6 - 23 mg/dL    Creatinine 1.09 0.50 - 1.30 mg/dL    eGFR 67 >60 mL/min/1.73m*2    Calcium 9.5 8.6 - 10.3 mg/dL    Albumin 3.8 3.4 - 5.0 g/dL    Alkaline Phosphatase 86 33 - 136 U/L    Total Protein 6.3 (L) 6.4 - 8.2 g/dL    AST 24 9 - 39 U/L    Bilirubin, Total 1.0 0.0 - 1.2 mg/dL    ALT 23 10 - 52 U/L   CBC   Result Value Ref Range    WBC 6.3 4.4 - 11.3 x10*3/uL    nRBC 0.0 0.0 - 0.0 /100 WBCs    RBC 4.56 4.50 - 5.90 x10*6/uL    Hemoglobin 14.4 13.5 - 17.5 g/dL    Hematocrit 42.6 41.0 - 52.0 %    MCV 93 80 - 100 fL    MCH 31.6 26.0 - 34.0 pg    MCHC 33.8 32.0 - 36.0 g/dL    RDW 11.9 11.5 - 14.5 %    Platelets 212 150 - 450 x10*3/uL   Troponin I, High Sensitivity, Initial   Result Value Ref Range    Troponin I, High Sensitivity 19 0 - 20 ng/L   Urinalysis with Reflex Culture and Microscopic   Result Value  Ref Range    Color, Urine Light-Yellow Light-Yellow, Yellow, Dark-Yellow    Appearance, Urine Clear Clear    Specific Gravity, Urine 1.007 1.005 - 1.035    pH, Urine 7.0 5.0, 5.5, 6.0, 6.5, 7.0, 7.5, 8.0    Protein, Urine NEGATIVE NEGATIVE, 10 (TRACE), 20 (TRACE) mg/dL    Glucose, Urine Normal Normal mg/dL    Blood, Urine 0.5 (2+) (A) NEGATIVE    Ketones, Urine NEGATIVE NEGATIVE mg/dL    Bilirubin, Urine NEGATIVE NEGATIVE    Urobilinogen, Urine Normal Normal mg/dL    Nitrite, Urine NEGATIVE NEGATIVE    Leukocyte Esterase, Urine 250 Simone/µL (A) NEGATIVE   Microscopic Only, Urine   Result Value Ref Range    WBC, Urine 11-20 (A) 1-5, NONE /HPF    WBC Clumps, Urine RARE Reference range not established. /HPF    RBC, Urine 11-20 (A) NONE, 1-2, 3-5 /HPF    Bacteria, Urine 1+ (A) NONE SEEN /HPF   Troponin, High Sensitivity, 1 Hour   Result Value Ref Range    Troponin I, High Sensitivity 17 0 - 20 ng/L     *Note: Due to a large number of results and/or encounters for the requested time period, some results have not been displayed. A complete set of results can be found in Results Review.        CT head wo IV contrast    Result Date: 11/25/2024  Interpreted By:  Mauricio Braxton, STUDY: CT HEAD WO IV CONTRAST;  11/25/2024 8:25 pm   INDICATION: Signs/Symptoms:near syncope.     COMPARISON: CT head 06/05/2024   ACCESSION NUMBER(S): [id number]   ORDERING CLINICIAN: KASH DIAZ   TECHNIQUE: Noncontrast axial CT images of head were obtained with coronal and sagittal reconstructed images.   FINDINGS: BRAIN PARENCHYMA: Similar area of left frontal lobe encephalomalacia. No CT evidence of acute large territory infarction. No mass-effect, midline shift or effacement of cerebral sulci. Patchy periventricular and subcortical white matter hypodensities, nonspecific but often seen in the setting of chronic microangiopathic change.   HEMORRHAGE: No acute intracranial hemorrhage.   VENTRICLES and EXTRA-AXIAL SPACES: The ventricles and sulci  are within normal limits for brain volume. No abnormal extra-axial fluid collection.   ORBITS: The visualized orbits and globes are within normal limits.   EXTRACRANIAL SOFT TISSUES: Within normal limits.   PARANASAL SINUSES/MASTOIDS: The visualized paranasal sinuses and mastoid air cells are well aerated.   CALVARIUM: No depressed skull fracture.         1. No acute intracranial abnormality identified. 2. Chronic ischemic changes in the left frontal lobe.       MACRO: None   Signed by: Mauricio Braxton 11/25/2024 8:50 PM Dictation workstation:   [id number]    XR chest 1 view    Result Date: 11/25/2024  Interpreted By:  Mauricio Braxton, STUDY: XR CHEST 1 VIEW;  11/25/2024 8:20 pm   INDICATION: Signs/Symptoms:near syncope.     COMPARISON: Chest radiograph 06/05/2024   ACCESSION NUMBER(S): [id number]   ORDERING CLINICIAN: KASH DIAZ   FINDINGS: SUPPORT DEVICES: Device overlies the left cardiac border, likely a leadless pacer or loop recorder.   CARDIOMEDIASTINAL SILHOUETTE: Cardiomediastinal silhouette is normal in size and configuration.   LUNGS: No pulmonary consolidation, pleural effusion or pneumothorax. Mild bibasilar atelectatic changes.   ABDOMEN: No remarkable upper abdominal findings.   BONES: No acute osseous abnormality.       1. No acute cardiopulmonary abnormality.     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  Prelim workup in the ER showed urinary tract infection.    I will admit the patient to the inpatient medical service with vital signs  monitoring.  Continue treating with cipro 400 iv bid and follow-up with a urine culture final report  I will order TSH level  Consult physical therapy and Occupational Therapy  Resume home medications  Lovenox and SCDs for DVT prophylaxis  Patient is DNR CCA    (This note was generated with voice recognition software and may contain errors including spelling, grammar, syntax and misrecognition of what was dictated, that are not fully corrected)     Neeraj García MD

## 2024-11-26 NOTE — CARE PLAN
Problem: Pain - Adult  Goal: Verbalizes/displays adequate comfort level or baseline comfort level  11/26/2024 0310 by Bharti Hackett RN  Outcome: Progressing  11/26/2024 0309 by Bharti Hackett RN  Outcome: Progressing     Problem: Safety - Adult  Goal: Free from fall injury  11/26/2024 0310 by Bharti Hackett RN  Outcome: Progressing  11/26/2024 0309 by Bharti Hackett RN  Outcome: Progressing     Problem: Discharge Planning  Goal: Discharge to home or other facility with appropriate resources  11/26/2024 0310 by Bharti Hackett RN  Outcome: Progressing  11/26/2024 0309 by Bharti Hackett RN  Outcome: Progressing     Problem: Chronic Conditions and Co-morbidities  Goal: Patient's chronic conditions and co-morbidity symptoms are monitored and maintained or improved  11/26/2024 0310 by Bharti Hackett RN  Outcome: Progressing  11/26/2024 0309 by Bharti Hackett RN  Outcome: Progressing     Problem: Fall/Injury  Goal: Not fall by end of shift  11/26/2024 0310 by Bharti Hackett RN  Outcome: Progressing  11/26/2024 0309 by Bharti Hackett RN  Outcome: Progressing  Goal: Be free from injury by end of the shift  11/26/2024 0310 by Bharti Hackett RN  Outcome: Progressing  11/26/2024 0309 by Bharti Hackett RN  Outcome: Progressing  Goal: Verbalize understanding of personal risk factors for fall in the hospital  11/26/2024 0310 by Bharti Hackett RN  Outcome: Progressing  11/26/2024 0309 by Bharti Hackett RN  Outcome: Progressing  Goal: Verbalize understanding of risk factor reduction measures to prevent injury from fall in the home  11/26/2024 0310 by Bharti Hackett RN  Outcome: Progressing  11/26/2024 0309 by Bharti Hackett RN  Outcome: Progressing  Goal: Use assistive devices by end of the shift  11/26/2024 0310 by Bharti Hackett RN  Outcome: Progressing  11/26/2024 0309 by Bharti Hackett RN  Outcome: Progressing  Goal: Pace activities to prevent fatigue by end of the shift  11/26/2024 0310 by Bharti Hackett  RN  Outcome: Progressing  11/26/2024 0309 by Bharti Hackett RN  Outcome: Progressing   The patient's goals for the shift include      The clinical goals for the shift include Patient will remain safe this shift.

## 2024-11-26 NOTE — PROGRESS NOTES
"Physical Therapy    Physical Therapy Evaluation    Patient Name: Arvin Diallo \"Lyle\"  MRN: [id number]  Today's Date: 11/26/2024   Time Calculation  Start Time: 1151  Stop Time: 1216  Time Calculation (min): 25 min    Assessment/Plan   Patient is performing mobility at baseline.  His medical issues are what affect his mobility.  No further acute PT at this time.  Patient is to continue mobility with nursing staff.    PT Assessment  Evaluation/Treatment Tolerance: Other (Comment) (headache, not feeling well)  End of Session Communication: Care Coordinator (communication on white board)  End of Session Patient Position: Up in chair, Alarm on (call light in reach, needs met)  IP OR SWING BED PT PLAN  Inpatient or Swing Bed: Inpatient  PT Plan  PT Plan: PT Eval only  PT Eval Only Reason: At baseline function  PT Recommended Transfer Status: Stand by assist  PT - OK to Discharge: Yes (once medically appropriate and safe DC plan in place)    Subjective     Current Problem:  Patient Active Problem List   Diagnosis    Arthritis of left hip    Asymmetric SNHL (sensorineural hearing loss)    Bronchomalacia    Cerebrovascular accident (CVA) (Multi)    Degeneration of lumbar intervertebral disc with acute herniation    Diabetes mellitus (Multi)    Dizziness    Elevated diaphragm    Enlarged prostate    Fatigue    Hyperlipidemia    Lumbar scoliosis    Meniere's disease    Mild HTN    Mitral valve prolapse    Obstructive sleep apnea, adult    Tinnitus of both ears    Vertigo    RAVI (generalized anxiety disorder)    Gastroesophageal reflux disease    History of DVT (deep vein thrombosis)    Patent foramen ovale ([facility])    PAD (peripheral artery disease) (CMS-HCC)    Pulmonary fibrosis, unspecified (Multi)    Mild intermittent asthma without complication ([facility])    Vasodepressor syncope    Urinary tract infection without hematuria, site unspecified       General Visit Information:  General  Reason for Referral: impaired " "mobility; 84 year old male admitted for near syncope.   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  Prior to Session Communication: Bedside nurse  Patient Position Received: Alarm on, Up in chair  General Comment: patient states he has the pain across front of his head.  states he needs to use restroom and agreeable to getting up with PT for that    Home Living:  Home Living  Type of Home: Assisted living (the [facility])  Lives With: Alone  Bathroom Shower/Tub: Walk-in shower    Prior Level of Function:  Prior Function Per Pt/Caregiver Report  ADL Assistance: Independent  Homemaking Assistance: Needs assistance  Ambulatory Assistance: Independent (mod indep with rollator)    Precautions:  Precautions  Medical Precautions: Fall precautions    Vital Signs:  Vital Signs  Vital Signs Comment: no concerns  Objective     Pain:  Pain Assessment  Pain Assessment: 0-10  0-10 (Numeric) Pain Score:  (c/o pain across front of head but no rating given)    Cognition:  Cognition  Orientation Level: Oriented X4    General Assessments:      Activity Tolerance  Endurance:  (due to not feeling well)  Sensation  Light Touch: No apparent deficits, Partial deficits in the RLE (numbness R foot (chronic))  Strength  Strength Comments: LEs WNL        Postural Control  Postural Control: Within Functional Limits  Static Sitting Balance  Static Sitting-Balance Support: Feet supported  Static " Sitting-Level of Assistance: Independent  Dynamic Sitting Balance  Dynamic Sitting-Balance Support: Feet supported  Dynamic Sitting-Level of Assistance: Independent  Static Standing Balance  Static Standing-Balance Support: Bilateral upper extremity supported  Static Standing-Level of Assistance: Close supervision  Dynamic Standing Balance  Dynamic Standing-Balance Support: Bilateral upper extremity supported  Dynamic Standing-Level of Assistance: Close supervision    Functional Assessments:     Bed Mobility  Bed Mobility: No  Transfers  Transfer: Yes  Transfer 1  Technique 1: Sit to stand, Stand to sit  Transfer Device 1: Gait belt (FWW)  Transfer Level of Assistance 1: Close supervision  Ambulation/Gait Training  Ambulation/Gait Training Performed: Yes  Ambulation/Gait Training 1  Surface 1: Level tile  Device 1: Rolling walker  Gait Support Devices: Gait belt  Assistance 1: Close supervision  Comments/Distance (ft) 1: 24'x2, steady, no LOB but did c/o not feeling good and needed to sit down (to/from toilet)          Outcome Measures:  [facility] Basic Mobility  Turning from your back to your side while in a flat bed without using bedrails: None  Moving from lying on your back to sitting on the side of a flat bed without using bedrails: None  Moving to and from bed to chair (including a wheelchair): A little  Standing up from a chair using your arms (e.g. wheelchair or bedside chair): None  To walk in hospital room: A little  Climbing 3-5 steps with railing: A little  Basic Mobility - Total Score: 21                            Goals: no further acute PT intervention  Encounter Problems       Encounter Problems (Active)       Pain - Adult            Education Documentation  No documentation found.  Education Comments  No comments found.

## 2024-11-26 NOTE — PROGRESS NOTES
11/26/24 1318   Discharge Planning   Living Arrangements Other (Comment)  (Assisted Living - [facility])   Support Systems Children;Yarsani/kalyan community;Friends/neighbors   Assistance Needed Return to Assisted Living   Type of Residence Assisted living   Do you have animals or pets at home? No   Care Facility Name [facility]   Who is requesting discharge planning? Provider   Home or Post Acute Services Other (Comment)  (assisted living staff)   Expected Discharge Disposition Othe  (Assisted Living - [facility])   Does the patient need discharge transport arranged? No   Financial Resource Strain   How hard is it for you to pay for the very basics like food, housing, medical care, and heating? Not hard   Housing Stability   In the last 12 months, was there a time when you were not able to pay the mortgage or rent on time? N   In the past 12 months, how many times have you moved where you were living? 1   Transportation Needs   In the past 12 months, has lack of transportation kept you from medical appointments or from getting medications? no   In the past 12 months, has lack of transportation kept you from meetings, work, or from getting things needed for daily living? No     Per medical team, patient is not yet medically appropriate for discharge; Will likely require another 24 hours in the hospital.  reviewed patient's chart and met with patient and patient's daughters at bedside to perform initial assessment. In addition to above information, patient stated that patient's four daughters are all in the health care industry and are caring, helpful supports for patient. Patient's PCP is Dr. Beckwith; patient's pharmacy of choice is [facility] for immediate needs and [facility] for regular medications. Plan for patient is to return to the [facility] when patient is medically ready. Care Transitions to follow and assist as needed.  Stella Alicea, MARYLIN

## 2024-11-26 NOTE — CARE PLAN
Problem: Pain - Adult  Goal: Verbalizes/displays adequate comfort level or baseline comfort level  Outcome: Progressing     Problem: Safety - Adult  Goal: Free from fall injury  Outcome: Progressing     Problem: Discharge Planning  Goal: Discharge to home or other facility with appropriate resources  Outcome: Progressing     Problem: Chronic Conditions and Co-morbidities  Goal: Patient's chronic conditions and co-morbidity symptoms are monitored and maintained or improved  Outcome: Progressing     Problem: Fall/Injury  Goal: Not fall by end of shift  Outcome: Progressing  Goal: Be free from injury by end of the shift  Outcome: Progressing  Goal: Verbalize understanding of personal risk factors for fall in the hospital  Outcome: Progressing  Goal: Verbalize understanding of risk factor reduction measures to prevent injury from fall in the home  Outcome: Progressing  Goal: Use assistive devices by end of the shift  Outcome: Progressing  Goal: Pace activities to prevent fatigue by end of the shift  Outcome: Progressing   The patient's goals for the shift include      The clinical goals for the shift include Patient will remain safe this shift.

## 2024-11-27 LAB
ANION GAP SERPL CALC-SCNC: 9 MMOL/L (ref 10–20)
BACTERIA UR CULT: NORMAL
BUN SERPL-MCNC: 20 MG/DL (ref 6–23)
CALCIUM SERPL-MCNC: 9.2 MG/DL (ref 8.6–10.3)
CHLORIDE SERPL-SCNC: 105 MMOL/L (ref 98–107)
CO2 SERPL-SCNC: 25 MMOL/L (ref 21–32)
CREAT SERPL-MCNC: 1.1 MG/DL (ref 0.5–1.3)
EGFRCR SERPLBLD CKD-EPI 2021: 67 ML/MIN/1.73M*2
ERYTHROCYTE [DISTWIDTH] IN BLOOD BY AUTOMATED COUNT: 11.9 % (ref 11.5–14.5)
GLUCOSE SERPL-MCNC: 137 MG/DL (ref 74–99)
HCT VFR BLD AUTO: 43.1 % (ref 41–52)
HGB BLD-MCNC: 14.4 G/DL (ref 13.5–17.5)
MCH RBC QN AUTO: 31.3 PG (ref 26–34)
MCHC RBC AUTO-ENTMCNC: 33.4 G/DL (ref 32–36)
MCV RBC AUTO: 94 FL (ref 80–100)
NRBC BLD-RTO: 0 /100 WBCS (ref 0–0)
PLATELET # BLD AUTO: 213 X10*3/UL (ref 150–450)
POTASSIUM SERPL-SCNC: 4.2 MMOL/L (ref 3.5–5.3)
RBC # BLD AUTO: 4.6 X10*6/UL (ref 4.5–5.9)
SODIUM SERPL-SCNC: 135 MMOL/L (ref 136–145)
WBC # BLD AUTO: 6.6 X10*3/UL (ref 4.4–11.3)

## 2024-11-27 PROCEDURE — 86140 C-REACTIVE PROTEIN: CPT | Performed by: INTERNAL MEDICINE

## 2024-11-27 PROCEDURE — 85027 COMPLETE CBC AUTOMATED: CPT | Performed by: HOSPITALIST

## 2024-11-27 PROCEDURE — 2500000001 HC RX 250 WO HCPCS SELF ADMINISTERED DRUGS (ALT 637 FOR MEDICARE OP): Performed by: INTERNAL MEDICINE

## 2024-11-27 PROCEDURE — 2500000004 HC RX 250 GENERAL PHARMACY W/ HCPCS (ALT 636 FOR OP/ED): Performed by: HOSPITALIST

## 2024-11-27 PROCEDURE — 36415 COLL VENOUS BLD VENIPUNCTURE: CPT | Performed by: HOSPITALIST

## 2024-11-27 PROCEDURE — 2500000004 HC RX 250 GENERAL PHARMACY W/ HCPCS (ALT 636 FOR OP/ED): Performed by: INTERNAL MEDICINE

## 2024-11-27 PROCEDURE — 2500000001 HC RX 250 WO HCPCS SELF ADMINISTERED DRUGS (ALT 637 FOR MEDICARE OP): Performed by: HOSPITALIST

## 2024-11-27 PROCEDURE — 99232 SBSQ HOSP IP/OBS MODERATE 35: CPT | Performed by: HOSPITALIST

## 2024-11-27 PROCEDURE — 2500000002 HC RX 250 W HCPCS SELF ADMINISTERED DRUGS (ALT 637 FOR MEDICARE OP, ALT 636 FOR OP/ED): Performed by: INTERNAL MEDICINE

## 2024-11-27 PROCEDURE — 2500000005 HC RX 250 GENERAL PHARMACY W/O HCPCS: Performed by: INTERNAL MEDICINE

## 2024-11-27 PROCEDURE — 80048 BASIC METABOLIC PNL TOTAL CA: CPT | Performed by: HOSPITALIST

## 2024-11-27 PROCEDURE — 94761 N-INVAS EAR/PLS OXIMETRY MLT: CPT

## 2024-11-27 PROCEDURE — 1100000001 HC PRIVATE ROOM DAILY

## 2024-11-27 PROCEDURE — 2500000005 HC RX 250 GENERAL PHARMACY W/O HCPCS: Performed by: HOSPITALIST

## 2024-11-27 RX ORDER — KETOROLAC TROMETHAMINE 30 MG/ML
15 INJECTION, SOLUTION INTRAMUSCULAR; INTRAVENOUS EVERY 6 HOURS PRN
Status: DISCONTINUED | OUTPATIENT
Start: 2024-11-27 | End: 2024-12-01 | Stop reason: HOSPADM

## 2024-11-27 RX ORDER — BISACODYL 5 MG
5 TABLET, DELAYED RELEASE (ENTERIC COATED) ORAL DAILY PRN
Status: DISCONTINUED | OUTPATIENT
Start: 2024-11-27 | End: 2024-12-01 | Stop reason: HOSPADM

## 2024-11-27 RX ORDER — BISACODYL 5 MG
5 TABLET, DELAYED RELEASE (ENTERIC COATED) ORAL DAILY PRN
Status: DISCONTINUED | OUTPATIENT
Start: 2024-11-27 | End: 2024-11-27

## 2024-11-27 ASSESSMENT — COGNITIVE AND FUNCTIONAL STATUS - GENERAL
DAILY ACTIVITIY SCORE: 24
CLIMB 3 TO 5 STEPS WITH RAILING: A LITTLE
MOBILITY SCORE: 22
MOBILITY SCORE: 22
DAILY ACTIVITIY SCORE: 24
CLIMB 3 TO 5 STEPS WITH RAILING: A LITTLE
WALKING IN HOSPITAL ROOM: A LITTLE
WALKING IN HOSPITAL ROOM: A LITTLE

## 2024-11-27 ASSESSMENT — PAIN - FUNCTIONAL ASSESSMENT
PAIN_FUNCTIONAL_ASSESSMENT: 0-10

## 2024-11-27 ASSESSMENT — PAIN SCALES - GENERAL
PAINLEVEL_OUTOF10: 4
PAINLEVEL_OUTOF10: 2
PAINLEVEL_OUTOF10: 10 - WORST POSSIBLE PAIN
PAINLEVEL_OUTOF10: 5 - MODERATE PAIN
PAINLEVEL_OUTOF10: 0 - NO PAIN
PAINLEVEL_OUTOF10: 5 - MODERATE PAIN
PAINLEVEL_OUTOF10: 1
PAINLEVEL_OUTOF10: 4

## 2024-11-27 ASSESSMENT — PAIN DESCRIPTION - LOCATION
LOCATION: HEAD

## 2024-11-27 NOTE — ED PROVIDER NOTES
"HPI   Chief Complaint   Patient presents with    Near Syncope     Pt. Reports coming back from [place] at his facility and felt like he was going to pass out. +Lightheadedness/Dizziness and had to sit down. Did not fall. C/o only HA/weakness now, but that has been ongoing for months, Follows with neurology       Patient presents with feeling that he was going to die and passed out.  States this has happened multiple times in the past few days.  States yesterday after lunch he started to feel like he was blacking out and he was able to sit down and it passed.  Then today the same thing happened after dinner.  Patient denies any new medications.  No recent illness.  Denies any chest pain or shortness of breath.  States he has been having ongoing headaches for more than a year of uncertain etiology.  Patient is on a blood thinner.  States his head feels \"swimmy.\"  Denies any vertigo type symptoms.  Denies trouble with his vision.  No neck pain.  Patient denies any urinary symptoms.  No change in appetite.      History provided by:  Patient and relative          Patient History   Past Medical History:   Diagnosis Date    Acute pulmonary embolism without acute cor pulmonale, unspecified pulmonary embolism type (Multi) 04/12/2023    Localized enlarged lymph nodes     Mediastinal adenopathy    Other nonspecific abnormal finding of lung field     Lung mass    Personal history of other diseases of the respiratory system     History of asthma    Personal history of other specified conditions     History of chronic cough    Personal history of pulmonary embolism     History of pulmonary embolism     Past Surgical History:   Procedure Laterality Date    CARDIAC CATHETERIZATION Left 3/14/2024    Procedure: Left Heart Cath;  Surgeon: James Costa MD;  Location: [facility] Cardiac Cath Lab;  Service: Cardiovascular;  Laterality: Left;    CARDIAC CATHETERIZATION N/A 3/14/2024    Procedure: Left Ventriculography;  Surgeon: " James Costa MD;  Location: [facility] Cardiac Cath Lab;  Service: Cardiovascular;  Laterality: N/A;    CATARACT EXTRACTION Left 04/06/2023    CATARACT EXTRACTION Right 07/06/2023    HERNIA REPAIR  01/09/2023    MR HEAD ANGIO WO IV CONTRAST  08/12/2021    MR HEAD ANGIO WO IV CONTRAST 8/12/2021 [facility] CLINICAL LEGACY    OTHER SURGICAL HISTORY  12/04/2018    Cystoscopy    OTHER SURGICAL HISTORY  12/04/2018    Inferior vena cava filter placement    OTHER SURGICAL HISTORY  12/04/2018    Tonsillectomy    OTHER SURGICAL HISTORY  12/04/2018    Ostectomy of calcaneus for spur    OTHER SURGICAL HISTORY  12/04/2018    Hernia repair    OTHER SURGICAL HISTORY  12/04/2018    Epidural steroid injection    OTHER SURGICAL HISTORY  12/04/2018    Transurethral resection of prostate    OTHER SURGICAL HISTORY  05/06/2022    Intra-articular corticosteroid injection    OTHER SURGICAL HISTORY  09/13/2021    Sigmoidectomy    OTHER SURGICAL HISTORY  04/20/2021    Colonoscopy    OTHER SURGICAL HISTORY  10/24/2019    Knee replacement     Family History   Problem Relation Name Age of Onset    Heart failure Mother      Parkinsonism Mother      Coronary artery disease Father      Heart failure Father      Breast cancer Daughter      Other (hysterectomy) Daughter      Diabetes Other grandfather      Social History     Tobacco Use    Smoking status: Never     Passive exposure: Past    Smokeless tobacco: Never   Vaping Use    Vaping status: Never Used   Substance Use Topics    Alcohol use: Never    Drug use: Defer       Physical Exam   ED Triage Vitals   Temp Heart Rate Resp BP   11/25/24 1933 11/25/24 1933 11/25/24 1933 11/25/24 1933   36.6 °C (97.9 °F) 76 18 144/73      SpO2 Temp Source Heart Rate Source Patient Position   11/25/24 1933 11/25/24 1933 11/25/24 1933 11/26/24 0400   94 % Oral Monitor Lying      BP Location FiO2 (%)     11/26/24 0400 11/25/24 2345     Right arm 32 %       Physical Exam  Vitals and nursing note reviewed.    Constitutional:       General: He is not in acute distress.     Appearance: Normal appearance. He is well-developed, well-groomed and normal weight. He is not ill-appearing or toxic-appearing.   HENT:      Head: Normocephalic.      Right Ear: Ear canal and external ear normal.      Left Ear: Ear canal and external ear normal.      Nose: Nose normal.      Mouth/Throat:      Lips: Pink. No lesions.      Mouth: Mucous membranes are moist.   Eyes:      General: No scleral icterus.     Conjunctiva/sclera: Conjunctivae normal.   Cardiovascular:      Rate and Rhythm: Normal rate and regular rhythm.      Pulses:           Dorsalis pedis pulses are 2+ on the right side and 2+ on the left side.        Posterior tibial pulses are 2+ on the right side and 2+ on the left side.      Heart sounds: Normal heart sounds.   Pulmonary:      Effort: Pulmonary effort is normal.      Breath sounds: Normal breath sounds and air entry.   Abdominal:      General: Bowel sounds are normal.      Palpations: Abdomen is soft.      Tenderness: There is no abdominal tenderness. There is no right CVA tenderness or left CVA tenderness.   Musculoskeletal:      Cervical back: No spinous process tenderness or muscular tenderness.      Right lower leg: No edema.      Left lower leg: No edema.   Skin:     General: Skin is warm.      Capillary Refill: Capillary refill takes less than 2 seconds.   Neurological:      General: No focal deficit present.      Mental Status: He is alert and oriented to person, place, and time.      Cranial Nerves: No cranial nerve deficit or facial asymmetry.      Sensory: No sensory deficit.      Motor: No weakness or pronator drift.      Coordination: Finger-Nose-Finger Test normal.      Comments: Equal  and equal lower extremity strength on exam   Psychiatric:         Attention and Perception: Attention and perception normal.         Mood and Affect: Mood and affect normal.         Speech: Speech normal.          "Behavior: Behavior normal. Behavior is cooperative.         Thought Content: Thought content normal.         Cognition and Memory: Cognition and memory normal.         Judgment: Judgment normal.           ED Course & MDM   Diagnoses as of 11/26/24 1955   Urinary tract infection without hematuria, site unspecified   Near syncope                 No data recorded     [place] Coma Scale Score: 15 (11/26/24 0934 : Natasha Ferguson RN)                           Medical Decision Making  Patient presents with feeling that he was going to die and passed out.  States this has happened multiple times in the past few days.  States yesterday after lunch he started to feel like he was blacking out and he was able to sit down and it passed.  Then today the same thing happened after dinner.  Patient denies any new medications.  No recent illness.  Denies any chest pain or shortness of breath.  States he has been having ongoing headaches for more than a year of uncertain etiology.  Patient is on a blood thinner.  States his head feels \"swimmy.\"  Denies any vertigo type symptoms.  Denies trouble with his vision.  No neck pain.  Patient denies any urinary symptoms.  No change in appetite.    Ddx: CVA, electrolyte, cardiac, pulmonary, metabolic, infection, other    Will obtain labs and CT.  Urine is concerning for infection otherwise no acute concerns were noted.  Will start on Rocephin 2 g given IV.  Patient's history is concerning as he has had multiple episodes of impending doom and would benefit from hospitalization for further evaluation and treatment even with observation status at a minimum.  Spoke with the family concerning this as well.  Family is in agreements of hospitalization for further evaluation and treatment.  Consulted the hospitalist for admission with acceptance    Amount and/or Complexity of Data Reviewed  Labs: ordered. Decision-making details documented in ED Course.  Radiology: ordered and independent interpretation " performed. Decision-making details documented in ED Course.  ECG/medicine tests: ordered and independent interpretation performed. Decision-making details documented in ED Course.     Details: Read by myself and attending showing normal sinus rhythm with a first-degree AV block at a NH interval of 268.  Ventricular rate of 77 bpm.  No ST segment elevation.  Right bundle branch block is noted.  QRS duration of 160 with a QT of 426.  Normal axis.        Procedure  Procedures     Rashmi Red PA-C  11/26/24 1955

## 2024-11-27 NOTE — PROGRESS NOTES
"Arvin Diallo \"Marco A\" is a 84 y.o. male on day 2 of admission presenting with Urinary tract infection without hematuria, site unspecified.    Subjective   Seen and examined   Not doing well   Still c/o headache  No chest pain or SOB        Objective     Physical Exam  General Appearance: AAO x 3,   Skin: skin color pink, warm, and dry; no suspicious rashes or lesions  Eyes : PERRL, EOM's intact  ENT: mucous membranes pink and moist  Neck: normocephalic  Respiratory: lungs clear to auscultation anteriorly; no wheezing, rhonchi, or crackles.   Heart: regular rate and rhythm. telemetry shows sinus rhythm  Abdomen: Nondistended, positive bowel sounds x4, soft,  nontender  Extremities: no edema   Peripheral pulses: normal x4 extremities  Neuro: alert, coherent and conversant, no focal motor deficits      Last Recorded Vitals  Blood pressure 135/84, pulse 96, temperature 36.6 °C (97.9 °F), temperature source Temporal, resp. rate 18, height 1.905 m (6' 3\"), weight 123 kg (271 lb 13.2 oz), SpO2 91%.  Intake/Output last 3 Shifts:  I/O last 3 completed shifts:  In: 422 (3.4 mL/kg) [P.O.:222; IV Piggyback:200]  Out: 900 (7.3 mL/kg) [Urine:900 (0.2 mL/kg/hr)]  Weight: 123.3 kg     Relevant Results  Scheduled medications  apixaban, 5 mg, oral, BID  ciprofloxacin, 400 mg, intravenous, q12h  finasteride, 5 mg, oral, Daily  losartan, 50 mg, oral, Daily  montelukast, 10 mg, oral, Nightly  oxygen, , inhalation, Continuous - Inhalation  simvastatin, 40 mg, oral, Daily      Continuous medications     PRN medications  PRN medications: acetaminophen **OR** acetaminophen **OR** acetaminophen, ketorolac, magnesium hydroxide, ondansetron ODT **OR** ondansetron    Results for orders placed or performed during the hospital encounter of 11/25/24 (from the past 24 hours)   POCT GLUCOSE   Result Value Ref Range    POCT Glucose 112 (H) 74 - 99 mg/dL   CBC   Result Value Ref Range    WBC 6.6 4.4 - 11.3 x10*3/uL    nRBC 0.0 0.0 - 0.0 /100 WBCs    " RBC 4.60 4.50 - 5.90 x10*6/uL    Hemoglobin 14.4 13.5 - 17.5 g/dL    Hematocrit 43.1 41.0 - 52.0 %    MCV 94 80 - 100 fL    MCH 31.3 26.0 - 34.0 pg    MCHC 33.4 32.0 - 36.0 g/dL    RDW 11.9 11.5 - 14.5 %    Platelets 213 150 - 450 x10*3/uL   Basic metabolic panel   Result Value Ref Range    Glucose 137 (H) 74 - 99 mg/dL    Sodium 135 (L) 136 - 145 mmol/L    Potassium 4.2 3.5 - 5.3 mmol/L    Chloride 105 98 - 107 mmol/L    Bicarbonate 25 21 - 32 mmol/L    Anion Gap 9 (L) 10 - 20 mmol/L    Urea Nitrogen 20 6 - 23 mg/dL    Creatinine 1.10 0.50 - 1.30 mg/dL    eGFR 67 >60 mL/min/1.73m*2    Calcium 9.2 8.6 - 10.3 mg/dL     *Note: Due to a large number of results and/or encounters for the requested time period, some results have not been displayed. A complete set of results can be found in Results Review.     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  Generalized anxiety  Obstructive sleep apnea  GERD  Vestibular disequilibrium  Possible BPH  Presented with  Near syncope  UTI  Mild hyponatremia        Complex migraine headache    Plan    Seen and examined   Clinically stable   He states not doing well today   C/o headache   Discussed with his neurologist over the phone  Plan to discontinue Depakote  Toradol 50 mg every 6 hours as needed for pain  Orthostatic signs  PT OT evaluation    Hypertension continue losartan    Hyperlipidemia continue with Zocor    Possible UTI: Urine culture still pending/continue with Cipro for now    BPH add Flomax    Sleep apnea on CPAP    History of DVT PE on Eliquis    Discussed with his daughter at  bedside                     Zachariah Galloway MD

## 2024-11-27 NOTE — CARE PLAN
The patient's goals for the shift include  pain control  Problem: Pain - Adult  Goal: Verbalizes/displays adequate comfort level or baseline comfort level  Outcome: Progressing     Problem: Safety - Adult  Goal: Free from fall injury  Outcome: Progressing     Problem: Discharge Planning  Goal: Discharge to home or other facility with appropriate resources  Outcome: Progressing     Problem: Chronic Conditions and Co-morbidities  Goal: Patient's chronic conditions and co-morbidity symptoms are monitored and maintained or improved  Outcome: Progressing     Problem: Fall/Injury  Goal: Not fall by end of shift  Outcome: Progressing  Goal: Be free from injury by end of the shift  Outcome: Progressing  Goal: Verbalize understanding of personal risk factors for fall in the hospital  Outcome: Progressing  Goal: Verbalize understanding of risk factor reduction measures to prevent injury from fall in the home  Outcome: Progressing  Goal: Use assistive devices by end of the shift  Outcome: Progressing  Goal: Pace activities to prevent fatigue by end of the shift  Outcome: Progressing     Problem: Diabetes  Goal: Achieve decreasing blood glucose levels by end of shift  Outcome: Progressing  Goal: Increase stability of blood glucose readings by end of shift  Outcome: Progressing  Goal: Maintain electrolyte levels within acceptable range throughout shift  Outcome: Progressing  Goal: Maintain glucose levels >70mg/dl to <250mg/dl throughout shift  Outcome: Progressing  Goal: No changes in neurological exam by end of shift  Outcome: Progressing  Goal: Learn about and adhere to nutrition recommendations by end of shift  Outcome: Progressing  Goal: Vital signs within normal range for age by end of shift  Outcome: Progressing  Goal: Increase self care and/or family involovement by end of shift  Outcome: Progressing  Goal: Receive DSME education by end of shift  Outcome: Progressing     Problem: Pain  Goal: Takes deep breaths with  improved pain control throughout the shift  Outcome: Progressing  Goal: Turns in bed with improved pain control throughout the shift  Outcome: Progressing  Goal: Walks with improved pain control throughout the shift  Outcome: Progressing  Goal: Performs ADL's with improved pain control throughout shift  Outcome: Progressing  Goal: Participates in PT with improved pain control throughout the shift  Outcome: Progressing  Goal: Free from opioid side effects throughout the shift  Outcome: Progressing  Goal: Free from acute confusion related to pain meds throughout the shift  Outcome: Progressing       The clinical goals for the shift include Patient will remain safe this shift.

## 2024-11-27 NOTE — PROGRESS NOTES
"Per medical team, patient is not yet medically appropriate for discharge today; Patient continues to feel very unwell, and will likely require at least another 24 hours in the hospital.  met with patient at bedside to review plan and Medicare IM. Patient confirmed understanding and agreement with same, and stated that patient felt \"just terrible\" and did not know when he might feel ready for discharge. Patient's current plan is to return home to assisted living at the [facility] when medically ready with no Care Transitions needs foreseen. Care Transitions to follow and assist should any new needs arise. MARYLIN Velazquez    "

## 2024-11-27 NOTE — CARE PLAN
Problem: Pain - Adult  Goal: Verbalizes/displays adequate comfort level or baseline comfort level  Outcome: Progressing     Problem: Safety - Adult  Goal: Free from fall injury  Outcome: Progressing     Problem: Discharge Planning  Goal: Discharge to home or other facility with appropriate resources  Outcome: Progressing     Problem: Chronic Conditions and Co-morbidities  Goal: Patient's chronic conditions and co-morbidity symptoms are monitored and maintained or improved  Outcome: Progressing     Problem: Fall/Injury  Goal: Not fall by end of shift  Outcome: Progressing  Goal: Be free from injury by end of the shift  Outcome: Progressing  Goal: Verbalize understanding of personal risk factors for fall in the hospital  Outcome: Progressing  Goal: Verbalize understanding of risk factor reduction measures to prevent injury from fall in the home  Outcome: Progressing  Goal: Use assistive devices by end of the shift  Outcome: Progressing  Goal: Pace activities to prevent fatigue by end of the shift  Outcome: Progressing     Problem: Diabetes  Goal: Achieve decreasing blood glucose levels by end of shift  Outcome: Progressing  Goal: Increase stability of blood glucose readings by end of shift  Outcome: Progressing  Goal: Maintain electrolyte levels within acceptable range throughout shift  Outcome: Progressing  Goal: Maintain glucose levels >70mg/dl to <250mg/dl throughout shift  Outcome: Progressing  Goal: No changes in neurological exam by end of shift  Outcome: Progressing  Goal: Learn about and adhere to nutrition recommendations by end of shift  Outcome: Progressing  Goal: Vital signs within normal range for age by end of shift  Outcome: Progressing  Goal: Increase self care and/or family involovement by end of shift  Outcome: Progressing  Goal: Receive DSME education by end of shift  Outcome: Progressing     Problem: Pain  Goal: Takes deep breaths with improved pain control throughout the shift  Outcome:  Progressing  Goal: Turns in bed with improved pain control throughout the shift  Outcome: Progressing  Goal: Walks with improved pain control throughout the shift  Outcome: Progressing  Goal: Performs ADL's with improved pain control throughout shift  Outcome: Progressing  Goal: Participates in PT with improved pain control throughout the shift  Outcome: Progressing  Goal: Free from opioid side effects throughout the shift  Outcome: Progressing  Goal: Free from acute confusion related to pain meds throughout the shift  Outcome: Progressing   The patient's goals for the shift include      The clinical goals for the shift include Patient will remain safe this shift.

## 2024-11-28 LAB
ATRIAL RATE: 77 BPM
P AXIS: 44 DEGREES
P OFFSET: 141 MS
P ONSET: 75 MS
PR INTERVAL: 268 MS
Q ONSET: 209 MS
QRS COUNT: 12 BEATS
QRS DURATION: 160 MS
QT INTERVAL: 426 MS
QTC CALCULATION(BAZETT): 482 MS
QTC FREDERICIA: 462 MS
R AXIS: -76 DEGREES
T AXIS: 69 DEGREES
T OFFSET: 422 MS
VENTRICULAR RATE: 77 BPM

## 2024-11-28 PROCEDURE — 94761 N-INVAS EAR/PLS OXIMETRY MLT: CPT

## 2024-11-28 PROCEDURE — 2500000004 HC RX 250 GENERAL PHARMACY W/ HCPCS (ALT 636 FOR OP/ED): Performed by: INTERNAL MEDICINE

## 2024-11-28 PROCEDURE — 1200000002 HC GENERAL ROOM WITH TELEMETRY DAILY

## 2024-11-28 PROCEDURE — 2500000002 HC RX 250 W HCPCS SELF ADMINISTERED DRUGS (ALT 637 FOR MEDICARE OP, ALT 636 FOR OP/ED): Performed by: INTERNAL MEDICINE

## 2024-11-28 PROCEDURE — 2500000001 HC RX 250 WO HCPCS SELF ADMINISTERED DRUGS (ALT 637 FOR MEDICARE OP): Performed by: NURSE PRACTITIONER

## 2024-11-28 PROCEDURE — 2500000004 HC RX 250 GENERAL PHARMACY W/ HCPCS (ALT 636 FOR OP/ED): Performed by: NURSE PRACTITIONER

## 2024-11-28 PROCEDURE — 2500000005 HC RX 250 GENERAL PHARMACY W/O HCPCS: Performed by: HOSPITALIST

## 2024-11-28 PROCEDURE — 2500000004 HC RX 250 GENERAL PHARMACY W/ HCPCS (ALT 636 FOR OP/ED): Performed by: HOSPITALIST

## 2024-11-28 PROCEDURE — 2500000001 HC RX 250 WO HCPCS SELF ADMINISTERED DRUGS (ALT 637 FOR MEDICARE OP): Performed by: HOSPITALIST

## 2024-11-28 PROCEDURE — 2500000001 HC RX 250 WO HCPCS SELF ADMINISTERED DRUGS (ALT 637 FOR MEDICARE OP): Performed by: INTERNAL MEDICINE

## 2024-11-28 PROCEDURE — 99232 SBSQ HOSP IP/OBS MODERATE 35: CPT | Performed by: INTERNAL MEDICINE

## 2024-11-28 RX ORDER — MAGNESIUM SULFATE HEPTAHYDRATE 40 MG/ML
2 INJECTION, SOLUTION INTRAVENOUS ONCE
Status: COMPLETED | OUTPATIENT
Start: 2024-11-28 | End: 2024-11-28

## 2024-11-28 RX ORDER — BUTALBITAL, ACETAMINOPHEN AND CAFFEINE 50; 325; 40 MG/1; MG/1; MG/1
1 TABLET ORAL ONCE
Status: DISCONTINUED | OUTPATIENT
Start: 2024-11-28 | End: 2024-11-29 | Stop reason: RX

## 2024-11-28 RX ORDER — METOCLOPRAMIDE HYDROCHLORIDE 5 MG/ML
10 INJECTION INTRAMUSCULAR; INTRAVENOUS ONCE
Status: COMPLETED | OUTPATIENT
Start: 2024-11-28 | End: 2024-11-28

## 2024-11-28 RX ORDER — CETIRIZINE HYDROCHLORIDE 10 MG/1
10 TABLET ORAL DAILY
Status: DISCONTINUED | OUTPATIENT
Start: 2024-11-28 | End: 2024-12-01 | Stop reason: HOSPADM

## 2024-11-28 RX ORDER — DIPHENHYDRAMINE HCL 25 MG
25 CAPSULE ORAL ONCE
Status: COMPLETED | OUTPATIENT
Start: 2024-11-28 | End: 2024-11-28

## 2024-11-28 RX ORDER — POLYETHYLENE GLYCOL 3350 17 G/17G
17 POWDER, FOR SOLUTION ORAL DAILY
Status: DISCONTINUED | OUTPATIENT
Start: 2024-11-28 | End: 2024-12-01 | Stop reason: HOSPADM

## 2024-11-28 ASSESSMENT — PAIN SCALES - GENERAL
PAINLEVEL_OUTOF10: 5 - MODERATE PAIN
PAINLEVEL_OUTOF10: 3
PAINLEVEL_OUTOF10: 3

## 2024-11-28 ASSESSMENT — PAIN - FUNCTIONAL ASSESSMENT: PAIN_FUNCTIONAL_ASSESSMENT: 0-10

## 2024-11-28 NOTE — CARE PLAN
The patient's goals for the shift include      The clinical goals for the shift include no headache this shift.  Problem: Pain - Adult  Goal: Verbalizes/displays adequate comfort level or baseline comfort level  Outcome: Progressing     Problem: Safety - Adult  Goal: Free from fall injury  Outcome: Progressing     Problem: Discharge Planning  Goal: Discharge to home or other facility with appropriate resources  Outcome: Progressing     Problem: Chronic Conditions and Co-morbidities  Goal: Patient's chronic conditions and co-morbidity symptoms are monitored and maintained or improved  Outcome: Progressing     Problem: Fall/Injury  Goal: Not fall by end of shift  Outcome: Progressing  Goal: Be free from injury by end of the shift  Outcome: Progressing  Goal: Verbalize understanding of personal risk factors for fall in the hospital  Outcome: Progressing  Goal: Verbalize understanding of risk factor reduction measures to prevent injury from fall in the home  Outcome: Progressing  Goal: Use assistive devices by end of the shift  Outcome: Progressing  Goal: Pace activities to prevent fatigue by end of the shift  Outcome: Progressing     Problem: Diabetes  Goal: Achieve decreasing blood glucose levels by end of shift  Outcome: Progressing  Goal: Increase stability of blood glucose readings by end of shift  Outcome: Progressing  Goal: Maintain electrolyte levels within acceptable range throughout shift  Outcome: Progressing  Goal: Maintain glucose levels >70mg/dl to <250mg/dl throughout shift  Outcome: Progressing  Goal: No changes in neurological exam by end of shift  Outcome: Progressing  Goal: Learn about and adhere to nutrition recommendations by end of shift  Outcome: Progressing  Goal: Vital signs within normal range for age by end of shift  Outcome: Progressing  Goal: Increase self care and/or family involovement by end of shift  Outcome: Progressing  Goal: Receive DSME education by end of shift  Outcome:  Progressing

## 2024-11-28 NOTE — PROGRESS NOTES
"Arvin Diallo \"Marco A\" is a 84 y.o. male on day 3 of admission presenting with Urinary tract infection without hematuria, site unspecified.      Subjective   No events over night,   Reports frontal headache.   No SOB at rest.     No chest symptoms.            Objective     Last Recorded Vitals  /79 (Patient Position: Lying)   Pulse 87   Temp 36.7 °C (98.1 °F) (Temporal)   Resp 18   Wt 123 kg (271 lb 13.2 oz)   SpO2 95%   Intake/Output last 3 Shifts:    Intake/Output Summary (Last 24 hours) at 11/28/2024 0735  Last data filed at 11/27/2024 1300  Gross per 24 hour   Intake 960 ml   Output --   Net 960 ml       Admission Weight  Weight: 127 kg (280 lb) (11/25/24 1933)    Daily Weight  11/25/24 : 123 kg (271 lb 13.2 oz)    Image Results    Vitals:    11/28/24 0734   BP: 118/67   Pulse: 79   Resp: 18   Temp: 36.6 °C (97.9 °F)   SpO2: 94%       Physical Exam  Constitutional:       Appearance: Normal appearance.      Comments: Looks comfortable at rest    HENT:      Head: Normocephalic.      Nose: Nose normal.   Eyes:      Extraocular Movements: Extraocular movements intact.      Pupils: Pupils are equal, round, and reactive to light.   Cardiovascular:      Rate and Rhythm: Normal rate and regular rhythm.      Heart sounds: Normal heart sounds. No murmur heard.  Pulmonary:      Effort: No respiratory distress.      Breath sounds: Normal breath sounds. No wheezing.   Abdominal:      General: There is no distension.      Palpations: Abdomen is soft.      Tenderness: There is no abdominal tenderness.   Musculoskeletal:         General: Normal range of motion.      Cervical back: Normal range of motion.   Skin:     General: Skin is warm.   Neurological:      General: No focal deficit present.      Mental Status: He is alert and oriented to person, place, and time. Mental status is at baseline.   Psychiatric:         Mood and Affect: Mood normal.         Relevant Results  Scheduled medications  apixaban, 5 mg, oral, " BID  finasteride, 5 mg, oral, Daily  losartan, 50 mg, oral, Daily  montelukast, 10 mg, oral, Nightly  oxygen, , inhalation, Continuous - Inhalation  polyethylene glycol, 17 g, oral, Daily  simvastatin, 40 mg, oral, Daily      Continuous medications     PRN medications  PRN medications: acetaminophen **OR** acetaminophen **OR** acetaminophen, bisacodyl, ketorolac, magnesium hydroxide, ondansetron ODT **OR** ondansetron, sodium chloride    No results found. However, due to the size of the patient record, not all encounters were searched. Please check Results Review for a complete set of results.    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 Urine cultures came back negative. Reports syncope prior to admission.            Complex migraine headache  Discussed with his neurologist over the phone  Discontinue Depakote  Toradol 50 mg every 6 hours as needed for pain    Syncope:  Orthostatic signs  Monitor on telemetry  PT OT evaluation  Monitor on telemetry     Hypertension  Continue losartan     Hyperlipidemia  Continue with Zocor     Possible UTI:   Urine culture negative  Antibiotic discontinued,      BPH  On  Flomax     Sleep apnea on CPAP     History of DVT /PE:   on Eliquis      PT, OT assessment.        Zak Chen MD

## 2024-11-28 NOTE — CARE PLAN
The patient's goals for the shift include      The clinical goals for the shift include rest comfortably    Over the shift, the patient did not make progress toward the following goals. Barriers to progression include . Recommendations to address these barriers include .

## 2024-11-29 LAB — CRP SERPL-MCNC: <0.1 MG/DL

## 2024-11-29 PROCEDURE — 2500000001 HC RX 250 WO HCPCS SELF ADMINISTERED DRUGS (ALT 637 FOR MEDICARE OP): Performed by: INTERNAL MEDICINE

## 2024-11-29 PROCEDURE — 99232 SBSQ HOSP IP/OBS MODERATE 35: CPT | Performed by: INTERNAL MEDICINE

## 2024-11-29 PROCEDURE — 1200000002 HC GENERAL ROOM WITH TELEMETRY DAILY

## 2024-11-29 PROCEDURE — 2500000005 HC RX 250 GENERAL PHARMACY W/O HCPCS: Performed by: HOSPITALIST

## 2024-11-29 PROCEDURE — 2500000004 HC RX 250 GENERAL PHARMACY W/ HCPCS (ALT 636 FOR OP/ED): Performed by: INTERNAL MEDICINE

## 2024-11-29 PROCEDURE — 2500000002 HC RX 250 W HCPCS SELF ADMINISTERED DRUGS (ALT 637 FOR MEDICARE OP, ALT 636 FOR OP/ED): Performed by: INTERNAL MEDICINE

## 2024-11-29 PROCEDURE — 2500000004 HC RX 250 GENERAL PHARMACY W/ HCPCS (ALT 636 FOR OP/ED): Performed by: HOSPITALIST

## 2024-11-29 PROCEDURE — 94761 N-INVAS EAR/PLS OXIMETRY MLT: CPT

## 2024-11-29 ASSESSMENT — COGNITIVE AND FUNCTIONAL STATUS - GENERAL
WALKING IN HOSPITAL ROOM: A LITTLE
MOBILITY SCORE: 22
WALKING IN HOSPITAL ROOM: A LITTLE
MOBILITY SCORE: 22
DAILY ACTIVITIY SCORE: 24
CLIMB 3 TO 5 STEPS WITH RAILING: A LITTLE
DAILY ACTIVITIY SCORE: 24
CLIMB 3 TO 5 STEPS WITH RAILING: A LITTLE

## 2024-11-29 ASSESSMENT — PAIN DESCRIPTION - LOCATION
LOCATION: HEAD

## 2024-11-29 ASSESSMENT — PAIN - FUNCTIONAL ASSESSMENT
PAIN_FUNCTIONAL_ASSESSMENT: 0-10
PAIN_FUNCTIONAL_ASSESSMENT: 0-10

## 2024-11-29 ASSESSMENT — PAIN SCALES - GENERAL
PAINLEVEL_OUTOF10: 3
PAINLEVEL_OUTOF10: 6
PAINLEVEL_OUTOF10: 3
PAINLEVEL_OUTOF10: 3
PAINLEVEL_OUTOF10: 4

## 2024-11-29 ASSESSMENT — PAIN DESCRIPTION - DESCRIPTORS
DESCRIPTORS: PRESSURE
DESCRIPTORS: PRESSURE

## 2024-11-29 NOTE — PROGRESS NOTES
"Arvin Diallo \"Marco A\" is a 84 y.o. male on day 4 of admission presenting with Urinary tract infection without hematuria, site unspecified.      Subjective   No events over night,   Stillreports frontal headache.   No SOB at rest.     No chest symptoms.            Objective     Last Recorded Vitals  /64   Pulse 76   Temp 36.2 °C (97.2 °F)   Resp 20   Wt 123 kg (271 lb 13.2 oz)   SpO2 95%   Intake/Output last 3 Shifts:    Intake/Output Summary (Last 24 hours) at 11/29/2024 1214  Last data filed at 11/29/2024 0900  Gross per 24 hour   Intake 240 ml   Output --   Net 240 ml       Admission Weight  Weight: 127 kg (280 lb) (11/25/24 1933)    Daily Weight  11/25/24 : 123 kg (271 lb 13.2 oz)    Image Results    Vitals:    11/29/24 1128   BP:    Pulse:    Resp:    Temp:    SpO2: 95%       Physical Exam  Constitutional:       Appearance: Normal appearance.      Comments: Looks comfortable at rest    HENT:      Head: Normocephalic.      Nose: Nose normal.   Eyes:      Extraocular Movements: Extraocular movements intact.      Pupils: Pupils are equal, round, and reactive to light.   Cardiovascular:      Rate and Rhythm: Normal rate and regular rhythm.      Heart sounds: Normal heart sounds. No murmur heard.  Pulmonary:      Effort: No respiratory distress.      Breath sounds: Normal breath sounds. No wheezing.   Abdominal:      General: There is no distension.      Palpations: Abdomen is soft.      Tenderness: There is no abdominal tenderness.   Musculoskeletal:         General: Normal range of motion.      Cervical back: Normal range of motion.   Skin:     General: Skin is warm.   Neurological:      General: No focal deficit present.      Mental Status: He is alert and oriented to person, place, and time. Mental status is at baseline.   Psychiatric:         Mood and Affect: Mood normal.         Relevant Results  Scheduled medications  apixaban, 5 mg, oral, BID  cetirizine, 10 mg, oral, Daily  finasteride, 5 mg, " oral, Daily  losartan, 50 mg, oral, Daily  montelukast, 10 mg, oral, Nightly  oxygen, , inhalation, Continuous - Inhalation  polyethylene glycol, 17 g, oral, Daily  simvastatin, 40 mg, oral, Daily      Continuous medications     PRN medications  PRN medications: acetaminophen **OR** acetaminophen **OR** acetaminophen, bisacodyl, ketorolac, magnesium hydroxide, ondansetron ODT **OR** ondansetron, sodium chloride    No results found. However, due to the size of the patient record, not all encounters were searched. Please check Results Review for a complete set of results.    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 Urine cultures came back negative. Reports syncope prior to admission.            Complex migraine headache  Discussed with his neurologist over the phone  Discontinue Depakote  Toradol 15 mg every 6 hours as needed for pain    Syncope:  Orthostatic negative on 11/28,   Monitor on telemetry  PT OT evaluation  He has loup recorder as per his family,   Follow up with his cardiologist.     Hypertension  Continue losartan     Hyperlipidemia  Continue with Zocor     Possible UTI:   Urine culture negative  Antibiotic discontinued,      BPH  On  Flomax     Sleep apnea on CPAP     History of DVT /PE:   on Eliquis     Dispo: Patient is medically stable, however reports feeling unwell. Talk to family today.If they willing DC today.        Zak Chen MD

## 2024-11-29 NOTE — CARE PLAN
The patient's goals for the shift include  no pain    The clinical goals for the shift include Pt will have decreased headache sx this shift. No falls, labs and vs wdl, pain control

## 2024-11-29 NOTE — PROGRESS NOTES
Pt not yet medically ready for discharge. Plan is for Pt to return home to [facility] when medically ready. No needs for additional supports or services anticipated. SW to follow.

## 2024-11-29 NOTE — PROGRESS NOTES
Per medical team, patient is medically appropriate for discharge today, however, patient feels very unwell; Hospitalist is aware and has not yet ordered patient's discharge.  met with patient and patient's dtr in patient's room to review discharge plan and Medicare IM. Patient confirmed that patient feels very unwell and will appeal patient's discharge if ordered. Patient's dtr expressed understanding and agreement with patient. SW relayed same to hospitalist/Dr. Chen and awaits outcome. Patient's current plan is to return home when medically ready with no Care Transitions needs foreseen. Care Transitions to follow and assist should any new needs arise. MARYLIN Velazquez

## 2024-11-30 PROCEDURE — 2500000002 HC RX 250 W HCPCS SELF ADMINISTERED DRUGS (ALT 637 FOR MEDICARE OP, ALT 636 FOR OP/ED): Performed by: INTERNAL MEDICINE

## 2024-11-30 PROCEDURE — 2500000004 HC RX 250 GENERAL PHARMACY W/ HCPCS (ALT 636 FOR OP/ED): Performed by: HOSPITALIST

## 2024-11-30 PROCEDURE — 94761 N-INVAS EAR/PLS OXIMETRY MLT: CPT

## 2024-11-30 PROCEDURE — 99232 SBSQ HOSP IP/OBS MODERATE 35: CPT | Performed by: INTERNAL MEDICINE

## 2024-11-30 PROCEDURE — 2500000001 HC RX 250 WO HCPCS SELF ADMINISTERED DRUGS (ALT 637 FOR MEDICARE OP): Performed by: INTERNAL MEDICINE

## 2024-11-30 PROCEDURE — 2500000004 HC RX 250 GENERAL PHARMACY W/ HCPCS (ALT 636 FOR OP/ED): Performed by: INTERNAL MEDICINE

## 2024-11-30 PROCEDURE — 2500000005 HC RX 250 GENERAL PHARMACY W/O HCPCS: Performed by: HOSPITALIST

## 2024-11-30 PROCEDURE — 1200000002 HC GENERAL ROOM WITH TELEMETRY DAILY

## 2024-11-30 ASSESSMENT — PAIN SCALES - GENERAL
PAINLEVEL_OUTOF10: 7
PAINLEVEL_OUTOF10: 3
PAINLEVEL_OUTOF10: 6

## 2024-11-30 ASSESSMENT — PAIN - FUNCTIONAL ASSESSMENT: PAIN_FUNCTIONAL_ASSESSMENT: 0-10

## 2024-11-30 ASSESSMENT — PAIN DESCRIPTION - LOCATION: LOCATION: HEAD

## 2024-11-30 ASSESSMENT — COGNITIVE AND FUNCTIONAL STATUS - GENERAL
CLIMB 3 TO 5 STEPS WITH RAILING: A LITTLE
STANDING UP FROM CHAIR USING ARMS: A LITTLE
MOBILITY SCORE: 21
MOVING TO AND FROM BED TO CHAIR: A LITTLE

## 2024-11-30 NOTE — PROGRESS NOTES
"Physical Therapy                 Therapy Communication Note    Patient Name: Arvin Diallo \"Nekoma\"  MRN: [id number]  Department: [facility]  Room: [address] [place], [place] [address]  Today's Date: 11/30/2024     Discipline: Physical Therapy    Patient was seen for PT evaluation on 11/26/24 and mobility was at baseline.  New PT order received today 11/30/24.  Spoke with patient nurse who states no changes in mobility.  Patient continues to get dizzy which has been a chronic issue for over a year.  Per patient's daughters patient has had multiple rounds of vestibular PT which were not successful and has other PT as well.  Patient should use wheelchair for long distance ambulation if he feels dizzy and is unsafe to ambulate further distances.  No new acute PT needs identified at this time.  "

## 2024-11-30 NOTE — CARE PLAN
The patient's goals for the shift include      The clinical goals for the shift include Pt will state decrease in head pressure by end of shift.    Problem: Pain - Adult  Goal: Verbalizes/displays adequate comfort level or baseline comfort level  Outcome: Progressing     Problem: Safety - Adult  Goal: Free from fall injury  Outcome: Progressing     Problem: Discharge Planning  Goal: Discharge to home or other facility with appropriate resources  Outcome: Progressing     Problem: Chronic Conditions and Co-morbidities  Goal: Patient's chronic conditions and co-morbidity symptoms are monitored and maintained or improved  Outcome: Progressing     Problem: Fall/Injury  Goal: Not fall by end of shift  Outcome: Progressing  Goal: Be free from injury by end of the shift  Outcome: Progressing  Goal: Verbalize understanding of personal risk factors for fall in the hospital  Outcome: Progressing  Goal: Verbalize understanding of risk factor reduction measures to prevent injury from fall in the home  Outcome: Progressing  Goal: Use assistive devices by end of the shift  Outcome: Progressing  Goal: Pace activities to prevent fatigue by end of the shift  Outcome: Progressing     Problem: Diabetes  Goal: Achieve decreasing blood glucose levels by end of shift  Outcome: Progressing  Goal: Increase stability of blood glucose readings by end of shift  Outcome: Progressing  Goal: Maintain electrolyte levels within acceptable range throughout shift  Outcome: Progressing  Goal: Maintain glucose levels >70mg/dl to <250mg/dl throughout shift  Outcome: Progressing  Goal: No changes in neurological exam by end of shift  Outcome: Progressing  Goal: Learn about and adhere to nutrition recommendations by end of shift  Outcome: Progressing  Goal: Vital signs within normal range for age by end of shift  Outcome: Progressing  Goal: Increase self care and/or family involovement by end of shift  Outcome: Progressing  Goal: Receive DSME education by  end of shift  Outcome: Progressing

## 2024-11-30 NOTE — CARE PLAN
The patient's goals for the shift include    Problem: Pain - Adult  Goal: Verbalizes/displays adequate comfort level or baseline comfort level  Outcome: Progressing     Problem: Safety - Adult  Goal: Free from fall injury  Outcome: Progressing     Problem: Discharge Planning  Goal: Discharge to home or other facility with appropriate resources  Outcome: Progressing     Problem: Chronic Conditions and Co-morbidities  Goal: Patient's chronic conditions and co-morbidity symptoms are monitored and maintained or improved  Outcome: Progressing     Problem: Fall/Injury  Goal: Not fall by end of shift  Outcome: Progressing  Goal: Be free from injury by end of the shift  Outcome: Progressing  Goal: Verbalize understanding of personal risk factors for fall in the hospital  Outcome: Progressing  Goal: Verbalize understanding of risk factor reduction measures to prevent injury from fall in the home  Outcome: Progressing  Goal: Use assistive devices by end of the shift  Outcome: Progressing  Goal: Pace activities to prevent fatigue by end of the shift  Outcome: Progressing     Problem: Diabetes  Goal: Achieve decreasing blood glucose levels by end of shift  Outcome: Progressing  Goal: Increase stability of blood glucose readings by end of shift  Outcome: Progressing  Goal: Maintain electrolyte levels within acceptable range throughout shift  Outcome: Progressing  Goal: Maintain glucose levels >70mg/dl to <250mg/dl throughout shift  Outcome: Progressing  Goal: No changes in neurological exam by end of shift  Outcome: Progressing  Goal: Learn about and adhere to nutrition recommendations by end of shift  Outcome: Progressing  Goal: Vital signs within normal range for age by end of shift  Outcome: Progressing  Goal: Increase self care and/or family involovement by end of shift  Outcome: Progressing  Goal: Receive DSME education by end of shift  Outcome: Progressing     Problem: Pain  Goal: Takes deep breaths with improved pain  control throughout the shift  Outcome: Progressing  Goal: Turns in bed with improved pain control throughout the shift  Outcome: Progressing  Goal: Walks with improved pain control throughout the shift  Outcome: Progressing  Goal: Performs ADL's with improved pain control throughout shift  Outcome: Progressing  Goal: Participates in PT with improved pain control throughout the shift  Outcome: Progressing  Goal: Free from opioid side effects throughout the shift  Outcome: Progressing  Goal: Free from acute confusion related to pain meds throughout the shift  Outcome: Progressing       The clinical goals for the shift include Pt will state decrease in head pressure by end of shift.    Over the shift, the patient did not make progress toward the following goals. Barriers to progression include head pressure (chronic). Recommendations to address these barriers include pt receiving prn Toradol and Tylenol for pain.

## 2024-11-30 NOTE — PROGRESS NOTES
"Arvin Diallo \"Marco A\" is a 84 y.o. male on day 5 of admission presenting with Urinary tract infection without hematuria, site unspecified.      Subjective   No events over night,   Reporting frontal pressure, no headache,   No SOB at rest.     No chest symptoms.            Objective     Last Recorded Vitals  /80 (BP Location: Right arm, Patient Position: Lying)   Pulse 81   Temp 36.5 °C (97.7 °F) (Temporal)   Resp 18   Wt 123 kg (271 lb 13.2 oz)   SpO2 94%   Intake/Output last 3 Shifts:    Intake/Output Summary (Last 24 hours) at 11/30/2024 1223  Last data filed at 11/30/2024 1000  Gross per 24 hour   Intake 650 ml   Output --   Net 650 ml       Admission Weight  Weight: 127 kg (280 lb) (11/25/24 1933)    Daily Weight  11/25/24 : 123 kg (271 lb 13.2 oz)    Image Results    Vitals:    11/30/24 0700   BP: 128/80   Pulse: 81   Resp: 18   Temp: 36.5 °C (97.7 °F)   SpO2: 94%       Physical Exam  Constitutional:       Appearance: Normal appearance.      Comments: Looks comfortable at rest    HENT:      Head: Normocephalic.      Nose: Nose normal.   Eyes:      Extraocular Movements: Extraocular movements intact.      Pupils: Pupils are equal, round, and reactive to light.   Cardiovascular:      Rate and Rhythm: Normal rate and regular rhythm.      Heart sounds: Normal heart sounds. No murmur heard.  Pulmonary:      Effort: No respiratory distress.      Breath sounds: Normal breath sounds. No wheezing.   Abdominal:      General: There is no distension.      Palpations: Abdomen is soft.      Tenderness: There is no abdominal tenderness.   Musculoskeletal:         General: Normal range of motion.      Cervical back: Normal range of motion.   Skin:     General: Skin is warm.   Neurological:      General: No focal deficit present.      Mental Status: He is alert and oriented to person, place, and time. Mental status is at baseline.   Psychiatric:         Mood and Affect: Mood normal.         Relevant " Results  Scheduled medications  apixaban, 5 mg, oral, BID  cetirizine, 10 mg, oral, Daily  finasteride, 5 mg, oral, Daily  losartan, 50 mg, oral, Daily  montelukast, 10 mg, oral, Nightly  oxygen, , inhalation, Continuous - Inhalation  polyethylene glycol, 17 g, oral, Daily  simvastatin, 40 mg, oral, Daily      Continuous medications     PRN medications  PRN medications: acetaminophen **OR** acetaminophen **OR** acetaminophen, bisacodyl, ketorolac, magnesium hydroxide, ondansetron ODT **OR** ondansetron, sodium chloride    No results found. However, due to the size of the patient record, not all encounters were searched. Please check Results Review for a complete set of results.    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 Urine cultures came back negative. Reports syncope prior to admission.         Complex migraine headache  Discussed with his neurologist over the phone   Depakote discontinued  Tylenol and Toradol PRN  Cetirizine 10 mg daily added for suspected sinus congestion,      Syncope:  Orthostatic negative on 11/28,   Monitor on telemetry  PT OT evaluation  He has loup recorder as per his family,   Follow up with his cardiologist.     Hypertension  Continue losartan     Hyperlipidemia  Continue with Zocor     Possible UTI:   Urine culture negative  Antibiotic discontinued,      BPH  On  Flomax     Sleep apnea on CPAP     History of DVT /PE:   on Eliquis     Dispo: Patient is medically stable, however reports feeling unwell. Talk to family today.        Zak Chen MD

## 2024-12-01 VITALS
DIASTOLIC BLOOD PRESSURE: 79 MMHG | OXYGEN SATURATION: 94 % | BODY MASS INDEX: 33.8 KG/M2 | WEIGHT: 271.83 LBS | RESPIRATION RATE: 18 BRPM | HEART RATE: 74 BPM | TEMPERATURE: 96.8 F | SYSTOLIC BLOOD PRESSURE: 159 MMHG | HEIGHT: 75 IN

## 2024-12-01 PROCEDURE — 2500000001 HC RX 250 WO HCPCS SELF ADMINISTERED DRUGS (ALT 637 FOR MEDICARE OP): Performed by: INTERNAL MEDICINE

## 2024-12-01 PROCEDURE — 2500000004 HC RX 250 GENERAL PHARMACY W/ HCPCS (ALT 636 FOR OP/ED): Performed by: HOSPITALIST

## 2024-12-01 PROCEDURE — 2500000002 HC RX 250 W HCPCS SELF ADMINISTERED DRUGS (ALT 637 FOR MEDICARE OP, ALT 636 FOR OP/ED): Performed by: INTERNAL MEDICINE

## 2024-12-01 PROCEDURE — 94761 N-INVAS EAR/PLS OXIMETRY MLT: CPT

## 2024-12-01 PROCEDURE — 99238 HOSP IP/OBS DSCHRG MGMT 30/<: CPT | Performed by: INTERNAL MEDICINE

## 2024-12-01 PROCEDURE — 2500000004 HC RX 250 GENERAL PHARMACY W/ HCPCS (ALT 636 FOR OP/ED): Performed by: INTERNAL MEDICINE

## 2024-12-01 RX ORDER — SUMATRIPTAN 6 MG/.5ML
6 INJECTION, SOLUTION SUBCUTANEOUS ONCE
Status: COMPLETED | OUTPATIENT
Start: 2024-12-01 | End: 2024-12-01

## 2024-12-01 ASSESSMENT — PAIN DESCRIPTION - LOCATION: LOCATION: HEAD

## 2024-12-01 ASSESSMENT — COGNITIVE AND FUNCTIONAL STATUS - GENERAL
DRESSING REGULAR UPPER BODY CLOTHING: A LITTLE
HELP NEEDED FOR BATHING: A LITTLE
DRESSING REGULAR LOWER BODY CLOTHING: A LITTLE
TOILETING: A LITTLE
MOBILITY SCORE: 22
DAILY ACTIVITIY SCORE: 20
WALKING IN HOSPITAL ROOM: A LITTLE
CLIMB 3 TO 5 STEPS WITH RAILING: A LITTLE

## 2024-12-01 ASSESSMENT — PAIN SCALES - GENERAL
PAINLEVEL_OUTOF10: 10 - WORST POSSIBLE PAIN
PAINLEVEL_OUTOF10: 5 - MODERATE PAIN

## 2024-12-01 NOTE — NURSING NOTE
Discharge instructions reviewed with patient and daughter. Follow up appointments reviewed. IV removed, no complications. Patient leaving via wheelchair into private vehicle with daughter.

## 2024-12-01 NOTE — DISCHARGE INSTRUCTIONS
"Urinary tract infection - Discharge instructions    The Basics  Written by the doctors and editors at [facility]  What are discharge instructions? -- Discharge instructions are information about how to take care of yourself after getting medical care for a health problem.  What is a urinary tract infection? -- A urinary tract infection (\"UTI\") is an infection that affects either the bladder or the kidneys (figure 1). A kidney infection is more serious, and can lead to other serious problems if it is not treated properly.  You need to take antibiotics to treat a UTI. It is important to take all of your antibiotics, even if you start to feel better.  How do I care for myself at home? -- Ask the doctor or nurse what you should do when you go home. Make sure that you understand exactly what you need to do to care for yourself. Ask questions if there is anything you do not understand.  You should also:  ? Take all of your medicines as instructed.  ? Take phenazopyridine (sample brand name: AZO Urinary Pain Relief) for the first day or so, if you choose. This is an over-the-counter medicine. It will help numb your bladder and decrease the urge to urinate. This medicine causes your urine and tears to look orange.  ? Take acetaminophen (sample brand name: Tylenol) if needed for pain.  ? Drink extra fluids. This can help prevent more bladder infections. If you have sex, these things might also help:  ? Urinate right afterward.  ? If you use birth control, use a form that does not contain spermicide.  When should I call the doctor? -- Call for advice if:  ? You have pain in your back, shoulder, or belly.  ? You have a fever, shaking chills, or sweats even though you are taking antibiotics.  ? You notice more blood in your urine.  ? Your symptoms get worse or do not get better within 24 hours of starting antibiotics.  ? Your symptoms come back after finishing treatment.  ? You have any new or worrying symptoms.  All topics are " updated as new evidence becomes available and our peer review process is complete.  This topic retrieved from [employer] on: May 30, 2024.  Topic 287538 Version 1.0  Release: 32.5.3 - C32.150  © 2024 UpToDate, Inc. and/or its affiliates. All rights reserved.  figure 1: Anatomy of the urinary tract

## 2024-12-01 NOTE — PROGRESS NOTES
Patient is ready for discharge today and will return to the [facility]. JORGE did review the IMM with him which he signed and he did not want a copy of this. Original placed on the chart. JORGE also called [facility] to let them know he will be returning to them today.       MARYLIN Toussaint

## 2024-12-01 NOTE — DISCHARGE SUMMARY
Discharge Diagnosis  Urinary tract infection without hematuria, site unspecified    Issues Requiring Follow-Up  Chronic headaches. Follow up  with your neurologist    Test Results Pending At Discharge  Pending Labs       No current pending labs.            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   Please refer to the H&P for further detailed history.   Prelim workup in the ER showed urinary tract infection.  Patient was admitted to the medical service.  Started on IV antibiotics.  TSH was checked.  Came back grossly within normal limits.  Urine culture came back negative so antibiotics were discontinued.  Physical therapy was consulted and patient was noted to not have any significant functional impairment.  During his hospital stay, patient continued to experience the headaches.  Further history taking with his daughter reviewed the patient has been suffering with those headaches for a while now.  Has had seen multiple neurologist so far and is recently seeing a migraine specialist.  He was given here at the hospital Toradol and then sumatriptan.  Patient was discharged after that back to the assisted living facility with recommendation to follow-up with neurology for his headaches.    Pertinent Physical Exam At Time of Discharge  Physical Exam  Constitutional:       General: He is not in acute distress.     Appearance: He is obese. He is not ill-appearing.      Comments: Awake alert and oriented x3   HENT:      Mouth/Throat:      Pharynx: Oropharynx is  clear.   Eyes:      Pupils: Pupils are equal, round, and reactive to light.   Cardiovascular:      Rate and Rhythm: Normal rate and regular rhythm.      Heart sounds: Normal heart sounds.   Pulmonary:      Effort: No respiratory distress.      Breath sounds: Normal breath sounds. No wheezing or rhonchi.   Abdominal:      General: Abdomen is flat. Bowel sounds are normal. There is no distension.      Palpations: Abdomen is soft.      Tenderness: There is no abdominal tenderness.   Musculoskeletal:         General: No swelling.   Skin:     General: Skin is warm.   Neurological:      General: No focal deficit present.   Psychiatric:         Mood and Affect: Mood normal.         Behavior: Behavior normal.         Thought Content: Thought content normal.         Judgment: Judgment normal.       A total of 30 minutes were spent in preparing and coordinating this patient's discharge. This includes a face-to-face encounter, patient counseling,  discharge and follow-up assessments.  (This note was generated with voice recognition software and may contain errors including spelling, grammar, syntax and misrecognition of what was dictated, that are not fully corrected)     Home Medications     Medication List      CONTINUE taking these medications     ALBUTEROL INHL   apixaban 5 mg tablet; Commonly known as: Eliquis; Take 1 tablet (5 mg)   by mouth 2 times a day.   aspirin-acetaminophen-caffeine 250-250-65 mg tablet; Commonly known as:   Excedrin Migraine   CALCIUM 600 ORAL   cephalexin 500 mg capsule; Commonly known as: Keflex; TAKE 1 CAPSULE   TWICE DAILY   divalproex 125 mg EC tablet; Commonly known as: Depakote; Take 1 tablet   (125 mg) by mouth 2 times a day. 1 tab in the morning and 2 tab in evening   finasteride 5 mg tablet; Commonly known as: Proscar; Take 1 tablet (5   mg) by mouth once daily.   fluticasone 50 mcg/actuation nasal spray; Commonly known as: Flonase;   INSTILL 2 SPRAYS IN EACH NOSTRIL DAILY   losartan 50  mg tablet; Commonly known as: Cozaar; Take 1 tablet (50 mg)   by mouth once daily.   lubricating eye drops ophthalmic solution   magnesium oxide 400 mg (241.3 mg magnesium) tablet; Commonly known as:   Mag-Ox   melatonin 10 mg tablet   Metamucil Fiber Thin 2.5 gram wafer wafer; Generic drug: psyllium husk   (with sugar)   montelukast 10 mg tablet; Commonly known as: Singulair; Take 1 tablet   (10 mg) by mouth once daily at bedtime.   MULTIPLE VITAMINS ORAL   oxygen gas therapy; Commonly known as: O2   simvastatin 40 mg tablet; Commonly known as: Zocor; Take 1 tablet (40   mg) by mouth early in the morning..     ASK your doctor about these medications     riboflavin 100 mg tablet tablet; Commonly known as: vitamin B2; Ask   about: Should I take this medication?       Outpatient Follow-Up  Future Appointments   Date Time Provider Department Center   5/20/2025 10:20 AM Santiago Beckwith MD [id number] None       Neeraj García MD

## 2024-12-03 ENCOUNTER — PATIENT OUTREACH (OUTPATIENT)
Age: 84
End: 2024-12-03
Payer: MEDICARE

## 2024-12-03 NOTE — PROGRESS NOTES
Discharge Facility:   [facility]   Discharge Diagnosis:  Urinary tract infection without hematuria, site unspe  Admission Date:  11/25/24  Discharge Date:  12/1/24  [place]     PCP Appointment Date:  Specialist Appointment Date:  12/10/24 cardio   Hospital Encounter and Summary Linked: Yes  See discharge assessment below for further details    Issues Requiring Follow-Up  Chronic headaches. Follow up  with your neurologist    Unable to reach patient x2 attempts. Voicemail left with call back number.

## 2024-12-10 NOTE — DOCUMENTATION CLARIFICATION NOTE
"    PATIENT:               MEME ARNOLD  ACCT #:                  [id number]  MRN:                       [id number]  :                       1940  ADMIT DATE:       2024 7:26 PM  DISCH DATE:        2024 2:10 PM  RESPONDING PROVIDER #:        [id number]          PROVIDER RESPONSE TEXT:    UTI ruled out after workup    CDI QUERY TEXT:    Clarification    Instruction:    Based on your assessment of the patient and the clinical information, please provide the requested documentation by clicking on the appropriate radio button and enter any additional information if prompted.    Question: Please further clarify the diagnosis of UTI as    When answering this query, please exercise your independent professional judgment. The fact that a question is being asked, does not imply that any particular answer is desired or expected.    The patient's clinical indicators include:  Clinical Information:  --  ED provider notes \"Patient presents with feeling that he was going to die and passed out'    Clinical Indicators:  -- ED provider note \"States he has been having ongoing headaches for more than a year of uncertain etiology\", and \"Urine is concerning for infection otherwise no acute concerns were noted.  Will start on Rocephin 2 g given IV\"  -- H&P \"Prelim workup in the ER showed urinary tract infection\"  --  progress note- \"Patient had migraine headache relieved with Toradol\"  --  Discharge summary  final diagnosis \"Urinary tract infection without hematuria\" and in hospital course notes \" Urine culture came back negative so antibiotics were discontinued\".    Treatment:  -- - IV rocephin 2 gm x 1 dose  --  IV Cipro q 12hrs- 2 doses  and one dose  and discontinued    Risk Factors:  --- UA shows 250 Leukocyte Esterase and 2+ blood, 1+ bacteria and cultures indicates \"Clinically insignificant growth based on current clinical standards\"  Options provided:  -- UTI ruled out after " workup  -- UTI ruled in for this admission  -- Other - I will add my own diagnosis  -- Refer to Clinical Documentation Reviewer    Query created by: Cathleen Reeves on 12/9/2024 7:28 AM      Electronically signed by:  TI PINEDA MD 12/9/2024 7:50 PM

## 2024-12-17 ENCOUNTER — PATIENT OUTREACH (OUTPATIENT)
Age: 84
End: 2024-12-17
Payer: MEDICARE

## 2024-12-17 DIAGNOSIS — G47.33 OBSTRUCTIVE SLEEP APNEA, ADULT: ICD-10-CM

## 2024-12-17 DIAGNOSIS — Z86.718 HISTORY OF DVT (DEEP VEIN THROMBOSIS): ICD-10-CM

## 2024-12-17 DIAGNOSIS — J45.20 MILD INTERMITTENT ASTHMA WITHOUT COMPLICATION (HHS-HCC): ICD-10-CM

## 2024-12-17 RX ORDER — ALBUTEROL SULFATE 90 UG/1
2 INHALANT RESPIRATORY (INHALATION) EVERY 4 HOURS PRN
Qty: 8 G | Refills: 5 | Status: SHIPPED | OUTPATIENT
Start: 2024-12-17 | End: 2025-12-17

## 2024-12-17 NOTE — PROGRESS NOTES
Unable to reach patient for call back after recent hospitalization.   M with call back number for patient to call if needed   If no voicemail available call attempts x 2 were made to contact the patient to assist with any questions or concerns patient may have.      [Non-Contributory] : Non-contributory [Normal?] : normal delivery [Was child in NICU?] : Child was not in NICU

## 2025-02-28 DIAGNOSIS — I10 MILD HTN: ICD-10-CM

## 2025-03-14 ENCOUNTER — OFFICE VISIT (OUTPATIENT)
Age: 85
End: 2025-03-14
Payer: MEDICARE

## 2025-03-14 VITALS
HEART RATE: 103 BPM | DIASTOLIC BLOOD PRESSURE: 70 MMHG | OXYGEN SATURATION: 93 % | SYSTOLIC BLOOD PRESSURE: 108 MMHG | WEIGHT: 272.2 LBS | HEIGHT: 75 IN | BODY MASS INDEX: 33.85 KG/M2

## 2025-03-14 DIAGNOSIS — E78.2 MIXED HYPERLIPIDEMIA: ICD-10-CM

## 2025-03-14 DIAGNOSIS — J45.20 MILD INTERMITTENT ASTHMA WITHOUT COMPLICATION (HHS-HCC): ICD-10-CM

## 2025-03-14 DIAGNOSIS — I10 MILD HTN: ICD-10-CM

## 2025-03-14 DIAGNOSIS — N40.0 ENLARGED PROSTATE: ICD-10-CM

## 2025-03-14 DIAGNOSIS — J45.909 ASTHMA, UNSPECIFIED ASTHMA SEVERITY, UNSPECIFIED WHETHER COMPLICATED, UNSPECIFIED WHETHER PERSISTENT (HHS-HCC): Primary | ICD-10-CM

## 2025-03-14 PROCEDURE — 1157F ADVNC CARE PLAN IN RCRD: CPT | Performed by: FAMILY MEDICINE

## 2025-03-14 PROCEDURE — 99213 OFFICE O/P EST LOW 20 MIN: CPT | Performed by: FAMILY MEDICINE

## 2025-03-14 PROCEDURE — 3078F DIAST BP <80 MM HG: CPT | Performed by: FAMILY MEDICINE

## 2025-03-14 PROCEDURE — 1036F TOBACCO NON-USER: CPT | Performed by: FAMILY MEDICINE

## 2025-03-14 PROCEDURE — 1159F MED LIST DOCD IN RCRD: CPT | Performed by: FAMILY MEDICINE

## 2025-03-14 PROCEDURE — 1160F RVW MEDS BY RX/DR IN RCRD: CPT | Performed by: FAMILY MEDICINE

## 2025-03-14 PROCEDURE — 3074F SYST BP LT 130 MM HG: CPT | Performed by: FAMILY MEDICINE

## 2025-03-14 RX ORDER — LOSARTAN POTASSIUM 50 MG/1
50 TABLET ORAL DAILY
Qty: 90 TABLET | Refills: 3 | OUTPATIENT
Start: 2025-03-14

## 2025-03-14 RX ORDER — MONTELUKAST SODIUM 10 MG/1
10 TABLET ORAL NIGHTLY
Qty: 90 TABLET | Refills: 3 | Status: SHIPPED | OUTPATIENT
Start: 2025-03-14

## 2025-03-14 RX ORDER — SIMVASTATIN 40 MG/1
40 TABLET, FILM COATED ORAL DAILY
Qty: 90 TABLET | Refills: 3 | Status: SHIPPED | OUTPATIENT
Start: 2025-03-14 | End: 2026-03-14

## 2025-03-14 RX ORDER — PREDNISONE 10 MG/1
TABLET ORAL
Qty: 30 TABLET | Refills: 0 | Status: SHIPPED | OUTPATIENT
Start: 2025-03-14 | End: 2025-03-26

## 2025-03-14 RX ORDER — FINASTERIDE 5 MG/1
5 TABLET, FILM COATED ORAL DAILY
Qty: 90 TABLET | Refills: 3 | Status: SHIPPED | OUTPATIENT
Start: 2025-03-14 | End: 2026-03-14

## 2025-03-14 RX ORDER — AZITHROMYCIN 250 MG/1
TABLET, FILM COATED ORAL
Qty: 6 TABLET | Refills: 0 | Status: SHIPPED | OUTPATIENT
Start: 2025-03-14 | End: 2025-03-19

## 2025-03-14 RX ORDER — LOSARTAN POTASSIUM 50 MG/1
50 TABLET ORAL DAILY
Qty: 90 TABLET | Refills: 3 | Status: SHIPPED | OUTPATIENT
Start: 2025-03-14 | End: 2026-03-14

## 2025-03-14 ASSESSMENT — ENCOUNTER SYMPTOMS
NAUSEA: 0
SINUS PAIN: 0
FATIGUE: 0
SHORTNESS OF BREATH: 0
APPETITE CHANGE: 0
VOMITING: 0
SORE THROAT: 0
DIARRHEA: 0
RHINORRHEA: 0
COUGH: 1
SINUS PRESSURE: 0
CONSTIPATION: 0
WHEEZING: 0
FEVER: 0
ABDOMINAL PAIN: 0
CHILLS: 0
PALPITATIONS: 0
ACTIVITY CHANGE: 0
CHEST TIGHTNESS: 0

## 2025-03-14 NOTE — PROGRESS NOTES
"Subjective   Patient ID: Marco A Diallo is a 84 y.o. male who presents for Congestion, Cough (x2wks).    HPI   Coughing all winter, more congestion with coughing over the past 2 weeks, more SOB, some wheezing at times, uses albuterol once a day on average, no sputum  To see pulmonary in May  No nasal congestion, no fevers or chills    Review of Systems   Constitutional:  Negative for activity change, appetite change, chills, fatigue and fever.   HENT:  Negative for congestion, postnasal drip, rhinorrhea, sinus pressure, sinus pain and sore throat.    Respiratory:  Positive for cough. Negative for chest tightness, shortness of breath and wheezing.    Cardiovascular:  Negative for chest pain, palpitations and leg swelling.   Gastrointestinal:  Negative for abdominal pain, constipation, diarrhea, nausea and vomiting.       Objective   /70   Pulse 103   Ht 1.905 m (6' 3\")   Wt 123 kg (272 lb 3.2 oz)   SpO2 93%   BMI 34.02 kg/m²     Physical Exam  Vitals and nursing note reviewed.   Constitutional:       Appearance: Normal appearance.   HENT:      Head: Normocephalic and atraumatic.      Right Ear: Tympanic membrane, ear canal and external ear normal.      Left Ear: Tympanic membrane, ear canal and external ear normal.      Nose: Nose normal.      Mouth/Throat:      Mouth: Mucous membranes are moist.      Pharynx: Oropharynx is clear.   Cardiovascular:      Rate and Rhythm: Normal rate and regular rhythm.      Pulses: Normal pulses.      Heart sounds: Normal heart sounds.   Pulmonary:      Effort: Pulmonary effort is normal.      Breath sounds: Normal breath sounds.   Musculoskeletal:      Cervical back: Normal range of motion and neck supple.   Skin:     General: Skin is warm and dry.      Capillary Refill: Capillary refill takes less than 2 seconds.   Neurological:      Mental Status: He is alert.   Psychiatric:         Mood and Affect: Mood normal.         Behavior: Behavior normal.         Assessment/Plan "   Problem List Items Addressed This Visit             ICD-10-CM    Enlarged prostate N40.0    Relevant Medications    finasteride (Proscar) 5 mg tablet    Hyperlipidemia E78.5    Relevant Medications    simvastatin (Zocor) 40 mg tablet    Mild HTN I10    Relevant Medications    losartan (Cozaar) 50 mg tablet    Mild intermittent asthma without complication ([facility]) J45.20     Encouraged to use albuterol up to 4 times a day for the next week, placed on a azithromycin and a 10-day taper of prednisone, call if symptoms do not improve in the next 2 weeks.          Other Visit Diagnoses         Codes    Asthma, unspecified asthma severity, unspecified whether complicated, unspecified whether persistent ([facility])    -  Primary J45.909    Relevant Medications    montelukast (Singulair) 10 mg tablet    predniSONE (Deltasone) 10 mg tablet    azithromycin (Zithromax) 250 mg tablet

## 2025-03-14 NOTE — ASSESSMENT & PLAN NOTE
Encouraged to use albuterol up to 4 times a day for the next week, placed on a azithromycin and a 10-day taper of prednisone, call if symptoms do not improve in the next 2 weeks.

## 2025-04-01 DIAGNOSIS — J45.20 MILD INTERMITTENT ASTHMA WITHOUT COMPLICATION (HHS-HCC): ICD-10-CM

## 2025-04-01 RX ORDER — ALBUTEROL SULFATE 90 UG/1
2 INHALANT RESPIRATORY (INHALATION) EVERY 4 HOURS PRN
Qty: 24 G | Refills: 3 | Status: SHIPPED | OUTPATIENT
Start: 2025-04-01 | End: 2026-04-01

## 2025-04-27 DIAGNOSIS — N40.0 ENLARGED PROSTATE: ICD-10-CM

## 2025-05-05 RX ORDER — FINASTERIDE 5 MG/1
5 TABLET, FILM COATED ORAL DAILY
Qty: 90 TABLET | Refills: 3 | Status: SHIPPED | OUTPATIENT
Start: 2025-05-05

## 2025-05-13 LAB
ALBUMIN SERPL-MCNC: 4 G/DL (ref 3.6–5.1)
ALP SERPL-CCNC: 73 U/L (ref 35–144)
ALT SERPL-CCNC: 19 U/L (ref 9–46)
ANION GAP SERPL CALCULATED.4IONS-SCNC: 9 MMOL/L (CALC) (ref 7–17)
AST SERPL-CCNC: 13 U/L (ref 10–35)
BASOPHILS # BLD AUTO: 10 CELLS/UL (ref 0–200)
BASOPHILS NFR BLD AUTO: 0.1 %
BILIRUB SERPL-MCNC: 1.2 MG/DL (ref 0.2–1.2)
BUN SERPL-MCNC: 23 MG/DL (ref 7–25)
CALCIUM SERPL-MCNC: 9.5 MG/DL (ref 8.6–10.3)
CHLORIDE SERPL-SCNC: 103 MMOL/L (ref 98–110)
CO2 SERPL-SCNC: 24 MMOL/L (ref 20–32)
CREAT SERPL-MCNC: 0.9 MG/DL (ref 0.7–1.22)
EGFRCR SERPLBLD CKD-EPI 2021: 85 ML/MIN/1.73M2
EOSINOPHIL # BLD AUTO: 0 CELLS/UL (ref 15–500)
EOSINOPHIL NFR BLD AUTO: 0 %
ERYTHROCYTE [DISTWIDTH] IN BLOOD BY AUTOMATED COUNT: 12.3 % (ref 11–15)
EST. AVERAGE GLUCOSE BLD GHB EST-MCNC: 154 MG/DL
EST. AVERAGE GLUCOSE BLD GHB EST-SCNC: 8.5 MMOL/L
GLUCOSE SERPL-MCNC: 163 MG/DL (ref 65–99)
HBA1C MFR BLD: 7 %
HCT VFR BLD AUTO: 42.9 % (ref 38.5–50)
HGB BLD-MCNC: 14.3 G/DL (ref 13.2–17.1)
LDLC SERPL DIRECT ASSAY-MCNC: 73 MG/DL
LYMPHOCYTES # BLD AUTO: 1475 CELLS/UL (ref 850–3900)
LYMPHOCYTES NFR BLD AUTO: 14.6 %
MCH RBC QN AUTO: 32.3 PG (ref 27–33)
MCHC RBC AUTO-ENTMCNC: 33.3 G/DL (ref 32–36)
MCV RBC AUTO: 96.8 FL (ref 80–100)
MONOCYTES # BLD AUTO: 939 CELLS/UL (ref 200–950)
MONOCYTES NFR BLD AUTO: 9.3 %
NEUTROPHILS # BLD AUTO: 7676 CELLS/UL (ref 1500–7800)
NEUTROPHILS NFR BLD AUTO: 76 %
PLATELET # BLD AUTO: 225 THOUSAND/UL (ref 140–400)
PMV BLD REES-ECKER: 10.4 FL (ref 7.5–12.5)
POTASSIUM SERPL-SCNC: 4.2 MMOL/L (ref 3.5–5.3)
PROT SERPL-MCNC: 6.2 G/DL (ref 6.1–8.1)
RBC # BLD AUTO: 4.43 MILLION/UL (ref 4.2–5.8)
SODIUM SERPL-SCNC: 136 MMOL/L (ref 135–146)
WBC # BLD AUTO: 10.1 THOUSAND/UL (ref 3.8–10.8)

## 2025-05-20 ENCOUNTER — APPOINTMENT (OUTPATIENT)
Age: 85
End: 2025-05-20
Payer: MEDICARE

## 2025-05-20 VITALS
HEART RATE: 96 BPM | SYSTOLIC BLOOD PRESSURE: 120 MMHG | WEIGHT: 272.6 LBS | OXYGEN SATURATION: 93 % | BODY MASS INDEX: 33.89 KG/M2 | DIASTOLIC BLOOD PRESSURE: 78 MMHG | HEIGHT: 75 IN

## 2025-05-20 DIAGNOSIS — I63.40 CEREBROVASCULAR ACCIDENT (CVA) DUE TO EMBOLISM OF CEREBRAL ARTERY (MULTI): ICD-10-CM

## 2025-05-20 DIAGNOSIS — J84.10 PULMONARY FIBROSIS, UNSPECIFIED (MULTI): ICD-10-CM

## 2025-05-20 DIAGNOSIS — Z86.718 HISTORY OF DVT (DEEP VEIN THROMBOSIS): ICD-10-CM

## 2025-05-20 DIAGNOSIS — E78.2 MIXED HYPERLIPIDEMIA: ICD-10-CM

## 2025-05-20 DIAGNOSIS — G47.33 OBSTRUCTIVE SLEEP APNEA, ADULT: ICD-10-CM

## 2025-05-20 DIAGNOSIS — Z00.00 ROUTINE GENERAL MEDICAL EXAMINATION AT HEALTH CARE FACILITY: Primary | ICD-10-CM

## 2025-05-20 DIAGNOSIS — J41.0 SIMPLE CHRONIC BRONCHITIS (MULTI): ICD-10-CM

## 2025-05-20 DIAGNOSIS — F41.1 GAD (GENERALIZED ANXIETY DISORDER): ICD-10-CM

## 2025-05-20 DIAGNOSIS — E11.9 TYPE 2 DIABETES MELLITUS WITHOUT COMPLICATION, WITHOUT LONG-TERM CURRENT USE OF INSULIN: ICD-10-CM

## 2025-05-20 DIAGNOSIS — I10 MILD HTN: ICD-10-CM

## 2025-05-20 DIAGNOSIS — R53.82 CHRONIC FATIGUE: ICD-10-CM

## 2025-05-20 DIAGNOSIS — J45.20 MILD INTERMITTENT ASTHMA WITHOUT COMPLICATION (HHS-HCC): ICD-10-CM

## 2025-05-20 PROCEDURE — 3078F DIAST BP <80 MM HG: CPT | Performed by: FAMILY MEDICINE

## 2025-05-20 PROCEDURE — 1158F ADVNC CARE PLAN TLK DOCD: CPT | Performed by: FAMILY MEDICINE

## 2025-05-20 PROCEDURE — 1036F TOBACCO NON-USER: CPT | Performed by: FAMILY MEDICINE

## 2025-05-20 PROCEDURE — 1170F FXNL STATUS ASSESSED: CPT | Performed by: FAMILY MEDICINE

## 2025-05-20 PROCEDURE — 99214 OFFICE O/P EST MOD 30 MIN: CPT | Performed by: FAMILY MEDICINE

## 2025-05-20 PROCEDURE — 1160F RVW MEDS BY RX/DR IN RCRD: CPT | Performed by: FAMILY MEDICINE

## 2025-05-20 PROCEDURE — G0439 PPPS, SUBSEQ VISIT: HCPCS | Performed by: FAMILY MEDICINE

## 2025-05-20 PROCEDURE — 3074F SYST BP LT 130 MM HG: CPT | Performed by: FAMILY MEDICINE

## 2025-05-20 PROCEDURE — 1123F ACP DISCUSS/DSCN MKR DOCD: CPT | Performed by: FAMILY MEDICINE

## 2025-05-20 PROCEDURE — 1159F MED LIST DOCD IN RCRD: CPT | Performed by: FAMILY MEDICINE

## 2025-05-20 RX ORDER — TRAMADOL HYDROCHLORIDE 50 MG/1
50 TABLET, FILM COATED ORAL EVERY 8 HOURS PRN
COMMUNITY
Start: 2025-05-14 | End: 2025-05-28

## 2025-05-20 ASSESSMENT — ENCOUNTER SYMPTOMS
DIARRHEA: 0
DIZZINESS: 1
LOSS OF SENSATION IN FEET: 0
ARTHRALGIAS: 0
ACTIVITY CHANGE: 0
SHORTNESS OF BREATH: 0
OCCASIONAL FEELINGS OF UNSTEADINESS: 0
LIGHT-HEADEDNESS: 1
HEADACHES: 1
UNEXPECTED WEIGHT CHANGE: 0
PALPITATIONS: 0
NAUSEA: 0
TROUBLE SWALLOWING: 0
NUMBNESS: 0
COUGH: 0
CONSTIPATION: 0
RHINORRHEA: 0
DEPRESSION: 0
DIFFICULTY URINATING: 0
VOMITING: 0
ABDOMINAL PAIN: 0
APPETITE CHANGE: 0
ABDOMINAL DISTENTION: 0
WHEEZING: 0
ADENOPATHY: 0
FATIGUE: 0

## 2025-05-20 ASSESSMENT — ACTIVITIES OF DAILY LIVING (ADL)
GROCERY_SHOPPING: NEEDS ASSISTANCE
TAKING_MEDICATION: INDEPENDENT
DRESSING: INDEPENDENT
DOING_HOUSEWORK: NEEDS ASSISTANCE
BATHING: INDEPENDENT
MANAGING_FINANCES: INDEPENDENT

## 2025-05-20 NOTE — ASSESSMENT & PLAN NOTE
Maintained on chronic apixaban, CBC stable, no change.  Orders:    Follow Up In Primary Care - Established    Follow Up In Primary Care - Established; Future    Comprehensive Metabolic Panel; Future    CBC and Auto Differential; Future

## 2025-05-20 NOTE — ASSESSMENT & PLAN NOTE
Stable with current medications.  Orders:    Follow Up In Primary Care - Established    Follow Up In Primary Care - Established; Future

## 2025-05-20 NOTE — ASSESSMENT & PLAN NOTE
Blood pressure under good control, renal function stable, continue with current medications.  Orders:    Follow Up In Primary Care - Established    Follow Up In Primary Care - Established; Future    Albumin-Creatinine Ratio, Urine Random; Future    Comprehensive Metabolic Panel; Future

## 2025-05-20 NOTE — ASSESSMENT & PLAN NOTE
Follows with pulmonary medicine at [facility], uses oxygen at night with CPAP bleedthrough.  Orders:    Follow Up In Primary Care - Established    Follow Up In Primary Care - Established; Future

## 2025-05-20 NOTE — ASSESSMENT & PLAN NOTE
A1c testing at 7, advised about diet and exercise, no change with current treatment  Orders:    Follow Up In Primary Care - Established    Follow Up In Primary Care - Established; Future    Albumin-Creatinine Ratio, Urine Random; Future    Comprehensive Metabolic Panel; Future    Hemoglobin A1C; Future    Thyroid Stimulating Hormone; Future    Lipid Panel; Future

## 2025-05-20 NOTE — ASSESSMENT & PLAN NOTE
Continue with simvastatin, laboratory testing stable.  Orders:    Follow Up In Primary Care - Established    Follow Up In Primary Care - Established; Future    Comprehensive Metabolic Panel; Future    Lipid Panel; Future

## 2025-05-20 NOTE — PROGRESS NOTES
Subjective   Reason for Visit: Arvin Diallo is an 84 y.o. male here for a Medicare Wellness visit.     Past Medical, Surgical, and Family History reviewed and updated in chart.    Reviewed all medications by prescribing practitioner or clinical pharmacist (such as prescriptions, OTCs, herbal therapies and supplements) and documented in the medical record.    HPI  Resident at [facility]  Follows with neurology for headaches, Tramadol helpful, injections not helpful  Uses a wheeled walker, no falls, + dizzy and LH  Some BARRON, cough some at times, dry cough  To see pulmonary in June in [place]  Sees urology in [place]  Nothing new with Cardiology, has a loop recorder, no issues  No bleeding issues  Patient has been using their CPAP nightly and is experiencing restful sleep, no morning headaches, no witnessed snoring, and notices a difference if they do not use the device.  Has 3L/min O2 bleed in  Socially active at [facility], walks daily  Some constipation, using metamucil/stool softner/prune juice      Patient Care Team:  Santiago Beckwith MD as PCP - General (Family Medicine)  Santiago Beckwith MD as PCP - [employer] PCP     Review of Systems   Constitutional:  Negative for activity change, appetite change, fatigue and unexpected weight change.   HENT:  Negative for ear pain, nosebleeds, rhinorrhea, sneezing and trouble swallowing.    Respiratory:  Negative for cough, shortness of breath and wheezing.    Cardiovascular:  Negative for chest pain, palpitations and leg swelling.   Gastrointestinal:  Negative for abdominal distention, abdominal pain, constipation, diarrhea, nausea and vomiting.   Genitourinary:  Negative for difficulty urinating.   Musculoskeletal:  Negative for arthralgias.   Skin:  Negative for rash.   Neurological:  Positive for dizziness, light-headedness and headaches. Negative for numbness.   Hematological:  Negative for adenopathy.   Psychiatric/Behavioral:  Negative for  "behavioral problems.    All other systems reviewed and are negative.      Objective   Vitals:  /78   Pulse 96   Ht 1.905 m (6' 3\")   Wt 124 kg (272 lb 9.6 oz)   SpO2 93%   BMI 34.07 kg/m²       Physical Exam  Vitals and nursing note reviewed.   Constitutional:       Appearance: Normal appearance.   HENT:      Head: Normocephalic and atraumatic.      Right Ear: Tympanic membrane, ear canal and external ear normal.      Left Ear: Tympanic membrane, ear canal and external ear normal.      Nose: Nose normal.      Mouth/Throat:      Mouth: Mucous membranes are moist.      Pharynx: Oropharynx is clear.   Cardiovascular:      Rate and Rhythm: Normal rate and regular rhythm.      Pulses: Normal pulses.      Heart sounds: Normal heart sounds.   Pulmonary:      Effort: Pulmonary effort is normal.      Breath sounds: Normal breath sounds.   Musculoskeletal:      Cervical back: Normal range of motion and neck supple.   Skin:     General: Skin is warm and dry.      Capillary Refill: Capillary refill takes less than 2 seconds.   Neurological:      Mental Status: He is alert.   Psychiatric:         Mood and Affect: Mood normal.         Behavior: Behavior normal.         Assessment & Plan  Mixed hyperlipidemia  Continue with simvastatin, laboratory testing stable.  Orders:    Follow Up In Primary Care - Established    Follow Up In Primary Care - Established; Future    Comprehensive Metabolic Panel; Future    Lipid Panel; Future    Mild HTN  Blood pressure under good control, renal function stable, continue with current medications.  Orders:    Follow Up In Primary Care - Established    Follow Up In Primary Care - Established; Future    Albumin-Creatinine Ratio, Urine Random; Future    Comprehensive Metabolic Panel; Future    Type 2 diabetes mellitus without complication, without long-term current use of insulin  A1c testing at 7, advised about diet and exercise, no change with current treatment  Orders:    Follow Up In " Primary Care - [facility]    Follow Up In Primary Care - Established; Future    Albumin-Creatinine Ratio, Urine Random; Future    Comprehensive Metabolic Panel; Future    Hemoglobin A1C; Future    Thyroid Stimulating Hormone; Future    Lipid Panel; Future    RAVI (generalized anxiety disorder)  Stable with current medications.  Orders:    Follow Up In Primary Care - Established    Follow Up In Primary Care - Established; Future    Cerebrovascular accident (CVA) due to embolism of cerebral artery (Multi)  No residual issues, tolerating apixaban, blood pressure under good control and tolerating statin.  Orders:    Follow Up In Primary Care - Established    Follow Up In Primary Care - Established; Future    Mild intermittent asthma without complication ([facility])  Follows with pulmonary medicine at [facility], uses oxygen at night with CPAP bleedthrough.  Orders:    Follow Up In Primary Care - Established    Follow Up In Primary Care - Established; Future    Chronic fatigue    Orders:    Follow Up In Primary Care - Established    Follow Up In Primary Care - Established; Future    History of DVT (deep vein thrombosis)  Maintained on chronic apixaban, CBC stable, no change.  Orders:    Follow Up In [facility]    Follow Up In Primary Care - Established; Future    Comprehensive Metabolic Panel; Future    CBC and Auto Differential; Future    Routine general medical examination at health care facility    Orders:    Follow Up In Primary Care - Established; Future    Obstructive sleep apnea, adult         Simple chronic bronchitis (Multi)  Follows with pulmonologist at [facility]         Pulmonary fibrosis, unspecified (Multi)  Follows with pulmonologist at [facility]

## 2025-05-20 NOTE — ASSESSMENT & PLAN NOTE
No residual issues, tolerating apixaban, blood pressure under good control and tolerating statin.  Orders:    Follow Up In Primary Care - Established    Follow Up In Primary Care - Established; Future

## 2025-06-19 ENCOUNTER — APPOINTMENT (OUTPATIENT)
Dept: CARDIOLOGY | Facility: HOSPITAL | Age: 85
DRG: 728 | End: 2025-06-19
Payer: MEDICARE

## 2025-06-19 ENCOUNTER — HOSPITAL ENCOUNTER (INPATIENT)
Facility: HOSPITAL | Age: 85
DRG: 728 | End: 2025-06-19
Attending: EMERGENCY MEDICINE
Payer: MEDICARE

## 2025-06-19 ENCOUNTER — APPOINTMENT (OUTPATIENT)
Dept: RADIOLOGY | Facility: HOSPITAL | Age: 85
DRG: 728 | End: 2025-06-19
Payer: MEDICARE

## 2025-06-19 DIAGNOSIS — M79.604 PAIN IN RIGHT LEG: ICD-10-CM

## 2025-06-19 DIAGNOSIS — R53.1 GENERALIZED WEAKNESS: Primary | ICD-10-CM

## 2025-06-19 DIAGNOSIS — G89.29 CHRONIC INTRACTABLE HEADACHE, UNSPECIFIED HEADACHE TYPE: ICD-10-CM

## 2025-06-19 DIAGNOSIS — M79.605 PAIN IN LEFT LEG: ICD-10-CM

## 2025-06-19 DIAGNOSIS — R60.0 EDEMA OF BOTH LEGS: ICD-10-CM

## 2025-06-19 DIAGNOSIS — N39.0 URINARY TRACT INFECTION WITHOUT HEMATURIA, SITE UNSPECIFIED: ICD-10-CM

## 2025-06-19 DIAGNOSIS — R51.9 CHRONIC INTRACTABLE HEADACHE, UNSPECIFIED HEADACHE TYPE: ICD-10-CM

## 2025-06-19 LAB
ALBUMIN SERPL BCP-MCNC: 3.6 G/DL (ref 3.4–5)
ALP SERPL-CCNC: 81 U/L (ref 33–136)
ALT SERPL W P-5'-P-CCNC: 19 U/L (ref 10–52)
AMORPH CRY #/AREA UR COMP ASSIST: ABNORMAL /HPF
ANION GAP SERPL CALC-SCNC: 9 MMOL/L (ref 10–20)
APPEARANCE UR: CLEAR
APTT PPP: 34 SECONDS (ref 26–36)
AST SERPL W P-5'-P-CCNC: 19 U/L (ref 9–39)
BACTERIA #/AREA URNS AUTO: ABNORMAL /HPF
BASOPHILS # BLD AUTO: 0.02 X10*3/UL (ref 0–0.1)
BASOPHILS NFR BLD AUTO: 0.3 %
BILIRUB SERPL-MCNC: 0.9 MG/DL (ref 0–1.2)
BILIRUB UR STRIP.AUTO-MCNC: NEGATIVE MG/DL
BUN SERPL-MCNC: 14 MG/DL (ref 6–23)
CALCIUM SERPL-MCNC: 9.6 MG/DL (ref 8.6–10.3)
CARDIAC TROPONIN I PNL SERPL HS: 34 NG/L (ref 0–20)
CARDIAC TROPONIN I PNL SERPL HS: 36 NG/L (ref 0–20)
CHLORIDE SERPL-SCNC: 104 MMOL/L (ref 98–107)
CO2 SERPL-SCNC: 26 MMOL/L (ref 21–32)
COLOR UR: ABNORMAL
CREAT SERPL-MCNC: 0.85 MG/DL (ref 0.5–1.3)
D DIMER PPP FEU-MCNC: 215 NG/ML FEU
EGFRCR SERPLBLD CKD-EPI 2021: 86 ML/MIN/1.73M*2
EOSINOPHIL # BLD AUTO: 0.04 X10*3/UL (ref 0–0.4)
EOSINOPHIL NFR BLD AUTO: 0.6 %
ERYTHROCYTE [DISTWIDTH] IN BLOOD BY AUTOMATED COUNT: 12.1 % (ref 11.5–14.5)
FLUAV RNA RESP QL NAA+PROBE: NOT DETECTED
FLUBV RNA RESP QL NAA+PROBE: NOT DETECTED
GLUCOSE SERPL-MCNC: 107 MG/DL (ref 74–99)
GLUCOSE UR STRIP.AUTO-MCNC: NORMAL MG/DL
HCT VFR BLD AUTO: 41 % (ref 41–52)
HGB BLD-MCNC: 14 G/DL (ref 13.5–17.5)
HOLD SPECIMEN: NORMAL
IMM GRANULOCYTES # BLD AUTO: 0.04 X10*3/UL (ref 0–0.5)
IMM GRANULOCYTES NFR BLD AUTO: 0.6 % (ref 0–0.9)
INR PPP: 1.2 (ref 0.9–1.1)
KETONES UR STRIP.AUTO-MCNC: NEGATIVE MG/DL
LEUKOCYTE ESTERASE UR QL STRIP.AUTO: ABNORMAL
LYMPHOCYTES # BLD AUTO: 1.69 X10*3/UL (ref 0.8–3)
LYMPHOCYTES NFR BLD AUTO: 23.8 %
MAGNESIUM SERPL-MCNC: 2.16 MG/DL (ref 1.6–2.4)
MCH RBC QN AUTO: 32.5 PG (ref 26–34)
MCHC RBC AUTO-ENTMCNC: 34.1 G/DL (ref 32–36)
MCV RBC AUTO: 95 FL (ref 80–100)
MONOCYTES # BLD AUTO: 0.69 X10*3/UL (ref 0.05–0.8)
MONOCYTES NFR BLD AUTO: 9.7 %
MUCOUS THREADS #/AREA URNS AUTO: ABNORMAL /LPF
NEUTROPHILS # BLD AUTO: 4.62 X10*3/UL (ref 1.6–5.5)
NEUTROPHILS NFR BLD AUTO: 65 %
NITRITE UR QL STRIP.AUTO: NEGATIVE
NRBC BLD-RTO: 0 /100 WBCS (ref 0–0)
PH UR STRIP.AUTO: 7.5 [PH]
PLATELET # BLD AUTO: 223 X10*3/UL (ref 150–450)
POTASSIUM SERPL-SCNC: 4.1 MMOL/L (ref 3.5–5.3)
PROT SERPL-MCNC: 5.5 G/DL (ref 6.4–8.2)
PROT UR STRIP.AUTO-MCNC: NEGATIVE MG/DL
PROTHROMBIN TIME: 13.2 SECONDS (ref 9.8–12.4)
RBC # BLD AUTO: 4.31 X10*6/UL (ref 4.5–5.9)
RBC # UR STRIP.AUTO: NEGATIVE MG/DL
RBC #/AREA URNS AUTO: ABNORMAL /HPF
RSV RNA RESP QL NAA+PROBE: NOT DETECTED
SARS-COV-2 RNA RESP QL NAA+PROBE: NOT DETECTED
SODIUM SERPL-SCNC: 135 MMOL/L (ref 136–145)
SP GR UR STRIP.AUTO: 1.01
UROBILINOGEN UR STRIP.AUTO-MCNC: NORMAL MG/DL
WBC # BLD AUTO: 7.1 X10*3/UL (ref 4.4–11.3)
WBC #/AREA URNS AUTO: ABNORMAL /HPF

## 2025-06-19 PROCEDURE — 83735 ASSAY OF MAGNESIUM: CPT | Performed by: PHYSICIAN ASSISTANT

## 2025-06-19 PROCEDURE — 71275 CT ANGIOGRAPHY CHEST: CPT

## 2025-06-19 PROCEDURE — 71275 CT ANGIOGRAPHY CHEST: CPT | Performed by: RADIOLOGY

## 2025-06-19 PROCEDURE — 2500000002 HC RX 250 W HCPCS SELF ADMINISTERED DRUGS (ALT 637 FOR MEDICARE OP, ALT 636 FOR OP/ED)

## 2025-06-19 PROCEDURE — 96365 THER/PROPH/DIAG IV INF INIT: CPT

## 2025-06-19 PROCEDURE — 85610 PROTHROMBIN TIME: CPT | Performed by: PHYSICIAN ASSISTANT

## 2025-06-19 PROCEDURE — 84484 ASSAY OF TROPONIN QUANT: CPT | Performed by: PHYSICIAN ASSISTANT

## 2025-06-19 PROCEDURE — 84075 ASSAY ALKALINE PHOSPHATASE: CPT | Performed by: PHYSICIAN ASSISTANT

## 2025-06-19 PROCEDURE — 36415 COLL VENOUS BLD VENIPUNCTURE: CPT | Performed by: PHYSICIAN ASSISTANT

## 2025-06-19 PROCEDURE — 93005 ELECTROCARDIOGRAM TRACING: CPT

## 2025-06-19 PROCEDURE — 2500000004 HC RX 250 GENERAL PHARMACY W/ HCPCS (ALT 636 FOR OP/ED)

## 2025-06-19 PROCEDURE — 70450 CT HEAD/BRAIN W/O DYE: CPT

## 2025-06-19 PROCEDURE — 2500000004 HC RX 250 GENERAL PHARMACY W/ HCPCS (ALT 636 FOR OP/ED): Performed by: PHYSICIAN ASSISTANT

## 2025-06-19 PROCEDURE — 85379 FIBRIN DEGRADATION QUANT: CPT | Performed by: PHYSICIAN ASSISTANT

## 2025-06-19 PROCEDURE — 85025 COMPLETE CBC W/AUTO DIFF WBC: CPT | Performed by: PHYSICIAN ASSISTANT

## 2025-06-19 PROCEDURE — 2550000001 HC RX 255 CONTRASTS: Performed by: PHYSICIAN ASSISTANT

## 2025-06-19 PROCEDURE — 71045 X-RAY EXAM CHEST 1 VIEW: CPT

## 2025-06-19 PROCEDURE — 85730 THROMBOPLASTIN TIME PARTIAL: CPT | Performed by: PHYSICIAN ASSISTANT

## 2025-06-19 PROCEDURE — 99223 1ST HOSP IP/OBS HIGH 75: CPT

## 2025-06-19 PROCEDURE — 87637 SARSCOV2&INF A&B&RSV AMP PRB: CPT | Performed by: PHYSICIAN ASSISTANT

## 2025-06-19 PROCEDURE — 81001 URINALYSIS AUTO W/SCOPE: CPT | Performed by: PHYSICIAN ASSISTANT

## 2025-06-19 PROCEDURE — 2500000005 HC RX 250 GENERAL PHARMACY W/O HCPCS

## 2025-06-19 PROCEDURE — 99285 EMERGENCY DEPT VISIT HI MDM: CPT | Mod: 25 | Performed by: EMERGENCY MEDICINE

## 2025-06-19 PROCEDURE — 2500000001 HC RX 250 WO HCPCS SELF ADMINISTERED DRUGS (ALT 637 FOR MEDICARE OP)

## 2025-06-19 PROCEDURE — 1200000002 HC GENERAL ROOM WITH TELEMETRY DAILY

## 2025-06-19 PROCEDURE — 71045 X-RAY EXAM CHEST 1 VIEW: CPT | Performed by: RADIOLOGY

## 2025-06-19 PROCEDURE — 70450 CT HEAD/BRAIN W/O DYE: CPT | Performed by: RADIOLOGY

## 2025-06-19 RX ORDER — ACETAMINOPHEN 160 MG/5ML
650 SOLUTION ORAL EVERY 4 HOURS PRN
Status: DISCONTINUED | OUTPATIENT
Start: 2025-06-19 | End: 2025-06-25 | Stop reason: HOSPADM

## 2025-06-19 RX ORDER — LANOLIN ALCOHOL/MO/W.PET/CERES
400 CREAM (GRAM) TOPICAL DAILY
Status: DISCONTINUED | OUTPATIENT
Start: 2025-06-20 | End: 2025-06-25 | Stop reason: HOSPADM

## 2025-06-19 RX ORDER — CEFTRIAXONE 1 G/50ML
1 INJECTION, SOLUTION INTRAVENOUS ONCE
Status: COMPLETED | OUTPATIENT
Start: 2025-06-19 | End: 2025-06-19

## 2025-06-19 RX ORDER — LOSARTAN POTASSIUM 50 MG/1
50 TABLET ORAL DAILY
Status: DISCONTINUED | OUTPATIENT
Start: 2025-06-20 | End: 2025-06-25 | Stop reason: HOSPADM

## 2025-06-19 RX ORDER — ALBUTEROL SULFATE 0.83 MG/ML
3 SOLUTION RESPIRATORY (INHALATION) 2 TIMES DAILY PRN
Status: DISCONTINUED | OUTPATIENT
Start: 2025-06-19 | End: 2025-06-25 | Stop reason: HOSPADM

## 2025-06-19 RX ORDER — ONDANSETRON HYDROCHLORIDE 2 MG/ML
4 INJECTION, SOLUTION INTRAVENOUS EVERY 8 HOURS PRN
Status: DISCONTINUED | OUTPATIENT
Start: 2025-06-19 | End: 2025-06-25 | Stop reason: HOSPADM

## 2025-06-19 RX ORDER — TRAMADOL HYDROCHLORIDE 50 MG/1
50 TABLET, FILM COATED ORAL 2 TIMES DAILY
Status: ON HOLD | COMMUNITY
End: 2025-06-25

## 2025-06-19 RX ORDER — FINASTERIDE 5 MG/1
5 TABLET, FILM COATED ORAL DAILY
Status: DISCONTINUED | OUTPATIENT
Start: 2025-06-19 | End: 2025-06-25 | Stop reason: HOSPADM

## 2025-06-19 RX ORDER — TRAMADOL HYDROCHLORIDE 50 MG/1
50 TABLET, FILM COATED ORAL 2 TIMES DAILY
Status: DISCONTINUED | OUTPATIENT
Start: 2025-06-19 | End: 2025-06-25 | Stop reason: HOSPADM

## 2025-06-19 RX ORDER — DOCUSATE SODIUM 100 MG/1
200 CAPSULE, LIQUID FILLED ORAL 2 TIMES DAILY
COMMUNITY

## 2025-06-19 RX ORDER — ACETAMINOPHEN 325 MG/1
650 TABLET ORAL EVERY 4 HOURS PRN
Status: DISCONTINUED | OUTPATIENT
Start: 2025-06-19 | End: 2025-06-25 | Stop reason: HOSPADM

## 2025-06-19 RX ORDER — POLYETHYLENE GLYCOL 3350 17 G/17G
17 POWDER, FOR SOLUTION ORAL DAILY
Status: DISCONTINUED | OUTPATIENT
Start: 2025-06-19 | End: 2025-06-25 | Stop reason: HOSPADM

## 2025-06-19 RX ORDER — SIMVASTATIN 20 MG/1
40 TABLET, FILM COATED ORAL NIGHTLY
Status: DISCONTINUED | OUTPATIENT
Start: 2025-06-19 | End: 2025-06-25 | Stop reason: HOSPADM

## 2025-06-19 RX ORDER — ACETAMINOPHEN 650 MG/1
650 SUPPOSITORY RECTAL EVERY 4 HOURS PRN
Status: DISCONTINUED | OUTPATIENT
Start: 2025-06-19 | End: 2025-06-25 | Stop reason: HOSPADM

## 2025-06-19 RX ORDER — DOCUSATE SODIUM 100 MG/1
200 CAPSULE, LIQUID FILLED ORAL 2 TIMES DAILY
Status: DISCONTINUED | OUTPATIENT
Start: 2025-06-19 | End: 2025-06-25 | Stop reason: HOSPADM

## 2025-06-19 RX ORDER — ACETAMINOPHEN 500 MG
10 TABLET ORAL NIGHTLY PRN
Status: DISCONTINUED | OUTPATIENT
Start: 2025-06-19 | End: 2025-06-25 | Stop reason: HOSPADM

## 2025-06-19 RX ORDER — ONDANSETRON 4 MG/1
4 TABLET, ORALLY DISINTEGRATING ORAL EVERY 8 HOURS PRN
Status: DISCONTINUED | OUTPATIENT
Start: 2025-06-19 | End: 2025-06-25 | Stop reason: HOSPADM

## 2025-06-19 RX ADMIN — SIMVASTATIN 40 MG: 20 TABLET, FILM COATED ORAL at 20:45

## 2025-06-19 RX ADMIN — POLYVINYL ALCOHOL, POVIDONE 1 DROP: 14; 6 SOLUTION/ DROPS OPHTHALMIC at 20:45

## 2025-06-19 RX ADMIN — APIXABAN 5 MG: 5 TABLET, FILM COATED ORAL at 20:45

## 2025-06-19 RX ADMIN — PIPERACILLIN SODIUM AND TAZOBACTAM SODIUM 3.38 G: 3; .375 INJECTION, SOLUTION INTRAVENOUS at 20:45

## 2025-06-19 RX ADMIN — TRAMADOL HYDROCHLORIDE 50 MG: 50 TABLET, COATED ORAL at 20:45

## 2025-06-19 RX ADMIN — DOCUSATE SODIUM 200 MG: 100 CAPSULE, LIQUID FILLED ORAL at 20:45

## 2025-06-19 RX ADMIN — CEFTRIAXONE 1 G: 1 INJECTION, SOLUTION INTRAVENOUS at 15:01

## 2025-06-19 RX ADMIN — IOHEXOL 70 ML: 350 INJECTION, SOLUTION INTRAVENOUS at 14:34

## 2025-06-19 RX ADMIN — ACETAMINOPHEN 650 MG: 325 TABLET ORAL at 20:45

## 2025-06-19 RX ADMIN — POLYETHYLENE GLYCOL 3350 17 G: 17 POWDER, FOR SOLUTION ORAL at 19:03

## 2025-06-19 RX ADMIN — FINASTERIDE 5 MG: 5 TABLET, FILM COATED ORAL at 19:03

## 2025-06-19 SDOH — SOCIAL STABILITY: SOCIAL INSECURITY: WITHIN THE LAST YEAR, HAVE YOU BEEN HUMILIATED OR EMOTIONALLY ABUSED IN OTHER WAYS BY YOUR PARTNER OR EX-PARTNER?: NO

## 2025-06-19 SDOH — SOCIAL STABILITY: SOCIAL INSECURITY: WITHIN THE LAST YEAR, HAVE YOU BEEN AFRAID OF YOUR PARTNER OR EX-PARTNER?: NO

## 2025-06-19 SDOH — ECONOMIC STABILITY: FOOD INSECURITY: WITHIN THE PAST 12 MONTHS, YOU WORRIED THAT YOUR FOOD WOULD RUN OUT BEFORE YOU GOT THE MONEY TO BUY MORE.: NEVER TRUE

## 2025-06-19 SDOH — ECONOMIC STABILITY: INCOME INSECURITY: IN THE PAST 12 MONTHS HAS THE ELECTRIC, GAS, OIL, OR WATER COMPANY THREATENED TO SHUT OFF SERVICES IN YOUR HOME?: NO

## 2025-06-19 SDOH — ECONOMIC STABILITY: FOOD INSECURITY: WITHIN THE PAST 12 MONTHS, THE FOOD YOU BOUGHT JUST DIDN'T LAST AND YOU DIDN'T HAVE MONEY TO GET MORE.: NEVER TRUE

## 2025-06-19 SDOH — SOCIAL STABILITY: SOCIAL INSECURITY: WERE YOU ABLE TO COMPLETE ALL THE BEHAVIORAL HEALTH SCREENINGS?: YES

## 2025-06-19 SDOH — SOCIAL STABILITY: SOCIAL INSECURITY: HAVE YOU HAD THOUGHTS OF HARMING ANYONE ELSE?: NO

## 2025-06-19 ASSESSMENT — ENCOUNTER SYMPTOMS
JOINT SWELLING: 0
FATIGUE: 0
BLOOD IN STOOL: 0
HEMATURIA: 0
SHORTNESS OF BREATH: 0
DYSURIA: 1
TREMORS: 0
ARTHRALGIAS: 0
DIZZINESS: 0
COLOR CHANGE: 0
NAUSEA: 0
CHILLS: 0
BACK PAIN: 0
FREQUENCY: 1
CONSTIPATION: 0
PALPITATIONS: 0
NUMBNESS: 1
SORE THROAT: 0
SEIZURES: 0
DIARRHEA: 0
COUGH: 0
EYE PAIN: 0
VOMITING: 0
WHEEZING: 0
FEVER: 0
RHINORRHEA: 1
DIFFICULTY URINATING: 1
ABDOMINAL PAIN: 0
DYSURIA: 0
WEAKNESS: 1

## 2025-06-19 ASSESSMENT — PATIENT HEALTH QUESTIONNAIRE - PHQ9
1. LITTLE INTEREST OR PLEASURE IN DOING THINGS: NOT AT ALL
2. FEELING DOWN, DEPRESSED OR HOPELESS: NOT AT ALL
SUM OF ALL RESPONSES TO PHQ9 QUESTIONS 1 & 2: 0

## 2025-06-19 ASSESSMENT — ACTIVITIES OF DAILY LIVING (ADL)
TOILETING: NEEDS ASSISTANCE
HEARING - RIGHT EAR: HEARING AID
PATIENT'S MEMORY ADEQUATE TO SAFELY COMPLETE DAILY ACTIVITIES?: YES
GROOMING: INDEPENDENT
HEARING - LEFT EAR: HEARING AID
BATHING: INDEPENDENT
WALKS IN HOME: NEEDS ASSISTANCE
ASSISTIVE_DEVICE: WALKER
JUDGMENT_ADEQUATE_SAFELY_COMPLETE_DAILY_ACTIVITIES: YES
ADEQUATE_TO_COMPLETE_ADL: YES
FEEDING YOURSELF: INDEPENDENT
LACK_OF_TRANSPORTATION: NO
DRESSING YOURSELF: INDEPENDENT

## 2025-06-19 ASSESSMENT — COGNITIVE AND FUNCTIONAL STATUS - GENERAL
TOILETING: A LITTLE
DAILY ACTIVITIY SCORE: 22
TURNING FROM BACK TO SIDE WHILE IN FLAT BAD: A LITTLE
WALKING IN HOSPITAL ROOM: A LITTLE
MOBILITY SCORE: 20
HELP NEEDED FOR BATHING: A LITTLE
CLIMB 3 TO 5 STEPS WITH RAILING: A LITTLE
PATIENT BASELINE BEDBOUND: NO
MOVING TO AND FROM BED TO CHAIR: A LITTLE

## 2025-06-19 ASSESSMENT — LIFESTYLE VARIABLES
AUDIT-C TOTAL SCORE: 0
HOW MANY STANDARD DRINKS CONTAINING ALCOHOL DO YOU HAVE ON A TYPICAL DAY: PATIENT DOES NOT DRINK
HOW OFTEN DO YOU HAVE A DRINK CONTAINING ALCOHOL: NEVER
SKIP TO QUESTIONS 9-10: 1
HOW OFTEN DO YOU HAVE 6 OR MORE DRINKS ON ONE OCCASION: NEVER
AUDIT-C TOTAL SCORE: 0

## 2025-06-19 ASSESSMENT — PAIN DESCRIPTION - LOCATION: LOCATION: HEAD

## 2025-06-19 ASSESSMENT — PAIN - FUNCTIONAL ASSESSMENT
PAIN_FUNCTIONAL_ASSESSMENT: 0-10
PAIN_FUNCTIONAL_ASSESSMENT: 0-10

## 2025-06-19 ASSESSMENT — PAIN DESCRIPTION - DESCRIPTORS
DESCRIPTORS: ACHING
DESCRIPTORS: PRESSURE

## 2025-06-19 ASSESSMENT — PAIN SCALES - GENERAL
PAINLEVEL_OUTOF10: 7
PAINLEVEL_OUTOF10: 10 - WORST POSSIBLE PAIN

## 2025-06-19 NOTE — ED PROVIDER NOTES
Patient is an 84-year-old male who presents to the emergency room with a chief complaint of generalized weakness.  Patient states that this morning he walked to breakfast and after getting back to his room from breakfast he reported generalized weakness.  He states that he feels extremely weak.  He reports shortness of breath and dizziness.  Denies chest pain, nausea, vomiting, or abdominal pain.  He also reports a headache which he states is chronic.  He has seen multiple neurologist for his headache for for the past 2 years.           Review of Systems   Constitutional:  Negative for chills and fever.        Weakness   HENT:  Negative for ear pain and sore throat.    Eyes:  Negative for pain and visual disturbance.   Respiratory:  Negative for cough and shortness of breath.    Cardiovascular:  Negative for chest pain and palpitations.   Gastrointestinal:  Negative for abdominal pain and vomiting.   Genitourinary:  Negative for dysuria and hematuria.   Musculoskeletal:  Negative for arthralgias and back pain.   Skin:  Negative for color change and rash.   Neurological:  Negative for seizures and syncope.   All other systems reviewed and are negative.       Physical Exam  Vitals and nursing note reviewed.   Constitutional:       General: He is not in acute distress.     Appearance: He is well-developed. He is not ill-appearing, toxic-appearing or diaphoretic.   HENT:      Head: Normocephalic and atraumatic.   Eyes:      General: No visual field deficit.     Extraocular Movements: Extraocular movements intact.      Right eye: Normal extraocular motion.      Left eye: Normal extraocular motion.      Conjunctiva/sclera: Conjunctivae normal.      Pupils: Pupils are equal, round, and reactive to light.   Cardiovascular:      Rate and Rhythm: Normal rate and regular rhythm.      Heart sounds: No murmur heard.  Pulmonary:      Effort: Pulmonary effort is normal. No respiratory distress.      Breath sounds: Normal breath  sounds. No stridor. No wheezing, rhonchi or rales.   Abdominal:      General: There is no distension.      Palpations: Abdomen is soft. There is no mass.      Tenderness: There is no abdominal tenderness. There is no guarding.   Musculoskeletal:         General: No swelling or tenderness.      Cervical back: Normal range of motion and neck supple.   Skin:     General: Skin is warm and dry.      Capillary Refill: Capillary refill takes less than 2 seconds.   Neurological:      Mental Status: He is alert.      GCS: GCS eye subscore is 4. GCS verbal subscore is 5. GCS motor subscore is 6.      Cranial Nerves: No cranial nerve deficit, dysarthria or facial asymmetry.      Sensory: No sensory deficit.   Psychiatric:         Mood and Affect: Mood normal.         Speech: Speech normal.          Labs Reviewed   URINALYSIS WITH REFLEX MICROSCOPIC - Abnormal       Result Value    Color, Urine Light-Yellow      Appearance, Urine Clear      Specific Gravity, Urine 1.009      pH, Urine 7.5      Protein, Urine NEGATIVE      Glucose, Urine Normal      Blood, Urine NEGATIVE      Ketones, Urine NEGATIVE      Bilirubin, Urine NEGATIVE      Urobilinogen, Urine Normal      Nitrite, Urine NEGATIVE      Leukocyte Esterase, Urine 75 Simone/uL (*)    CBC WITH AUTO DIFFERENTIAL - Abnormal    WBC 7.1      nRBC 0.0      RBC 4.31 (*)     Hemoglobin 14.0      Hematocrit 41.0      MCV 95      MCH 32.5      MCHC 34.1      RDW 12.1      Platelets 223      Neutrophils % 65.0      Immature Granulocytes %, Automated 0.6      Lymphocytes % 23.8      Monocytes % 9.7      Eosinophils % 0.6      Basophils % 0.3      Neutrophils Absolute 4.62      Immature Granulocytes Absolute, Automated 0.04      Lymphocytes Absolute 1.69      Monocytes Absolute 0.69      Eosinophils Absolute 0.04      Basophils Absolute 0.02     COMPREHENSIVE METABOLIC PANEL - Abnormal    Glucose 107 (*)     Sodium 135 (*)     Potassium 4.1      Chloride 104      Bicarbonate 26      Anion  Gap 9 (*)     Urea Nitrogen 14      Creatinine 0.85      eGFR 86      Calcium 9.6      Albumin 3.6      Alkaline Phosphatase 81      Total Protein 5.5 (*)     AST 19      Bilirubin, Total 0.9      ALT 19     PROTIME-INR - Abnormal    Protime 13.2 (*)     INR 1.2 (*)    SERIAL TROPONIN-INITIAL - Abnormal    Troponin I, High Sensitivity 34 (*)     Narrative:     Less than 99th percentile of normal range cutoff-  Female and children under 18 years old <14 ng/L; Male <21 ng/L: Negative  Repeat testing should be performed if clinically indicated.     Female and children under 18 years old 14-50 ng/L; Male 21-50 ng/L:  Consistent with possible cardiac damage and possible increased clinical   risk. Serial measurements may help to assess extent of myocardial damage.     >50 ng/L: Consistent with cardiac damage, increased clinical risk and  myocardial infarction. Serial measurements may help assess extent of   myocardial damage.      NOTE: Children less than 1 year old may have higher baseline troponin   levels and results should be interpreted in conjunction with the overall   clinical context.     NOTE: Troponin I testing is performed using a different   testing methodology at [facility] than at [facility]. Direct result comparisons should only   be made within the same method.   SERIAL TROPONIN, 1 HOUR - Abnormal    Troponin I, High Sensitivity 36 (*)     Narrative:     Less than 99th percentile of normal range cutoff-  Female and children under 18 years old <14 ng/L; Male <21 ng/L: Negative  Repeat testing should be performed if clinically indicated.     Female and children under 18 years old 14-50 ng/L; Male 21-50 ng/L:  Consistent with possible cardiac damage and possible increased clinical   risk. Serial measurements may help to assess extent of myocardial damage.     >50 ng/L: Consistent with cardiac damage, increased clinical risk and  myocardial infarction. Serial measurements may help  assess extent of   myocardial damage.      NOTE: Children less than 1 year old may have higher baseline troponin   levels and results should be interpreted in conjunction with the overall   clinical context.     NOTE: Troponin I testing is performed using a different   testing methodology at [facility] than at other   [facility]. Direct result comparisons should only   be made within the same method.   MICROSCOPIC ONLY, URINE - Abnormal    WBC, Urine 11-20 (*)     RBC, Urine 1-2      Bacteria, Urine 1+ (*)     Mucus, Urine FEW      Amorphous Crystals, Urine 1+     BASIC METABOLIC PANEL - Abnormal    Glucose 161 (*)     Sodium 136      Potassium 4.2      Chloride 105      Bicarbonate 27      Anion Gap 8 (*)     Urea Nitrogen 18      Creatinine 1.05      eGFR 70      Calcium 9.2     CBC - Abnormal    WBC 6.8      nRBC 0.0      RBC 4.41 (*)     Hemoglobin 14.3      Hematocrit 41.8      MCV 95      MCH 32.4      MCHC 34.2      RDW 12.2      Platelets 217     MAGNESIUM - Normal    Magnesium 2.16     APTT - Normal    aPTT 34      Narrative:     The APTT is no longer used for monitoring Unfractionated Heparin Therapy. For monitoring Heparin Therapy, use the Heparin Assay.   D-DIMER, VTE EXCLUSION - Normal    D-Dimer, Quantitative VTE Exclusion 215      Narrative:     The VTE Exclusion D-Dimer assay is reported in ng/mL Fibrinogen Equivalent Units (FEU).    Per 's instructions for use, a value of less than 500 ng/mL (FEU) may help to exclude DVT or PE in outpatients when the assay is used with a clinical pretest probability assessment.([facility] must utilize and document eCalc 'Wells Score Deep Vein Thrombosis Risk' for DVT exclusion only. Emergency Department should utilize  Guidelines for Emergency Department Use of the VTE Exclusion D-Dimer and Clinical Pretest probability assessment model for DVT or PE exclusion.)   SARS-COV-2, INFLUENZA A/B AND RSV PCR - Normal    Coronavirus 2019, PCR Not  Detected      Flu A Result Not Detected      Flu B Result Not Detected      RSV PCR Not Detected      Narrative:     This assay is an FDA-cleared, in vitro diagnostic nucleic acid amplification test for the qualitative detection and differentiation of SARS CoV-2/ Influenza A/B/ RSV from nasopharyngeal specimens collected from individuals with signs and symptoms of respiratory tract infections, and has been validated for use at [facility]. Negative results do not preclude COVID-19/ Influenza A/B/ RSV infections and should not be used as the sole basis for diagnosis, treatment, or other management decisions. Testing for SARS CoV-2 is recommended only for patients who meet current clinical and/or epidemiological criteria defined by federal, state, or local public health directives.   TROPONIN SERIES- (INITIAL, 1 HR)    Narrative:     The following orders were created for panel order Troponin I Series, High Sensitivity (0, 1 HR).  Procedure                               Abnormality         Status                     ---------                               -----------         ------                     Troponin I, High Sensiti...[[id number]]  Abnormal            Final result               Troponin, High Sensitivi...[[id number]]  Abnormal            Final result                 Please view results for these tests on the individual orders.   GRAY TOP    Extra Tube Hold for add-ons.     URINALYSIS WITH REFLEX CULTURE AND MICROSCOPIC    Narrative:     The following orders were created for panel order Urinalysis with Reflex Culture and Microscopic.  Procedure                               Abnormality         Status                     ---------                               -----------         ------                     Urinalysis with Reflex C...[[id number]]                                                 Extra Urine Gray Tube[[id number]]                                                         Please view  results for these tests on the individual orders.   URINALYSIS WITH REFLEX CULTURE AND MICROSCOPIC   EXTRA URINE GRAY TUBE        CT head wo IV contrast   Final Result   Age related changes again present without acute intracranial process.        Signed by: Monty Aguilar 6/19/2025 2:51 PM   Dictation workstation:   [id number]      CT angio chest for pulmonary embolism   Final Result   No pulmonary embolism or active disease in the chest.        Signed by: Fidel Crowell 6/19/2025 2:51 PM   Dictation workstation:   [id number]      XR chest 1 view   Final Result   Mild bibasilar atelectatic changes and mild bilateral pleural   effusions versus pleural thickening.             MACRO:   None        Signed by: Dimas Garcia 6/19/2025 12:54 PM   Dictation workstation:   [id number]           ECG 12 lead    Performed by: Luci Werner PA-C  Authorized by: Luci Werner PA-C    ECG interpreted by ED Physician in the absence of a cardiologist: yes    Comments:      EKG shows sinus tachycardia with first-degree AV block.  Rate of 104 bpm.  No ST elevation.  Right bundle branch block.  AL interval is 214 ms and QRS duration is 150 ms.  EKG interpretation per myself, Luci Werner  ECG 12 lead    Performed by: Luci Werner PA-C  Authorized by: Luci Werner PA-C    ECG interpreted by ED Physician in the absence of a cardiologist: yes    Comments:      EKG shows normal sinus rhythm with a rate of 88 bpm.  No ST elevation.  Right bundle branch block.  AL interval is 208 ms and QRS duration is 152 ms.  EKG interpretation per myself, Luci Werner       Medical Decision Making  Patient  is an 85 yo male who presents for generalized weakness. He lives at assisted living, states that he feels extremely weak. Initial troponin was 34 and repeat was 36. No chest pain but reported SOB. Hx of PE and DVTs. CTA chest is unremarkable. UA shows findings suspicious for UTI and Rocephin given. My  initial plan was to discharge him home since I consulted with cardiology about his troponins and cardiology did not feel that these troponin values with a low flat troponin were significant. Both patient and family member state that going back to assisted living at this time is not an option and feel that he needs to be admitted to the hospital at least overnight.    Differential diagnosis includes but not limited to viral illness, generalized weakness, electrolyte abnormality, urinary tract infection, pneumonia, NSTEMI, STEMI, pulmonary embolism.    The patient was seen by the advanced practice provider.  I have personally performed a substantive portion of the encounter.  I have seen and examined the patient; agree with the workup, evaluation, MDM, management and diagnosis.  The care plan has been discussed.    I personally saw the patient and made/approved the management plan and take responsibility for the patient's management.   History: Patient was seen and evaluated in the ED due to complaint of generalized weakness she states that this morning when he got up to walk to breakfast he began to feel extremely weak.  He also admits to having some shortness of breath and dizziness.  Denies any chest pain, nausea, vomiting or abdominal pain.  Patient also ports a headache which she states that he has had chronically for at least 2 years now.  Exam: Gen: Alert awake oriented appears to be in no acute distress, nontoxic and cooperative.  ENT examinations unremarkable.  Neck is supple without magician.  Heart regular rate rhythm without murmur.  Lungs are clear to auscultation bilaterally respirations are easy and symmetric without retractions or tachypnea.  Abdomen soft benign without rebound rigidity guarding noted.  Patient does have abdominal binder in place due to hernias.  Skin is warm soft dry intact out rash.  No focal neurologic deficits are appreciated.    MDM: Patient was ordered lab work, CT scan imaging  of the chest, brain and a chest x-ray.  Troponin and delta troponin were slightly elevated at 36 and 34 though not concerning for STEMI and EKG revealed no acute abnormality.  Magnesium was normal at 2.16.  D-dimer was normal at 215.  PTT was normal at 34.  Urinalysis was absolutely normal and he was treated with IV Rocephin.  CMP revealed glucose of 1057 and sodium was 135 with a anion gap of 9 total protein was low at 5.5 white cell count was normal at 7.1 H&H were normal and platelet counts were normal no left shift.  INR was 1.2 and PT was 13.2.  Testing for COVID RSV and flu was negative and chest x-ray revealed mild bibasilar atelectasis changes and mild bilateral pleural effusions versus pleural thickening.  CT scan imaging of the brain revealed age-related changes again present without acute intracranial process.  And CT angio of the chest revealed no pulmonary embolism or active disease in the chest.  Patient was able to ambulate in the emerged department and we did attempt to discharge the patient home but the family refused to send the patient back to the assisted living because of his weakness and wanted him in a SNF for rehab.  Jean-Pierre Souza, DO     Amount and/or Complexity of Data Reviewed  Labs: ordered. Decision-making details documented in ED Course.  Radiology: ordered and independent interpretation performed. Decision-making details documented in ED Course.     Details: 1 view chest x-ray per my interpretation shows no infiltrate or effusion  ECG/medicine tests: ordered and independent interpretation performed. Decision-making details documented in ED Course.    Risk  Decision regarding hospitalization.         Diagnoses as of 06/20/25 0749   Generalized weakness   Chronic intractable headache, unspecified headache type   Urinary tract infection without hematuria, site unspecified                    Luci Werner PA-C  06/19/25 1608       Luci Werner PA-C  06/19/25 1910        Jean-Pierre Souza, DO  06/20/25 0749

## 2025-06-19 NOTE — H&P
History and Physical Examination    Patient Name: Arvin Diallo  YOB: 1940  MRN: [id number]  Date of Service: 06/19/25  Time of Dictation: 4:34 PM    Chief Complaint: Generalized weakness x1 day.    History of Present Illness  Sudden onset of profound weakness this morning.  Associated symptoms throughout the day have included urinary urgency, hesitancy, frequency.  Denies dysuria, fevers, chills, abdominal pain, other gastrointestinal symptoms.  Degree of weakness prompted presentation for further evaluation.  Currently prescribed cephalexin twice daily for UTI prophylaxis.  Had intraoperative complication during urologic procedure where and ureters were ligated for which she was started on long-term cephalexin.  Typically has urinary results with bacteria and leukocytes; however, with the symptoms today, patient and family are concerned regarding true UTI.  In the ED,  Mildly tachycardic (106), hypertensive (121-141/74-84) with other vital signs largely within normal limits.  Laboratory studies notable for minimally elevated troponin (34-36 ng/L), UA with bacteria (1+), LE, WBC (11-20).  CXR showing mild bibasilar atelectatic changes and mild bilateral pleural effusions versus pleural thickening, CTA chest with no pulmonary embolism or active disease in chest, CT head with age-related changes without acute intracranial process.  Started on ceftriaxone 1 g IV.  Due to concerns of ongoing weakness limiting ability to return home, admitted to medicine for further management.    Medical History[1]  Surgical History[2]  Family History[3]    Social History:   Tobacco: Denies.   Alcohol: Denies.  Other: Denies.  Living Situation: From assisted living.  Receives PT.  Ambulates with rollator.    Home Medications:   Medications Ordered Prior to Encounter[4]    Review of Systems   Constitutional:  Negative for chills, fatigue and fever.   HENT:  Positive for rhinorrhea. Negative for congestion, sneezing and  sore throat.    Respiratory:  Negative for cough, shortness of breath and wheezing.    Cardiovascular:  Negative for chest pain.   Gastrointestinal:  Negative for abdominal pain, blood in stool, constipation, diarrhea, nausea and vomiting.   Genitourinary:  Positive for difficulty urinating, dysuria, frequency and urgency.   Musculoskeletal:  Negative for joint swelling.   Skin:  Negative for rash.   Neurological:  Positive for weakness (Generalized) and numbness (Chronic numbness to right lower extremity). Negative for dizziness and tremors.     Physical Examination  Vital Signs  Temperature:  [36.7 °C (98.1 °F)] 36.7 °C (98.1 °F)  Heart Rate:  [] 90  Respirations:  [14-18] 15  BP: (121-141)/(74-84) 141/79    General: Alert, resting with eyes open, semi-Cuello's in stretcher. No evident acute distress.   Eyes: Pupils equal and round, with symmetric eye movements.   Neck: Trachea midline.  No JVD.  Cardiovascular: Regular rate and rhythm. No murmurs, rubs, gallops. No evident peripheral edema.   Pulmonary: Chest clear to auscultation bilaterally. Normal work of breathing.   Abdominal: Soft, non-tender, non-distended. Bowel sounds present.   Neurological: Moves all extremities. No evident focal neurological deficits.     Diagnostic Evaluation  Laboratory Studies:  Lab Results   Component Value Date    WBC 7.1 06/19/2025    HGB 14.0 06/19/2025    HCT 41.0 06/19/2025    MCV 95 06/19/2025     06/19/2025     Lab Results   Component Value Date     (L) 06/19/2025    K 4.1 06/19/2025     06/19/2025    CO2 26 06/19/2025    CREATININE 0.85 06/19/2025    BUN 14 06/19/2025    CALCIUM 9.6 06/19/2025    PHOS 2.9 03/12/2024     Lab Results   Component Value Date    AST 19 06/19/2025    ALT 19 06/19/2025    ALKPHOS 81 06/19/2025    BILITOT 0.9 06/19/2025    ALBUMIN 3.6 06/19/2025    INR 1.2 (H) 06/19/2025    PROTIME 13.2 (H) 06/19/2025    APTT 34 06/19/2025     Lab Results   Component Value Date    RIANA  36 (H) 06/19/2025    BNP 52 03/11/2024     Diagnostic Imaging:  CXR (06/19/25): Mild bibasilar atelectatic changes and mild bilateral pleural effusions versus pleural thickening.   CTA Chest (06/19/25): No pulmonary embolism or active disease in the chest.  Incidental findings: Mild cardiomegaly.  Basilar atelectasis.  Moderate-severe coronary atherosclerosis.  CT head (06/19/25): Age-related changes again present without acute intracranial process.    Assessment and Plan  1.  Acute cystitis  Presents with generalized weakness and genitourinary symptoms without systemic infectious symptoms; initial evaluation notable for WBCs, bacteria (1+), and LE on urinalysis.  Prescribed suppressive cephalexin in the setting of prior urologic complication placing him at higher risk for UTIs.  Will change ceftriaxone to Zosyn pending further urine results in light of chronic suppressive therapy with ceftriaxone.  Follow-up urine culture.  Monitor intake/output.  Repeat CBC/BMP in AM.  PT/OT while in house.    Medical Comorbidities:  1.  Hyperlipidemia: Prescribed simvastatin 40 mg nightly.  2.  Hypertension: Prescribed losartan 50 mg daily.  3.  Type 2 diabetes mellitus:  4.  Generalized anxiety disorder  5.  CVA  6.  Mild intermittent asthma: Prescribed albuterol 90 mcg/actuation 2 puffs every 4 hours as needed.  7.  History of DVT: Prescribed apixaban 5 mg twice daily.  8.  Pulmonary fibrosis  9.  Obstructive sleep apnea  10.  Bilateral septal neuralgia  11.  New daily persistent headache: Prescribed aspirin-acetaminophen,-caffeine 250-250-65 mg 2 tablets once daily, tramadol 50 mg twice daily.  12.  Constipation: Prescribed docusate 200 mg twice daily.  13.  BPH: Prescribed finasteride 5 mg daily.    Malnutrition Assessment:  Not on file.    Prophylaxis/General Management  DVT Prophylaxis: Apixaban  Diet: Cardiac  Bowel Regimen: Polyethylene glycol  Code Status: DNR/DNI    Chaya Zaldivar MD    Medical Decision  Making/Disposition  The following information was utilized in the care of Arvin Diallo on 6/19/25:  Tests, Documents, and Historians:   Reviewed outpatient clinic notes and/or hospital records (neurology note, PCP note) using Care Everywhere  Reviewed complete ER workup including CBC, CMP, magnesium, troponin x 2, coagulation studies, respiratory PCR panel, urinalysis, radiology reports for CTA chest, CT head, CXR.  Collateral history obtained from daughter at bedside.  Discussion with Other Healthcare Professionals:   Discussed case with ER provider.    Patient is high risk for morbidity in light of:   Decision regarding hospitalization or escalation of care    The above was typed using dictation software and may contain typographic errors.          [1]   Past Medical History:  Diagnosis Date    Acute pulmonary embolism without acute cor pulmonale, unspecified pulmonary embolism type (Multi) 04/12/2023    Localized enlarged lymph nodes     Mediastinal adenopathy    Other nonspecific abnormal finding of lung field     Lung mass    Personal history of other diseases of the respiratory system     History of asthma    Personal history of other specified conditions     History of chronic cough    Personal history of pulmonary embolism     History of pulmonary embolism   [2]   Past Surgical History:  Procedure Laterality Date    CARDIAC CATHETERIZATION Left 3/14/2024    Procedure: Left Heart Cath;  Surgeon: James Costa MD;  Location: [facility] Cardiac Cath Lab;  Service: Cardiovascular;  Laterality: Left;    CARDIAC CATHETERIZATION N/A 3/14/2024    Procedure: Left Ventriculography;  Surgeon: James Costa MD;  Location: [facility] Cardiac Cath Lab;  Service: Cardiovascular;  Laterality: N/A;    CATARACT EXTRACTION Left 04/06/2023    CATARACT EXTRACTION Right 07/06/2023    HERNIA REPAIR  01/09/2023    MR HEAD ANGIO WO IV CONTRAST  08/12/2021    MR HEAD ANGIO WO IV CONTRAST 8/12/2021 [facility] CLINICAL LEGACY     OTHER SURGICAL HISTORY  12/04/2018    Cystoscopy    OTHER SURGICAL HISTORY  12/04/2018    Inferior vena cava filter placement    OTHER SURGICAL HISTORY  12/04/2018    Tonsillectomy    OTHER SURGICAL HISTORY  12/04/2018    Ostectomy of calcaneus for spur    OTHER SURGICAL HISTORY  12/04/2018    Hernia repair    OTHER SURGICAL HISTORY  12/04/2018    Epidural steroid injection    OTHER SURGICAL HISTORY  12/04/2018    Transurethral resection of prostate    OTHER SURGICAL HISTORY  05/06/2022    Intra-articular corticosteroid injection    OTHER SURGICAL HISTORY  09/13/2021    Sigmoidectomy    OTHER SURGICAL HISTORY  04/20/2021    Colonoscopy    OTHER SURGICAL HISTORY  10/24/2019    Knee replacement   [3]   Family History  Problem Relation Name Age of Onset    Heart failure Mother      Parkinsonism Mother      Coronary artery disease Father      Heart failure Father      Breast cancer Daughter      Other (hysterectomy) Daughter      Diabetes Other grandfather    [4]   No current facility-administered medications on file prior to encounter.     Current Outpatient Medications on File Prior to Encounter   Medication Sig Dispense Refill    albuterol (Ventolin HFA) 90 mcg/actuation inhaler Inhale 2 puffs every 4 hours if needed for wheezing or shortness of breath. 24 g 3    apixaban (Eliquis) 5 mg tablet Take 1 tablet (5 mg) by mouth 2 times a day. 180 tablet 3    aspirin-acetaminophen-caffeine (Excedrin Migraine) 250-250-65 mg tablet Take 2 tablets by mouth once daily.      calcium carbonate (CALCIUM 600 ORAL) Take 600 mg by mouth 2 times a day.      cephalexin (Keflex) 500 mg capsule TAKE 1 CAPSULE TWICE DAILY 60 capsule 11    docusate sodium (Colace) 100 mg capsule Take 1 capsule (100 mg) by mouth once daily at bedtime.      finasteride (Proscar) 5 mg tablet Take 1 tablet (5 mg) by mouth once daily. 90 tablet 3    losartan (Cozaar) 50 mg tablet Take 1 tablet (50 mg) by mouth once daily. 90 tablet 3    lubricating eye  drops ophthalmic solution Administer 1 drop into both eyes 3 times a day.      magnesium oxide (Mag-Ox) 400 mg (241.3 mg magnesium) tablet Take 1 tablet by mouth once daily.      melatonin 10 mg tablet Take by mouth once daily at bedtime.      multivitamin (MULTIPLE VITAMINS ORAL) Take 1 tablet by mouth once daily.      psyllium husk, with sugar, (Metamucil Fiber Thin) 2.5 gram wafer wafer Take 2.5 g by mouth once daily at bedtime. WITH CRANBERRY JUICE      simvastatin (Zocor) 40 mg tablet Take 1 tablet (40 mg) by mouth early in the morning.. (Patient taking differently: Take 1 tablet (40 mg) by mouth once daily at bedtime.) 90 tablet 3    traMADol (Ultram) 50 mg tablet Take 1 tablet (50 mg) by mouth 2 times a day.      oxygen (O2) gas therapy Inhale 3 L/min once daily at bedtime.

## 2025-06-20 LAB
AMORPH CRY #/AREA UR COMP ASSIST: ABNORMAL /HPF
ANION GAP SERPL CALC-SCNC: 8 MMOL/L (ref 10–20)
APPEARANCE UR: ABNORMAL
BILIRUB UR STRIP.AUTO-MCNC: NEGATIVE MG/DL
BUN SERPL-MCNC: 18 MG/DL (ref 6–23)
CALCIUM SERPL-MCNC: 9.2 MG/DL (ref 8.6–10.3)
CHLORIDE SERPL-SCNC: 105 MMOL/L (ref 98–107)
CO2 SERPL-SCNC: 27 MMOL/L (ref 21–32)
COLOR UR: ABNORMAL
CREAT SERPL-MCNC: 1.05 MG/DL (ref 0.5–1.3)
EGFRCR SERPLBLD CKD-EPI 2021: 70 ML/MIN/1.73M*2
ERYTHROCYTE [DISTWIDTH] IN BLOOD BY AUTOMATED COUNT: 12.2 % (ref 11.5–14.5)
GLUCOSE SERPL-MCNC: 161 MG/DL (ref 74–99)
GLUCOSE UR STRIP.AUTO-MCNC: ABNORMAL MG/DL
HCT VFR BLD AUTO: 41.8 % (ref 41–52)
HGB BLD-MCNC: 14.3 G/DL (ref 13.5–17.5)
KETONES UR STRIP.AUTO-MCNC: NEGATIVE MG/DL
LEUKOCYTE ESTERASE UR QL STRIP.AUTO: ABNORMAL
MCH RBC QN AUTO: 32.4 PG (ref 26–34)
MCHC RBC AUTO-ENTMCNC: 34.2 G/DL (ref 32–36)
MCV RBC AUTO: 95 FL (ref 80–100)
NITRITE UR QL STRIP.AUTO: NEGATIVE
NRBC BLD-RTO: 0 /100 WBCS (ref 0–0)
PH UR STRIP.AUTO: 7.5 [PH]
PLATELET # BLD AUTO: 217 X10*3/UL (ref 150–450)
POTASSIUM SERPL-SCNC: 4.2 MMOL/L (ref 3.5–5.3)
PROT UR STRIP.AUTO-MCNC: NEGATIVE MG/DL
RBC # BLD AUTO: 4.41 X10*6/UL (ref 4.5–5.9)
RBC # UR STRIP.AUTO: ABNORMAL MG/DL
RBC #/AREA URNS AUTO: ABNORMAL /HPF
SODIUM SERPL-SCNC: 136 MMOL/L (ref 136–145)
SP GR UR STRIP.AUTO: 1.02
UROBILINOGEN UR STRIP.AUTO-MCNC: NORMAL MG/DL
WBC # BLD AUTO: 6.8 X10*3/UL (ref 4.4–11.3)
WBC #/AREA URNS AUTO: ABNORMAL /HPF

## 2025-06-20 PROCEDURE — 1200000002 HC GENERAL ROOM WITH TELEMETRY DAILY

## 2025-06-20 PROCEDURE — 94640 AIRWAY INHALATION TREATMENT: CPT

## 2025-06-20 PROCEDURE — 99233 SBSQ HOSP IP/OBS HIGH 50: CPT

## 2025-06-20 PROCEDURE — 81001 URINALYSIS AUTO W/SCOPE: CPT | Performed by: PHYSICIAN ASSISTANT

## 2025-06-20 PROCEDURE — 2500000005 HC RX 250 GENERAL PHARMACY W/O HCPCS

## 2025-06-20 PROCEDURE — 2500000002 HC RX 250 W HCPCS SELF ADMINISTERED DRUGS (ALT 637 FOR MEDICARE OP, ALT 636 FOR OP/ED)

## 2025-06-20 PROCEDURE — 97161 PT EVAL LOW COMPLEX 20 MIN: CPT | Mod: GP | Performed by: PHYSICAL THERAPIST

## 2025-06-20 PROCEDURE — 85027 COMPLETE CBC AUTOMATED: CPT

## 2025-06-20 PROCEDURE — 2500000001 HC RX 250 WO HCPCS SELF ADMINISTERED DRUGS (ALT 637 FOR MEDICARE OP)

## 2025-06-20 PROCEDURE — 36415 COLL VENOUS BLD VENIPUNCTURE: CPT

## 2025-06-20 PROCEDURE — 2500000004 HC RX 250 GENERAL PHARMACY W/ HCPCS (ALT 636 FOR OP/ED)

## 2025-06-20 PROCEDURE — 94761 N-INVAS EAR/PLS OXIMETRY MLT: CPT

## 2025-06-20 PROCEDURE — 97165 OT EVAL LOW COMPLEX 30 MIN: CPT | Mod: GO

## 2025-06-20 PROCEDURE — 87086 URINE CULTURE/COLONY COUNT: CPT | Mod: SAMLAB | Performed by: PHYSICIAN ASSISTANT

## 2025-06-20 PROCEDURE — 80048 BASIC METABOLIC PNL TOTAL CA: CPT

## 2025-06-20 RX ORDER — CIPROFLOXACIN 500 MG/1
500 TABLET, FILM COATED ORAL EVERY 12 HOURS SCHEDULED
Status: DISCONTINUED | OUTPATIENT
Start: 2025-06-20 | End: 2025-06-25 | Stop reason: HOSPADM

## 2025-06-20 RX ADMIN — CIPROFLOXACIN HYDROCHLORIDE 500 MG: 500 TABLET, FILM COATED ORAL at 20:38

## 2025-06-20 RX ADMIN — Medication 3 L/MIN: at 22:07

## 2025-06-20 RX ADMIN — APIXABAN 5 MG: 5 TABLET, FILM COATED ORAL at 10:22

## 2025-06-20 RX ADMIN — Medication 2 TABLET: at 14:34

## 2025-06-20 RX ADMIN — DOCUSATE SODIUM 200 MG: 100 CAPSULE, LIQUID FILLED ORAL at 20:38

## 2025-06-20 RX ADMIN — PIPERACILLIN SODIUM AND TAZOBACTAM SODIUM 3.38 G: 3; .375 INJECTION, SOLUTION INTRAVENOUS at 07:24

## 2025-06-20 RX ADMIN — TRAMADOL HYDROCHLORIDE 50 MG: 50 TABLET, COATED ORAL at 20:39

## 2025-06-20 RX ADMIN — Medication 3 L/MIN: at 06:20

## 2025-06-20 RX ADMIN — POLYVINYL ALCOHOL, POVIDONE 1 DROP: 14; 6 SOLUTION/ DROPS OPHTHALMIC at 21:08

## 2025-06-20 RX ADMIN — Medication 1 TABLET: at 10:23

## 2025-06-20 RX ADMIN — ALBUTEROL SULFATE 3 ML: 2.5 SOLUTION RESPIRATORY (INHALATION) at 06:20

## 2025-06-20 RX ADMIN — POLYVINYL ALCOHOL, POVIDONE 1 DROP: 14; 6 SOLUTION/ DROPS OPHTHALMIC at 14:38

## 2025-06-20 RX ADMIN — FINASTERIDE 5 MG: 5 TABLET, FILM COATED ORAL at 10:23

## 2025-06-20 RX ADMIN — Medication 21 PERCENT: at 11:22

## 2025-06-20 RX ADMIN — SIMVASTATIN 40 MG: 20 TABLET, FILM COATED ORAL at 20:38

## 2025-06-20 RX ADMIN — PIPERACILLIN SODIUM AND TAZOBACTAM SODIUM 3.38 G: 3; .375 INJECTION, SOLUTION INTRAVENOUS at 13:07

## 2025-06-20 RX ADMIN — APIXABAN 5 MG: 5 TABLET, FILM COATED ORAL at 20:39

## 2025-06-20 RX ADMIN — POLYVINYL ALCOHOL, POVIDONE 1 DROP: 14; 6 SOLUTION/ DROPS OPHTHALMIC at 10:23

## 2025-06-20 RX ADMIN — TRAMADOL HYDROCHLORIDE 50 MG: 50 TABLET, COATED ORAL at 10:23

## 2025-06-20 RX ADMIN — LOSARTAN POTASSIUM 50 MG: 50 TABLET, FILM COATED ORAL at 10:23

## 2025-06-20 RX ADMIN — DOCUSATE SODIUM 200 MG: 100 CAPSULE, LIQUID FILLED ORAL at 10:22

## 2025-06-20 RX ADMIN — POLYETHYLENE GLYCOL 3350 17 G: 17 POWDER, FOR SOLUTION ORAL at 10:24

## 2025-06-20 RX ADMIN — PIPERACILLIN SODIUM AND TAZOBACTAM SODIUM 3.38 G: 3; .375 INJECTION, SOLUTION INTRAVENOUS at 01:32

## 2025-06-20 ASSESSMENT — COGNITIVE AND FUNCTIONAL STATUS - GENERAL
WALKING IN HOSPITAL ROOM: A LITTLE
TOILETING: A LITTLE
STANDING UP FROM CHAIR USING ARMS: A LITTLE
MOVING TO AND FROM BED TO CHAIR: A LITTLE
TOILETING: A LITTLE
MOVING TO AND FROM BED TO CHAIR: A LITTLE
HELP NEEDED FOR BATHING: A LITTLE
DAILY ACTIVITIY SCORE: 22
CLIMB 3 TO 5 STEPS WITH RAILING: TOTAL
DAILY ACTIVITIY SCORE: 21
HELP NEEDED FOR BATHING: A LITTLE
TURNING FROM BACK TO SIDE WHILE IN FLAT BAD: A LITTLE
CLIMB 3 TO 5 STEPS WITH RAILING: A LITTLE
STANDING UP FROM CHAIR USING ARMS: A LITTLE
DRESSING REGULAR LOWER BODY CLOTHING: A LITTLE
MOBILITY SCORE: 17
WALKING IN HOSPITAL ROOM: A LITTLE
MOBILITY SCORE: 19
TURNING FROM BACK TO SIDE WHILE IN FLAT BAD: A LITTLE

## 2025-06-20 ASSESSMENT — PAIN DESCRIPTION - DESCRIPTORS
DESCRIPTORS: ACHING

## 2025-06-20 ASSESSMENT — PAIN SCALES - GENERAL
PAINLEVEL_OUTOF10: 3
PAINLEVEL_OUTOF10: 10 - WORST POSSIBLE PAIN
PAINLEVEL_OUTOF10: 3
PAINLEVEL_OUTOF10: 0 - NO PAIN
PAINLEVEL_OUTOF10: 0 - NO PAIN
PAINLEVEL_OUTOF10: 5 - MODERATE PAIN
PAINLEVEL_OUTOF10: 3

## 2025-06-20 ASSESSMENT — ACTIVITIES OF DAILY LIVING (ADL)
BATHING_ASSISTANCE: STAND BY
ADL_ASSISTANCE: INDEPENDENT
LACK_OF_TRANSPORTATION: NO
ADL_ASSISTANCE: INDEPENDENT

## 2025-06-20 ASSESSMENT — PAIN - FUNCTIONAL ASSESSMENT
PAIN_FUNCTIONAL_ASSESSMENT: 0-10

## 2025-06-20 NOTE — PROGRESS NOTES
06/20/25 1601   Discharge Planning   Living Arrangements Other (Comment)  (Assisted Living: [facility])   Support Systems Children   Assistance Needed assisted living - return   Type of Residence Assisted living  ([facility] (assisted living))   Do you have animals or pets at home? No   Care Facility Name [facility]   Who is requesting discharge planning? Provider   Home or Post Acute Services Other (Comment)  (return to assisted living)   Expected Discharge Disposition Othe  (Return to assisted living at [facility])   Financial Resource Strain   How hard is it for you to pay for the very basics like food, housing, medical care, and heating? Not hard   Housing Stability   In the last 12 months, was there a time when you were not able to pay the mortgage or rent on time? N   In the past 12 months, how many times have you moved where you were living? 1   At any time in the past 12 months, were you homeless or living in a shelter (including now)? N   Transportation Needs   In the past 12 months, has lack of transportation kept you from medical appointments or from getting medications? no   In the past 12 months, has lack of transportation kept you from meetings, work, or from getting things needed for daily living? No   Patient Choice   Patient / Family choosing to utilize agency / facility established prior to hospitalization Yes   Stroke Family Assessment   Stroke Family Assessment Needed No   Intensity of Service   Intensity of Service 0-30 min     Per medical team, patient will likely require another 24 to 48 hours in the hospital.  reviewed patient's chart and met with patient at bedside to perform initial assessment. Patient is known to [facility] team from previous hospital admission. Patient stated that, prior to current hospitalization, patient was enjoying life at the [facility] assisted living [facility], and patient was largely  independent with ADLs. Assisted living provides assistance with IADLs as needed. Patient uses a rollator, oxygen, and a Cpap, and has all of same at home. Plan for patient is to return to the [facility] when medically ready. Care Transitions to follow and assist as needed. MARYLIN Benz

## 2025-06-20 NOTE — PROGRESS NOTES
"Occupational Therapy    Evaluation    Patient Name: Arvin Diallo \"Lewis\"  MRN: [id number]  Today's Date: 6/20/2025  Time Calculation  Start Time: 0749  Stop Time: 0809  Time Calculation (min): 20 min  316/316-A    Assessment  IP OT Assessment  OT Assessment: pt able to complete all ADLs with c/o feeling \"really weak.\"    No safety deficits or LOB noted during assessment.  No need for OT intervention at this time due to pt indep in ADL.  Prognosis: Good  Barriers to Discharge Home: No anticipated barriers  Evaluation/Treatment Tolerance: Patient tolerated treatment well  End of Session Communication: Bedside nurse (student nurse present)  End of Session Patient Position: Up in chair, Alarm on (call light in reach)    Plan:  No Skilled OT: Independent with ADLs  OT Frequency: OT eval only  OT Discharge Recommendations: No further acute OT, No OT needed after discharge  OT Recommended Transfer Status: Stand by assist  OT - OK to Discharge: Yes (once medically stable)    Subjective     Current Problem:  1. Generalized weakness        2. Chronic intractable headache, unspecified headache type        3. Urinary tract infection without hematuria, site unspecified            General:  General  Reason for Referral: 84 year old male admitted for UTI  Referred By: Kayley  Past Medical History Relevant to Rehab: chronic headaches  Family/Caregiver Present:  (dtr entered room briefly then left)  Co-Treatment: PT  Co-Treatment Reason: to maximize pt safety  Prior to Session Communication: Bedside nurse  Patient Position Received: Bed, 2 rail up, Alarm off, not on at start of session  General Comment: pt agreeable to assessment    Precautions:  Medical Precautions: Fall precautions    Vital Signs:  Vital Signs Comment: no concerns    Pain:  Pain Assessment  Pain Assessment: 0-10  0-10 (Numeric) Pain Score: 3 (headache)    Objective     Cognition:  Overall Cognitive Status: Within Functional Limits  Orientation Level: Oriented X4   "           Home Living:  Type of Home: Assisted living ([facility])     Prior Function:  ADL Assistance: Independent  Homemaking Assistance: Needs assistance  Ambulatory Assistance: Independent (with upright walker)      ADL:  Eating Assistance: Independent  Grooming Assistance: Independent  Bathing Assistance: Stand by  UE Dressing Assistance: Independent  LE Dressing Assistance: Stand by  Toileting Assistance with Device: Stand by    Activity Tolerance:  Endurance: Endurance does not limit participation in activity    Bed Mobility/Transfers:   Bed Mobility  Bed Mobility: Yes  Bed Mobility 1  Bed Mobility 1: Supine to sitting  Level of Assistance 1: Independent, Modified independent  Bed Mobility Comments 1: bed flat light use of bed rail  Transfers  Transfer: Yes  Transfer 1  Technique 1: Sit to stand, Stand to sit  Transfer Device 1: Walker, Gait belt  Transfer Level of Assistance 1: Close supervision    Ambulation/Gait Training:  Functional Mobility  Functional Mobility Performed: Yes  Functional Mobility 1  Surface 1: Level tile  Device 1:  (upright walker)  Functional Mobility Support Devices: Gait belt  Assistance 1: Close supervision      Vision: Vision - Basic Assessment  Current Vision: Wears glasses all the time    Sensation:  Light Touch: No apparent deficits    Strength:  Strength Comments: BUE WNL      Coordination:  Movements are Fluid and Coordinated: Yes       Outcome Measures: [facility] Daily Activity  Putting on and taking off regular lower body clothing: A little  Bathing (including washing, rinsing, drying): A little  Putting on and taking off regular upper body clothing: None  Toileting, which includes using toilet, bedpan or urinal: A little  Taking care of personal grooming such as brushing teeth: None  Eating Meals: None  Daily Activity - Total Score: 21

## 2025-06-20 NOTE — CARE PLAN
The patient's goals for the shift include      The clinical goals for the shift include Maintain pt safety    Over the shift, the patient did not make progress toward the following goals. Barriers to progression include . Recommendations to address these barriers include .

## 2025-06-20 NOTE — CARE PLAN
The patient's goals for the shift include      The clinical goals for the shift include Maintain pt safety      Problem: Pain - Adult  Goal: Verbalizes/displays adequate comfort level or baseline comfort level  Outcome: Progressing     Problem: Safety - Adult  Goal: Free from fall injury  Outcome: Progressing     Problem: Discharge Planning  Goal: Discharge to home or other facility with appropriate resources  Outcome: Progressing     Problem: Chronic Conditions and Co-morbidities  Goal: Patient's chronic conditions and co-morbidity symptoms are monitored and maintained or improved  Outcome: Progressing     Problem: Nutrition  Goal: Nutrient intake appropriate for maintaining nutritional needs  Outcome: Progressing

## 2025-06-20 NOTE — PROGRESS NOTES
"Physical Therapy    Physical Therapy Evaluation    Patient Name: Arvin Diallo \"[place]\"  MRN: [id number]  Today's Date: 6/20/2025   Time Calculation  Start Time: 0759  Stop Time: 0809  Time Calculation (min): 10 min    Assessment/Plan   Skilled PT intervention is indicated due to patient presents with deficits in bed mobility, transfers, gait, stair negotiation, strength, activity tolerance, and safety awareness.   Patient generaly weak and deconditioned.  Recommend continued physical therapy intervention at a low intensity following hospitalization to improve strength, balance, mobility and reduce fall risk.  Continue ambulation with patient's own rollator    PT Assessment  PT Assessment Results: Decreased strength, Decreased endurance, Impaired balance, Decreased mobility, Decreased safety awareness, Pain  Rehab Prognosis: Good  Barriers to Discharge Home: No anticipated barriers (return to residential)  Evaluation/Treatment Tolerance: Patient limited by fatigue  End of Session Communication: Bedside nurse (student nurse present)  End of Session Patient Position: Up in chair, Alarm on (call light in reach)  IP OR SWING BED PT PLAN  Inpatient or Swing Bed: Inpatient  PT Plan  Treatment/Interventions: Bed mobility, Transfer training, Gait training, Strengthening, Endurance training, Therapeutic exercise, Therapeutic activity, Home exercise program  PT Plan: Ongoing PT  PT Frequency: 3 times per week  PT Discharge Recommendations: Low intensity level of continued care  PT Recommended Transfer Status: Assist x1  PT - OK to Discharge: Yes (once medically appropriate and safe DC plan in place)    Subjective     Current Problem:  Problem List[1]    General Visit Information:  General  Reason for Referral: impaired mobility; 84 year old male admitted for UTI  Referred By: Kayley  Past Medical History Relevant to Rehab: chronic headaches  Family/Caregiver Present:  (dtr entered room briefly then left)  Co-Treatment: OT  Co-Treatment " Reason: to maximize pt safety with mobility  Prior to Session Communication: Bedside nurse  Patient Position Received: Bed, 2 rail up, Alarm off, not on at start of session  General Comment: pt agreeable to assessment; states he just doesn't feel good and feels weak    Home Living:  Home Living  Type of Home: Assisted living ([facility])    Prior Level of Function:  Prior Function Per Pt/Caregiver Report  ADL Assistance: Independent  Homemaking Assistance: Needs assistance  Ambulatory Assistance: Independent (with rollator)    Precautions:  Precautions  Medical Precautions: Fall precautions    Vital Signs:  Vital Signs  Vitals Session: During PT (no concnerns)  Vital Signs Comment: no concerns  Objective     Pain:  Pain Assessment  Pain Assessment: 0-10  0-10 (Numeric) Pain Score: 3 (headache)  Pain Interventions: Medication (See MAR), Repositioned    Cognition:  Cognition  Overall Cognitive Status: Within Functional Limits  Orientation Level: Oriented X4  Safety/Judgement: Exceptions to WFL    General Assessments:      Activity Tolerance  Endurance: Decreased tolerance for upright activites  Sensation  Light Touch: No apparent deficits  Strength  Strength Comments: LEs grossly >/=4-/5     Coordination  Movements are Fluid and Coordinated: Yes  Postural Control  Postural Control: Within Functional Limits  Static Sitting Balance  Static Sitting-Balance Support: Feet supported, Bilateral upper extremity supported  Static Sitting-Level of Assistance: Close supervision  Dynamic Sitting Balance  Dynamic Sitting-Balance Support: Feet supported, Bilateral upper extremity supported  Dynamic Sitting-Level of Assistance: Close supervision  Dynamic Sitting-Balance: Forward lean  Static Standing Balance  Static Standing-Balance Support: Bilateral upper extremity supported  Static Standing-Level of Assistance: Contact guard  Static Standing-Comment/Number of Minutes: rollator  Dynamic Standing Balance  Dynamic  Standing-Balance Support: Bilateral upper extremity supported  Dynamic Standing-Level of Assistance: Contact guard  Dynamic Standing-Balance: Turning  Dynamic Standing-Comments: rollator    Functional Assessments:     Bed Mobility  Bed Mobility: Yes  Bed Mobility 1  Bed Mobility 1: Supine to sitting  Level of Assistance 1: Independent, Modified independent  Bed Mobility Comments 1: bed flat, light use of rail  Transfers  Transfer: Yes  Transfer 1  Technique 1: Sit to stand, Stand to sit  Transfer Device 1: Walker, Gait belt (rollator)  Transfer Level of Assistance 1: Close supervision  Trials/Comments 1: cues for hand placement/technique  Ambulation/Gait Training  Ambulation/Gait Training Performed: Yes  Ambulation/Gait Training 1  Surface 1: Level tile  Device 1: Rolling walker (rollator)  Gait Support Devices: Gait belt  Assistance 1: Contact guard  Quality of Gait 1: Decreased step length, Diminished heel strike  Comments/Distance (ft) 1: 48'x2        Outcome Measures:  [facility] Basic Mobility  Turning from your back to your side while in a flat bed without using bedrails: None  Moving from lying on your back to sitting on the side of a flat bed without using bedrails: A little  Moving to and from bed to chair (including a wheelchair): A little  Standing up from a chair using your arms (e.g. wheelchair or bedside chair): A little  To walk in hospital room: A little  Climbing 3-5 steps with railing: Total  Basic Mobility - Total Score: 17                            Goals:  Encounter Problems       Encounter Problems (Active)       PT Problem       PT Goal 1       Start:  06/20/25    Expected End:  07/04/25       Arvin Diallo will be independent with bed mobility for supine to and from sitting EOB without use of rail and bed flat           PT Goal 2       Start:  06/20/25    Expected End:  07/04/25       Arvin Diallo will demonstrate good safety awareness with transfers and mobility and with use of assistive  device (proper hand placement).            PT Goal 3       Start:  06/20/25    Expected End:  07/04/25       Arvin Diallo will transfer sit to and from stand using least restrictive assistive device mod indep           PT Goal 4       Start:  06/20/25    Expected End:  07/04/25       Arvin Diallo will ambulate 150 ft with assistive device , level surface, good balance, steady mod Indep              Pain - Adult            Education Documentation  Mobility Training, taught by Nilam Fatima, PT at 6/20/2025 11:50 AM.  Learner: Patient  Readiness: Acceptance  Method: Explanation, Demonstration  Response: Verbalizes Understanding, Demonstrated Understanding, Needs Reinforcement  Comment: safety with mobility, transfers, use of device    Education Comments  No comments found.               [1]   Patient Active Problem List  Diagnosis    Arthritis of left hip    Asymmetric SNHL (sensorineural hearing loss)    Bronchomalacia    Cerebrovascular accident (CVA) (Multi)    Degeneration of lumbar intervertebral disc with acute herniation    Diabetes mellitus (Multi)    Dizziness    Elevated diaphragm    Enlarged prostate    Fatigue    Hyperlipidemia    Lumbar scoliosis    Meniere's disease    Mild HTN    Mitral valve prolapse    Obstructive sleep apnea, adult    Tinnitus of both ears    Vertigo    RAVI (generalized anxiety disorder)    Gastroesophageal reflux disease    History of DVT (deep vein thrombosis)    Patent foramen ovale (HHS-HCC)    PAD (peripheral artery disease)    Mild intermittent asthma without complication (HHS-HCC)    Vasodepressor syncope    Urinary tract infection without hematuria, site unspecified    Generalized weakness

## 2025-06-20 NOTE — PROGRESS NOTES
Hospital Medicine Progress Note    Patient Name: Arvin Diallo  YOB: 1940  MRN: [id number]  Date of Service: 06/20/25  Time of Dictation: 3:17 PM    Assessment and Plan  1.  Acute bacterial prostatitis  Presented with generalized weakness and genitourinary symptoms without systemic infectious symptoms; initial evaluation notable for tachycardia (106) and UA with bacteria (1+), LE, WBCs (11-20).  Radiography showed bibasilar atelectasis; CT head without acute findings.  Started on ceftriaxone, transitioned to piperacillin-tazobactam for improved coverage in light of chronic suppressive cephalexin use.  Generalized weakness persists today; ANASTASIA with boggy, tender prostate concerning for bacterial prostatitis.  Transition antibiotics to ciprofloxacin.  Will need 3-week course in light of concerns regarding prostate involvement.  Continue PT/OT while in house.  Monitor intake/output.  Follow-up urine culture.  Repeat CBC/BMP in AM.    Medical Comorbidities:  1.  Hyperlipidemia: Prescribed simvastatin 40 mg nightly.  2.  Hypertension: Prescribed losartan 50 mg daily.  3.  Type 2 diabetes mellitus:  4.  Generalized anxiety disorder  5.  CVA  6.  Mild intermittent asthma: Prescribed albuterol 90 mcg/actuation 2 puffs every 4 hours as needed.  7.  History of DVT: Prescribed apixaban 5 mg twice daily.  8.  Pulmonary fibrosis  9.  Obstructive sleep apnea  10.  Bilateral septal neuralgia  11.  New daily persistent headache: Prescribed aspirin-acetaminophen,-caffeine 250-250-65 mg 2 tablets once daily, tramadol 50 mg twice daily.  12.  Constipation: Prescribed docusate 200 mg twice daily.  13.  BPH: Prescribed finasteride 5 mg daily.  Malnutrition Assessment:  Not on file.    Prophylaxis/General Management  DVT Prophylaxis: Apixaban  Diet: Cardiac  Bowel Regimen: Polyethylene glycol  Code Status: DNR/DNI    Disposition: Patient continues to exhibit/require:   Uncontrolled symptoms requiring further management to  ensure safe discharge and Discharge planning pending ancillary and/or specialist evaluation   warranting further hospitalization.  Tentative discharge within 24-48 hours.     Subjective  No acute overnight events.  Evaluated at bedside with RN and PharmD/RPh during triad rounds at ~09h40.  Family present at bedside at the time of my evaluation.  Persistent weakness this morning; associated frequency, nocturia.  No fevers, chills, chest pain.   Reviewed diagnostic work-up x24 hours and plan of care, including need for ANASTASIA to evaluate prostatic involvement and transition of antibiotics for improved coverage of prostatitis.  Patient/family queried for questions/concerns - all voiced questions/concerns addressed.     Objective  Vital Signs (x24h)  Temp:  [0.5 °C (32.9 °F)-36.5 °C (97.7 °F)] 35.9 °C (96.6 °F)  Heart Rate:  [80-99] 91  Resp:  [15-20] 18  BP: (112-154)/(65-89) 136/65  FiO2 (%):  [32 %] 32 %    Physical Exam (at ~09h50):  General: Alert, resting with eyes open, sitting upright in chair at bedside.  No evident acute distress.   HEENT: Pupils equal and round, with symmetric eye movements.   Cardiovascular: Regular rate and rhythm. No murmurs, rubs, gallops. No peripheral edema.   Pulmonary: Chest clear to auscultation bilaterally. Normal work of breathing.   Abdominal: Soft, non-tender, non-distended. Bowel sounds present.   Neurological: Moves all extremities. No evident focal neurological deficits.   Musculoskeletal:  Calves supple, non-tender.      Returned to bedside ~13h00 to perform ANASTASIA.  Good rectal tone.  Boggy, tender prostate noted. No blood.     Laboratory Studies:  Lab Results   Component Value Date    WBC 6.8 06/20/2025    HGB 14.3 06/20/2025    HCT 41.8 06/20/2025    MCV 95 06/20/2025     06/20/2025     Lab Results   Component Value Date     06/20/2025    K 4.2 06/20/2025     06/20/2025    CO2 27 06/20/2025    CREATININE 1.05 06/20/2025    BUN 18 06/20/2025    CALCIUM 9.2  06/20/2025    PHOS 2.9 03/12/2024     Diagnostic Imaging (Pertinent Findings/Impression):   CXR (06/19/25): Mild bibasilar atelectatic changes and mild bilateral pleural effusions versus pleural thickening.   CTA Chest (06/19/25): No pulmonary embolism or active disease in the chest.  Incidental findings: Mild cardiomegaly.  Basilar atelectasis.  Moderate-severe coronary atherosclerosis.  CT head (06/19/25): Age-related changes again present without acute intracranial process.    Medications:  Current Medications[1]    Medical Decision Making  The following information was utilized in the care of Arvin Diallo on 6/20/25:  Tests, Documents, and Historians:   Obtained collateral history from family at bedside  Discussion with Other Healthcare Professionals:   Discussed above pharmacologic therapy with RN and PharmD/Rph during bedside rounds and Reviewed discharge planning with case management    Total estimated time on patient care 50 minutes.  The above was typed using dictation software and may contain typographic errors.     Chaya Zaldivar         [1]   Current Facility-Administered Medications:     acetaminophen (Tylenol) tablet 650 mg, 650 mg, oral, q4h PRN, 650 mg at 06/19/25 2045 **OR** acetaminophen (Tylenol) oral liquid 650 mg, 650 mg, oral, q4h PRN **OR** acetaminophen (Tylenol) suppository 650 mg, 650 mg, rectal, q4h PRN, Chaya Zaldivar MD    albuterol 2.5 mg /3 mL (0.083 %) nebulizer solution 3 mL, 3 mL, nebulization, BID PRN, Chaya Zaldivar MD, 3 mL at 06/20/25 0620    apixaban (Eliquis) tablet 5 mg, 5 mg, oral, BID, Chaya Zaldivar MD, 5 mg at 06/20/25 1022    aspirin-acetaminophen-caffeine (Excedrin Migraine) 250-250-65 mg per tablet 2 tablet, 2 tablet, oral, q24h, Chaya Zaldivar MD, 2 tablet at 06/20/25 1434    docusate sodium (Colace) capsule 200 mg, 200 mg, oral, BID, Chaya Zaldivar MD, 200 mg at 06/20/25 1022    finasteride (Proscar) tablet 5 mg, 5 mg, oral, Daily, Chaya Zaldivar MD, 5 mg  at 06/20/25 1023    losartan (Cozaar) tablet 50 mg, 50 mg, oral, Daily, Chaya Zaldivar MD, 50 mg at 06/20/25 1023    lubricating eye drops ophthalmic solution 1 drop, 1 drop, Both Eyes, TID, Chaya Zaldivar MD, 1 drop at 06/20/25 1438    magnesium oxide (Mag-Ox) 400 mg (241.3 mg elemental) tablet 1 tablet, 400 mg of magnesium oxide, oral, Daily, Chaya Zaldivar MD, 1 tablet at 06/20/25 1023    melatonin tablet 10 mg, 10 mg, oral, Nightly PRN, Chaya Zaldivar MD    ondansetron ODT (Zofran-ODT) disintegrating tablet 4 mg, 4 mg, oral, q8h PRN **OR** ondansetron (Zofran) injection 4 mg, 4 mg, intravenous, q8h PRN, Chaya Zaldivar MD    oxygen (O2) therapy (Peds), , inhalation, Continuous PRN - O2/gases, Chaya Zaldivar MD, 21 percent at 06/20/25 1122    oxygen (O2) therapy, , inhalation, Continuous PRN - O2/gases, Chaya Zaldivar MD, 3 L/min at 06/20/25 0620    piperacillin-tazobactam (Zosyn) 3.375 g in dextrose (iso) IV 50 mL, 3.375 g, intravenous, q6h, Chaya Zaldivar MD, Stopped at 06/20/25 1434    polyethylene glycol (Glycolax, Miralax) packet 17 g, 17 g, oral, Daily, Chaya Zaldivar MD, 17 g at 06/20/25 1024    simvastatin (Zocor) tablet 40 mg, 40 mg, oral, Nightly, Chaya Zaldivar MD, 40 mg at 06/19/25 2045    traMADol (Ultram) tablet 50 mg, 50 mg, oral, BID, Chaya Zaldivar MD, 50 mg at 06/20/25 1023

## 2025-06-21 PROBLEM — N41.0 ACUTE PROSTATITIS: Status: ACTIVE | Noted: 2025-06-21

## 2025-06-21 PROCEDURE — 99232 SBSQ HOSP IP/OBS MODERATE 35: CPT

## 2025-06-21 PROCEDURE — 2500000001 HC RX 250 WO HCPCS SELF ADMINISTERED DRUGS (ALT 637 FOR MEDICARE OP)

## 2025-06-21 PROCEDURE — 2500000005 HC RX 250 GENERAL PHARMACY W/O HCPCS

## 2025-06-21 PROCEDURE — 94761 N-INVAS EAR/PLS OXIMETRY MLT: CPT

## 2025-06-21 PROCEDURE — 2500000004 HC RX 250 GENERAL PHARMACY W/ HCPCS (ALT 636 FOR OP/ED)

## 2025-06-21 PROCEDURE — 2500000002 HC RX 250 W HCPCS SELF ADMINISTERED DRUGS (ALT 637 FOR MEDICARE OP, ALT 636 FOR OP/ED)

## 2025-06-21 PROCEDURE — 94640 AIRWAY INHALATION TREATMENT: CPT

## 2025-06-21 PROCEDURE — 1200000002 HC GENERAL ROOM WITH TELEMETRY DAILY

## 2025-06-21 PROCEDURE — 97110 THERAPEUTIC EXERCISES: CPT | Mod: GP,CQ

## 2025-06-21 RX ADMIN — ALBUTEROL SULFATE 3 ML: 2.5 SOLUTION RESPIRATORY (INHALATION) at 18:02

## 2025-06-21 RX ADMIN — SIMVASTATIN 40 MG: 20 TABLET, FILM COATED ORAL at 20:06

## 2025-06-21 RX ADMIN — POLYVINYL ALCOHOL, POVIDONE 1 DROP: 14; 6 SOLUTION/ DROPS OPHTHALMIC at 14:15

## 2025-06-21 RX ADMIN — Medication 1 TABLET: at 08:21

## 2025-06-21 RX ADMIN — APIXABAN 5 MG: 5 TABLET, FILM COATED ORAL at 08:21

## 2025-06-21 RX ADMIN — TRAMADOL HYDROCHLORIDE 50 MG: 50 TABLET, COATED ORAL at 08:21

## 2025-06-21 RX ADMIN — CIPROFLOXACIN HYDROCHLORIDE 500 MG: 500 TABLET, FILM COATED ORAL at 08:22

## 2025-06-21 RX ADMIN — Medication 3 L/MIN: at 06:31

## 2025-06-21 RX ADMIN — POLYETHYLENE GLYCOL 3350 17 G: 17 POWDER, FOR SOLUTION ORAL at 08:21

## 2025-06-21 RX ADMIN — CIPROFLOXACIN HYDROCHLORIDE 500 MG: 500 TABLET, FILM COATED ORAL at 20:06

## 2025-06-21 RX ADMIN — Medication 2 TABLET: at 14:15

## 2025-06-21 RX ADMIN — POLYVINYL ALCOHOL, POVIDONE 1 DROP: 14; 6 SOLUTION/ DROPS OPHTHALMIC at 20:06

## 2025-06-21 RX ADMIN — POLYVINYL ALCOHOL, POVIDONE 1 DROP: 14; 6 SOLUTION/ DROPS OPHTHALMIC at 08:21

## 2025-06-21 RX ADMIN — FINASTERIDE 5 MG: 5 TABLET, FILM COATED ORAL at 08:21

## 2025-06-21 RX ADMIN — ALBUTEROL SULFATE 3 ML: 2.5 SOLUTION RESPIRATORY (INHALATION) at 06:28

## 2025-06-21 RX ADMIN — DOCUSATE SODIUM 200 MG: 100 CAPSULE, LIQUID FILLED ORAL at 08:21

## 2025-06-21 RX ADMIN — DOCUSATE SODIUM 200 MG: 100 CAPSULE, LIQUID FILLED ORAL at 20:06

## 2025-06-21 RX ADMIN — LOSARTAN POTASSIUM 50 MG: 50 TABLET, FILM COATED ORAL at 08:21

## 2025-06-21 RX ADMIN — Medication 21 PERCENT: at 18:02

## 2025-06-21 RX ADMIN — TRAMADOL HYDROCHLORIDE 50 MG: 50 TABLET, COATED ORAL at 20:06

## 2025-06-21 RX ADMIN — APIXABAN 5 MG: 5 TABLET, FILM COATED ORAL at 20:06

## 2025-06-21 RX ADMIN — Medication 21 PERCENT: at 11:47

## 2025-06-21 ASSESSMENT — COGNITIVE AND FUNCTIONAL STATUS - GENERAL
STANDING UP FROM CHAIR USING ARMS: A LITTLE
MOBILITY SCORE: 19
MOBILITY SCORE: 10
CLIMB 3 TO 5 STEPS WITH RAILING: TOTAL
WALKING IN HOSPITAL ROOM: A LITTLE
DAILY ACTIVITIY SCORE: 22
TOILETING: A LITTLE
HELP NEEDED FOR BATHING: A LITTLE
MOVING TO AND FROM BED TO CHAIR: TOTAL
CLIMB 3 TO 5 STEPS WITH RAILING: A LITTLE
MOVING TO AND FROM BED TO CHAIR: A LITTLE
TURNING FROM BACK TO SIDE WHILE IN FLAT BAD: A LITTLE
WALKING IN HOSPITAL ROOM: A LITTLE
MOVING FROM LYING ON BACK TO SITTING ON SIDE OF FLAT BED WITH BEDRAILS: TOTAL
TURNING FROM BACK TO SIDE WHILE IN FLAT BAD: TOTAL
STANDING UP FROM CHAIR USING ARMS: A LITTLE

## 2025-06-21 ASSESSMENT — PAIN - FUNCTIONAL ASSESSMENT
PAIN_FUNCTIONAL_ASSESSMENT: 0-10

## 2025-06-21 ASSESSMENT — PAIN SCALES - GENERAL
PAINLEVEL_OUTOF10: 0 - NO PAIN
PAINLEVEL_OUTOF10: 5 - MODERATE PAIN
PAINLEVEL_OUTOF10: 0 - NO PAIN

## 2025-06-21 NOTE — PROGRESS NOTES
Pt reviewed during Care Rounds today and he is not medically ready for discharge; ADOD is 24 to 48 hours. SW met with pt to review his plan and he confirms his desires to return to his apartment at [facility].  Pt reports feeling weak and would like [facility] upon his return to [facility].  Agency preference is [facility].  [facility].  IM reviewed without questions/concerns. Daughter normally transports home. Care Transitions will continue to follow.       KEYON Rosas

## 2025-06-21 NOTE — CARE PLAN
Problem: Pain - Adult  Goal: Verbalizes/displays adequate comfort level or baseline comfort level  Outcome: Progressing     Problem: Safety - Adult  Goal: Free from fall injury  Outcome: Progressing     Problem: Discharge Planning  Goal: Discharge to home or other facility with appropriate resources  Outcome: Progressing     Problem: Chronic Conditions and Co-morbidities  Goal: Patient's chronic conditions and co-morbidity symptoms are monitored and maintained or improved  Outcome: Progressing     Problem: Nutrition  Goal: Nutrient intake appropriate for maintaining nutritional needs  Outcome: Progressing     Problem: Skin  Goal: Participates in plan/prevention/treatment measures  Outcome: Progressing  Flowsheets (Taken 6/21/2025 0753)  Participates in plan/prevention/treatment measures:   Discuss with provider PT/OT consult   Increase activity/out of bed for meals  Goal: Prevent/manage excess moisture  Outcome: Progressing  Flowsheets (Taken 6/21/2025 0753)  Prevent/manage excess moisture:   Moisturize dry skin   Cleanse incontinence/protect with barrier cream  Goal: Prevent/minimize sheer/friction injuries  Outcome: Progressing  Flowsheets (Taken 6/21/2025 0753)  Prevent/minimize sheer/friction injuries:   HOB 30 degrees or less   Increase activity/out of bed for meals   Use pull sheet  Goal: Promote/optimize nutrition  Outcome: Progressing  Flowsheets (Taken 6/21/2025 0753)  Promote/optimize nutrition:   Offer water/supplements/favorite foods   Consume > 50% meals/supplements   Monitor/record intake including meals

## 2025-06-21 NOTE — PROGRESS NOTES
Hospital Medicine Progress Note    Patient Name: Arvin Diallo  YOB: 1940  MRN: [id number]  Date of Service: 06/21/25  Time of Dictation: 11:08 AM    Assessment and Plan  1.  Acute bacterial prostatitis  Presented with generalized weakness and genitourinary symptoms without systemic infectious symptoms; initial evaluation notable for tachycardia (106) and UA with bacteria (1+), LE, WBCs (11-20).  Radiography showed bibasilar atelectasis; CT head without acute findings.  Started on ceftriaxone, transitioned to piperacillin-tazobactam for improved coverage in light of chronic suppressive cephalexin use and subsequently ciprofloxacin in light of concerns for prostatitis.  Physical exam with evidence of prostatitis.  Generalized weakness persists today (improving yesterday but believes he walked too much with PT); patient does not yet feel safe to return home.   Continue ciprofloxacin 500 mg p.o. twice daily.  Continue PT/OT while in house.  Monitor intake/output.  Follow-up urine culture.  Repeat CBC/BMP in AM.    Medical Comorbidities:  1.  Hyperlipidemia: Prescribed simvastatin 40 mg nightly.  2.  Hypertension: Prescribed losartan 50 mg daily.  3.  Type 2 diabetes mellitus  4.  Generalized anxiety disorder  5.  CVA  6.  Mild intermittent asthma: Prescribed albuterol 90 mcg/actuation 2 puffs every 4 hours as needed.  7.  History of DVT: Prescribed apixaban 5 mg twice daily.  8.  Pulmonary fibrosis  9.  Obstructive sleep apnea  10.  Bilateral septal neuralgia  11.  Daily persistent headache: Prescribed aspirin-acetaminophen,-caffeine 250-250-65 mg 2 tablets once daily, tramadol 50 mg twice daily.  12.  Constipation: Prescribed docusate 200 mg twice daily.  13.  BPH: Prescribed finasteride 5 mg daily.  Malnutrition Assessment:  Not on file.    Prophylaxis/General Management  DVT Prophylaxis: Apixaban  Diet: Cardiac  Bowel Regimen: Polyethylene glycol  Code Status: DNR/DNI    Disposition: Patient continues  to exhibit/require:   Uncontrolled symptoms requiring further management to ensure safe discharge and Discharge planning pending ancillary and/or specialist evaluation   warranting further hospitalization.  Tentative discharge within 24 hours.     Subjective  No acute overnight events.  Evaluated at bedside with RN and PharmD/RPh during triad rounds at ~08h50.  Patient called daughter over cell phone at time of my evaluation.  Persistent weakness this morning; feels that he walked too far yesterday evening and perhaps pushed himself too hard.  No fevers, chills, chest pain, dyspnea, abdominal pain, dysuria.  Reviewed diagnostic work-up x24 hours and plan of care, including continued antibiotic therapy and monitoring for clinical improvement.  Patient/family queried for questions/concerns - all voiced questions/concerns addressed.     Objective  Vital Signs (x24h)  Temp:  [35.6 °C (96.1 °F)-36.6 °C (97.8 °F)] 35.9 °C (96.6 °F)  Heart Rate:  [] 95  Resp:  [16-18] 18  BP: (131-154)/(80-91) 154/91  FiO2 (%):  [28 %-32 %] 32 %    Physical Exam (at ~08h50):  General: Alert, resting with eyes open, resting semi-Cuello's in bed.  No evident acute distress.   HEENT: Pupils equal and round, with symmetric eye movements.   Cardiovascular: Regular rate and rhythm. No murmurs, rubs, gallops. No peripheral edema.   Pulmonary: Chest clear to auscultation bilaterally. Normal work of breathing.   Abdominal: Soft, non-tender, non-distended. Bowel sounds present.   Neurological: Moves all extremities. No evident focal neurological deficits.   Musculoskeletal:  Calves supple, non-tender.      Laboratory Studies:  Lab Results   Component Value Date    WBC 6.8 06/20/2025    HGB 14.3 06/20/2025    HCT 41.8 06/20/2025    MCV 95 06/20/2025     06/20/2025     Lab Results   Component Value Date     06/20/2025    K 4.2 06/20/2025     06/20/2025    CO2 27 06/20/2025    CREATININE 1.05 06/20/2025    BUN 18 06/20/2025     CALCIUM 9.2 06/20/2025    PHOS 2.9 03/12/2024     Diagnostic Imaging (Pertinent Findings/Impression):   CXR (06/19/25): Mild bibasilar atelectatic changes and mild bilateral pleural effusions versus pleural thickening.   CTA Chest (06/19/25): No pulmonary embolism or active disease in the chest.  Incidental findings: Mild cardiomegaly.  Basilar atelectasis.  Moderate-severe coronary atherosclerosis.  CT head (06/19/25): Age-related changes again present without acute intracranial process.    Medications:  Current Medications[1]    Medical Decision Making  The following information was utilized in the care of Arvin Diallo on 6/21/25:  Tests, Documents, and Historians:   Obtained collateral history from family over the phone  Discussion with Other Healthcare Professionals:   Discussed above pharmacologic therapy with RN and PharmD/Rph during bedside rounds and Reviewed discharge planning with case management    Total estimated time on patient care 40 minutes.  The above was typed using dictation software and may contain typographic errors.     Chaya Zaldivar         [1]   Current Facility-Administered Medications:     acetaminophen (Tylenol) tablet 650 mg, 650 mg, oral, q4h PRN, 650 mg at 06/19/25 2045 **OR** acetaminophen (Tylenol) oral liquid 650 mg, 650 mg, oral, q4h PRN **OR** acetaminophen (Tylenol) suppository 650 mg, 650 mg, rectal, q4h PRN, Chaya Zaldivar MD    albuterol 2.5 mg /3 mL (0.083 %) nebulizer solution 3 mL, 3 mL, nebulization, BID PRN, Chaya Zaldivar MD, 3 mL at 06/21/25 0628    apixaban (Eliquis) tablet 5 mg, 5 mg, oral, BID, Chaya Zaldivar MD, 5 mg at 06/21/25 0821    aspirin-acetaminophen-caffeine (Excedrin Migraine) 250-250-65 mg per tablet 2 tablet, 2 tablet, oral, q24h, Chaya Zaldivar MD, 2 tablet at 06/20/25 1434    ciprofloxacin (Cipro) tablet 500 mg, 500 mg, oral, q12h MERY, Chaya Zaldivar MD, 500 mg at 06/21/25 0822    docusate sodium (Colace) capsule 200 mg, 200 mg, oral, BID,  Chaya Zaldivar MD, 200 mg at 06/21/25 0821    finasteride (Proscar) tablet 5 mg, 5 mg, oral, Daily, Chaya Zaldivar MD, 5 mg at 06/21/25 0821    losartan (Cozaar) tablet 50 mg, 50 mg, oral, Daily, Chaya Zaldivar MD, 50 mg at 06/21/25 0821    lubricating eye drops ophthalmic solution 1 drop, 1 drop, Both Eyes, TID, Chaya Zaldivar MD, 1 drop at 06/21/25 0821    magnesium oxide (Mag-Ox) 400 mg (241.3 mg elemental) tablet 1 tablet, 400 mg of magnesium oxide, oral, Daily, Chaya Zaldivar MD, 1 tablet at 06/21/25 0821    melatonin tablet 10 mg, 10 mg, oral, Nightly PRN, Chaya Zaldivar MD    ondansetron ODT (Zofran-ODT) disintegrating tablet 4 mg, 4 mg, oral, q8h PRN **OR** ondansetron (Zofran) injection 4 mg, 4 mg, intravenous, q8h PRN, Chaya Zaldivar MD    oxygen (O2) therapy (Peds), , inhalation, Continuous PRN - O2/gases, Chaya Zaldivar MD, 21 percent at 06/20/25 1122    oxygen (O2) therapy, , inhalation, Continuous PRN - O2/gases, Chaya Zaldivar MD, 3 L/min at 06/21/25 0631    polyethylene glycol (Glycolax, Miralax) packet 17 g, 17 g, oral, Daily, Chaya Zaldivar MD, 17 g at 06/21/25 0821    simvastatin (Zocor) tablet 40 mg, 40 mg, oral, Nightly, Chaya Zaldivar MD, 40 mg at 06/20/25 2038    traMADol (Ultram) tablet 50 mg, 50 mg, oral, BID, Chaya Zaldivar MD, 50 mg at 06/21/25 0821

## 2025-06-21 NOTE — PROGRESS NOTES
"Physical Therapy    Physical Therapy Treatment    Patient Name: Arvin Diallo \"[place]\"  MRN: [id number]  Today's Date: 6/21/2025  Time Calculation  Start Time: 0941  Stop Time: 0957  Time Calculation (min): 16 min     316/316-A    Assessment/Plan   PT Assessment  PT Assessment Results: Decreased strength, Decreased endurance, Impaired balance, Decreased mobility, Decreased safety awareness, Pain  Rehab Prognosis: Good  Barriers to Discharge Home: No anticipated barriers (return to [facility])  Evaluation/Treatment Tolerance: Patient limited by fatigue  End of Session Communication: Bedside nurse (student nurse present)  Assessment Comment: Patient pleasant and willing to participate in treatment. He reported increased fatigue this date.  End of Session Patient Position:  (call light in reach in bathroom)     PT Plan  Treatment/Interventions: Bed mobility, Transfer training, Gait training, Strengthening, Endurance training, Therapeutic exercise, Therapeutic activity, Home exercise program  PT Plan: Ongoing PT  PT Frequency: 3 times per week  PT Discharge Recommendations: Low intensity level of continued care  PT Recommended Transfer Status: Assist x1  PT - OK to Discharge: Yes (once medically appropriate and safe DC plan in place)    Current Problem:  Problem List[1]    General Visit Information:   PT  Visit  PT Received On: 06/21/25  General  Reason for Referral: impaired mobility; 84 year old male admitted for UTI  Referred By: Kayley  Past Medical History Relevant to Rehab: chronic headaches  Family/Caregiver Present:  (dtr entered room briefly then left)  Co-Treatment: OT  Co-Treatment Reason: to maximize pt safety with mobility  Prior to Session Communication: Bedside nurse  Patient Position Received: Bed, 2 rail up, Alarm off, not on at start of session  General Comment: pt agreeable to assessment; states he just doesn't feel good and feels weak  Subjective Patient reported he feels he overdid it the night prior and is " fatigued this date.     Precautions:  Precautions  Medical Precautions: Fall precautions    Vital Signs:  Vital Signs  Vitals Session: Pre PT  Heart Rate: 95  Heart Rate Source: Monitor, Right  SpO2: 94 %  Objective     Pain:  Pain Assessment  Pain Assessment: 0-10  0-10 (Numeric) Pain Score: 5 - Moderate pain  Pain Location: Head    Cognition:  Cognition  Orientation Level: Oriented X4      Treatments:  Therapeutic Exercise  Therapeutic Exercise Performed: Yes  Therapeutic Exercise Activity 1: Standng-hip abd,ext, marching, heel/toe x10           Ambulation/Gait Training 1  Surface 1: Level tile  Device 1: Rolling walker (rollator)  Gait Support Devices: Gait belt  Assistance 1: Contact guard  Quality of Gait 1: Decreased step length, Diminished heel strike  Comments/Distance (ft) 1: 100ft  Transfer 1  Transfer From 1: Sit to, Stand to    Outcome Measures:     [facility] Basic Mobility  Turning from your back to your side while in a flat bed without using bedrails: Total  Moving from lying on your back to sitting on the side of a flat bed without using bedrails: Total  Moving to and from bed to chair (including a wheelchair): Total  Standing up from a chair using your arms (e.g. wheelchair or bedside chair): A little  To walk in hospital room: A little  Climbing 3-5 steps with railing: Total  Basic Mobility - Total Score: 10        Education Documentation  No documentation found.  Education Comments  No comments found.           EDUCATION:     Encounter Problems       Encounter Problems (Active)       PT Problem       PT Goal 1 (Progressing)       Start:  06/20/25    Expected End:  07/04/25       Arvin Diallo will be independent with bed mobility for supine to and from sitting EOB without use of rail and bed flat           PT Goal 2 (Progressing)       Start:  06/20/25    Expected End:  07/04/25       Arvin Diallo will demonstrate good safety awareness with transfers and mobility and with use of assistive device  (proper hand placement).            PT Goal 3 (Progressing)       Start:  06/20/25    Expected End:  07/04/25       Arvin Diallo will transfer sit to and from stand using least restrictive assistive device mod indep           PT Goal 4 (Progressing)       Start:  06/20/25    Expected End:  07/04/25       Arvin Diallo will ambulate 150 ft with assistive device , level surface, good balance, steady mod Indep              Pain - Adult                  [1]   Patient Active Problem List  Diagnosis    Arthritis of left hip    Asymmetric SNHL (sensorineural hearing loss)    Bronchomalacia    Cerebrovascular accident (CVA) (Multi)    Degeneration of lumbar intervertebral disc with acute herniation    Diabetes mellitus (Multi)    Dizziness    Elevated diaphragm    Enlarged prostate    Fatigue    Hyperlipidemia    Lumbar scoliosis    Meniere's disease    Mild HTN    Mitral valve prolapse    Obstructive sleep apnea, adult    Tinnitus of both ears    Vertigo    RAVI (generalized anxiety disorder)    Gastroesophageal reflux disease    History of DVT (deep vein thrombosis)    Patent foramen ovale (HHS-HCC)    PAD (peripheral artery disease)    Mild intermittent asthma without complication (HHS-HCC)    Vasodepressor syncope    Urinary tract infection without hematuria, site unspecified    Acute prostatitis

## 2025-06-22 VITALS
SYSTOLIC BLOOD PRESSURE: 115 MMHG | HEIGHT: 75 IN | WEIGHT: 278.9 LBS | HEART RATE: 97 BPM | BODY MASS INDEX: 34.68 KG/M2 | RESPIRATION RATE: 18 BRPM | TEMPERATURE: 97 F | OXYGEN SATURATION: 95 % | DIASTOLIC BLOOD PRESSURE: 76 MMHG

## 2025-06-22 LAB
ANION GAP SERPL CALC-SCNC: 13 MMOL/L (ref 10–20)
ATRIAL RATE: 104 BPM
ATRIAL RATE: 88 BPM
BACTERIA UR CULT: NO GROWTH
BUN SERPL-MCNC: 18 MG/DL (ref 6–23)
CALCIUM SERPL-MCNC: 9.4 MG/DL (ref 8.6–10.3)
CHLORIDE SERPL-SCNC: 101 MMOL/L (ref 98–107)
CO2 SERPL-SCNC: 21 MMOL/L (ref 21–32)
CREAT SERPL-MCNC: 1.1 MG/DL (ref 0.5–1.3)
EGFRCR SERPLBLD CKD-EPI 2021: 67 ML/MIN/1.73M*2
ERYTHROCYTE [DISTWIDTH] IN BLOOD BY AUTOMATED COUNT: 12.5 % (ref 11.5–14.5)
GLUCOSE SERPL-MCNC: 283 MG/DL (ref 74–99)
HCT VFR BLD AUTO: 42.8 % (ref 41–52)
HGB BLD-MCNC: 14.8 G/DL (ref 13.5–17.5)
MCH RBC QN AUTO: 32.4 PG (ref 26–34)
MCHC RBC AUTO-ENTMCNC: 34.6 G/DL (ref 32–36)
MCV RBC AUTO: 94 FL (ref 80–100)
NRBC BLD-RTO: 0 /100 WBCS (ref 0–0)
P AXIS: -5 DEGREES
P AXIS: 5 DEGREES
P OFFSET: 146 MS
P ONSET: 103 MS
PLATELET # BLD AUTO: 230 X10*3/UL (ref 150–450)
POTASSIUM SERPL-SCNC: 4.2 MMOL/L (ref 3.5–5.3)
PR INTERVAL: 208 MS
PR INTERVAL: 214 MS
Q ONSET: 210 MS
Q ONSET: 211 MS
QRS COUNT: 15 BEATS
QRS COUNT: 17 BEATS
QRS DURATION: 150 MS
QRS DURATION: 152 MS
QT INTERVAL: 360 MS
QT INTERVAL: 398 MS
QTC CALCULATION(BAZETT): 473 MS
QTC CALCULATION(BAZETT): 481 MS
QTC FREDERICIA: 432 MS
QTC FREDERICIA: 452 MS
R AXIS: -68 DEGREES
R AXIS: -70 DEGREES
RBC # BLD AUTO: 4.57 X10*6/UL (ref 4.5–5.9)
SODIUM SERPL-SCNC: 131 MMOL/L (ref 136–145)
T AXIS: 40 DEGREES
T AXIS: 53 DEGREES
T OFFSET: 390 MS
T OFFSET: 410 MS
VENTRICULAR RATE: 104 BPM
VENTRICULAR RATE: 88 BPM
WBC # BLD AUTO: 7 X10*3/UL (ref 4.4–11.3)

## 2025-06-22 PROCEDURE — 36415 COLL VENOUS BLD VENIPUNCTURE: CPT

## 2025-06-22 PROCEDURE — 2500000001 HC RX 250 WO HCPCS SELF ADMINISTERED DRUGS (ALT 637 FOR MEDICARE OP)

## 2025-06-22 PROCEDURE — 2500000005 HC RX 250 GENERAL PHARMACY W/O HCPCS

## 2025-06-22 PROCEDURE — 80048 BASIC METABOLIC PNL TOTAL CA: CPT

## 2025-06-22 PROCEDURE — 2500000002 HC RX 250 W HCPCS SELF ADMINISTERED DRUGS (ALT 637 FOR MEDICARE OP, ALT 636 FOR OP/ED)

## 2025-06-22 PROCEDURE — 2500000004 HC RX 250 GENERAL PHARMACY W/ HCPCS (ALT 636 FOR OP/ED)

## 2025-06-22 PROCEDURE — 99232 SBSQ HOSP IP/OBS MODERATE 35: CPT

## 2025-06-22 PROCEDURE — 1200000002 HC GENERAL ROOM WITH TELEMETRY DAILY

## 2025-06-22 PROCEDURE — 97116 GAIT TRAINING THERAPY: CPT | Mod: GP,CQ

## 2025-06-22 PROCEDURE — 94640 AIRWAY INHALATION TREATMENT: CPT

## 2025-06-22 PROCEDURE — 85027 COMPLETE CBC AUTOMATED: CPT

## 2025-06-22 RX ADMIN — Medication 2 TABLET: at 14:25

## 2025-06-22 RX ADMIN — ALBUTEROL SULFATE 3 ML: 2.5 SOLUTION RESPIRATORY (INHALATION) at 06:52

## 2025-06-22 RX ADMIN — Medication 3 L/MIN: at 06:52

## 2025-06-22 RX ADMIN — SIMVASTATIN 40 MG: 20 TABLET, FILM COATED ORAL at 20:42

## 2025-06-22 RX ADMIN — FINASTERIDE 5 MG: 5 TABLET, FILM COATED ORAL at 08:18

## 2025-06-22 RX ADMIN — POLYETHYLENE GLYCOL 3350 17 G: 17 POWDER, FOR SOLUTION ORAL at 08:18

## 2025-06-22 RX ADMIN — APIXABAN 5 MG: 5 TABLET, FILM COATED ORAL at 08:18

## 2025-06-22 RX ADMIN — POLYVINYL ALCOHOL, POVIDONE 1 DROP: 14; 6 SOLUTION/ DROPS OPHTHALMIC at 08:18

## 2025-06-22 RX ADMIN — CIPROFLOXACIN HYDROCHLORIDE 500 MG: 500 TABLET, FILM COATED ORAL at 20:42

## 2025-06-22 RX ADMIN — ALBUTEROL SULFATE 3 ML: 2.5 SOLUTION RESPIRATORY (INHALATION) at 18:08

## 2025-06-22 RX ADMIN — LOSARTAN POTASSIUM 50 MG: 50 TABLET, FILM COATED ORAL at 08:18

## 2025-06-22 RX ADMIN — DOCUSATE SODIUM 200 MG: 100 CAPSULE, LIQUID FILLED ORAL at 20:42

## 2025-06-22 RX ADMIN — DOCUSATE SODIUM 200 MG: 100 CAPSULE, LIQUID FILLED ORAL at 08:18

## 2025-06-22 RX ADMIN — POLYVINYL ALCOHOL, POVIDONE 1 DROP: 14; 6 SOLUTION/ DROPS OPHTHALMIC at 14:25

## 2025-06-22 RX ADMIN — CIPROFLOXACIN HYDROCHLORIDE 500 MG: 500 TABLET, FILM COATED ORAL at 08:18

## 2025-06-22 RX ADMIN — TRAMADOL HYDROCHLORIDE 50 MG: 50 TABLET, COATED ORAL at 08:18

## 2025-06-22 RX ADMIN — TRAMADOL HYDROCHLORIDE 50 MG: 50 TABLET, COATED ORAL at 20:42

## 2025-06-22 RX ADMIN — APIXABAN 5 MG: 5 TABLET, FILM COATED ORAL at 20:42

## 2025-06-22 RX ADMIN — Medication 21 PERCENT: at 14:40

## 2025-06-22 RX ADMIN — Medication 1 TABLET: at 08:18

## 2025-06-22 RX ADMIN — Medication 21 PERCENT: at 18:08

## 2025-06-22 RX ADMIN — POLYVINYL ALCOHOL, POVIDONE 1 DROP: 14; 6 SOLUTION/ DROPS OPHTHALMIC at 20:42

## 2025-06-22 ASSESSMENT — COGNITIVE AND FUNCTIONAL STATUS - GENERAL
WALKING IN HOSPITAL ROOM: A LITTLE
STANDING UP FROM CHAIR USING ARMS: A LITTLE
WALKING IN HOSPITAL ROOM: A LITTLE
MOBILITY SCORE: 19
TOILETING: A LITTLE
HELP NEEDED FOR BATHING: A LITTLE
MOVING TO AND FROM BED TO CHAIR: A LITTLE
CLIMB 3 TO 5 STEPS WITH RAILING: TOTAL
MOVING FROM LYING ON BACK TO SITTING ON SIDE OF FLAT BED WITH BEDRAILS: TOTAL
MOBILITY SCORE: 19
MOVING TO AND FROM BED TO CHAIR: A LITTLE
CLIMB 3 TO 5 STEPS WITH RAILING: A LITTLE
MOBILITY SCORE: 12
DAILY ACTIVITIY SCORE: 22
TURNING FROM BACK TO SIDE WHILE IN FLAT BAD: TOTAL
TURNING FROM BACK TO SIDE WHILE IN FLAT BAD: A LITTLE
STANDING UP FROM CHAIR USING ARMS: A LITTLE
TURNING FROM BACK TO SIDE WHILE IN FLAT BAD: A LITTLE
STANDING UP FROM CHAIR USING ARMS: A LITTLE
CLIMB 3 TO 5 STEPS WITH RAILING: A LITTLE
MOVING TO AND FROM BED TO CHAIR: A LITTLE
WALKING IN HOSPITAL ROOM: A LITTLE

## 2025-06-22 ASSESSMENT — PAIN SCALES - GENERAL
PAINLEVEL_OUTOF10: 0 - NO PAIN
PAINLEVEL_OUTOF10: 5 - MODERATE PAIN

## 2025-06-22 ASSESSMENT — PAIN - FUNCTIONAL ASSESSMENT
PAIN_FUNCTIONAL_ASSESSMENT: 0-10

## 2025-06-22 NOTE — PROGRESS NOTES
"Physical Therapy    Physical Therapy Treatment    Patient Name: Arvin Diallo \"[place]\"  MRN: [id number]  Today's Date: 6/22/2025  Time Calculation  Start Time: 0855  Stop Time: 0907  Time Calculation (min): 12 min     316/316-A    Assessment/Plan   PT Assessment  PT Assessment Results: Decreased strength, Decreased endurance, Impaired balance, Decreased mobility, Decreased safety awareness, Pain  Rehab Prognosis: Good  Barriers to Discharge Home: No anticipated barriers (return to [facility])  Evaluation/Treatment Tolerance: Patient limited by fatigue  End of Session Communication: Bedside nurse (student nurse present)  Assessment Comment: Patient pleasant and willing to participate in treatment. He reported increased fatigue this date, seated TE only secondary to increased fatigue.  End of Session Patient Position:  (call light in reach in bathroom)     PT Plan  Treatment/Interventions: Bed mobility, Transfer training, Gait training, Strengthening, Endurance training, Therapeutic exercise, Therapeutic activity, Home exercise program  PT Plan: Ongoing PT  PT Frequency: 3 times per week  PT Discharge Recommendations: Low intensity level of continued care  PT Recommended Transfer Status: Assist x1  PT - OK to Discharge: Yes (once medically appropriate and safe DC plan in place)    Current Problem:  Problem List[1]    General Visit Information:   PT  Visit  PT Received On: 06/22/25  General  Reason for Referral: impaired mobility; 84 year old male admitted for UTI  Referred By: Kayley  Past Medical History Relevant to Rehab: chronic headaches  Family/Caregiver Present:  (dtr entered room briefly then left)  Co-Treatment: OT  Co-Treatment Reason: to maximize pt safety with mobility  Prior to Session Communication: Bedside nurse  Patient Position Received: Bed, 2 rail up, Alarm on, Up in chair  General Comment: pt agreeable to assessment; states he just doesn't feel good and feels weak  Subjective "     Precautions:  Precautions  Medical Precautions: Fall precautions    Vital Signs:  Vital Signs  Vitals Session: Pre PT  Heart Rate: 106  Heart Rate Source: Monitor  SpO2: 94 %  Objective     Pain:  Pain Assessment  Pain Assessment: 0-10  0-10 (Numeric) Pain Score: 5 - Moderate pain  Pain Location: Head    Cognition:  Cognition  Orientation Level: Oriented X4      Treatments:  Therapeutic Exercise  Therapeutic Exercise Performed: Yes  Therapeutic Exercise Activity 1: Seated LAQ, heel/toe raises,hip abd, hip add           Ambulation/Gait Training 1  Surface 1: Level tile  Device 1: Rolling walker (rollator)  Gait Support Devices: Gait belt  Assistance 1: Contact guard  Quality of Gait 1: Decreased step length, Diminished heel strike  Comments/Distance (ft) 1: 96  Transfer 1  Transfer From 1: Stand to, Sit to  Transfer to 1: Sit, Stand  Transfer Device 1: Walker, Gait belt  Transfer Level of Assistance 1: Contact guard          Outcome Measures:     [facility] Basic Mobility  Turning from your back to your side while in a flat bed without using bedrails: Total  Moving from lying on your back to sitting on the side of a flat bed without using bedrails: Total  Moving to and from bed to chair (including a wheelchair): A little  Standing up from a chair using your arms (e.g. wheelchair or bedside chair): A little  To walk in hospital room: A little  Climbing 3-5 steps with railing: Total  Basic Mobility - Total Score: 12       Education Documentation  No documentation found.  Education Comments  No comments found.           EDUCATION:     Encounter Problems       Encounter Problems (Active)       PT Problem       PT Goal 1 (Progressing)       Start:  06/20/25    Expected End:  07/04/25       Arvin Diallo will be independent with bed mobility for supine to and from sitting EOB without use of rail and bed flat           PT Goal 2 (Progressing)       Start:  06/20/25    Expected End:  07/04/25       Arvin Diallo will  demonstrate good safety awareness with transfers and mobility and with use of assistive device (proper hand placement).            PT Goal 3 (Progressing)       Start:  06/20/25    Expected End:  07/04/25       Arvin Diallo will transfer sit to and from stand using least restrictive assistive device mod indep           PT Goal 4 (Progressing)       Start:  06/20/25    Expected End:  07/04/25       Arvin Diallo will ambulate 150 ft with assistive device , level surface, good balance, steady mod Indep              Pain - Adult                   [1]   Patient Active Problem List  Diagnosis    Arthritis of left hip    Asymmetric SNHL (sensorineural hearing loss)    Bronchomalacia    Cerebrovascular accident (CVA) (Multi)    Degeneration of lumbar intervertebral disc with acute herniation    Diabetes mellitus (Multi)    Dizziness    Elevated diaphragm    Enlarged prostate    Fatigue    Hyperlipidemia    Lumbar scoliosis    Meniere's disease    Mild HTN    Mitral valve prolapse    Obstructive sleep apnea, adult    Tinnitus of both ears    Vertigo    RAVI (generalized anxiety disorder)    Gastroesophageal reflux disease    History of DVT (deep vein thrombosis)    Patent foramen ovale (HHS-HCC)    PAD (peripheral artery disease)    Mild intermittent asthma without complication (HHS-HCC)    Vasodepressor syncope    Urinary tract infection without hematuria, site unspecified    Acute prostatitis

## 2025-06-22 NOTE — CARE PLAN
Problem: Pain - Adult  Goal: Verbalizes/displays adequate comfort level or baseline comfort level  Outcome: Progressing     Problem: Safety - Adult  Goal: Free from fall injury  Outcome: Progressing     Problem: Discharge Planning  Goal: Discharge to home or other facility with appropriate resources  Outcome: Progressing     Problem: Chronic Conditions and Co-morbidities  Goal: Patient's chronic conditions and co-morbidity symptoms are monitored and maintained or improved  Outcome: Progressing     Problem: Nutrition  Goal: Nutrient intake appropriate for maintaining nutritional needs  Outcome: Progressing     Problem: Skin  Goal: Participates in plan/prevention/treatment measures  Outcome: Progressing  Flowsheets (Taken 6/22/2025 0751)  Participates in plan/prevention/treatment measures:   Discuss with provider PT/OT consult   Increase activity/out of bed for meals   Elevate heels  Goal: Prevent/manage excess moisture  Outcome: Progressing  Flowsheets (Taken 6/22/2025 0751)  Prevent/manage excess moisture: Moisturize dry skin  Goal: Prevent/minimize sheer/friction injuries  Outcome: Progressing  Flowsheets (Taken 6/22/2025 0751)  Prevent/minimize sheer/friction injuries:   Use pull sheet   Increase activity/out of bed for meals   HOB 30 degrees or less  Goal: Promote/optimize nutrition  Outcome: Progressing  Flowsheets (Taken 6/22/2025 0751)  Promote/optimize nutrition:   Consume > 50% meals/supplements   Monitor/record intake including meals     Problem: Fall/Injury  Goal: Not fall by end of shift  Outcome: Progressing  Goal: Be free from injury by end of the shift  Outcome: Progressing  Goal: Verbalize understanding of personal risk factors for fall in the hospital  Outcome: Progressing  Goal: Verbalize understanding of risk factor reduction measures to prevent injury from fall in the home  Outcome: Progressing  Goal: Use assistive devices by end of the shift  Outcome: Progressing  Goal: Pace activities to  prevent fatigue by end of the shift  Outcome: Progressing

## 2025-06-22 NOTE — CARE PLAN
The patient's goals for the shift include      The clinical goals for the shift include no falls      Problem: Pain - Adult  Goal: Verbalizes/displays adequate comfort level or baseline comfort level  Outcome: Progressing     Problem: Safety - Adult  Goal: Free from fall injury  Outcome: Progressing     Problem: Nutrition  Goal: Nutrient intake appropriate for maintaining nutritional needs  Outcome: Progressing     Problem: Skin  Goal: Promote/optimize nutrition  Outcome: Progressing

## 2025-06-22 NOTE — PROGRESS NOTES
Hospital Medicine Progress Note    Patient Name: Arvin Diallo  YOB: 1940  MRN: [id number]  Date of Service: 06/22/25  Time of Dictation: 9:16 AM    Assessment and Plan  1.  Acute bacterial prostatitis  Presented with generalized weakness and genitourinary symptoms without systemic infectious symptoms; initial evaluation notable for tachycardia (106) and UA with bacteria (1+), LE, WBCs (11-20).  Radiography showed bibasilar atelectasis; CT head without acute findings.  Started on ceftriaxone, transitioned to piperacillin-tazobactam for improved coverage in light of chronic suppressive cephalexin use and subsequently ciprofloxacin in light of concerns for prostatitis.  Physical exam with evidence of prostatitis.  Persistent generalized weakness today precluding safe return home.  Continue ciprofloxacin 500 mg p.o. twice daily.  Continue PT/OT while in house.  Monitor intake/output.  Follow-up urine culture.  Follow-up morning labs.    Medical Comorbidities:  1.  Hyperlipidemia: Prescribed simvastatin 40 mg nightly.  2.  Hypertension: Prescribed losartan 50 mg daily.  3.  Type 2 diabetes mellitus  4.  Generalized anxiety disorder  5.  CVA  6.  Mild intermittent asthma: Prescribed albuterol 90 mcg/actuation 2 puffs every 4 hours as needed.  7.  History of DVT: Prescribed apixaban 5 mg twice daily.  8.  Pulmonary fibrosis  9.  Obstructive sleep apnea  10.  Bilateral septal neuralgia  11.  Daily persistent headache: Prescribed aspirin-acetaminophen,-caffeine 250-250-65 mg 2 tablets once daily, tramadol 50 mg twice daily.  12.  Constipation: Prescribed docusate 200 mg twice daily.  13.  BPH: Prescribed finasteride 5 mg daily.  Malnutrition Assessment:  Not on file.    Prophylaxis/General Management  DVT Prophylaxis: Apixaban  Diet: Cardiac  Bowel Regimen: Polyethylene glycol  Code Status: DNR/DNI    Disposition: Persistent weakness precluding safe discharge plan.  Awaiting physical therapy reassessment  tomorrow.  Otherwise medically suitable for discharge.    Subjective  Persistently weak throughout the day yesterday.  Ambulated to bathroom to void overnight.  Evaluated at bedside with RN during triad rounds at ~08h40.  Patient attempted to call daughter over cell phone at the time of my evaluation-no answer.  Persistent weakness today; patient explains he normally ambulates well without difficulty.  Per nursing, patient had difficulty even getting out of bed today, requiring significant support for transfer to chair.  Denies fevers, chills, chest pain, dyspnea, abdominal pain.  Reviewed diagnostic work-up x24 hours and plan of care including continued antibiotic therapy and monitoring for clinical improvement, as well as plan for physical therapy to reassess for disposition planning tomorrow.  Patient queried for questions/concerns - all voiced questions/concerns addressed.     Objective  Vital Signs (x24h)  Temp:  [36.2 °C (97.2 °F)-36.3 °C (97.3 °F)] 36.3 °C (97.3 °F)  Heart Rate:  [87-97] 95  Resp:  [16-20] 20  BP: (114-157)/(72-77) 157/77  FiO2 (%):  [32 %] 32 %    Physical Exam (at ~08h40):  General: Alert, resting with eyes open, sitting upright in recliner at bedside.  No evident acute distress.   HEENT: Pupils equal and round, with symmetric eye movements.   Cardiovascular: Regular rate and rhythm. No murmurs, rubs, gallops. No peripheral edema.   Pulmonary: Chest clear to auscultation bilaterally. Normal work of breathing.   Abdominal: Soft, non-tender, non-distended. Bowel sounds present.   Neurological: Moves all extremities. No evident focal neurological deficits.   Musculoskeletal:  Calves supple, non-tender.      Laboratory Studies:  Lab Results   Component Value Date    WBC 7.0 06/22/2025    HGB 14.8 06/22/2025    HCT 42.8 06/22/2025    MCV 94 06/22/2025     06/22/2025     Lab Results   Component Value Date     06/20/2025    K 4.2 06/20/2025     06/20/2025    CO2 27 06/20/2025     CREATININE 1.05 06/20/2025    BUN 18 06/20/2025    CALCIUM 9.2 06/20/2025    PHOS 2.9 03/12/2024     Diagnostic Imaging (Pertinent Findings/Impression):   CXR (06/19/25): Mild bibasilar atelectatic changes and mild bilateral pleural effusions versus pleural thickening.   CTA Chest (06/19/25): No pulmonary embolism or active disease in the chest.  Incidental findings: Mild cardiomegaly.  Basilar atelectasis.  Moderate-severe coronary atherosclerosis.  CT head (06/19/25): Age-related changes again present without acute intracranial process.    Medications:  Current Medications[1]    Medical Decision Making  Total estimated time on patient care 40 minutes.  The above was typed using dictation software and may contain typographic errors.     Chaya Zaldivar         [1]   Current Facility-Administered Medications:     acetaminophen (Tylenol) tablet 650 mg, 650 mg, oral, q4h PRN, 650 mg at 06/19/25 2045 **OR** acetaminophen (Tylenol) oral liquid 650 mg, 650 mg, oral, q4h PRN **OR** acetaminophen (Tylenol) suppository 650 mg, 650 mg, rectal, q4h PRN, Chaya Zaldivar MD    albuterol 2.5 mg /3 mL (0.083 %) nebulizer solution 3 mL, 3 mL, nebulization, BID PRN, Chaya Zaldivar MD, 3 mL at 06/22/25 0652    apixaban (Eliquis) tablet 5 mg, 5 mg, oral, BID, Chaya Zaldivar MD, 5 mg at 06/22/25 0818    aspirin-acetaminophen-caffeine (Excedrin Migraine) 250-250-65 mg per tablet 2 tablet, 2 tablet, oral, q24h, Chaya Zaldivar MD, 2 tablet at 06/21/25 1415    ciprofloxacin (Cipro) tablet 500 mg, 500 mg, oral, q12h MERY, Chaya Zaldivar MD, 500 mg at 06/22/25 0818    docusate sodium (Colace) capsule 200 mg, 200 mg, oral, BID, Chaya Zaldivar MD, 200 mg at 06/22/25 0818    finasteride (Proscar) tablet 5 mg, 5 mg, oral, Daily, Chaya Zaldivar MD, 5 mg at 06/22/25 0818    losartan (Cozaar) tablet 50 mg, 50 mg, oral, Daily, Chaya Zaldivar MD, 50 mg at 06/22/25 0818    lubricating eye drops ophthalmic solution 1 drop, 1 drop, Both Eyes,  TID, Chaya Zaldivar MD, 1 drop at 06/22/25 0818    magnesium oxide (Mag-Ox) 400 mg (241.3 mg elemental) tablet 1 tablet, 400 mg of magnesium oxide, oral, Daily, Chaya Zaldivar MD, 1 tablet at 06/22/25 0818    melatonin tablet 10 mg, 10 mg, oral, Nightly PRN, Chaya Zaldivar MD    ondansetron ODT (Zofran-ODT) disintegrating tablet 4 mg, 4 mg, oral, q8h PRN **OR** ondansetron (Zofran) injection 4 mg, 4 mg, intravenous, q8h PRN, Chaya Zaldivar MD    oxygen (O2) therapy (Peds), , inhalation, Continuous PRN - O2/gases, Chaya Zaldivar MD, 21 percent at 06/21/25 1802    oxygen (O2) therapy, , inhalation, Continuous PRN - O2/gases, Chaya Zaldivar MD, 3 L/min at 06/22/25 0652    polyethylene glycol (Glycolax, Miralax) packet 17 g, 17 g, oral, Daily, Chaya Zaldivar MD, 17 g at 06/22/25 0818    simvastatin (Zocor) tablet 40 mg, 40 mg, oral, Nightly, Chaya Zaldivar MD, 40 mg at 06/21/25 2006    traMADol (Ultram) tablet 50 mg, 50 mg, oral, BID, Chaya Zaldivar MD, 50 mg at 06/22/25 0818

## 2025-06-23 ENCOUNTER — APPOINTMENT (OUTPATIENT)
Dept: VASCULAR MEDICINE | Facility: HOSPITAL | Age: 85
DRG: 728 | End: 2025-06-23
Payer: MEDICARE

## 2025-06-23 LAB
ANION GAP SERPL CALC-SCNC: 10 MMOL/L (ref 10–20)
ANION GAP SERPL CALC-SCNC: 11 MMOL/L (ref 10–20)
BUN SERPL-MCNC: 17 MG/DL (ref 6–23)
BUN SERPL-MCNC: 18 MG/DL (ref 6–23)
CALCIUM SERPL-MCNC: 9.5 MG/DL (ref 8.6–10.3)
CALCIUM SERPL-MCNC: 9.6 MG/DL (ref 8.6–10.3)
CARDIAC TROPONIN I PNL SERPL HS: 28 NG/L (ref 0–20)
CHLORIDE SERPL-SCNC: 103 MMOL/L (ref 98–107)
CHLORIDE SERPL-SCNC: 99 MMOL/L (ref 98–107)
CO2 SERPL-SCNC: 26 MMOL/L (ref 21–32)
CO2 SERPL-SCNC: 27 MMOL/L (ref 21–32)
CREAT SERPL-MCNC: 1.01 MG/DL (ref 0.5–1.3)
CREAT SERPL-MCNC: 1.24 MG/DL (ref 0.5–1.3)
EGFRCR SERPLBLD CKD-EPI 2021: 58 ML/MIN/1.73M*2
EGFRCR SERPLBLD CKD-EPI 2021: 74 ML/MIN/1.73M*2
ERYTHROCYTE [DISTWIDTH] IN BLOOD BY AUTOMATED COUNT: 12.3 % (ref 11.5–14.5)
GLUCOSE SERPL-MCNC: 170 MG/DL (ref 74–99)
GLUCOSE SERPL-MCNC: 200 MG/DL (ref 74–99)
HCT VFR BLD AUTO: 44 % (ref 41–52)
HGB BLD-MCNC: 15.1 G/DL (ref 13.5–17.5)
HOLD SPECIMEN: NORMAL
MAGNESIUM SERPL-MCNC: 2.23 MG/DL (ref 1.6–2.4)
MCH RBC QN AUTO: 32.3 PG (ref 26–34)
MCHC RBC AUTO-ENTMCNC: 34.3 G/DL (ref 32–36)
MCV RBC AUTO: 94 FL (ref 80–100)
NRBC BLD-RTO: 0 /100 WBCS (ref 0–0)
PLATELET # BLD AUTO: 229 X10*3/UL (ref 150–450)
POTASSIUM SERPL-SCNC: 4.2 MMOL/L (ref 3.5–5.3)
POTASSIUM SERPL-SCNC: 4.4 MMOL/L (ref 3.5–5.3)
RBC # BLD AUTO: 4.67 X10*6/UL (ref 4.5–5.9)
SODIUM SERPL-SCNC: 132 MMOL/L (ref 136–145)
SODIUM SERPL-SCNC: 136 MMOL/L (ref 136–145)
WBC # BLD AUTO: 6.4 X10*3/UL (ref 4.4–11.3)

## 2025-06-23 PROCEDURE — 93970 EXTREMITY STUDY: CPT

## 2025-06-23 PROCEDURE — 2500000005 HC RX 250 GENERAL PHARMACY W/O HCPCS

## 2025-06-23 PROCEDURE — 85027 COMPLETE CBC AUTOMATED: CPT

## 2025-06-23 PROCEDURE — 93970 EXTREMITY STUDY: CPT | Performed by: SURGERY

## 2025-06-23 PROCEDURE — 36415 COLL VENOUS BLD VENIPUNCTURE: CPT

## 2025-06-23 PROCEDURE — 1200000002 HC GENERAL ROOM WITH TELEMETRY DAILY

## 2025-06-23 PROCEDURE — 99232 SBSQ HOSP IP/OBS MODERATE 35: CPT

## 2025-06-23 PROCEDURE — 97116 GAIT TRAINING THERAPY: CPT | Mod: GP,CQ

## 2025-06-23 PROCEDURE — 2500000002 HC RX 250 W HCPCS SELF ADMINISTERED DRUGS (ALT 637 FOR MEDICARE OP, ALT 636 FOR OP/ED)

## 2025-06-23 PROCEDURE — 94640 AIRWAY INHALATION TREATMENT: CPT

## 2025-06-23 PROCEDURE — 97168 OT RE-EVAL EST PLAN CARE: CPT | Mod: GO

## 2025-06-23 PROCEDURE — 83735 ASSAY OF MAGNESIUM: CPT

## 2025-06-23 PROCEDURE — 80048 BASIC METABOLIC PNL TOTAL CA: CPT

## 2025-06-23 PROCEDURE — 84484 ASSAY OF TROPONIN QUANT: CPT

## 2025-06-23 PROCEDURE — 2500000004 HC RX 250 GENERAL PHARMACY W/ HCPCS (ALT 636 FOR OP/ED)

## 2025-06-23 PROCEDURE — 97110 THERAPEUTIC EXERCISES: CPT | Mod: GP,CQ

## 2025-06-23 PROCEDURE — 2500000001 HC RX 250 WO HCPCS SELF ADMINISTERED DRUGS (ALT 637 FOR MEDICARE OP)

## 2025-06-23 RX ADMIN — TRAMADOL HYDROCHLORIDE 50 MG: 50 TABLET, COATED ORAL at 20:49

## 2025-06-23 RX ADMIN — CIPROFLOXACIN HYDROCHLORIDE 500 MG: 500 TABLET, FILM COATED ORAL at 20:49

## 2025-06-23 RX ADMIN — LOSARTAN POTASSIUM 50 MG: 50 TABLET, FILM COATED ORAL at 08:48

## 2025-06-23 RX ADMIN — DOCUSATE SODIUM 200 MG: 100 CAPSULE, LIQUID FILLED ORAL at 08:48

## 2025-06-23 RX ADMIN — POLYVINYL ALCOHOL, POVIDONE 1 DROP: 14; 6 SOLUTION/ DROPS OPHTHALMIC at 08:47

## 2025-06-23 RX ADMIN — ALBUTEROL SULFATE 3 ML: 2.5 SOLUTION RESPIRATORY (INHALATION) at 06:13

## 2025-06-23 RX ADMIN — DOCUSATE SODIUM 200 MG: 100 CAPSULE, LIQUID FILLED ORAL at 20:50

## 2025-06-23 RX ADMIN — POLYVINYL ALCOHOL, POVIDONE 1 DROP: 14; 6 SOLUTION/ DROPS OPHTHALMIC at 20:50

## 2025-06-23 RX ADMIN — APIXABAN 5 MG: 5 TABLET, FILM COATED ORAL at 20:50

## 2025-06-23 RX ADMIN — APIXABAN 5 MG: 5 TABLET, FILM COATED ORAL at 08:48

## 2025-06-23 RX ADMIN — Medication 1 TABLET: at 08:47

## 2025-06-23 RX ADMIN — Medication 2 TABLET: at 13:22

## 2025-06-23 RX ADMIN — FINASTERIDE 5 MG: 5 TABLET, FILM COATED ORAL at 08:47

## 2025-06-23 RX ADMIN — Medication 3 L/MIN: at 00:18

## 2025-06-23 RX ADMIN — Medication 3 L/MIN: at 06:13

## 2025-06-23 RX ADMIN — POLYETHYLENE GLYCOL 3350 17 G: 17 POWDER, FOR SOLUTION ORAL at 08:48

## 2025-06-23 RX ADMIN — CIPROFLOXACIN HYDROCHLORIDE 500 MG: 500 TABLET, FILM COATED ORAL at 08:47

## 2025-06-23 RX ADMIN — Medication 21 PERCENT: at 18:13

## 2025-06-23 RX ADMIN — SIMVASTATIN 40 MG: 20 TABLET, FILM COATED ORAL at 20:50

## 2025-06-23 RX ADMIN — Medication 21 PERCENT: at 14:02

## 2025-06-23 RX ADMIN — ALBUTEROL SULFATE 3 ML: 2.5 SOLUTION RESPIRATORY (INHALATION) at 18:13

## 2025-06-23 RX ADMIN — POLYVINYL ALCOHOL, POVIDONE 1 DROP: 14; 6 SOLUTION/ DROPS OPHTHALMIC at 15:18

## 2025-06-23 RX ADMIN — TRAMADOL HYDROCHLORIDE 50 MG: 50 TABLET, COATED ORAL at 08:48

## 2025-06-23 ASSESSMENT — PAIN SCALES - GENERAL
PAINLEVEL_OUTOF10: 5 - MODERATE PAIN
PAINLEVEL_OUTOF10: 0 - NO PAIN
PAINLEVEL_OUTOF10: 0 - NO PAIN
PAINLEVEL_OUTOF10: 4

## 2025-06-23 ASSESSMENT — COGNITIVE AND FUNCTIONAL STATUS - GENERAL
HELP NEEDED FOR BATHING: A LITTLE
TURNING FROM BACK TO SIDE WHILE IN FLAT BAD: A LOT
MOVING TO AND FROM BED TO CHAIR: A LITTLE
DAILY ACTIVITIY SCORE: 21
STANDING UP FROM CHAIR USING ARMS: A LITTLE
HELP NEEDED FOR BATHING: A LITTLE
DAILY ACTIVITIY SCORE: 23
WALKING IN HOSPITAL ROOM: A LITTLE
MOVING FROM LYING ON BACK TO SITTING ON SIDE OF FLAT BED WITH BEDRAILS: A LOT
CLIMB 3 TO 5 STEPS WITH RAILING: TOTAL
TOILETING: A LITTLE
DRESSING REGULAR LOWER BODY CLOTHING: A LITTLE
MOBILITY SCORE: 14

## 2025-06-23 ASSESSMENT — PAIN - FUNCTIONAL ASSESSMENT
PAIN_FUNCTIONAL_ASSESSMENT: 0-10

## 2025-06-23 ASSESSMENT — ACTIVITIES OF DAILY LIVING (ADL)
ADL_ASSISTANCE: INDEPENDENT
BATHING_ASSISTANCE: STAND BY

## 2025-06-23 ASSESSMENT — PAIN DESCRIPTION - LOCATION: LOCATION: HEAD

## 2025-06-23 NOTE — PROGRESS NOTES
"Physical Therapy    Physical Therapy Treatment    Patient Name: Arvin Diallo \"[place]\"  MRN: [id number]  Department: [facility]  Room: [address] [place] [address]  Today's Date: 6/23/2025  Time Calculation  Start Time: 1122  Stop Time: 1150  Time Calculation (min): 28 min         Assessment/Plan  Pt demo'ing improving hand placement and rollator breaks use and safety awareness after education and at end of session. Pt tolerated treatment well with reported fatigue, but only a few short rest breaks needed today.  PT Assessment  PT Assessment Results: Decreased strength, Decreased endurance, Impaired balance, Decreased mobility, Decreased safety awareness, Pain  PT Plan  Inpatient/Swing Bed or Outpatient: Inpatient  PT Plan  Treatment/Interventions: Bed mobility, Transfer training, Gait training, Strengthening, Endurance training, Therapeutic exercise, Therapeutic activity, Home exercise program  PT Plan: Ongoing PT  PT Frequency: 3 times per week  PT Discharge Recommendations: Low intensity level of continued care  PT Recommended Transfer Status: Assist x1  PT - OK to Discharge: Yes (once medically appropriate and safe DC plan in place)    PT Visit Info:  PT Received On: 06/23/25     General Visit Information:   General  Reason for Referral: impaired mobility; 84 year old male admitted for UTI  Referred By: Kayley  Past Medical History Relevant to Rehab: chronic headaches  Family/Caregiver Present: No    Subjective  Pt reports feeling \"tired\" but otherwise doing well. Some fatigue and rests after gait training and in between exercises.  Precautions:  Precautions  Medical Precautions: Fall precautions      Vital Signs Comment: No concerns     Objective   Pain:  Pain Assessment  Pain Assessment: 0-10  0-10 (Numeric) Pain Score: 4  Pain Type: Other (Comment) (Head ache)  Pain Location: Head  Cognition:  Cognition  Orientation Level: Oriented X4  Coordination:     Postural Control:  Static Sitting Balance  Static Sitting-Balance Support: Feet " supported, Bilateral upper extremity supported  Static Sitting-Level of Assistance: Close supervision  Dynamic Sitting Balance  Dynamic Sitting-Balance Support: Feet supported, Bilateral upper extremity supported  Dynamic Sitting-Level of Assistance: Close supervision  Static Standing Balance  Static Standing-Balance Support: Bilateral upper extremity supported  Static Standing-Level of Assistance: Contact guard  Activity Tolerance:  Activity Tolerance  Endurance: Tolerates 30 min exercise with multiple rests  Treatments:  Therapeutic Exercise  Therapeutic Exercise Performed: Yes  Therapeutic Exercise Activity 1: Standing marches x20  Therapeutic Exercise Activity 2: standing hamstring curl x20  Therapeutic Exercise Activity 3: standing PF x20  Therapeutic Exercise Activity 4: seated LAQ x20  Therapeutic Exercise Activity 5: sit<>stands x5    Therapeutic Activity  Therapeutic Activity Performed: Yes  Therapeutic Activity 1: Toilet transfer  Therapeutic Activity 2: transfer training with education for safety    Bed Mobility  Bed Mobility: No    Ambulation/Gait Training  Ambulation/Gait Training Performed: Yes  Ambulation/Gait Training 1  Surface 1: Level tile  Device 1: Rollator  Gait Support Devices: Gait belt  Assistance 1: Contact guard  Quality of Gait 1: Decreased step length, Diminished heel strike  Comments/Distance (ft) 1: 316 ft`  Transfers  Transfer: Yes  Transfer 1  Transfer From 1: Stand to, Sit to  Transfer to 1: Sit, Stand  Technique 1: Sit to stand, Stand to sit  Transfer Device 1: Walker, Gait belt  Transfer Level of Assistance 1: Close supervision  Trials/Comments 1: 5  Transfers 2  Transfer From 2: Toilet to  Transfer to 2: Stand  Technique 2: Sit to stand  Transfer Device 2: Walker  Transfer Level of Assistance 2: Distant supervision  Trials/Comments 2: 1  Transfers 3  Transfer From 3: Stand to  Transfer to 3: Stand  Technique 3: Stand pivot  Transfer Device 3: Gait belt, Walker  Transfer Level of  Assistance 3: Contact guard  Trials/Comments 3: Education on rollator proximity    Outcome Measures:  [facility] Basic Mobility  Turning from your back to your side while in a flat bed without using bedrails: A lot  Moving from lying on your back to sitting on the side of a flat bed without using bedrails: A lot  Moving to and from bed to chair (including a wheelchair): A little  Standing up from a chair using your arms (e.g. wheelchair or bedside chair): A little  To walk in hospital room: A little  Climbing 3-5 steps with railing: Total  Basic Mobility - Total Score: 14    Education Documentation  Mobility Training, taught by Daniel Patton PTA at 6/23/2025 12:03 PM.  Learner: Patient  Readiness: Acceptance  Method: Explanation  Response: Verbalizes Understanding, Needs Reinforcement  Comment: PTA educated pt on hand placement with sit to stands, rollator proximity with pivots, and checking rollator breaks before standing to improve pt's safety and reduce fall risk.    Education Comments  No comments found.        OP EDUCATION:       Encounter Problems       Encounter Problems (Active)       PT Problem       PT Goal 1 (Progressing)       Start:  06/20/25    Expected End:  07/04/25       Arvin Diallo will be independent with bed mobility for supine to and from sitting EOB without use of rail and bed flat           PT Goal 2 (Progressing)       Start:  06/20/25    Expected End:  07/04/25       Arvin Diallo will demonstrate good safety awareness with transfers and mobility and with use of assistive device (proper hand placement).            PT Goal 3 (Progressing)       Start:  06/20/25    Expected End:  07/04/25       Arvin Diallo will transfer sit to and from stand using least restrictive assistive device mod indep           PT Goal 4 (Progressing)       Start:  06/20/25    Expected End:  07/04/25       Arvin Diallo will ambulate 150 ft with assistive device , level surface, good balance, steady mod Indep               Pain - Adult

## 2025-06-23 NOTE — PROGRESS NOTES
Per medical team, patient will be medically appropriate for discharge when a suitable disposition can be confirmed.  met with patient at bedside to review discharge plan and Medicare IM. Patient does not feel medically ready for discharge, still feels weaker than baseline, and requested for referrals to be sent to 1) [facility] and 2) [facility] for skilled rehab. [facility] to send same. Plan for patient TBD: Possible discharge to SNF pending acceptance, insurance auth, vs return home to the [facility] living with [facility], pending patient's choice. Care Transitions to follow and assist. MARYLIN Velazquez

## 2025-06-23 NOTE — PROGRESS NOTES
"Occupational Therapy    Re- Evaluation    Patient Name: Arvin Diallo \"[place]\"  MRN: [id number]  Today's Date: 6/23/2025  Time Calculation  Start Time: 1415  Stop Time: 1437  Time Calculation (min): 22 min  316/316-A    Assessment  IP OT Assessment  OT Assessment: Re-evaluation ordered by hospitalist.  pt able to complete all ADLs with no assistance, no LOB, and good safety.   pt reports taking a shower the other day and was able to perform without help but felt fatigued.  Educated pt that shower assist/supervision to ensure safety would be beneficial upon return to [facility].  No skilled OT need at this time as pt is able to perform ADLs with no physical assistance.  Prognosis: Excellent  Barriers to Discharge Home: No anticipated barriers  Evaluation/Treatment Tolerance: Patient tolerated treatment well  End of Session Communication: Bedside nurse  End of Session Patient Position: Bed, 2 rail up, Alarm off, not on at start of session (pt educated to sit in chair and states he was up for 6 hours earler today)    Plan:  No Skilled OT: Independent with ADLs  OT Frequency: OT eval only  OT Discharge Recommendations: No further acute OT, No OT needed after discharge (shower supervision at [facility] to ensure safety)  OT Recommended Transfer Status: Independent  OT - OK to Discharge: Yes (once medically stable)    Subjective     Current Problem:  1. Generalized weakness        2. Chronic intractable headache, unspecified headache type        3. Urinary tract infection without hematuria, site unspecified        4. Edema of both legs  Vascular US lower extremity venous duplex bilateral    Vascular US lower extremity venous duplex bilateral      5. Pain in left leg  Vascular US lower extremity venous duplex bilateral      6. Pain in right leg  Vascular US lower extremity venous duplex bilateral          General:  General  Reason for Referral: OT re-evaluation ordered by hospitalist (Kayley).  Referred By: Kayley  Past Medical History " Relevant to Rehab: chronic headaches  Family/Caregiver Present: No  Co-Treatment: PT  Co-Treatment Reason: to maximize pt safety  Prior to Session Communication: Bedside nurse  Patient Position Received: Bed, 2 rail up, Alarm off, not on at start of session  General Comment: pt states that he felt terrible this morning but is feeling better now.  pt states he has no help at [facility] but was thinking of paying for a week of assistance if needed.    Precautions:  Medical Precautions: Fall precautions    Vital Signs:  Vital Signs Comment: no concerns    Pain:  Pain Assessment  Pain Assessment: 0-10  0-10 (Numeric) Pain Score: 0 - No pain    Objective     Cognition:  Overall Cognitive Status: Within Functional Limits  Orientation Level: Oriented X4             Home Living:  Type of Home: Assisted living ([facility])  Lives With: Alone     Prior Function:  ADL Assistance: Independent  Homemaking Assistance: Needs assistance  Ambulatory Assistance: Independent (with upright walker)    ADL:  Eating Assistance: Independent  Grooming Assistance: Independent  Bathing Assistance: Stand by  UE Dressing Assistance: Independent  LE Dressing Assistance: Independent  Toileting Assistance with Device: Independent    Activity Tolerance:  Endurance:  (pt c/o fatigue after ADL)    Bed Mobility/Transfers:   Bed Mobility  Bed Mobility: Yes  Bed Mobility 1  Bed Mobility 1: Supine to sitting, Sitting to supine  Level of Assistance 1: Independent, Modified independent  Bed Mobility Comments 1: bed flat light use of bed rail  Transfers  Transfer: Yes  Transfer 1  Technique 1: Sit to stand, Stand to sit  Transfer Device 1: Walker, Gait belt  Transfer Level of Assistance 1: Independent    Ambulation/Gait Training:  Functional Mobility  Functional Mobility Performed:  (pt ambulated 300+ feet with PTA earlier in day)  Functional Mobility 1  Surface 1: Level tile  Device 1:  (upright walker)  Functional Mobility Support Devices: Gait  belt  Assistance 1: Close supervision       Vision: Vision - Basic Assessment  Current Vision: Wears glasses all the time     Sensation:  Light Touch: No apparent deficits    Strength:  Strength Comments: BUE WFL       Coordination:  Movements are Fluid and Coordinated: Yes     Outcome Measures: [facility] Daily Activity  Putting on and taking off regular lower body clothing: None  Bathing (including washing, rinsing, drying): A little  Putting on and taking off regular upper body clothing: None  Toileting, which includes using toilet, bedpan or urinal: None  Taking care of personal grooming such as brushing teeth: None  Eating Meals: None  Daily Activity - Total Score: 23

## 2025-06-23 NOTE — CARE PLAN
The patient's goals for the shift include feeling stronger  The clinical goals for the shift include no falls.    Problem: Pain - Adult  Goal: Verbalizes/displays adequate comfort level or baseline comfort level  Outcome: Progressing     Problem: Safety - Adult  Goal: Free from fall injury  Outcome: Progressing     Problem: Discharge Planning  Goal: Discharge to home or other facility with appropriate resources  Outcome: Progressing     Problem: Chronic Conditions and Co-morbidities  Goal: Patient's chronic conditions and co-morbidity symptoms are monitored and maintained or improved  Outcome: Progressing     Problem: Nutrition  Goal: Nutrient intake appropriate for maintaining nutritional needs  Outcome: Progressing     Problem: Skin  Goal: Participates in plan/prevention/treatment measures  Outcome: Progressing  Goal: Prevent/manage excess moisture  Outcome: Progressing  Goal: Prevent/minimize sheer/friction injuries  Outcome: Progressing  Goal: Promote/optimize nutrition  Outcome: Progressing     Problem: Fall/Injury  Goal: Not fall by end of shift  Outcome: Progressing  Goal: Be free from injury by end of the shift  Outcome: Progressing  Goal: Verbalize understanding of personal risk factors for fall in the hospital  Outcome: Progressing  Goal: Verbalize understanding of risk factor reduction measures to prevent injury from fall in the home  Outcome: Progressing  Goal: Use assistive devices by end of the shift  Outcome: Progressing  Goal: Pace activities to prevent fatigue by end of the shift  Outcome: Progressing

## 2025-06-23 NOTE — NURSING NOTE
Pt daughter Christie called and was hoping her dad could go to [facility]. Will reach out to team and see what meeting plan was.

## 2025-06-23 NOTE — CARE PLAN
NEPHROLOGY DAILY PROGRESS NOTE:    Date:  7/3/2018    Attending:  Antonio Parks DO     Problem List:   Principal Problem:    Acute metabolic encephalopathy  Active Problems:    Type II or unspecified type diabetes mellitus with renal manifestations, uncontrolled    HTN, goal to be determined    Chronic atrial fibrillation (CMS/HCC)    Bipolar disorder in full remission (CMS/HCC)    Parkinson disease, symptomatic (CMS/HCC)      Subjective:  Clotilde Ford is a 76 year old female who was admitted on 6/28/2018 for AMS/ESRD.    ALLERGIES, MEDICATIONS REVIEWED    OBJECTIVE:   Vital 24 Hour Range Most Recent Value   Temperature Temp  Min: 97.3 °F (36.3 °C)  Max: 98.7 °F (37.1 °C) 97.3 °F (36.3 °C)   Pulse Pulse  Min: 52  Max: 69 61   Respiratory Resp  Min: 12  Max: 20 18   Blood Pressure BP  Min: 94/51  Max: 142/62 130/60   Pulse Oximetry SpO2  Min: 96 %  Max: 99 %    O2 No Data Recorded      Vital Most Recent Value First Value   Weight 54.1 kg Weight: 53.8 kg   Height 5' (152.4 cm) Height: 5' (152.4 cm)     I/O last 3 completed shifts:  In: 690 [P.O.:690]  Out: 350 [Urine:350]     Scheduled Medications:   • epoetin malcolm  4,000 Units Intravenous Once   • iron sucrose (VENOFER) injection/IVPB  50 mg Intravenous Once   • amLODIPine  2.5 mg Oral BID   • heparin (porcine)  5,000 Units Subcutaneous BID   • levETIRAcetam  250 mg Oral Once per day on Tue Thu Sat   • divalproex  1,000 mg Oral Nightly   • levETIRAcetam  500 mg Oral Daily   • insulin lispro   Subcutaneous TID AC   • B complex-vitamin C-folic acid  1 tablet Oral Nightly   • calcium acetate gelcap  1,334 mg Oral TID WC   • carvedilol  25 mg Oral BID WC   • cholecalciferol  5,000 Units Oral Daily   • cloNIDine  0.2 mg Oral TID   • folic acid  1 mg Oral Daily   • insulin aspart  4 Units Subcutaneous BID AC   • lidocaine-prilocaine  1 application Topical Once per day on Tue Thu Sat   • OLANZapine  5 mg Oral Nightly   • pantoprazole  40 mg Oral Nightly   • predniSONE  5  The patient's goals for the shift include      The clinical goals for the shift include no falls      Problem: Pain - Adult  Goal: Verbalizes/displays adequate comfort level or baseline comfort level  Outcome: Progressing     Problem: Safety - Adult  Goal: Free from fall injury  Outcome: Progressing     Problem: Discharge Planning  Goal: Discharge to home or other facility with appropriate resources  Outcome: Progressing     Problem: Skin  Goal: Promote/optimize nutrition  Outcome: Progressing  Flowsheets (Taken 6/22/2025 2200)  Promote/optimize nutrition: Consume > 50% meals/supplements        mg Oral Daily with breakfast   • sertraline  100 mg Oral Daily   • sevelamer carbonate  800 mg Oral TID WC   • simvastatin  20 mg Oral Nightly   • lisinopril  40 mg Oral Nightly   • sodium chloride (PF)  2 mL Injection 2 times per day        Physical Exam:  General - Patient is in no acute distress.   Cardiovascular - No S3. No rubs.  Pulmonary - No wheezes, rales or rhonchi.   Extremities - No edema.   Neurologic - Alert.  No focal deficits.    Laboratory Results:    Recent Labs  Lab 07/03/18  0522 07/02/18  0555 06/29/18  0451 06/28/18  1700   SODIUM 134* 135 133* 134*   POTASSIUM 4.4 3.8 4.2 3.0*   CHLORIDE 99 98 97* 99   CO2 27 30 31 27   BUN 47* 31* 18 12   CREATININE 5.46* 4.31* 2.92* 1.94*   GLUCOSE 81 98 88 172*   ALBUMIN  --  2.5*  --  2.7*   AST  --  18  --  29   BILIRUBIN  --  0.3  --  0.2   TSH  --   --  3.716  --        Recent Labs  Lab 07/02/18  0555 06/29/18  0451 06/28/18  1800   WBC 4.8 5.6 4.1*   HGB 10.2* 12.5 11.9*   HCT 31.5* 35.9* 33.8*    178 167       CARDIAC:  No results found  ENDO:    Recent Labs  Lab 06/29/18  0451   TSH 3.716   VB12 860   ISRAEL >24.0       MICROBIOLOGY:  None    Imaging:  No results found.    ASSESSMENT/PLAN:  1. End-stage renal disease: Hemodialysis today.  2.  Serum electrolytes/acid-base disorder: Adequate  3.  Fluid status: Euvolemia  4.  Anemia: Hemoglobin at goal.  5.  Blood pressure: Reasonably controlled.  6.  Bone mineral metabolism: Well controlled.  7.  Failure to thrive/deconditioning:  confusion and frequent fall, probable seizure, on antiseizure medications per neurology. Patient will need social placement.

## 2025-06-23 NOTE — PROGRESS NOTES
No Medication Refill Protocol on file:    Medication/Dose: Suboxone, Albuterol inhaler  Patient last seen by PCP: 10/25/2022  Next office visit with PCP: None  Last Lab:8/22/2022  Last Refill: 9/22/2022    Above information requires contacting patient: No    PDMP needs to be reviewed by Provider    Sent to provider to approve or deny       [facility] Medicine Progress Note    Patient Name: Arvin Diallo  YOB: 1940  MRN: [id number]  Date of Service: 06/23/25  Time of Dictation: 11:18 AM    Assessment and Plan  1.  Acute bacterial prostatitis  2.  Bilateral lower extremity tenderness with mild swelling  Presented with generalized weakness and genitourinary symptoms without systemic infectious symptoms; initial evaluation notable for tachycardia (106) and UA with bacteria (1+), LE, WBCs (11-20); UCx NGTD.  Radiography showed bibasilar atelectasis; CT head without acute findings.  Started on ceftriaxone, transitioned to piperacillin-tazobactam for improved coverage in light of chronic suppressive cephalexin use and subsequently ciprofloxacin in light of concerns for prostatitis.  Physical exam with evidence of prostatitis.  Generalized weakness persists, precluding safe discharge.  Would likely benefit from placement upon discharge to regain strength.  Continue ciprofloxacin 500 mg p.o. twice daily (currently day 3).  Plan for total 21-day course for prostatitis.  Continue PT/OT while in house.  May benefit from inpatient rehabilitation.  Monitor intake/output.  Obtain Doppler of bilateral lower extremities to rule out DVT.    Medical Comorbidities:  1.  Hyperlipidemia: Prescribed simvastatin 40 mg nightly.  Continue.  2.  Hypertension: -157/68-77 x 24 hours.  Prescribed losartan 50 mg daily.  Continue.  3.  Type 2 diabetes mellitus  4.  Generalized anxiety disorder  5.  CVA  6.  Mild intermittent asthma: Prescribed albuterol 90 mcg/actuation 2 puffs every 4 hours as needed.  Continue.  7.  History of DVT: Prescribed apixaban 5 mg twice daily.  Continue.  8.  Pulmonary fibrosis  9.  Obstructive sleep apnea  10.  Bilateral septal neuralgia  11.  Daily persistent headache: Prescribed aspirin-acetaminophen,-caffeine 250-250-65 mg 2 tablets once daily, tramadol 50 mg twice daily.  Continue.  12.  Constipation: Prescribed docusate 200 mg  twice daily.  Continue.  13.  BPH: Prescribed finasteride 5 mg daily.  Continue.  Malnutrition Assessment:  Not on file.    Prophylaxis/General Management  DVT Prophylaxis: Apixaban  Diet: Cardiac  Bowel Regimen: Polyethylene glycol  Code Status: DNR/DNI    Disposition: Medically suitable for discharge.  Awaiting physical therapy/case management for assistance with disposition planning and placement.    Subjective  Remained persistently weak throughout the day yesterday.    Evaluated at bedside with RN and Pharm.D. during triad rounds at ~08h50.    Weakness continues to persist today, limiting mobility.  Patient concerned regarding safety returning home this week, as he would be unable to properly care for himself.  Persistent nocturia and frequency, somewhat improved.  Denies fevers, chills, chest pain, dyspnea, abdominal pain.  Reviewed diagnostic work-up x24 hours and plan of care including plan for physical therapy evaluation to determine discharge needs regarding physical therapy.  Patient queried for questions/concerns - all voiced questions/concerns addressed.     Objective  Vital Signs (x24h)  Temp:  [35.9 °C (96.6 °F)-36.6 °C (97.9 °F)] 36.6 °C (97.9 °F)  Heart Rate:  [77-97] 88  Resp:  [18] 18  BP: (109-138)/(68-86) 138/82  FiO2 (%):  [21 %-32 %] 32 %    Physical Exam (at ~08h50):  General: Alert, resting with eyes open, sitting upright in recliner at bedside.  No evident acute distress.   HEENT: Pupils equal and round, with symmetric eye movements.   Cardiovascular: Regular rate and rhythm. No murmurs, rubs, gallops.  No peripheral edema.  Pulmonary: Chest clear to auscultation bilaterally. Normal work of breathing.   Abdominal: Soft, non-tender, non-distended. Bowel sounds present.   Neurological: Moves all extremities. No evident focal neurological deficits.   Musculoskeletal:  Calves supple, non-tender.      Laboratory Studies:  Lab Results   Component Value Date    WBC 6.4 06/23/2025    HGB 15.1  06/23/2025    HCT 44.0 06/23/2025    MCV 94 06/23/2025     06/23/2025     Lab Results   Component Value Date     06/23/2025    K 4.2 06/23/2025     06/23/2025    CO2 26 06/23/2025    CREATININE 1.01 06/23/2025    BUN 17 06/23/2025    CALCIUM 9.6 06/23/2025    PHOS 2.9 03/12/2024     Diagnostic Imaging (Pertinent Findings/Impression):   CXR (06/19/25): Mild bibasilar atelectatic changes and mild bilateral pleural effusions versus pleural thickening.   CTA Chest (06/19/25): No pulmonary embolism or active disease in the chest.  Incidental findings: Mild cardiomegaly.  Basilar atelectasis.  Moderate-severe coronary atherosclerosis.  CT head (06/19/25): Age-related changes again present without acute intracranial process.    Medications:  Current Medications[1]    Medical Decision Making  Total estimated time on patient care 40 minutes.  The above was typed using dictation software and may contain typographic errors.     Chaya Zaldivar         [1]   Current Facility-Administered Medications:     acetaminophen (Tylenol) tablet 650 mg, 650 mg, oral, q4h PRN, 650 mg at 06/19/25 2045 **OR** acetaminophen (Tylenol) oral liquid 650 mg, 650 mg, oral, q4h PRN **OR** acetaminophen (Tylenol) suppository 650 mg, 650 mg, rectal, q4h PRN, Chaya Zaldivar MD    albuterol 2.5 mg /3 mL (0.083 %) nebulizer solution 3 mL, 3 mL, nebulization, BID PRN, Chaya Zaldivar MD, 3 mL at 06/23/25 0613    apixaban (Eliquis) tablet 5 mg, 5 mg, oral, BID, Chaya Zaldivar MD, 5 mg at 06/23/25 0848    aspirin-acetaminophen-caffeine (Excedrin Migraine) 250-250-65 mg per tablet 2 tablet, 2 tablet, oral, q24h, Chaya Zaldivar MD, 2 tablet at 06/22/25 1425    ciprofloxacin (Cipro) tablet 500 mg, 500 mg, oral, q12h MERY, Chaya Zaldivar MD, 500 mg at 06/23/25 0847    docusate sodium (Colace) capsule 200 mg, 200 mg, oral, BID, Chaya Zaldivar MD, 200 mg at 06/23/25 0848    finasteride (Proscar) tablet 5 mg, 5 mg, oral, Daily, Chaya THOMPSON  MD Kayley, 5 mg at 06/23/25 0847    losartan (Cozaar) tablet 50 mg, 50 mg, oral, Daily, Chaya Zaldivar MD, 50 mg at 06/23/25 0848    lubricating eye drops ophthalmic solution 1 drop, 1 drop, Both Eyes, TID, Chaya Zaldivar MD, 1 drop at 06/23/25 0847    magnesium oxide (Mag-Ox) 400 mg (241.3 mg elemental) tablet 1 tablet, 400 mg of magnesium oxide, oral, Daily, Chaya Zaldivar MD, 1 tablet at 06/23/25 0847    melatonin tablet 10 mg, 10 mg, oral, Nightly PRN, Chaya Zaldivar MD    ondansetron ODT (Zofran-ODT) disintegrating tablet 4 mg, 4 mg, oral, q8h PRN **OR** ondansetron (Zofran) injection 4 mg, 4 mg, intravenous, q8h PRN, Chaya Zaldivar MD    oxygen (O2) therapy (Peds), , inhalation, Continuous PRN - O2/gases, Chaya Zaldivar MD, 21 percent at 06/21/25 1802    oxygen (O2) therapy, , inhalation, Continuous PRN - O2/gases, Chaya Zaldivar MD, 3 L/min at 06/23/25 0613    polyethylene glycol (Glycolax, Miralax) packet 17 g, 17 g, oral, Daily, Chaya Zaldivar MD, 17 g at 06/23/25 0848    simvastatin (Zocor) tablet 40 mg, 40 mg, oral, Nightly, Chaya Zaldivar MD, 40 mg at 06/22/25 2042    traMADol (Ultram) tablet 50 mg, 50 mg, oral, BID, Chaya Zaldivar MD, 50 mg at 06/23/25 0848

## 2025-06-24 LAB — HOLD SPECIMEN: NORMAL

## 2025-06-24 PROCEDURE — 94761 N-INVAS EAR/PLS OXIMETRY MLT: CPT

## 2025-06-24 PROCEDURE — 2500000004 HC RX 250 GENERAL PHARMACY W/ HCPCS (ALT 636 FOR OP/ED)

## 2025-06-24 PROCEDURE — 2500000001 HC RX 250 WO HCPCS SELF ADMINISTERED DRUGS (ALT 637 FOR MEDICARE OP): Performed by: HOSPITALIST

## 2025-06-24 PROCEDURE — 2500000005 HC RX 250 GENERAL PHARMACY W/O HCPCS

## 2025-06-24 PROCEDURE — 1200000002 HC GENERAL ROOM WITH TELEMETRY DAILY

## 2025-06-24 PROCEDURE — 99232 SBSQ HOSP IP/OBS MODERATE 35: CPT | Performed by: HOSPITALIST

## 2025-06-24 PROCEDURE — 97110 THERAPEUTIC EXERCISES: CPT | Mod: GP,CQ

## 2025-06-24 PROCEDURE — 94640 AIRWAY INHALATION TREATMENT: CPT

## 2025-06-24 PROCEDURE — 97116 GAIT TRAINING THERAPY: CPT | Mod: GP,CQ

## 2025-06-24 PROCEDURE — 2500000001 HC RX 250 WO HCPCS SELF ADMINISTERED DRUGS (ALT 637 FOR MEDICARE OP)

## 2025-06-24 PROCEDURE — 2500000002 HC RX 250 W HCPCS SELF ADMINISTERED DRUGS (ALT 637 FOR MEDICARE OP, ALT 636 FOR OP/ED)

## 2025-06-24 RX ORDER — BISACODYL 5 MG
10 TABLET, DELAYED RELEASE (ENTERIC COATED) ORAL ONCE
Status: COMPLETED | OUTPATIENT
Start: 2025-06-24 | End: 2025-06-24

## 2025-06-24 RX ADMIN — POLYVINYL ALCOHOL, POVIDONE 1 DROP: 14; 6 SOLUTION/ DROPS OPHTHALMIC at 20:30

## 2025-06-24 RX ADMIN — Medication 3 L/MIN: at 06:19

## 2025-06-24 RX ADMIN — DOCUSATE SODIUM 200 MG: 100 CAPSULE, LIQUID FILLED ORAL at 20:29

## 2025-06-24 RX ADMIN — SIMVASTATIN 40 MG: 20 TABLET, FILM COATED ORAL at 20:30

## 2025-06-24 RX ADMIN — ALBUTEROL SULFATE 3 ML: 2.5 SOLUTION RESPIRATORY (INHALATION) at 17:54

## 2025-06-24 RX ADMIN — Medication 2 TABLET: at 13:03

## 2025-06-24 RX ADMIN — Medication 21 L/MIN: at 13:50

## 2025-06-24 RX ADMIN — BISACODYL 10 MG: 5 TABLET, COATED ORAL at 13:22

## 2025-06-24 RX ADMIN — APIXABAN 5 MG: 5 TABLET, FILM COATED ORAL at 08:50

## 2025-06-24 RX ADMIN — POLYVINYL ALCOHOL, POVIDONE 1 DROP: 14; 6 SOLUTION/ DROPS OPHTHALMIC at 13:03

## 2025-06-24 RX ADMIN — Medication 1 TABLET: at 08:50

## 2025-06-24 RX ADMIN — Medication 3 L/MIN: at 00:32

## 2025-06-24 RX ADMIN — FINASTERIDE 5 MG: 5 TABLET, FILM COATED ORAL at 08:50

## 2025-06-24 RX ADMIN — POLYVINYL ALCOHOL, POVIDONE 1 DROP: 14; 6 SOLUTION/ DROPS OPHTHALMIC at 08:50

## 2025-06-24 RX ADMIN — POLYETHYLENE GLYCOL 3350 17 G: 17 POWDER, FOR SOLUTION ORAL at 08:50

## 2025-06-24 RX ADMIN — CIPROFLOXACIN HYDROCHLORIDE 500 MG: 500 TABLET, FILM COATED ORAL at 20:31

## 2025-06-24 RX ADMIN — TRAMADOL HYDROCHLORIDE 50 MG: 50 TABLET, COATED ORAL at 20:30

## 2025-06-24 RX ADMIN — LOSARTAN POTASSIUM 50 MG: 50 TABLET, FILM COATED ORAL at 08:50

## 2025-06-24 RX ADMIN — ALBUTEROL SULFATE 3 ML: 2.5 SOLUTION RESPIRATORY (INHALATION) at 06:19

## 2025-06-24 RX ADMIN — APIXABAN 5 MG: 5 TABLET, FILM COATED ORAL at 20:30

## 2025-06-24 RX ADMIN — CIPROFLOXACIN HYDROCHLORIDE 500 MG: 500 TABLET, FILM COATED ORAL at 08:50

## 2025-06-24 RX ADMIN — TRAMADOL HYDROCHLORIDE 50 MG: 50 TABLET, COATED ORAL at 08:50

## 2025-06-24 RX ADMIN — DOCUSATE SODIUM 200 MG: 100 CAPSULE, LIQUID FILLED ORAL at 08:50

## 2025-06-24 ASSESSMENT — PAIN DESCRIPTION - DESCRIPTORS
DESCRIPTORS: ACHING

## 2025-06-24 ASSESSMENT — PAIN - FUNCTIONAL ASSESSMENT
PAIN_FUNCTIONAL_ASSESSMENT: 0-10

## 2025-06-24 ASSESSMENT — COGNITIVE AND FUNCTIONAL STATUS - GENERAL
MOVING TO AND FROM BED TO CHAIR: A LITTLE
CLIMB 3 TO 5 STEPS WITH RAILING: TOTAL
STANDING UP FROM CHAIR USING ARMS: A LITTLE
TURNING FROM BACK TO SIDE WHILE IN FLAT BAD: A LOT
WALKING IN HOSPITAL ROOM: A LITTLE
MOBILITY SCORE: 14
MOVING FROM LYING ON BACK TO SITTING ON SIDE OF FLAT BED WITH BEDRAILS: A LOT

## 2025-06-24 ASSESSMENT — PAIN SCALES - GENERAL
PAINLEVEL_OUTOF10: 5 - MODERATE PAIN
PAINLEVEL_OUTOF10: 5 - MODERATE PAIN
PAINLEVEL_OUTOF10: 0 - NO PAIN
PAINLEVEL_OUTOF10: 5 - MODERATE PAIN
PAINLEVEL_OUTOF10: 0 - NO PAIN

## 2025-06-24 ASSESSMENT — PAIN DESCRIPTION - LOCATION
LOCATION: HEAD
LOCATION: HEAD

## 2025-06-24 NOTE — PROGRESS NOTES
Per Thomas, [facility] can accept; This is patient's facility of choice.  asked [facility] auth team to submit for insurance auth. Medicare IM reviewed with patient yesterday.  - 1220: Per [facility] Auth Team, insurance auth is approved. JORGE met with patient in patient's room, and spoke with patient's dtr/Sherita on speaker phone with patient's permission. Patient and dtr in agreement with going to rehab, however, patient does not feel well enough to discharge from the hospital today. JORGE updated medical team.  - 1230: Per hospitalist, discharge will be held until tomorrow morning. Plan is for patient to discharge to [facility] when medically ready, insurance auth received today 6/24. Care Transitions to follow and assist. MARYLIN Benz

## 2025-06-24 NOTE — PROGRESS NOTES
"Physical Therapy    Physical Therapy Treatment    Patient Name: Arvin Dialol \"Marco A\"  MRN: [id number]  Department: [facility]  Room: [address] [place] [address]  Today's Date: 6/24/2025  Time Calculation  Start Time: 1316  Stop Time: 1341  Time Calculation (min): 25 min         Assessment/Plan   PT Assessment  PT Assessment Results: Decreased strength, Decreased endurance, Impaired balance, Decreased mobility, Decreased safety awareness, Pain  Rehab Prognosis: Good  Barriers to Discharge Home: No anticipated barriers  End of Session Communication: Bedside nurse  Assessment Comment: Pt. with fair tolerance with LE exercises.  Progressed reps with TE with seated breaksa needed d/t fatigue and SOB.  Pt. walked down the chen with no LOB noted.  Pt. states he doesn't understand why he is so tired.  End of Session Patient Position: Up in chair, Alarm on (call light within reach)  PT Plan  Inpatient/Swing Bed or Outpatient: Inpatient  PT Plan  Treatment/Interventions: Bed mobility, Transfer training, Gait training, Strengthening, Endurance training, Therapeutic exercise, Therapeutic activity, Home exercise program  PT Plan: Ongoing PT  PT Frequency: 3 times per week  PT Discharge Recommendations: Low intensity level of continued care  PT Recommended Transfer Status: Assist x1  PT - OK to Discharge: Yes    PT Visit Info:  PT Received On: 06/24/25     General Visit Information:   General  Referred By: Kayley  Past Medical History Relevant to Rehab: chronic headaches  Family/Caregiver Present: No  Prior to Session Communication: Bedside nurse  Patient Position Received: Up in bathroom  General Comment: Pt. agreeable to therapy.  States he feels so weak and tired.  C/o his head hurting.    Subjective   Precautions:  Precautions  Medical Precautions: Fall precautions     Date/Time Vitals Session Patient Position Pulse Resp SpO2 BP MAP (mmHg)    06/24/25 1316 --  --  --  --  98 %  --  --     06/24/25 1350 --  --  --  --  93 %  --  --             "     Objective   Pain:  Pain Assessment  Pain Assessment: 0-10  0-10 (Numeric) Pain Score: 5 - Moderate pain  Pain Location: Head  Pain Descriptors: Aching  Pain Interventions:  (nurse is aware of patient's head hurting)  Cognition:  Cognition  Orientation Level: Oriented X4  Coordination:     Postural Control:       Activity Tolerance:     Treatments:  Therapeutic Exercise  Therapeutic Exercise Performed: Yes  Therapeutic Exercise Activity 1: seated heel raises 2 x10  Therapeutic Exercise Activity 2: seated toe raises 2 x10  Therapeutic Exercise Activity 3: seated LAQ's 2 x10  Therapeutic Exercise Activity 4: seated marches, alt 2 x10  Therapeutic Exercise Activity 5: STS x5 reps    Ambulation/Gait Training  Ambulation/Gait Training Performed: Yes  Ambulation/Gait Training 1  Surface 1: Level tile  Device 1: Rollator  Gait Support Devices: Gait belt  Assistance 1: Contact guard  Quality of Gait 1: Decreased step length, Diminished heel strike  Comments/Distance (ft) 1: 160ft.    Outcome Measures:  [facility] Basic Mobility  Turning from your back to your side while in a flat bed without using bedrails: A lot  Moving from lying on your back to sitting on the side of a flat bed without using bedrails: A lot  Moving to and from bed to chair (including a wheelchair): A little  Standing up from a chair using your arms (e.g. wheelchair or bedside chair): A little  To walk in hospital room: A little  Climbing 3-5 steps with railing: Total  Basic Mobility - Total Score: 14    Education Documentation  Mobility Training, taught by Mary Rachel PTA at 6/24/2025  2:10 PM.  Learner: Patient  Readiness: Acceptance  Method: Explanation  Response: Verbalizes Understanding, Needs Reinforcement    Education Comments  No comments found.        OP EDUCATION:       Encounter Problems       Encounter Problems (Active)       PT Problem       PT Goal 1 (Progressing)       Start:  06/20/25    Expected End:  07/04/25       Arvin Diallo  will be independent with bed mobility for supine to and from sitting EOB without use of rail and bed flat           PT Goal 2 (Progressing)       Start:  06/20/25    Expected End:  07/04/25       Arvin Diallo will demonstrate good safety awareness with transfers and mobility and with use of assistive device (proper hand placement).            PT Goal 3 (Progressing)       Start:  06/20/25    Expected End:  07/04/25       Arvin Diallo will transfer sit to and from stand using least restrictive assistive device mod indep           PT Goal 4 (Progressing)       Start:  06/20/25    Expected End:  07/04/25       Arvin Diallo will ambulate 150 ft with assistive device , level surface, good balance, steady mod Indep              Pain - Adult

## 2025-06-24 NOTE — PROGRESS NOTES
Hospital Medicine Progress Note    Patient Name: Arvin Diallo  YOB: 1940  MRN: [id number]  Date of Service: 06/24/25  Time of Dictation: 12:29 PM    Assessment and Plan  1.  Acute bacterial prostatitis  2.  Bilateral lower extremity tenderness with mild swelling    Seen and examined   Clinically stable   Not feeling well today   C/o dizziness and weakness  No fever   Urine culture is negative   Cirpro PO BID   PT/OT  Plan to dc to SNF tomorrow       Medical Comorbidities:  1.  Hyperlipidemia: Prescribed simvastatin 40 mg nightly.  Continue.  2.  Hypertension: -157/68-77 x 24 hours.  Prescribed losartan 50 mg daily.  Continue.  3.  Type 2 diabetes mellitus  4.  Generalized anxiety disorder  5.  CVA  6.  Mild intermittent asthma: Prescribed albuterol 90 mcg/actuation 2 puffs every 4 hours as needed.  Continue.  7.  History of DVT: Prescribed apixaban 5 mg twice daily.  Continue.  8.  Pulmonary fibrosis  9.  Obstructive sleep apnea  10.  Bilateral septal neuralgia  11.  Daily persistent headache: Prescribed aspirin-acetaminophen,-caffeine 250-250-65 mg 2 tablets once daily, tramadol 50 mg twice daily.  Continue.  12.  Constipation: Prescribed docusate 200 mg twice daily.  Continue.  13.  BPH: Prescribed finasteride 5 mg daily.  Continue.  Malnutrition Assessment:  Not on file.    Prophylaxis/General Management  DVT Prophylaxis: Apixaban  Diet: Cardiac  Bowel Regimen: Polyethylene glycol  Code Status: DNR/DNI    Disposition: [facility] on 6/24      Subjective  Seen and examined   Clinically stable   Not feeling well   C/o dizziness and weakness  No pain       Objective  Vital Signs (x24h)  Temp:  [36.2 °C (97.2 °F)-36.8 °C (98.2 °F)] 36.6 °C (97.9 °F)  Heart Rate:  [76-88] 88  Resp:  [16-18] 18  BP: (126-133)/(75-84) 133/84  FiO2 (%):  [21 %-32 %] 32 %    Physical Exam (at ~08h50):  General: Alert, resting with eyes open, sitting upright in recliner at bedside.  No evident acute distress.   HEENT: Pupils  equal and round, with symmetric eye movements.   Cardiovascular: Regular rate and rhythm. No murmurs, rubs, gallops.  No peripheral edema.  Pulmonary: Chest clear to auscultation bilaterally. Normal work of breathing.   Abdominal: Soft, non-tender, non-distended. Bowel sounds present.   Neurological: Moves all extremities. No evident focal neurological deficits.   Musculoskeletal:  Calves supple, non-tender.      Laboratory Studies:  Lab Results   Component Value Date    WBC 6.4 06/23/2025    HGB 15.1 06/23/2025    HCT 44.0 06/23/2025    MCV 94 06/23/2025     06/23/2025     Lab Results   Component Value Date     (L) 06/23/2025    K 4.4 06/23/2025    CL 99 06/23/2025    CO2 27 06/23/2025    CREATININE 1.24 06/23/2025    BUN 18 06/23/2025    CALCIUM 9.5 06/23/2025    PHOS 2.9 03/12/2024     Diagnostic Imaging (Pertinent Findings/Impression):   CXR (06/19/25): Mild bibasilar atelectatic changes and mild bilateral pleural effusions versus pleural thickening.   CTA Chest (06/19/25): No pulmonary embolism or active disease in the chest.  Incidental findings: Mild cardiomegaly.  Basilar atelectasis.  Moderate-severe coronary atherosclerosis.  CT head (06/19/25): Age-related changes again present without acute intracranial process.    Medications:  Current Medications[1]        Zachariah Galloway MD           [1]   Current Facility-Administered Medications:     acetaminophen (Tylenol) tablet 650 mg, 650 mg, oral, q4h PRN, 650 mg at 06/19/25 2045 **OR** acetaminophen (Tylenol) oral liquid 650 mg, 650 mg, oral, q4h PRN **OR** acetaminophen (Tylenol) suppository 650 mg, 650 mg, rectal, q4h PRN, Chaya Zaldivar MD    albuterol 2.5 mg /3 mL (0.083 %) nebulizer solution 3 mL, 3 mL, nebulization, BID PRN, Chaya Zaldivar MD, 3 mL at 06/24/25 0619    apixaban (Eliquis) tablet 5 mg, 5 mg, oral, BID, Chaya Zaldivar MD, 5 mg at 06/24/25 0850    aspirin-acetaminophen-caffeine (Excedrin Migraine) 250-250-65 mg per tablet 2  tablet, 2 tablet, oral, q24h, Chaya Zaldivar MD, 2 tablet at 06/23/25 1322    bisacodyl (Dulcolax) EC tablet 10 mg, 10 mg, oral, Once, Zachariah Galloway MD    ciprofloxacin (Cipro) tablet 500 mg, 500 mg, oral, q12h MERY, Chaya Zaldivar MD, 500 mg at 06/24/25 0850    docusate sodium (Colace) capsule 200 mg, 200 mg, oral, BID, Chaya aZldivar MD, 200 mg at 06/24/25 0850    finasteride (Proscar) tablet 5 mg, 5 mg, oral, Daily, Chaya Zaldivar MD, 5 mg at 06/24/25 0850    losartan (Cozaar) tablet 50 mg, 50 mg, oral, Daily, Chaya Zaldivar MD, 50 mg at 06/24/25 0850    lubricating eye drops ophthalmic solution 1 drop, 1 drop, Both Eyes, TID, Chaya Zaldivar MD, 1 drop at 06/24/25 0850    magnesium oxide (Mag-Ox) 400 mg (241.3 mg elemental) tablet 1 tablet, 400 mg of magnesium oxide, oral, Daily, Chaya Zaldivar MD, 1 tablet at 06/24/25 0850    melatonin tablet 10 mg, 10 mg, oral, Nightly PRN, Chaya Zaldivar MD    ondansetron ODT (Zofran-ODT) disintegrating tablet 4 mg, 4 mg, oral, q8h PRN **OR** ondansetron (Zofran) injection 4 mg, 4 mg, intravenous, q8h PRN, Chaya Zladivar MD    oxygen (O2) therapy, , inhalation, Continuous PRN - O2/gases, Chaya Zaldivar MD, 3 L/min at 06/24/25 0619    polyethylene glycol (Glycolax, Miralax) packet 17 g, 17 g, oral, Daily, Chaya Zaldivar MD, 17 g at 06/24/25 0850    simvastatin (Zocor) tablet 40 mg, 40 mg, oral, Nightly, Chaya Zaldivar MD, 40 mg at 06/23/25 2050    traMADol (Ultram) tablet 50 mg, 50 mg, oral, BID, Chaya Zaldivar MD, 50 mg at 06/24/25 0828

## 2025-06-24 NOTE — CARE PLAN
The patient's goals for the shift include  have a bowel movement    The clinical goals for the shift include increased strength      Problem: Pain - Adult  Goal: Verbalizes/displays adequate comfort level or baseline comfort level  Outcome: Progressing     Problem: Safety - Adult  Goal: Free from fall injury  Outcome: Progressing     Problem: Discharge Planning  Goal: Discharge to home or other facility with appropriate resources  Outcome: Progressing     Problem: Chronic Conditions and Co-morbidities  Goal: Patient's chronic conditions and co-morbidity symptoms are monitored and maintained or improved  Outcome: Progressing     Problem: Nutrition  Goal: Nutrient intake appropriate for maintaining nutritional needs  Outcome: Progressing     Problem: Skin  Goal: Participates in plan/prevention/treatment measures  Outcome: Progressing  Flowsheets (Taken 6/24/2025 1236)  Participates in plan/prevention/treatment measures: Elevate heels  Goal: Prevent/manage excess moisture  Outcome: Progressing  Flowsheets (Taken 6/24/2025 1236)  Prevent/manage excess moisture: Moisturize dry skin  Goal: Prevent/minimize sheer/friction injuries  Outcome: Progressing  Flowsheets (Taken 6/24/2025 1236)  Prevent/minimize sheer/friction injuries:   Use pull sheet   Increase activity/out of bed for meals  Goal: Promote/optimize nutrition  Outcome: Progressing  Flowsheets (Taken 6/24/2025 1236)  Promote/optimize nutrition: Monitor/record intake including meals     Problem: Fall/Injury  Goal: Not fall by end of shift  Outcome: Progressing  Goal: Be free from injury by end of the shift  Outcome: Progressing  Goal: Verbalize understanding of personal risk factors for fall in the hospital  Outcome: Progressing  Goal: Verbalize understanding of risk factor reduction measures to prevent injury from fall in the home  Outcome: Progressing  Goal: Use assistive devices by end of the shift  Outcome: Progressing  Goal: Pace activities to prevent fatigue  by end of the shift  Outcome: Progressing     Problem: Pain  Goal: Takes deep breaths with improved pain control throughout the shift  Outcome: Progressing  Goal: Turns in bed with improved pain control throughout the shift  Outcome: Progressing  Goal: Walks with improved pain control throughout the shift  Outcome: Progressing  Goal: Performs ADL's with improved pain control throughout shift  Outcome: Progressing  Goal: Participates in PT with improved pain control throughout the shift  Outcome: Progressing  Goal: Free from opioid side effects throughout the shift  Outcome: Progressing  Goal: Free from acute confusion related to pain meds throughout the shift  Outcome: Progressing     Problem: Diabetes  Goal: Achieve decreasing blood glucose levels by end of shift  Outcome: Progressing  Goal: Increase stability of blood glucose readings by end of shift  Outcome: Progressing  Goal: Maintain electrolyte levels within acceptable range throughout shift  Outcome: Progressing  Goal: Maintain glucose levels >70mg/dl to <250mg/dl throughout shift  Outcome: Progressing  Goal: No changes in neurological exam by end of shift  Outcome: Progressing  Goal: Vital signs within normal range for age by end of shift  Outcome: Progressing  Goal: Receive DSME education by end of shift  Outcome: Progressing

## 2025-06-25 VITALS
OXYGEN SATURATION: 93 % | TEMPERATURE: 97.3 F | DIASTOLIC BLOOD PRESSURE: 83 MMHG | WEIGHT: 278.9 LBS | SYSTOLIC BLOOD PRESSURE: 122 MMHG | BODY MASS INDEX: 34.68 KG/M2 | RESPIRATION RATE: 18 BRPM | HEART RATE: 90 BPM | HEIGHT: 75 IN

## 2025-06-25 PROBLEM — N41.0 ACUTE PROSTATITIS: Status: RESOLVED | Noted: 2025-06-21 | Resolved: 2025-06-25

## 2025-06-25 LAB — SARS-COV-2 RNA RESP QL NAA+PROBE: NOT DETECTED

## 2025-06-25 PROCEDURE — 2500000004 HC RX 250 GENERAL PHARMACY W/ HCPCS (ALT 636 FOR OP/ED)

## 2025-06-25 PROCEDURE — 2500000002 HC RX 250 W HCPCS SELF ADMINISTERED DRUGS (ALT 637 FOR MEDICARE OP, ALT 636 FOR OP/ED)

## 2025-06-25 PROCEDURE — 94640 AIRWAY INHALATION TREATMENT: CPT

## 2025-06-25 PROCEDURE — 2500000001 HC RX 250 WO HCPCS SELF ADMINISTERED DRUGS (ALT 637 FOR MEDICARE OP)

## 2025-06-25 PROCEDURE — 2500000005 HC RX 250 GENERAL PHARMACY W/O HCPCS

## 2025-06-25 PROCEDURE — 94760 N-INVAS EAR/PLS OXIMETRY 1: CPT

## 2025-06-25 PROCEDURE — 99239 HOSP IP/OBS DSCHRG MGMT >30: CPT | Performed by: STUDENT IN AN ORGANIZED HEALTH CARE EDUCATION/TRAINING PROGRAM

## 2025-06-25 PROCEDURE — 87635 SARS-COV-2 COVID-19 AMP PRB: CPT | Performed by: STUDENT IN AN ORGANIZED HEALTH CARE EDUCATION/TRAINING PROGRAM

## 2025-06-25 RX ORDER — CIPROFLOXACIN 500 MG/1
500 TABLET, FILM COATED ORAL EVERY 12 HOURS SCHEDULED
Qty: 30 TABLET | Refills: 0 | Status: SHIPPED | OUTPATIENT
Start: 2025-06-25 | End: 2025-07-10

## 2025-06-25 RX ORDER — TRAMADOL HYDROCHLORIDE 50 MG/1
50 TABLET, FILM COATED ORAL 2 TIMES DAILY
Qty: 6 TABLET | Refills: 0 | Status: SHIPPED | OUTPATIENT
Start: 2025-06-25 | End: 2025-06-28

## 2025-06-25 RX ADMIN — POLYETHYLENE GLYCOL 3350 17 G: 17 POWDER, FOR SOLUTION ORAL at 09:06

## 2025-06-25 RX ADMIN — Medication 2 TABLET: at 13:40

## 2025-06-25 RX ADMIN — ALBUTEROL SULFATE 3 ML: 2.5 SOLUTION RESPIRATORY (INHALATION) at 06:33

## 2025-06-25 RX ADMIN — ALBUTEROL SULFATE 3 ML: 2.5 SOLUTION RESPIRATORY (INHALATION) at 18:33

## 2025-06-25 RX ADMIN — POLYVINYL ALCOHOL, POVIDONE 1 DROP: 14; 6 SOLUTION/ DROPS OPHTHALMIC at 09:07

## 2025-06-25 RX ADMIN — POLYVINYL ALCOHOL, POVIDONE 1 DROP: 14; 6 SOLUTION/ DROPS OPHTHALMIC at 14:26

## 2025-06-25 RX ADMIN — Medication 3 L/MIN: at 06:35

## 2025-06-25 RX ADMIN — DOCUSATE SODIUM 200 MG: 100 CAPSULE, LIQUID FILLED ORAL at 09:06

## 2025-06-25 RX ADMIN — Medication 21 PERCENT: at 18:33

## 2025-06-25 RX ADMIN — Medication 1 TABLET: at 09:07

## 2025-06-25 RX ADMIN — TRAMADOL HYDROCHLORIDE 50 MG: 50 TABLET, COATED ORAL at 09:06

## 2025-06-25 RX ADMIN — CIPROFLOXACIN HYDROCHLORIDE 500 MG: 500 TABLET, FILM COATED ORAL at 09:07

## 2025-06-25 RX ADMIN — LOSARTAN POTASSIUM 50 MG: 50 TABLET, FILM COATED ORAL at 09:06

## 2025-06-25 RX ADMIN — APIXABAN 5 MG: 5 TABLET, FILM COATED ORAL at 09:06

## 2025-06-25 RX ADMIN — FINASTERIDE 5 MG: 5 TABLET, FILM COATED ORAL at 09:07

## 2025-06-25 ASSESSMENT — COGNITIVE AND FUNCTIONAL STATUS - GENERAL
PERSONAL GROOMING: A LITTLE
DRESSING REGULAR LOWER BODY CLOTHING: A LITTLE
MOVING TO AND FROM BED TO CHAIR: A LITTLE
DAILY ACTIVITIY SCORE: 19
WALKING IN HOSPITAL ROOM: A LITTLE
MOBILITY SCORE: 19
HELP NEEDED FOR BATHING: A LITTLE
DRESSING REGULAR UPPER BODY CLOTHING: A LITTLE
TOILETING: A LITTLE
CLIMB 3 TO 5 STEPS WITH RAILING: A LOT
STANDING UP FROM CHAIR USING ARMS: A LITTLE

## 2025-06-25 ASSESSMENT — PAIN SCALES - GENERAL: PAINLEVEL_OUTOF10: 0 - NO PAIN

## 2025-06-25 ASSESSMENT — PAIN - FUNCTIONAL ASSESSMENT: PAIN_FUNCTIONAL_ASSESSMENT: 0-10

## 2025-06-25 NOTE — PROGRESS NOTES
Per medical team, patient is medically appropriate for discharge today.  met with patient at bedside to review discharge plan and Medicare IM. Patient confirmed understanding and agreement with same. SW/DSC to send updated notes and final orders to [facility] via [facility] when complete. ALFREDA/Lennie to complete HENS document.  - 1450: Nursing confirmed transport for 1700. Plan for patient is to discharge to [facility] at 1700 today. No further Care Transitions needs foreseen. Care Transitions available upon request. MARYLIN Velazquez

## 2025-06-25 NOTE — CARE PLAN
Problem: Pain - Adult  Goal: Verbalizes/displays adequate comfort level or baseline comfort level  Outcome: Progressing     Problem: Safety - Adult  Goal: Free from fall injury  Outcome: Progressing     Problem: Discharge Planning  Goal: Discharge to home or other facility with appropriate resources  Outcome: Progressing     Problem: Chronic Conditions and Co-morbidities  Goal: Patient's chronic conditions and co-morbidity symptoms are monitored and maintained or improved  Outcome: Progressing     Problem: Nutrition  Goal: Nutrient intake appropriate for maintaining nutritional needs  Outcome: Progressing     Problem: Skin  Goal: Participates in plan/prevention/treatment measures  Outcome: Progressing  Flowsheets (Taken 6/24/2025 1236 by Danika Rios, RN)  Participates in plan/prevention/treatment measures: Elevate heels  Goal: Prevent/manage excess moisture  Outcome: Progressing  Flowsheets (Taken 6/24/2025 1236 by Danika Rios, RN)  Prevent/manage excess moisture: Moisturize dry skin  Goal: Prevent/minimize sheer/friction injuries  Outcome: Progressing  Flowsheets (Taken 6/24/2025 1236 by Danika Rios, RN)  Prevent/minimize sheer/friction injuries:   Use pull sheet   Increase activity/out of bed for meals  Goal: Promote/optimize nutrition  Outcome: Progressing  Flowsheets (Taken 6/24/2025 1236 by Danika Rios, RN)  Promote/optimize nutrition: Monitor/record intake including meals     Problem: Fall/Injury  Goal: Not fall by end of shift  Outcome: Progressing  Goal: Be free from injury by end of the shift  Outcome: Progressing  Goal: Verbalize understanding of personal risk factors for fall in the hospital  Outcome: Progressing  Goal: Verbalize understanding of risk factor reduction measures to prevent injury from fall in the home  Outcome: Progressing  Goal: Use assistive devices by end of the shift  Outcome: Progressing  Goal: Pace activities to prevent fatigue by end of the  shift  Outcome: Progressing     Problem: Pain  Goal: Takes deep breaths with improved pain control throughout the shift  Outcome: Progressing  Goal: Turns in bed with improved pain control throughout the shift  Outcome: Progressing  Goal: Walks with improved pain control throughout the shift  Outcome: Progressing  Goal: Performs ADL's with improved pain control throughout shift  Outcome: Progressing  Goal: Participates in PT with improved pain control throughout the shift  Outcome: Progressing  Goal: Free from opioid side effects throughout the shift  Outcome: Progressing  Goal: Free from acute confusion related to pain meds throughout the shift  Outcome: Progressing     Problem: Diabetes  Goal: Achieve decreasing blood glucose levels by end of shift  Outcome: Progressing  Goal: Increase stability of blood glucose readings by end of shift  Outcome: Progressing  Goal: Maintain electrolyte levels within acceptable range throughout shift  Outcome: Progressing  Goal: Maintain glucose levels >70mg/dl to <250mg/dl throughout shift  Outcome: Progressing  Goal: No changes in neurological exam by end of shift  Outcome: Progressing  Goal: Vital signs within normal range for age by end of shift  Outcome: Progressing  Goal: Receive DSME education by end of shift  Outcome: Progressing   The patient's goals for the shift include      The clinical goals for the shift include ambulate to BR with minimal assitance    Over the shift, the patient did not make progress toward the following goals. Barriers to progression include . Recommendations to address these barriers include .

## 2025-06-25 NOTE — CARE PLAN
The clinical goals for the shift include increased strength while ambulating to bathroom      Problem: Pain - Adult  Goal: Verbalizes/displays adequate comfort level or baseline comfort level  Outcome: Progressing     Problem: Safety - Adult  Goal: Free from fall injury  Outcome: Progressing     Problem: Discharge Planning  Goal: Discharge to home or other facility with appropriate resources  Outcome: Progressing     Problem: Chronic Conditions and Co-morbidities  Goal: Patient's chronic conditions and co-morbidity symptoms are monitored and maintained or improved  Outcome: Progressing     Problem: Nutrition  Goal: Nutrient intake appropriate for maintaining nutritional needs  Outcome: Progressing     Problem: Skin  Goal: Participates in plan/prevention/treatment measures  Outcome: Progressing  Goal: Prevent/manage excess moisture  Outcome: Progressing  Goal: Prevent/minimize sheer/friction injuries  Outcome: Progressing  Goal: Promote/optimize nutrition  Outcome: Progressing     Problem: Fall/Injury  Goal: Not fall by end of shift  Outcome: Progressing  Goal: Be free from injury by end of the shift  Outcome: Progressing  Goal: Verbalize understanding of personal risk factors for fall in the hospital  Outcome: Progressing  Goal: Verbalize understanding of risk factor reduction measures to prevent injury from fall in the home  Outcome: Progressing  Goal: Use assistive devices by end of the shift  Outcome: Progressing  Goal: Pace activities to prevent fatigue by end of the shift  Outcome: Progressing     Problem: Pain  Goal: Takes deep breaths with improved pain control throughout the shift  Outcome: Progressing  Goal: Turns in bed with improved pain control throughout the shift  Outcome: Progressing  Goal: Walks with improved pain control throughout the shift  Outcome: Progressing  Goal: Performs ADL's with improved pain control throughout shift  Outcome: Progressing  Goal: Participates in PT with improved pain  control throughout the shift  Outcome: Progressing  Goal: Free from opioid side effects throughout the shift  Outcome: Progressing  Goal: Free from acute confusion related to pain meds throughout the shift  Outcome: Progressing     Problem: Diabetes  Goal: Achieve decreasing blood glucose levels by end of shift  Outcome: Progressing  Goal: Increase stability of blood glucose readings by end of shift  Outcome: Progressing  Goal: Maintain electrolyte levels within acceptable range throughout shift  Outcome: Progressing  Goal: Maintain glucose levels >70mg/dl to <250mg/dl throughout shift  Outcome: Progressing  Goal: No changes in neurological exam by end of shift  Outcome: Progressing  Goal: Vital signs within normal range for age by end of shift  Outcome: Progressing  Goal: Receive DSME education by end of shift  Outcome: Progressing

## 2025-06-25 NOTE — DISCHARGE INSTR - DIET
Type: Cardiac   Fat restriction: 70 gm fat   Sodium restriction: 2 - 3 grams Sodium         Patient to use: At night   Mode: CPAP   Ventilation type: Auto Titrating   Auto-titrating range: 10

## 2025-06-25 NOTE — DISCHARGE SUMMARY
Discharge Diagnosis  Acute prostatitis           Issues Requiring Follow-Up      Discharge Meds     Medication List      START taking these medications     ciprofloxacin 500 mg tablet; Commonly known as: Cipro; Take 1 tablet   (500 mg) by mouth every 12 hours for 30 doses.     CHANGE how you take these medications     simvastatin 40 mg tablet; Commonly known as: Zocor; Take 1 tablet (40   mg) by mouth early in the morning..; What changed: when to take this     CONTINUE taking these medications     albuterol 90 mcg/actuation inhaler; Commonly known as: Ventolin HFA;   Inhale 2 puffs every 4 hours if needed for wheezing or shortness of   breath.   apixaban 5 mg tablet; Commonly known as: Eliquis; Take 1 tablet (5 mg)   by mouth 2 times a day.   aspirin-acetaminophen-caffeine 250-250-65 mg tablet; Commonly known as:   Excedrin Migraine   CALCIUM 600 ORAL   docusate sodium 100 mg capsule; Commonly known as: Colace   finasteride 5 mg tablet; Commonly known as: Proscar; Take 1 tablet (5   mg) by mouth once daily.   losartan 50 mg tablet; Commonly known as: Cozaar; Take 1 tablet (50 mg)   by mouth once daily.   lubricating eye drops ophthalmic solution   magnesium oxide 400 mg (241.3 mg elemental) tablet; Commonly known as:   Mag-Ox   melatonin 10 mg tablet   Metamucil Fiber Thin 2.5 gram wafer wafer; Generic drug: psyllium husk   (with sugar)   MULTIPLE VITAMINS ORAL   oxygen gas therapy; Commonly known as: O2   traMADol 50 mg tablet; Commonly known as: Ultram     STOP taking these medications     cephalexin 500 mg capsule; Commonly known as: Keflex       Test Results Pending At Discharge  Pending Labs       Order Current Status    Lavender Top Collected (06/19/25 1228)    Light Blue Top Collected (06/19/25 1228)    PST Top Collected (06/19/25 1228)    SST TOP Collected (06/19/25 1228)    [place] Draw In process            Hospital Course   84-year-old male with past medical history of hyperlipidemia, hypertension, type 2  diabetes, generalized anxiety, CVA, asthma, history of DVT, pulmonary fibrosis, obstructive sleep apnea, constipation, BPH who was admitted with acute bacterial prostatitis and bilateral lower extremity tenderness with mild swelling.  He was treated with antibiotics for prostatitis, he is doing good, urine culture is negative, no fever or chills, no nausea or vomiting.  Has been undergoing PT OT plan to discharge to a SNF.  Medically ready.  Was complaining of some dizziness yesterday but feeling much better today.  Will follow-up with primary care..    Pertinent Physical Exam At Time of Discharge  Physical Exam  General: Alert, resting with eyes open, sitting upright in recliner at bedside.  No evident acute distress.   HEENT: Pupils equal and round, with symmetric eye movements.   Cardiovascular: Regular rate and rhythm. No murmurs, rubs, gallops.  No peripheral edema.  Pulmonary: Chest clear to auscultation bilaterally. Normal work of breathing.   Abdominal: Soft, non-tender, non-distended. Bowel sounds present.   Neurological: Moves all extremities. No evident focal neurological deficits.   Musculoskeletal:  Calves supple, non-tender.    Outpatient Follow-Up  Future Appointments   Date Time Provider Department Center   11/21/2025 10:00 AM Santiago Beckwith MD [id number] None         Sathya Cardoza MD

## 2025-06-25 NOTE — DISCHARGE INSTR - OTHER ORDERS
Device Non-Invasive Ventilation/CPAP    Rate in liters per minute: 3 LPM    FIO2 32    Keep O2 Sat Above: 94%      May use pt home CPAP/NIV    Daily am pulse at 0800 when using oxygen.

## 2025-06-30 ENCOUNTER — HOSPITAL ENCOUNTER (OUTPATIENT)
Dept: HOSPITAL 100 - LABSPEC | Age: 85
Discharge: HOME | End: 2025-06-30
Payer: MEDICARE

## 2025-06-30 DIAGNOSIS — N39.0: Primary | ICD-10-CM

## 2025-06-30 PROCEDURE — 87088 URINE BACTERIA CULTURE: CPT

## 2025-06-30 PROCEDURE — 87086 URINE CULTURE/COLONY COUNT: CPT

## 2025-07-07 ENCOUNTER — TELEPHONE (OUTPATIENT)
Age: 85
End: 2025-07-07
Payer: MEDICARE

## 2025-07-07 NOTE — TELEPHONE ENCOUNTER
PATIENT CALLED IS BACK AT THE [facility] AT [facility].   HAS BEEN PRESCRIBED TRAMADOL.   IS HAVING CONSTIPATION.    PER PATIENT WHEN HE WAS AT [facility]. CARE HE GOT SOME KIND OF CAPSULE DAILY THAT WORKED GREAT.     HAS NOT HAD A BM FOR  4 OR 5 DAYS

## 2025-07-07 NOTE — TELEPHONE ENCOUNTER
I am not sure what he was getting at the nursing home, my recommendation would be some type of a stool softener such as Colace, 1 to 2 tablets a day, also make sure that the patient is using some type of fiber like Metamucil or MiraLAX daily.

## 2025-07-15 ENCOUNTER — TELEPHONE (OUTPATIENT)
Age: 85
End: 2025-07-15
Payer: MEDICARE

## 2025-07-15 NOTE — TELEPHONE ENCOUNTER
Recommend that the patient uses fiber or Metamucil, he can increase the dosing of this that he is taking, and make sure he is drinking plenty of water, can use milk of magnesia as needed for constipation.

## 2025-07-15 NOTE — TELEPHONE ENCOUNTER
LYNNETTE KING CALLING TO REPORT PATIENT SELF ADMINISTERS HIS MEDICATIONS. RN REQUESTS SOMETHING FOR CONSTIPATION. BOWEL SOUNDS ARE NORMAL. PATIENT STATES HE FEELS BLOATED AND UNCOMFORTABLE. RN NOT SURE IF HE IS USING HIS DOCUSATE SODIUM OR METAMUCIL. PLEASE ADVISE. [phone]

## 2025-08-04 ENCOUNTER — TELEPHONE (OUTPATIENT)
Age: 85
End: 2025-08-04
Payer: MEDICARE

## 2025-08-04 NOTE — TELEPHONE ENCOUNTER
PATIENT NOTIFIED. TO HOLD SIMVASTATIN, CALL IN 1 WEEK WITH CONDITION UPDATE.       DAUGHTER, DONNELL, CALLED ON FATHER'S BEHALF TO INQUIRE IF THE PATIENT COULD DECREASE OR DISCONTINUE TO SIMVASTATIN TO HELP WITH HIS OVERALL WEAKNESS? WE CAN RETURN A CALL TO HER [phone]

## 2025-08-04 NOTE — TELEPHONE ENCOUNTER
The patient can try holding the simvastatin for the next month, but he should be on some type of a statin medication given his history of diabetes, peripheral artery disease and I believe he may have had a history of stroke in the past.  Let us know if after holding in a week if it makes any difference, if it does we can try different cholesterol medication.

## 2025-08-05 ENCOUNTER — TELEPHONE (OUTPATIENT)
Age: 85
End: 2025-08-05
Payer: MEDICARE

## 2025-08-05 NOTE — TELEPHONE ENCOUNTER
Pt DTR called asking if Pt can hold statin to see if it helps with fatigue and weakness. Neurology is asking as well per DTR. Please advise.    Dtr asked if Pt can't hold it could it be decreased? Please advise

## 2025-08-05 NOTE — TELEPHONE ENCOUNTER
There was a message over the same question yesterday from Nicole, recommendation was to hold for a month and see if it helped, he may need to be on some type of statin therapy though given his historyOf diabetes and vascular issues.

## 2025-09-04 ENCOUNTER — TELEPHONE (OUTPATIENT)
Age: 85
End: 2025-09-04
Payer: MEDICARE

## 2025-09-04 DIAGNOSIS — N39.0 RECURRENT UTI: ICD-10-CM

## 2025-09-04 DIAGNOSIS — R82.90 BAD ODOR OF URINE: Primary | ICD-10-CM

## 2025-09-05 ENCOUNTER — LAB REQUISITION (OUTPATIENT)
Dept: LAB | Facility: HOSPITAL | Age: 85
End: 2025-09-05
Payer: MEDICARE

## 2025-09-05 ENCOUNTER — TELEPHONE (OUTPATIENT)
Age: 85
End: 2025-09-05
Payer: MEDICARE

## 2025-09-05 DIAGNOSIS — R82.90 UNSPECIFIED ABNORMAL FINDINGS IN URINE: ICD-10-CM

## 2025-09-05 DIAGNOSIS — R82.90 BAD ODOR OF URINE: Primary | ICD-10-CM

## 2025-09-05 DIAGNOSIS — N39.0 RECURRENT UTI: ICD-10-CM

## 2025-09-05 LAB
APPEARANCE UR: CLEAR
BACTERIA #/AREA URNS AUTO: ABNORMAL /HPF
BILIRUB UR STRIP.AUTO-MCNC: NEGATIVE MG/DL
COLOR UR: ABNORMAL
GLUCOSE UR STRIP.AUTO-MCNC: NORMAL MG/DL
KETONES UR STRIP.AUTO-MCNC: NEGATIVE MG/DL
LEUKOCYTE ESTERASE UR QL STRIP.AUTO: ABNORMAL
NITRITE UR QL STRIP.AUTO: NEGATIVE
PH UR STRIP.AUTO: 6.5 [PH]
PROT UR STRIP.AUTO-MCNC: NEGATIVE MG/DL
RBC # UR STRIP.AUTO: NEGATIVE MG/DL
RBC #/AREA URNS AUTO: ABNORMAL /HPF
SP GR UR STRIP.AUTO: 1.01
UROBILINOGEN UR STRIP.AUTO-MCNC: NORMAL MG/DL
WBC #/AREA URNS AUTO: ABNORMAL /HPF
WBC CLUMPS #/AREA URNS AUTO: ABNORMAL /HPF

## 2025-09-05 PROCEDURE — 81001 URINALYSIS AUTO W/SCOPE: CPT | Mod: OUT | Performed by: FAMILY MEDICINE

## 2025-09-05 PROCEDURE — 87086 URINE CULTURE/COLONY COUNT: CPT | Mod: OUT,SAMLAB | Performed by: FAMILY MEDICINE

## 2025-09-07 LAB — BACTERIA UR CULT: NORMAL

## 2025-11-21 ENCOUNTER — APPOINTMENT (OUTPATIENT)
Age: 85
End: 2025-11-21
Payer: MEDICARE

## (undated) DEVICE — BAND, VASCULAR, RADIAL HEMOSTAT, LONG 27CM

## (undated) DEVICE — Device

## (undated) DEVICE — SHEATH, GLIDESHEATH, SLENDER, 6FR 10CM

## (undated) DEVICE — CATHETER, OPTITORQUE, 6FR 110CM, TIGER RADIAL 4.0

## (undated) DEVICE — ELECTRODE, QUICK-COMBO, EDGE SYSTEM, REDI PACK